# Patient Record
Sex: FEMALE | Race: WHITE | Employment: UNEMPLOYED
[De-identification: names, ages, dates, MRNs, and addresses within clinical notes are randomized per-mention and may not be internally consistent; named-entity substitution may affect disease eponyms.]

---

## 2017-02-17 ENCOUNTER — OFFICE VISIT (OUTPATIENT)
Dept: PRIMARY CARE CLINIC | Facility: CLINIC | Age: 52
End: 2017-02-17

## 2017-02-17 VITALS
TEMPERATURE: 97.7 F | BODY MASS INDEX: 35.27 KG/M2 | HEIGHT: 64 IN | SYSTOLIC BLOOD PRESSURE: 113 MMHG | DIASTOLIC BLOOD PRESSURE: 75 MMHG | WEIGHT: 206.6 LBS | RESPIRATION RATE: 20 BRPM | HEART RATE: 110 BPM

## 2017-02-17 DIAGNOSIS — M25.512 ACUTE PAIN OF LEFT SHOULDER: ICD-10-CM

## 2017-02-17 DIAGNOSIS — F41.8 DEPRESSION WITH ANXIETY: ICD-10-CM

## 2017-02-17 PROCEDURE — 99214 OFFICE O/P EST MOD 30 MIN: CPT | Performed by: NURSE PRACTITIONER

## 2017-02-17 RX ORDER — CITALOPRAM 40 MG/1
40 TABLET ORAL DAILY
Qty: 90 TABLET | Refills: 3 | Status: SHIPPED | OUTPATIENT
Start: 2017-02-17 | End: 2017-12-29 | Stop reason: SDUPTHER

## 2017-02-17 RX ORDER — PREGABALIN 100 MG/1
100 CAPSULE ORAL 3 TIMES DAILY
Qty: 270 CAPSULE | Refills: 3 | Status: SHIPPED | OUTPATIENT
Start: 2017-02-17 | End: 2017-06-09 | Stop reason: SDUPTHER

## 2017-02-17 ASSESSMENT — ENCOUNTER SYMPTOMS
BACK PAIN: 1
SORE THROAT: 0
DIARRHEA: 1
WHEEZING: 0
COUGH: 0
NAUSEA: 0
VISUAL CHANGE: 0
RHINORRHEA: 0
ABDOMINAL PAIN: 0
CONSTIPATION: 0
VOMITING: 0
SHORTNESS OF BREATH: 0

## 2017-02-17 ASSESSMENT — PATIENT HEALTH QUESTIONNAIRE - PHQ9
SUM OF ALL RESPONSES TO PHQ9 QUESTIONS 1 & 2: 2
2. FEELING DOWN, DEPRESSED OR HOPELESS: 1
SUM OF ALL RESPONSES TO PHQ QUESTIONS 1-9: 2
1. LITTLE INTEREST OR PLEASURE IN DOING THINGS: 1

## 2017-02-20 ENCOUNTER — TELEPHONE (OUTPATIENT)
Dept: PRIMARY CARE CLINIC | Facility: CLINIC | Age: 52
End: 2017-02-20

## 2017-03-24 ENCOUNTER — OFFICE VISIT (OUTPATIENT)
Dept: PRIMARY CARE CLINIC | Age: 52
End: 2017-03-24
Payer: COMMERCIAL

## 2017-03-24 VITALS
TEMPERATURE: 97 F | RESPIRATION RATE: 18 BRPM | HEIGHT: 64 IN | DIASTOLIC BLOOD PRESSURE: 62 MMHG | BODY MASS INDEX: 36.23 KG/M2 | SYSTOLIC BLOOD PRESSURE: 119 MMHG | WEIGHT: 212.2 LBS | HEART RATE: 96 BPM

## 2017-03-24 DIAGNOSIS — R07.89 COSTOCHONDRAL PAIN: ICD-10-CM

## 2017-03-24 DIAGNOSIS — J20.9 ACUTE BRONCHITIS, UNSPECIFIED ORGANISM: Primary | ICD-10-CM

## 2017-03-24 PROCEDURE — 99214 OFFICE O/P EST MOD 30 MIN: CPT | Performed by: NURSE PRACTITIONER

## 2017-03-24 RX ORDER — BENZONATATE 200 MG/1
200 CAPSULE ORAL 3 TIMES DAILY PRN
Qty: 20 CAPSULE | Refills: 0 | Status: SHIPPED | OUTPATIENT
Start: 2017-03-24 | End: 2017-03-31

## 2017-03-24 RX ORDER — AZITHROMYCIN 250 MG/1
TABLET, FILM COATED ORAL
COMMUNITY
Start: 2017-03-22 | End: 2017-04-19 | Stop reason: ALTCHOICE

## 2017-03-24 ASSESSMENT — ENCOUNTER SYMPTOMS
HEMOPTYSIS: 0
WHEEZING: 0
NAUSEA: 0
SORE THROAT: 1
TROUBLE SWALLOWING: 0
COUGH: 1
RHINORRHEA: 0
ABDOMINAL PAIN: 0
SHORTNESS OF BREATH: 0
VOMITING: 0
DIARRHEA: 0

## 2017-04-19 ENCOUNTER — OFFICE VISIT (OUTPATIENT)
Dept: PRIMARY CARE CLINIC | Age: 52
End: 2017-04-19
Payer: COMMERCIAL

## 2017-04-19 VITALS
SYSTOLIC BLOOD PRESSURE: 129 MMHG | TEMPERATURE: 96.4 F | RESPIRATION RATE: 18 BRPM | DIASTOLIC BLOOD PRESSURE: 70 MMHG | HEIGHT: 64 IN | WEIGHT: 210.1 LBS | BODY MASS INDEX: 35.87 KG/M2 | HEART RATE: 101 BPM

## 2017-04-19 DIAGNOSIS — L20.84 INTRINSIC ECZEMA: ICD-10-CM

## 2017-04-19 DIAGNOSIS — M79.2 NEUROPATHIC PAIN: ICD-10-CM

## 2017-04-19 DIAGNOSIS — E55.9 VITAMIN D DEFICIENCY: ICD-10-CM

## 2017-04-19 LAB — HBA1C MFR BLD: 11 %

## 2017-04-19 PROCEDURE — 83036 HEMOGLOBIN GLYCOSYLATED A1C: CPT | Performed by: NURSE PRACTITIONER

## 2017-04-19 PROCEDURE — 99214 OFFICE O/P EST MOD 30 MIN: CPT | Performed by: NURSE PRACTITIONER

## 2017-04-19 RX ORDER — AMMONIUM LACTATE 12 G/100G
CREAM TOPICAL
Qty: 1 BOTTLE | Refills: 4 | Status: SHIPPED | OUTPATIENT
Start: 2017-04-19 | End: 2017-05-19

## 2017-04-19 ASSESSMENT — ENCOUNTER SYMPTOMS
CONSTIPATION: 0
BLURRED VISION: 0
NAUSEA: 0
VOMITING: 0
COUGH: 0
WHEEZING: 0
DIARRHEA: 0
SHORTNESS OF BREATH: 0
ABDOMINAL PAIN: 0
SORE THROAT: 0
RHINORRHEA: 0

## 2017-04-21 ENCOUNTER — TELEPHONE (OUTPATIENT)
Dept: PRIMARY CARE CLINIC | Age: 52
End: 2017-04-21

## 2017-06-09 RX ORDER — TRAMADOL HYDROCHLORIDE 50 MG/1
TABLET ORAL
COMMUNITY
Start: 2017-05-08 | End: 2017-08-22 | Stop reason: ALTCHOICE

## 2017-06-09 RX ORDER — PREGABALIN 100 MG/1
100 CAPSULE ORAL 3 TIMES DAILY
Qty: 90 CAPSULE | Refills: 2 | Status: SHIPPED | OUTPATIENT
Start: 2017-06-09 | End: 2017-08-22 | Stop reason: ALTCHOICE

## 2017-07-11 ENCOUNTER — OFFICE VISIT (OUTPATIENT)
Dept: PRIMARY CARE CLINIC | Age: 52
End: 2017-07-11
Payer: COMMERCIAL

## 2017-07-11 VITALS
RESPIRATION RATE: 18 BRPM | BODY MASS INDEX: 36.45 KG/M2 | SYSTOLIC BLOOD PRESSURE: 122 MMHG | HEART RATE: 96 BPM | HEIGHT: 64 IN | DIASTOLIC BLOOD PRESSURE: 82 MMHG | TEMPERATURE: 97.4 F | WEIGHT: 213.5 LBS

## 2017-07-11 DIAGNOSIS — M79.2 NEUROPATHIC PAIN: ICD-10-CM

## 2017-07-11 DIAGNOSIS — M75.42 SHOULDER IMPINGEMENT, LEFT: Primary | ICD-10-CM

## 2017-07-11 PROCEDURE — 99213 OFFICE O/P EST LOW 20 MIN: CPT | Performed by: NURSE PRACTITIONER

## 2017-07-11 ASSESSMENT — ENCOUNTER SYMPTOMS
COUGH: 0
SHORTNESS OF BREATH: 0

## 2017-07-27 ENCOUNTER — TELEPHONE (OUTPATIENT)
Dept: PRIMARY CARE CLINIC | Age: 52
End: 2017-07-27

## 2017-08-03 DIAGNOSIS — M75.42 SHOULDER IMPINGEMENT, LEFT: ICD-10-CM

## 2017-08-03 DIAGNOSIS — M79.2 NEUROPATHIC PAIN: ICD-10-CM

## 2017-08-08 ENCOUNTER — TELEPHONE (OUTPATIENT)
Dept: PRIMARY CARE CLINIC | Age: 52
End: 2017-08-08

## 2017-08-22 ENCOUNTER — HOSPITAL ENCOUNTER (OUTPATIENT)
Age: 52
Discharge: HOME | End: 2017-08-22
Payer: COMMERCIAL

## 2017-08-22 ENCOUNTER — HOSPITAL ENCOUNTER (OUTPATIENT)
Dept: PREADMISSION TESTING | Age: 52
Discharge: HOME | End: 2017-08-22
Payer: COMMERCIAL

## 2017-08-22 VITALS
TEMPERATURE: 96.1 F | OXYGEN SATURATION: 100 % | DIASTOLIC BLOOD PRESSURE: 70 MMHG | SYSTOLIC BLOOD PRESSURE: 126 MMHG | HEIGHT: 64 IN | WEIGHT: 216.9 LBS | HEART RATE: 99 BPM | RESPIRATION RATE: 20 BRPM | BODY MASS INDEX: 37.03 KG/M2

## 2017-08-22 DIAGNOSIS — E55.9 VITAMIN D DEFICIENCY: ICD-10-CM

## 2017-08-22 LAB
ABSOLUTE EOS #: 0.1 K/UL (ref 0–0.4)
ABSOLUTE LYMPH #: 3.2 K/UL (ref 1–4.8)
ABSOLUTE MONO #: 0.5 K/UL (ref 0–1)
ALBUMIN SERPL-MCNC: 4.2 G/DL (ref 3.5–5.2)
ALBUMIN/GLOBULIN RATIO: 1.4 (ref 1–2.5)
ALP BLD-CCNC: 141 U/L (ref 35–104)
ALT SERPL-CCNC: 20 U/L (ref 5–33)
ANION GAP SERPL CALCULATED.3IONS-SCNC: 15 MMOL/L (ref 9–17)
AST SERPL-CCNC: 14 U/L
BASOPHILS # BLD: 0 %
BASOPHILS ABSOLUTE: 0 K/UL (ref 0–0.2)
BILIRUB SERPL-MCNC: 0.31 MG/DL (ref 0.3–1.2)
BUN BLDV-MCNC: 14 MG/DL (ref 6–20)
BUN/CREAT BLD: 32 (ref 9–20)
CALCIUM SERPL-MCNC: 9.7 MG/DL (ref 8.6–10.4)
CHLORIDE BLD-SCNC: 97 MMOL/L (ref 98–107)
CHOLESTEROL/HDL RATIO: 11.4
CHOLESTEROL: 263 MG/DL
CO2: 24 MMOL/L (ref 20–31)
CREAT SERPL-MCNC: 0.44 MG/DL (ref 0.5–0.9)
CREATININE URINE: 55.3 MG/DL (ref 28–217)
DIFFERENTIAL TYPE: ABNORMAL
EOSINOPHILS RELATIVE PERCENT: 1 %
ESTIMATED AVERAGE GLUCOSE: 243 MG/DL
GFR AFRICAN AMERICAN: >60 ML/MIN
GFR NON-AFRICAN AMERICAN: >60 ML/MIN
GFR SERPL CREATININE-BSD FRML MDRD: ABNORMAL ML/MIN/{1.73_M2}
GFR SERPL CREATININE-BSD FRML MDRD: ABNORMAL ML/MIN/{1.73_M2}
GLUCOSE BLD-MCNC: 330 MG/DL (ref 70–99)
HBA1C MFR BLD: 10.1 % (ref 4.8–5.9)
HCT VFR BLD CALC: 46.6 % (ref 36–46)
HDLC SERPL-MCNC: 23 MG/DL
HEMOGLOBIN: 15.7 G/DL (ref 12–16)
LDL CHOLESTEROL DIRECT: 159 MG/DL
LDL CHOLESTEROL: ABNORMAL MG/DL (ref 0–130)
LYMPHOCYTES # BLD: 30 %
MCH RBC QN AUTO: 29 PG (ref 26–34)
MCHC RBC AUTO-ENTMCNC: 33.8 G/DL (ref 31–37)
MCV RBC AUTO: 85.9 FL (ref 80–100)
MICROALBUMIN/CREAT 24H UR: 21 MG/L
MICROALBUMIN/CREAT UR-RTO: 38 MCG/MG CREAT
MONOCYTES # BLD: 5 %
PDW BLD-RTO: 13.5 % (ref 12.1–15.2)
PLATELET # BLD: 242 K/UL (ref 140–450)
PLATELET ESTIMATE: ABNORMAL
PMV BLD AUTO: 9.3 FL (ref 6–12)
POTASSIUM SERPL-SCNC: 4.8 MMOL/L (ref 3.7–5.3)
RBC # BLD: 5.42 M/UL (ref 4–5.2)
RBC # BLD: ABNORMAL 10*6/UL
SEG NEUTROPHILS: 64 %
SEGMENTED NEUTROPHILS ABSOLUTE COUNT: 6.9 K/UL (ref 1.8–7.7)
SODIUM BLD-SCNC: 136 MMOL/L (ref 135–144)
TOTAL PROTEIN: 7.3 G/DL (ref 6.4–8.3)
TRIGL SERPL-MCNC: 754 MG/DL
VITAMIN D 25-HYDROXY: 8.7 NG/ML (ref 30–100)
VLDLC SERPL CALC-MCNC: ABNORMAL MG/DL (ref 1–30)
WBC # BLD: 10.8 K/UL (ref 3.5–11)
WBC # BLD: ABNORMAL 10*3/UL

## 2017-08-22 PROCEDURE — 93005 ELECTROCARDIOGRAM TRACING: CPT

## 2017-08-22 PROCEDURE — 36415 COLL VENOUS BLD VENIPUNCTURE: CPT

## 2017-08-22 PROCEDURE — 82043 UR ALBUMIN QUANTITATIVE: CPT

## 2017-08-22 PROCEDURE — 80061 LIPID PANEL: CPT

## 2017-08-22 PROCEDURE — 83036 HEMOGLOBIN GLYCOSYLATED A1C: CPT

## 2017-08-22 PROCEDURE — 83721 ASSAY OF BLOOD LIPOPROTEIN: CPT

## 2017-08-22 PROCEDURE — 85025 COMPLETE CBC W/AUTO DIFF WBC: CPT

## 2017-08-22 PROCEDURE — 82306 VITAMIN D 25 HYDROXY: CPT

## 2017-08-22 PROCEDURE — 80053 COMPREHEN METABOLIC PANEL: CPT

## 2017-08-22 PROCEDURE — 82570 ASSAY OF URINE CREATININE: CPT

## 2017-08-22 RX ORDER — LOPERAMIDE HYDROCHLORIDE 2 MG/1
2 CAPSULE ORAL NIGHTLY
COMMUNITY
End: 2019-02-06 | Stop reason: ALTCHOICE

## 2017-08-22 RX ORDER — GABAPENTIN 600 MG/1
1200 TABLET ORAL NIGHTLY
Status: ON HOLD | COMMUNITY
End: 2017-11-23 | Stop reason: HOSPADM

## 2017-08-22 RX ORDER — GABAPENTIN 600 MG/1
600 TABLET ORAL 2 TIMES DAILY
COMMUNITY
End: 2018-01-24 | Stop reason: SDUPTHER

## 2017-08-22 ASSESSMENT — PAIN DESCRIPTION - LOCATION: LOCATION: ELBOW

## 2017-08-22 ASSESSMENT — PAIN DESCRIPTION - ORIENTATION: ORIENTATION: LEFT

## 2017-08-22 ASSESSMENT — PAIN SCALES - GENERAL: PAINLEVEL_OUTOF10: 5

## 2017-08-22 ASSESSMENT — PAIN DESCRIPTION - PAIN TYPE: TYPE: CHRONIC PAIN

## 2017-08-23 ENCOUNTER — TELEPHONE (OUTPATIENT)
Dept: PRIMARY CARE CLINIC | Age: 52
End: 2017-08-23

## 2017-08-23 LAB
EKG ATRIAL RATE: 91 BPM
EKG P AXIS: 55 DEGREES
EKG P-R INTERVAL: 136 MS
EKG Q-T INTERVAL: 364 MS
EKG QRS DURATION: 86 MS
EKG QTC CALCULATION (BAZETT): 447 MS
EKG R AXIS: 4 DEGREES
EKG T AXIS: 30 DEGREES
EKG VENTRICULAR RATE: 91 BPM

## 2017-08-24 ENCOUNTER — HOSPITAL ENCOUNTER (EMERGENCY)
Age: 52
Discharge: HOME | End: 2017-08-24
Attending: EMERGENCY MEDICINE
Payer: COMMERCIAL

## 2017-08-24 ENCOUNTER — APPOINTMENT (OUTPATIENT)
Dept: CT IMAGING | Age: 52
End: 2017-08-24
Payer: COMMERCIAL

## 2017-08-24 VITALS
DIASTOLIC BLOOD PRESSURE: 68 MMHG | BODY MASS INDEX: 36.88 KG/M2 | SYSTOLIC BLOOD PRESSURE: 141 MMHG | OXYGEN SATURATION: 99 % | HEART RATE: 85 BPM | RESPIRATION RATE: 18 BRPM | HEIGHT: 64 IN | TEMPERATURE: 97.4 F | WEIGHT: 216 LBS

## 2017-08-24 DIAGNOSIS — R42 VERTIGO: Primary | ICD-10-CM

## 2017-08-24 LAB
ABSOLUTE EOS #: 0.1 K/UL (ref 0–0.4)
ABSOLUTE LYMPH #: 3.7 K/UL (ref 1–4.8)
ABSOLUTE MONO #: 0.5 K/UL (ref 0–1)
ANION GAP SERPL CALCULATED.3IONS-SCNC: 19 MMOL/L (ref 9–17)
BASOPHILS # BLD: 0 %
BASOPHILS ABSOLUTE: 0 K/UL (ref 0–0.2)
BUN BLDV-MCNC: 11 MG/DL (ref 6–20)
BUN/CREAT BLD: 24 (ref 9–20)
CALCIUM SERPL-MCNC: 9.5 MG/DL (ref 8.6–10.4)
CHLORIDE BLD-SCNC: 95 MMOL/L (ref 98–107)
CO2: 24 MMOL/L (ref 20–31)
CREAT SERPL-MCNC: 0.45 MG/DL (ref 0.5–0.9)
DIFFERENTIAL TYPE: ABNORMAL
EOSINOPHILS RELATIVE PERCENT: 2 %
GFR AFRICAN AMERICAN: >60 ML/MIN
GFR NON-AFRICAN AMERICAN: >60 ML/MIN
GFR SERPL CREATININE-BSD FRML MDRD: ABNORMAL ML/MIN/{1.73_M2}
GFR SERPL CREATININE-BSD FRML MDRD: ABNORMAL ML/MIN/{1.73_M2}
GLUCOSE BLD-MCNC: 311 MG/DL (ref 70–99)
HCT VFR BLD CALC: 48.5 % (ref 36–46)
HEMOGLOBIN: 16.5 G/DL (ref 12–16)
LYMPHOCYTES # BLD: 38 %
MCH RBC QN AUTO: 29.2 PG (ref 26–34)
MCHC RBC AUTO-ENTMCNC: 33.9 G/DL (ref 31–37)
MCV RBC AUTO: 85.9 FL (ref 80–100)
MONOCYTES # BLD: 5 %
PDW BLD-RTO: 12.9 % (ref 12.1–15.2)
PLATELET # BLD: 246 K/UL (ref 140–450)
PLATELET ESTIMATE: ABNORMAL
PMV BLD AUTO: 9 FL (ref 6–12)
POTASSIUM SERPL-SCNC: 4.6 MMOL/L (ref 3.7–5.3)
RBC # BLD: 5.65 M/UL (ref 4–5.2)
RBC # BLD: ABNORMAL 10*6/UL
SEG NEUTROPHILS: 55 %
SEGMENTED NEUTROPHILS ABSOLUTE COUNT: 5.3 K/UL (ref 1.8–7.7)
SODIUM BLD-SCNC: 138 MMOL/L (ref 135–144)
WBC # BLD: 9.7 K/UL (ref 3.5–11)
WBC # BLD: ABNORMAL 10*3/UL

## 2017-08-24 PROCEDURE — 96375 TX/PRO/DX INJ NEW DRUG ADDON: CPT

## 2017-08-24 PROCEDURE — 6360000002 HC RX W HCPCS: Performed by: EMERGENCY MEDICINE

## 2017-08-24 PROCEDURE — 96376 TX/PRO/DX INJ SAME DRUG ADON: CPT

## 2017-08-24 PROCEDURE — 99284 EMERGENCY DEPT VISIT MOD MDM: CPT

## 2017-08-24 PROCEDURE — 2580000003 HC RX 258: Performed by: EMERGENCY MEDICINE

## 2017-08-24 PROCEDURE — 6370000000 HC RX 637 (ALT 250 FOR IP): Performed by: EMERGENCY MEDICINE

## 2017-08-24 PROCEDURE — 85025 COMPLETE CBC W/AUTO DIFF WBC: CPT

## 2017-08-24 PROCEDURE — 80048 BASIC METABOLIC PNL TOTAL CA: CPT

## 2017-08-24 PROCEDURE — 93005 ELECTROCARDIOGRAM TRACING: CPT

## 2017-08-24 PROCEDURE — 70450 CT HEAD/BRAIN W/O DYE: CPT

## 2017-08-24 PROCEDURE — 96374 THER/PROPH/DIAG INJ IV PUSH: CPT

## 2017-08-24 PROCEDURE — 36415 COLL VENOUS BLD VENIPUNCTURE: CPT

## 2017-08-24 RX ORDER — MECLIZINE HCL 12.5 MG/1
12.5 TABLET ORAL 3 TIMES DAILY PRN
Qty: 15 TABLET | Refills: 0 | Status: SHIPPED | OUTPATIENT
Start: 2017-08-24 | End: 2018-01-24 | Stop reason: ALTCHOICE

## 2017-08-24 RX ORDER — ONDANSETRON 2 MG/ML
4 INJECTION INTRAMUSCULAR; INTRAVENOUS ONCE
Status: COMPLETED | OUTPATIENT
Start: 2017-08-24 | End: 2017-08-24

## 2017-08-24 RX ORDER — ONDANSETRON 4 MG/1
4 TABLET, ORALLY DISINTEGRATING ORAL EVERY 8 HOURS PRN
Qty: 12 TABLET | Refills: 0 | Status: SHIPPED | OUTPATIENT
Start: 2017-08-24 | End: 2017-08-30 | Stop reason: ALTCHOICE

## 2017-08-24 RX ORDER — MECLIZINE HCL 12.5 MG/1
25 TABLET ORAL ONCE
Status: COMPLETED | OUTPATIENT
Start: 2017-08-24 | End: 2017-08-24

## 2017-08-24 RX ORDER — 0.9 % SODIUM CHLORIDE 0.9 %
500 INTRAVENOUS SOLUTION INTRAVENOUS ONCE
Status: COMPLETED | OUTPATIENT
Start: 2017-08-24 | End: 2017-08-24

## 2017-08-24 RX ORDER — PSEUDOEPHEDRINE HCL 120 MG/1
120 TABLET, FILM COATED, EXTENDED RELEASE ORAL 2 TIMES DAILY
Status: DISCONTINUED | OUTPATIENT
Start: 2017-08-24 | End: 2017-08-24 | Stop reason: HOSPADM

## 2017-08-24 RX ORDER — DIAZEPAM 5 MG/ML
5 INJECTION, SOLUTION INTRAMUSCULAR; INTRAVENOUS ONCE
Status: COMPLETED | OUTPATIENT
Start: 2017-08-24 | End: 2017-08-24

## 2017-08-24 RX ORDER — DIAZEPAM 5 MG/ML
2.5 INJECTION, SOLUTION INTRAMUSCULAR; INTRAVENOUS ONCE
Status: COMPLETED | OUTPATIENT
Start: 2017-08-24 | End: 2017-08-24

## 2017-08-24 RX ORDER — PSEUDOEPHEDRINE HCL 120 MG/1
120 TABLET, FILM COATED, EXTENDED RELEASE ORAL 2 TIMES DAILY
Status: DISCONTINUED | OUTPATIENT
Start: 2017-08-24 | End: 2017-08-24

## 2017-08-24 RX ADMIN — PSEUDOEPHEDRINE HYDROCHLORIDE 120 MG: 120 TABLET, FILM COATED, EXTENDED RELEASE ORAL at 19:29

## 2017-08-24 RX ADMIN — ONDANSETRON 4 MG: 2 INJECTION INTRAMUSCULAR; INTRAVENOUS at 16:38

## 2017-08-24 RX ADMIN — SODIUM CHLORIDE 500 ML: 9 INJECTION, SOLUTION INTRAVENOUS at 14:15

## 2017-08-24 RX ADMIN — ONDANSETRON 4 MG: 2 INJECTION INTRAMUSCULAR; INTRAVENOUS at 15:05

## 2017-08-24 RX ADMIN — DIAZEPAM 2.5 MG: 5 INJECTION, SOLUTION INTRAMUSCULAR; INTRAVENOUS at 14:18

## 2017-08-24 RX ADMIN — MECLIZINE 25 MG: 12.5 TABLET ORAL at 15:07

## 2017-08-24 RX ADMIN — DIAZEPAM 5 MG: 5 INJECTION, SOLUTION INTRAMUSCULAR; INTRAVENOUS at 16:59

## 2017-08-24 RX ADMIN — ONDANSETRON 4 MG: 2 INJECTION INTRAMUSCULAR; INTRAVENOUS at 14:16

## 2017-08-25 ENCOUNTER — OFFICE VISIT (OUTPATIENT)
Dept: PRIMARY CARE CLINIC | Age: 52
End: 2017-08-25
Payer: COMMERCIAL

## 2017-08-25 VITALS
BODY MASS INDEX: 36.39 KG/M2 | RESPIRATION RATE: 18 BRPM | HEART RATE: 86 BPM | SYSTOLIC BLOOD PRESSURE: 122 MMHG | TEMPERATURE: 97.7 F | DIASTOLIC BLOOD PRESSURE: 79 MMHG | WEIGHT: 212 LBS

## 2017-08-25 DIAGNOSIS — M54.2 NECK PAIN: ICD-10-CM

## 2017-08-25 DIAGNOSIS — R42 VERTIGO: Primary | ICD-10-CM

## 2017-08-25 DIAGNOSIS — E86.0 DEHYDRATION: ICD-10-CM

## 2017-08-25 PROCEDURE — 99213 OFFICE O/P EST LOW 20 MIN: CPT | Performed by: NURSE PRACTITIONER

## 2017-08-25 ASSESSMENT — ENCOUNTER SYMPTOMS
ABDOMINAL DISTENTION: 0
VISUAL CHANGE: 0
WHEEZING: 0
NAUSEA: 1
VOMITING: 0
SORE THROAT: 0
COUGH: 0
SHORTNESS OF BREATH: 0
PHOTOPHOBIA: 1
BACK PAIN: 1
ABDOMINAL PAIN: 0
RESPIRATORY NEGATIVE: 1
CHEST TIGHTNESS: 0

## 2017-08-28 LAB
EKG ATRIAL RATE: 82 BPM
EKG P AXIS: 60 DEGREES
EKG P-R INTERVAL: 138 MS
EKG Q-T INTERVAL: 384 MS
EKG QRS DURATION: 84 MS
EKG QTC CALCULATION (BAZETT): 448 MS
EKG R AXIS: 20 DEGREES
EKG T AXIS: 27 DEGREES
EKG VENTRICULAR RATE: 82 BPM

## 2017-08-30 ENCOUNTER — OFFICE VISIT (OUTPATIENT)
Dept: PRIMARY CARE CLINIC | Age: 52
End: 2017-08-30
Payer: COMMERCIAL

## 2017-08-30 VITALS
HEART RATE: 104 BPM | DIASTOLIC BLOOD PRESSURE: 80 MMHG | BODY MASS INDEX: 36.6 KG/M2 | WEIGHT: 214.4 LBS | HEIGHT: 64 IN | RESPIRATION RATE: 16 BRPM | TEMPERATURE: 98.1 F | SYSTOLIC BLOOD PRESSURE: 116 MMHG

## 2017-08-30 DIAGNOSIS — Z12.11 SCREENING FOR COLORECTAL CANCER: Primary | ICD-10-CM

## 2017-08-30 DIAGNOSIS — E55.9 VITAMIN D DEFICIENCY: ICD-10-CM

## 2017-08-30 DIAGNOSIS — Z12.12 SCREENING FOR COLORECTAL CANCER: Primary | ICD-10-CM

## 2017-08-30 DIAGNOSIS — E78.5 DYSLIPIDEMIA: ICD-10-CM

## 2017-08-30 DIAGNOSIS — Z12.31 ENCOUNTER FOR SCREENING MAMMOGRAM FOR BREAST CANCER: ICD-10-CM

## 2017-08-30 PROCEDURE — 99214 OFFICE O/P EST MOD 30 MIN: CPT | Performed by: NURSE PRACTITIONER

## 2017-08-30 RX ORDER — ATORVASTATIN CALCIUM 40 MG/1
40 TABLET, FILM COATED ORAL DAILY
Qty: 90 TABLET | Refills: 3 | Status: SHIPPED | OUTPATIENT
Start: 2017-08-30 | End: 2017-12-29 | Stop reason: SDUPTHER

## 2017-08-30 RX ORDER — CHOLECALCIFEROL (VITAMIN D3) 1250 MCG
1 CAPSULE ORAL WEEKLY
Qty: 8 CAPSULE | Refills: 0 | Status: SHIPPED | OUTPATIENT
Start: 2017-08-30 | End: 2018-03-27 | Stop reason: ALTCHOICE

## 2017-08-30 RX ORDER — GLIPIZIDE 5 MG/1
5 TABLET, FILM COATED, EXTENDED RELEASE ORAL DAILY
Qty: 90 TABLET | Refills: 3 | Status: SHIPPED | OUTPATIENT
Start: 2017-08-30 | End: 2017-12-29 | Stop reason: SDUPTHER

## 2017-08-30 ASSESSMENT — ENCOUNTER SYMPTOMS
VOMITING: 0
ABDOMINAL PAIN: 0
SORE THROAT: 0
CONSTIPATION: 0
DIARRHEA: 0
NAUSEA: 0
COUGH: 0
RHINORRHEA: 0
SHORTNESS OF BREATH: 0
WHEEZING: 0

## 2017-09-13 ENCOUNTER — TELEPHONE (OUTPATIENT)
Dept: PRIMARY CARE CLINIC | Age: 52
End: 2017-09-13

## 2017-09-13 DIAGNOSIS — Z12.12 SCREENING FOR COLORECTAL CANCER: ICD-10-CM

## 2017-09-13 DIAGNOSIS — Z12.11 SCREENING FOR COLORECTAL CANCER: ICD-10-CM

## 2017-09-13 LAB
CONTROL: PRESENT
HEMOCCULT STL QL: NORMAL

## 2017-09-13 PROCEDURE — 82274 ASSAY TEST FOR BLOOD FECAL: CPT | Performed by: NURSE PRACTITIONER

## 2017-10-03 ENCOUNTER — OFFICE VISIT (OUTPATIENT)
Dept: PRIMARY CARE CLINIC | Age: 52
End: 2017-10-03
Payer: MEDICAID

## 2017-10-03 VITALS
WEIGHT: 210.9 LBS | HEART RATE: 102 BPM | RESPIRATION RATE: 18 BRPM | SYSTOLIC BLOOD PRESSURE: 109 MMHG | BODY MASS INDEX: 36.2 KG/M2 | TEMPERATURE: 97.7 F | DIASTOLIC BLOOD PRESSURE: 77 MMHG

## 2017-10-03 DIAGNOSIS — J06.9 UPPER RESPIRATORY TRACT INFECTION, UNSPECIFIED TYPE: Primary | ICD-10-CM

## 2017-10-03 PROCEDURE — 99213 OFFICE O/P EST LOW 20 MIN: CPT | Performed by: NURSE PRACTITIONER

## 2017-10-03 ASSESSMENT — ENCOUNTER SYMPTOMS
WHEEZING: 0
VOICE CHANGE: 0
DIARRHEA: 0
EYES NEGATIVE: 1
STRIDOR: 0
COUGH: 1
SINUS PAIN: 0
RHINORRHEA: 1
VOMITING: 0
NAUSEA: 1
SORE THROAT: 0
SINUS PRESSURE: 0
TROUBLE SWALLOWING: 0
ABDOMINAL PAIN: 0
SWOLLEN GLANDS: 0
SHORTNESS OF BREATH: 0

## 2017-10-03 NOTE — MR AVS SNAPSHOT
type 2 diabetes, stroke, gallstones, arthritis, sleep apnea, and certain cancers. BMI is not perfect. It may overestimate body fat in athletes and people who are more muscular. Even a small weight loss (between 5 and 10 percent of your current weight) by decreasing your calorie intake and becoming more physically active will help lower your risk of developing or worsening diseases associated with obesity. Learn more at: Xfluential.uk          Instructions         Viral Respiratory Infection: Care Instructions  Your Care Instructions  Viruses are very small organisms. They grow in number after they enter your body. There are many types that cause different illnesses, such as colds and the mumps. The symptoms of a viral respiratory infection often start quickly. They include a fever, sore throat, and runny nose. You may also just not feel well. Or you may not want to eat much. Most viral respiratory infections are not serious. They usually get better with time and self-care. Antibiotics are not used to treat a viral infection. That's because antibiotics will not help cure a viral illness. In some cases, antiviral medicine can help your body fight a serious viral infection. Follow-up care is a key part of your treatment and safety. Be sure to make and go to all appointments, and call your doctor if you are having problems. It's also a good idea to know your test results and keep a list of the medicines you take. How can you care for yourself at home? · Rest as much as possible until you feel better. · Be safe with medicines. Take your medicine exactly as prescribed. Call your doctor if you think you are having a problem with your medicine. You will get more details on the specific medicine your doctor prescribes.   · Take an over-the-counter pain medicine, such as acetaminophen (Tylenol), ibuprofen (Advil, Motrin), or naproxen (Aleve), as needed for pain and fever. Read and follow all instructions on the label. Do not give aspirin to anyone younger than 20. It has been linked to Reye syndrome, a serious illness. · Drink plenty of fluids, enough so that your urine is light yellow or clear like water. Hot fluids, such as tea or soup, may help relieve congestion in your nose and throat. If you have kidney, heart, or liver disease and have to limit fluids, talk with your doctor before you increase the amount of fluids you drink. · Try to clear mucus from your lungs by breathing deeply and coughing. · Gargle with warm salt water once an hour. This can help reduce swelling and throat pain. Use 1 teaspoon of salt mixed in 1 cup of warm water. · Do not smoke or allow others to smoke around you. If you need help quitting, talk to your doctor about stop-smoking programs and medicines. These can increase your chances of quitting for good. To avoid spreading the virus  · Cough or sneeze into a tissue. Then throw the tissue away. · If you don't have a tissue, use your hand to cover your cough or sneeze. Then clean your hand. You can also cough into your sleeve. · Wash your hands often. Use soap and warm water. Wash for 15 to 20 seconds each time. · If you don't have soap and water near you, you can clean your hands with alcohol wipes or gel. When should you call for help? Call your doctor now or seek immediate medical care if:  · You have a new or higher fever. · Your fever lasts more than 48 hours. · You have trouble breathing. · You have a fever with a stiff neck or a severe headache. · You are sensitive to light. · You feel very sleepy or confused. Watch closely for changes in your health, and be sure to contact your doctor if:  · You do not get better as expected. Where can you learn more? Go to [email or web address].cisimple. org and sign in to your [employer] account.  Enter A414 in the Graymatics box to learn more Vitamin D deficiency    Uncontrolled type 2 diabetes mellitus with diabetic polyneuropathy, with long-term current use of insulin ([address]    Nuclear sclerotic cataract of left eye    Controlled type 2 diabetes mellitus with diabetic polyneuropathy, without long-term current use of insulin ([address]      Immunizations as of 10/3/2017     Name Date    Influenza Vaccine, unspecified formulation 9/2/2016    Tdap (Boostrix, Adacel) 4/28/2013      Preventive Care        Date Due    Mammograms are recommended every 2 years for low/average risk patients aged 48 - 69, and every year for high risk patients per updated national guidelines. However these guidelines can be individualized by your provider. 8/8/2017    Eye Exam By An Eye Doctor 8/11/2017    Diabetic Foot Exam 9/1/2017    Yearly Flu Vaccine (1) 11/25/2017 (Originally 9/1/2017)    Pneumococcal Vaccine - Pneumovax for adults aged 19-64 years with: chronic heart disease, chronic lung disease, diabetes mellitus, alcoholism, chronic liver disease, or cigarette smoking. (1 of 1 - PPSV23) 2/17/2018 (Originally 8/8/1986)    Pap Smear 2/25/2018 (Originally 8/8/1988)    Hemoglobin A1C (Test For Long-Term Glucose Control) 11/22/2017    Urine Check For Kidney Problems 8/22/2018    Cholesterol Screening 8/22/2018    Colon Cancer Stool Test 9/13/2018    Tetanus Combination Vaccine (2 - Td) 4/28/2023            [employer] Signup           Our records indicate that you have an active LeadPages account. You can view your After Visit Summary by going to [email or web address] and logging in with your LeadPages username and password. If you don't have a LeadPages username and password but a parent or guardian has access to your record, the parent or guardian should login with their own LeadPages username and password and access your record to view the After Visit Summary.      Additional Information

## 2017-10-03 NOTE — PATIENT INSTRUCTIONS
Viral Respiratory Infection: Care Instructions  Your Care Instructions  Viruses are very small organisms. They grow in number after they enter your body. There are many types that cause different illnesses, such as colds and the mumps. The symptoms of a viral respiratory infection often start quickly. They include a fever, sore throat, and runny nose. You may also just not feel well. Or you may not want to eat much. Most viral respiratory infections are not serious. They usually get better with time and self-care. Antibiotics are not used to treat a viral infection. That's because antibiotics will not help cure a viral illness. In some cases, antiviral medicine can help your body fight a serious viral infection. Follow-up care is a key part of your treatment and safety. Be sure to make and go to all appointments, and call your doctor if you are having problems. It's also a good idea to know your test results and keep a list of the medicines you take. How can you care for yourself at home? · Rest as much as possible until you feel better. · Be safe with medicines. Take your medicine exactly as prescribed. Call your doctor if you think you are having a problem with your medicine. You will get more details on the specific medicine your doctor prescribes. · Take an over-the-counter pain medicine, such as acetaminophen (Tylenol), ibuprofen (Advil, Motrin), or naproxen (Aleve), as needed for pain and fever. Read and follow all instructions on the label. Do not give aspirin to anyone younger than 20. It has been linked to Reye syndrome, a serious illness. · Drink plenty of fluids, enough so that your urine is light yellow or clear like water. Hot fluids, such as tea or soup, may help relieve congestion in your nose and throat. If you have kidney, heart, or liver disease and have to limit fluids, talk with your doctor before you increase the amount of fluids you drink.   · Try to clear mucus from your lungs by

## 2017-10-03 NOTE — PROGRESS NOTES
present. No rhinorrhea or sinus tenderness. Right sinus exhibits no maxillary sinus tenderness and no frontal sinus tenderness. Left sinus exhibits no maxillary sinus tenderness and no frontal sinus tenderness. Mouth/Throat: Uvula is midline, oropharynx is clear and moist and mucous membranes are normal. No trismus in the jaw. No oropharyngeal exudate, posterior oropharyngeal edema, posterior oropharyngeal erythema or tonsillar abscesses. Eyes: Conjunctivae and EOM are normal. Pupils are equal, round, and reactive to light. Right eye exhibits no discharge. Left eye exhibits no discharge. Neck: Normal range of motion. Neck supple. No tracheal deviation present. No thyromegaly present. Cardiovascular: Normal rate, regular rhythm, normal heart sounds and intact distal pulses. Exam reveals no gallop and no friction rub. No murmur heard. Pulses:       Radial pulses are 2+ on the left side. Pulmonary/Chest: Effort normal and breath sounds normal. No tachypnea. No respiratory distress. She has no decreased breath sounds. She has no wheezes. She has no rhonchi. She has no rales. Occasional dry cough in office  Breath sounds are clear to auscultation over posterior bilateral fields. Chest expansion is symmetrical with non-restricted air movement. Abdominal: Soft. Bowel sounds are normal.   Musculoskeletal: Normal range of motion. She exhibits no edema or tenderness. Lymphadenopathy:     She has no cervical adenopathy. Neurological: She is alert and oriented to person, place, and time. No cranial nerve deficit. She exhibits normal muscle tone. Coordination and gait normal.   Skin: Skin is warm and dry. No rash noted. She is not diaphoretic. No erythema. No pallor. Psychiatric: She has a normal mood and affect. Her speech is normal and behavior is normal. Judgment and thought content normal.   Nursing note and vitals reviewed. There were no vitals taken for this visit.     Assessment:     No diagnosis found. 1. Upper respiratory tract infection, unspecified type         Plan:   Lacie appears to have a viral URI. Treatment for this condition is largely supportive. Symtoms are usually present with this type of illness for approximately 5-7 days. There is no sign of bacterial infection and therefore there is no indication for antibiotics. Lacie is to increase clear fluids and rest. Saline and suctioning the nose and a vaporizer can be used if indicated. Tylenol or Motrin can be used for fever or pain if indicated and appropriate. Caregiver/patient informed today if any severe or worsening of symptoms, spikes in fever, difficulty swallowing, respiratory distress to go to ED. Caregiver/patient verbalizes understanding. Discussed exam, POCT findings, plan of care (including prescriptive and supportive as listed below) and follow-up at length with patient and or Patient. Reviewed all prescribed and recommended medications, administration and side effects. Specifically: acetaminophen, ibuprofen,  No antibiotics indicated today. Encouraged to return to [address] [place] [address] for no improvement and or worsening of symptoms. To ER or call 911 if any difficulty breathing, shortness of breath, inability to swallow, hives or temp greater than 103 degrees. Questions answered. They verbalized understanding and agreement. No Follow-up on file. No orders of the defined types were placed in this encounter. She is asked to follow-up if symptoms persist or worsen. All patient questions answered. Pt voiced understanding.       Electronically signed by Fabiana Toscano on 10/3/2017 at 2:37 PM

## 2017-10-10 ENCOUNTER — OFFICE VISIT (OUTPATIENT)
Dept: PRIMARY CARE CLINIC | Age: 52
End: 2017-10-10
Payer: MEDICAID

## 2017-10-10 VITALS
HEART RATE: 83 BPM | TEMPERATURE: 97.7 F | BODY MASS INDEX: 36.56 KG/M2 | SYSTOLIC BLOOD PRESSURE: 109 MMHG | DIASTOLIC BLOOD PRESSURE: 62 MMHG | WEIGHT: 213 LBS | RESPIRATION RATE: 22 BRPM

## 2017-10-10 DIAGNOSIS — J20.9 ACUTE BRONCHITIS, UNSPECIFIED ORGANISM: ICD-10-CM

## 2017-10-10 DIAGNOSIS — J01.40 ACUTE NON-RECURRENT PANSINUSITIS: Primary | ICD-10-CM

## 2017-10-10 PROCEDURE — 99213 OFFICE O/P EST LOW 20 MIN: CPT | Performed by: NURSE PRACTITIONER

## 2017-10-10 RX ORDER — BENZONATATE 200 MG/1
200 CAPSULE ORAL 3 TIMES DAILY PRN
Qty: 20 CAPSULE | Refills: 1 | Status: SHIPPED | OUTPATIENT
Start: 2017-10-10 | End: 2017-10-17

## 2017-10-10 RX ORDER — AMOXICILLIN AND CLAVULANATE POTASSIUM 875; 125 MG/1; MG/1
1 TABLET, FILM COATED ORAL 2 TIMES DAILY
Qty: 20 TABLET | Refills: 0 | Status: SHIPPED | OUTPATIENT
Start: 2017-10-10 | End: 2017-10-20

## 2017-10-10 ASSESSMENT — ENCOUNTER SYMPTOMS
SINUS PRESSURE: 1
DIARRHEA: 0
COUGH: 1
RHINORRHEA: 1
SHORTNESS OF BREATH: 0
WHEEZING: 0
VOMITING: 0
SORE THROAT: 0
SINUS PAIN: 1
TROUBLE SWALLOWING: 0

## 2017-10-10 NOTE — LETTER
[address] [address]  Phone: [phone]  Fax: [address] [facility], [facility]        October 10, 2017     Patient: Zenaida Morris   YOB: 1965   Date of Visit: 10/10/2017       To Whom it May Concern: It is my medical opinion that Clara Bonilla should remain out of work until 01/10/2018. If you have any questions or concerns, please don't hesitate to call.     Sincerely,         Linda Martin Might, CNP

## 2017-10-10 NOTE — LETTER
[address] [address]  Phone: [phone]  Fax: [phone] [facility], [facility]        October 10, 2017     Patient: Danette Jara   YOB: 1965   Date of Visit: 10/10/2017       To Whom It May Concern: It is my medical opinion that [facility] should remain out of work until 01/10/2017. If you have any questions or concerns, please don't hesitate to call.     Sincerely,        Nellie Ely Might, CNP

## 2017-10-10 NOTE — PROGRESS NOTES
[facility] PHYSICIANS  [facility] PRIMARY CARE [address]  Dept: [phone]  Dept Fax: [phone]    Ainsley Castellanos is a 50 y.o. female who presents to the [facility] in Care today for her medical conditions/complaints as noted below. Ainsley Castellanos is c/o of Cough (& chest congestion)      HPI:     Amanda Sampson is here today for a walk in care visit. Cough   This is a new problem. The current episode started in the past 7 days. The problem has been gradually worsening. The cough is productive of sputum. Associated symptoms include chills, nasal congestion, postnasal drip and rhinorrhea. Pertinent negatives include no chest pain, fever, rash, sore throat, shortness of breath or wheezing. Associated symptoms comments: C/o wheezing @ night, chest congestion. The symptoms are aggravated by lying down. She has tried OTC cough suppressant for the symptoms. The treatment provided mild relief. Her past medical history is significant for bronchitis, environmental allergies and pneumonia. Past Medical History:   Diagnosis Date    Arthritis     Depression     Diabetes mellitus [facility]     Diabetic neuropathy ([facility]     Esophageal reflux     Sleep apnea     DOES NOT USE A MACHINE.  DIAGNOSED > 15 YEARS AGO WITH SLEEP APNEA        Current Outpatient Prescriptions   Medication Sig Dispense Refill    amoxicillin-clavulanate (AUGMENTIN) 875-125 MG per tablet Take 1 tablet by mouth 2 times daily for 10 days 20 tablet 0    benzonatate (TESSALON) 200 MG capsule Take 1 capsule by mouth 3 times daily as needed for Cough 20 capsule 1    Cholecalciferol (VITAMIN D3) 86801 units CAPS Take 1 capsule by mouth once a week 8 capsule 0    atorvastatin (LIPITOR) 40 MG tablet Take 1 tablet by mouth daily 90 tablet 3    Insulin Degludec (TRESIBA FLEXTOUCH) 200 UNIT/ML SOPN Inject 40 Units into the skin daily 5 Pen 5    glipiZIDE (GLUCOTROL XL) 5 MG extended release tablet Take 1 tablet by mouth daily 90 tablet 3    gabapentin (NEURONTIN) 600 MG tablet Take 600 mg by mouth 2 times daily      gabapentin (NEURONTIN) 600 MG tablet Take 1,200 mg by mouth nightly       loperamide (IMODIUM) 2 MG capsule Take 2 mg by mouth nightly      metFORMIN (GLUCOPHAGE) 1000 MG tablet Take 1 tablet by mouth 2 times daily (with meals) 180 tablet 3    citalopram (CELEXA) 40 MG tablet Take 1 tablet by mouth daily (Patient taking differently: Take 40 mg by mouth nightly ) 90 tablet 3    lisinopril (ZESTRIL) 2.5 MG tablet Take 1 tablet by mouth daily (Patient taking differently: Take 2.5 mg by mouth nightly ) 90 tablet 3    esomeprazole Magnesium (NEXIUM) 20 MG PACK Take 20 mg by mouth nightly OTC       meclizine (ANTIVERT) 12.5 MG tablet Take 1 tablet by mouth 3 times daily as needed for Dizziness 15 tablet 0    AFLURIA PRESERVATIVE FREE 0.5 ML JAKE injection        No current facility-administered medications for this visit. Allergies   Allergen Reactions    Codeine Other (See Comments)     Migraine    Other Itching and Dermatitis     METAL       Subjective:      Review of Systems   Constitutional: Positive for chills. Negative for fever. HENT: Positive for congestion, postnasal drip, rhinorrhea, sinus pain and sinus pressure. Negative for sore throat and trouble swallowing. Respiratory: Positive for cough. Negative for shortness of breath and wheezing. Cardiovascular: Negative for chest pain. Gastrointestinal: Negative for diarrhea and vomiting. Skin: Negative for rash. Allergic/Immunologic: Positive for environmental allergies. Objective:     Physical Exam   Constitutional: She appears well-developed and well-nourished. She appears ill. No distress. HENT:   Right Ear: Tympanic membrane and ear canal normal. No middle ear effusion. Left Ear: Tympanic membrane and ear canal normal.  No middle ear effusion. Nose: Mucosal edema and rhinorrhea present.  Right sinus exhibits maxillary sinus tenderness and frontal sinus tenderness. Left sinus exhibits maxillary sinus tenderness and frontal sinus tenderness. Mouth/Throat: Mucous membranes are normal. Mucous membranes are not dry. Posterior oropharyngeal erythema present. Eyes: Conjunctivae are normal.   Neck: Normal range of motion. Neck supple. Cardiovascular: Normal rate, regular rhythm and normal heart sounds. Pulmonary/Chest: Effort normal and breath sounds normal. She has no wheezes. Lymphadenopathy:     She has no cervical adenopathy. Neurological: She is alert. Skin: Skin is warm and dry. No rash noted. Nursing note and vitals reviewed. /62 (Site: Left Arm, Position: Sitting, Cuff Size: Medium Adult)   Pulse 83   Temp 97.7 °F (36.5 °C) (Temporal)   Resp 22   Wt 213 lb (96.6 kg)   BMI 36.56 kg/m²     Assessment:     1. Acute non-recurrent pansinusitis  amoxicillin-clavulanate (AUGMENTIN) 875-125 MG per tablet   2. Acute bronchitis, unspecified organism  benzonatate (TESSALON) 200 MG capsule       Plan:             Discussed exam, POCT findings, plan of care (including prescriptive and supportive as listed below) and follow-up at length with patient and or Patient. Reviewed all prescribed and recommended medications, administration and side effects. Encouraged to return to [address] [place] [address] for no improvement and or worsening of symptoms. To ER or call 911 if any difficulty breathing, shortness of breath, inability to swallow, hives or temp greater than 103 degrees. Questions answered. They verbalized understanding and agreement. Return if symptoms worsen or fail to improve.     Orders Placed This Encounter   Medications    amoxicillin-clavulanate (AUGMENTIN) 875-125 MG per tablet     Sig: Take 1 tablet by mouth 2 times daily for 10 days     Dispense:  20 tablet     Refill:  0    benzonatate (TESSALON) 200 MG capsule     Sig: Take 1 capsule by mouth 3 times daily as needed for Cough

## 2017-10-25 ENCOUNTER — TELEPHONE (OUTPATIENT)
Dept: PRIMARY CARE CLINIC | Age: 52
End: 2017-10-25

## 2017-10-25 NOTE — TELEPHONE ENCOUNTER
Attempted to call patient regarding missed appt for mammogram. Informed that order was re faxed to scheduling and they will be calling her to reschedule. Instructed to call this office with any questions.

## 2017-11-05 DIAGNOSIS — E11.42 CONTROLLED TYPE 2 DIABETES MELLITUS WITH DIABETIC POLYNEUROPATHY, WITHOUT LONG-TERM CURRENT USE OF INSULIN (HCC): ICD-10-CM

## 2017-11-06 RX ORDER — LISINOPRIL 2.5 MG/1
TABLET ORAL
Qty: 90 TABLET | Refills: 3 | Status: SHIPPED | OUTPATIENT
Start: 2017-11-06 | End: 2017-12-29 | Stop reason: SDUPTHER

## 2017-11-20 ENCOUNTER — APPOINTMENT (OUTPATIENT)
Dept: GENERAL RADIOLOGY | Age: 52
DRG: 871 | End: 2017-11-20
Payer: COMMERCIAL

## 2017-11-20 ENCOUNTER — HOSPITAL ENCOUNTER (INPATIENT)
Age: 52
LOS: 3 days | Discharge: HOME | DRG: 871 | End: 2017-11-23
Attending: INTERNAL MEDICINE | Admitting: INTERNAL MEDICINE
Payer: COMMERCIAL

## 2017-11-20 ENCOUNTER — APPOINTMENT (OUTPATIENT)
Dept: CT IMAGING | Age: 52
DRG: 871 | End: 2017-11-20
Payer: COMMERCIAL

## 2017-11-20 DIAGNOSIS — J18.9 PNEUMONIA OF RIGHT UPPER LOBE DUE TO INFECTIOUS ORGANISM: Primary | ICD-10-CM

## 2017-11-20 LAB
ABSOLUTE BANDS #: 1.06 K/UL (ref 0–1)
ABSOLUTE EOS #: 0 K/UL (ref 0–0.4)
ABSOLUTE IMMATURE GRANULOCYTE: ABNORMAL K/UL (ref 0–0.3)
ABSOLUTE LYMPH #: 2.92 K/UL (ref 1–4.8)
ABSOLUTE MONO #: 1.33 K/UL (ref 0–1)
ANION GAP SERPL CALCULATED.3IONS-SCNC: 17 MMOL/L (ref 9–17)
BANDS: 4 %
BASOPHILS # BLD: 0 %
BASOPHILS ABSOLUTE: 0 K/UL (ref 0–0.2)
BNP INTERPRETATION: ABNORMAL
BUN BLDV-MCNC: 13 MG/DL (ref 6–20)
BUN/CREAT BLD: 27 (ref 9–20)
CALCIUM SERPL-MCNC: 9.2 MG/DL (ref 8.6–10.4)
CHLORIDE BLD-SCNC: 91 MMOL/L (ref 98–107)
CO2: 24 MMOL/L (ref 20–31)
CREAT SERPL-MCNC: 0.48 MG/DL (ref 0.5–0.9)
DIFFERENTIAL TYPE: ABNORMAL
EOSINOPHILS RELATIVE PERCENT: 0 %
GFR AFRICAN AMERICAN: >60 ML/MIN
GFR NON-AFRICAN AMERICAN: >60 ML/MIN
GFR SERPL CREATININE-BSD FRML MDRD: ABNORMAL ML/MIN/{1.73_M2}
GFR SERPL CREATININE-BSD FRML MDRD: ABNORMAL ML/MIN/{1.73_M2}
GLUCOSE BLD-MCNC: 251 MG/DL (ref 70–99)
HCT VFR BLD CALC: 45 % (ref 36–46)
HEMOGLOBIN: 14.6 G/DL (ref 12–16)
IMMATURE GRANULOCYTES: ABNORMAL %
INR BLD: 1.2 (ref 0.9–1.2)
LACTIC ACID, WHOLE BLOOD: NORMAL MMOL/L (ref 0.7–2.1)
LACTIC ACID: 1.8 MMOL/L (ref 0.5–2.2)
LYMPHOCYTES # BLD: 11 %
MCH RBC QN AUTO: 27.9 PG (ref 26–34)
MCHC RBC AUTO-ENTMCNC: 32.4 G/DL (ref 31–37)
MCV RBC AUTO: 85.9 FL (ref 80–100)
MONOCYTES # BLD: 5 %
MORPHOLOGY: NORMAL
PARTIAL THROMBOPLASTIN TIME: 31.8 SEC (ref 23.2–34.4)
PDW BLD-RTO: 13.7 % (ref 12.1–15.2)
PLATELET # BLD: 193 K/UL (ref 140–450)
PLATELET ESTIMATE: ABNORMAL
PMV BLD AUTO: 9.3 FL (ref 6–12)
POTASSIUM SERPL-SCNC: 4 MMOL/L (ref 3.7–5.3)
PRO-BNP: 391 PG/ML
PROTHROMBIN TIME: 12.4 SEC (ref 9.7–12.2)
RBC # BLD: 5.24 M/UL (ref 4–5.2)
RBC # BLD: ABNORMAL 10*6/UL
SEG NEUTROPHILS: 80 %
SEGMENTED NEUTROPHILS ABSOLUTE COUNT: 21.19 K/UL (ref 1.8–7.7)
SODIUM BLD-SCNC: 132 MMOL/L (ref 135–144)
TROPONIN INTERP: NORMAL
TROPONIN T: <0.03 NG/ML
WBC # BLD: 26.5 K/UL (ref 3.5–11)
WBC # BLD: ABNORMAL 10*3/UL

## 2017-11-20 PROCEDURE — 6360000002 HC RX W HCPCS: Performed by: INTERNAL MEDICINE

## 2017-11-20 PROCEDURE — 93005 ELECTROCARDIOGRAM TRACING: CPT

## 2017-11-20 PROCEDURE — 71010 XR CHEST PORTABLE: CPT | Performed by: INTERNAL MEDICINE

## 2017-11-20 PROCEDURE — 96375 TX/PRO/DX INJ NEW DRUG ADDON: CPT

## 2017-11-20 PROCEDURE — 84484 ASSAY OF TROPONIN QUANT: CPT

## 2017-11-20 PROCEDURE — 71275 CT ANGIOGRAPHY CHEST: CPT

## 2017-11-20 PROCEDURE — 6370000000 HC RX 637 (ALT 250 FOR IP): Performed by: INTERNAL MEDICINE

## 2017-11-20 PROCEDURE — 2580000003 HC RX 258: Performed by: PHYSICIAN ASSISTANT

## 2017-11-20 PROCEDURE — 36415 COLL VENOUS BLD VENIPUNCTURE: CPT

## 2017-11-20 PROCEDURE — 80048 BASIC METABOLIC PNL TOTAL CA: CPT

## 2017-11-20 PROCEDURE — 1200000000 HC SEMI PRIVATE

## 2017-11-20 PROCEDURE — 99285 EMERGENCY DEPT VISIT HI MDM: CPT

## 2017-11-20 PROCEDURE — 6360000004 HC RX CONTRAST MEDICATION: Performed by: PHYSICIAN ASSISTANT

## 2017-11-20 PROCEDURE — 96365 THER/PROPH/DIAG IV INF INIT: CPT

## 2017-11-20 PROCEDURE — 83605 ASSAY OF LACTIC ACID: CPT

## 2017-11-20 PROCEDURE — 85025 COMPLETE CBC W/AUTO DIFF WBC: CPT

## 2017-11-20 PROCEDURE — 6370000000 HC RX 637 (ALT 250 FOR IP): Performed by: PHYSICIAN ASSISTANT

## 2017-11-20 PROCEDURE — 83880 ASSAY OF NATRIURETIC PEPTIDE: CPT

## 2017-11-20 PROCEDURE — 2580000003 HC RX 258: Performed by: INTERNAL MEDICINE

## 2017-11-20 PROCEDURE — 71010 XR CHEST PORTABLE: CPT

## 2017-11-20 PROCEDURE — 87040 BLOOD CULTURE FOR BACTERIA: CPT

## 2017-11-20 PROCEDURE — 85730 THROMBOPLASTIN TIME PARTIAL: CPT

## 2017-11-20 PROCEDURE — 96376 TX/PRO/DX INJ SAME DRUG ADON: CPT

## 2017-11-20 PROCEDURE — 6360000002 HC RX W HCPCS: Performed by: PHYSICIAN ASSISTANT

## 2017-11-20 PROCEDURE — 94640 AIRWAY INHALATION TREATMENT: CPT

## 2017-11-20 PROCEDURE — 85610 PROTHROMBIN TIME: CPT

## 2017-11-20 RX ORDER — MORPHINE SULFATE 2 MG/ML
2 INJECTION, SOLUTION INTRAMUSCULAR; INTRAVENOUS ONCE
Status: COMPLETED | OUTPATIENT
Start: 2017-11-20 | End: 2017-11-20

## 2017-11-20 RX ORDER — CITALOPRAM 20 MG/1
40 TABLET ORAL NIGHTLY
Status: DISCONTINUED | OUTPATIENT
Start: 2017-11-20 | End: 2017-11-23 | Stop reason: HOSPADM

## 2017-11-20 RX ORDER — SODIUM CHLORIDE 0.9 % (FLUSH) 0.9 %
10 SYRINGE (ML) INJECTION EVERY 12 HOURS SCHEDULED
Status: DISCONTINUED | OUTPATIENT
Start: 2017-11-20 | End: 2017-11-23 | Stop reason: HOSPADM

## 2017-11-20 RX ORDER — ESOMEPRAZOLE MAGNESIUM 20 MG/1
20 FOR SUSPENSION ORAL NIGHTLY
Status: DISCONTINUED | OUTPATIENT
Start: 2017-11-20 | End: 2017-11-20

## 2017-11-20 RX ORDER — LISINOPRIL 2.5 MG/1
2.5 TABLET ORAL DAILY
Status: DISCONTINUED | OUTPATIENT
Start: 2017-11-21 | End: 2017-11-23 | Stop reason: HOSPADM

## 2017-11-20 RX ORDER — IPRATROPIUM BROMIDE AND ALBUTEROL SULFATE 2.5; .5 MG/3ML; MG/3ML
1 SOLUTION RESPIRATORY (INHALATION) ONCE
Status: COMPLETED | OUTPATIENT
Start: 2017-11-20 | End: 2017-11-20

## 2017-11-20 RX ORDER — IPRATROPIUM BROMIDE AND ALBUTEROL SULFATE 2.5; .5 MG/3ML; MG/3ML
1 SOLUTION RESPIRATORY (INHALATION)
Status: DISCONTINUED | OUTPATIENT
Start: 2017-11-21 | End: 2017-11-20

## 2017-11-20 RX ORDER — KETOROLAC TROMETHAMINE 15 MG/ML
15 INJECTION, SOLUTION INTRAMUSCULAR; INTRAVENOUS ONCE
Status: COMPLETED | OUTPATIENT
Start: 2017-11-20 | End: 2017-11-20

## 2017-11-20 RX ORDER — ACETAMINOPHEN 325 MG/1
650 TABLET ORAL EVERY 4 HOURS PRN
Status: DISCONTINUED | OUTPATIENT
Start: 2017-11-20 | End: 2017-11-23 | Stop reason: HOSPADM

## 2017-11-20 RX ORDER — ONDANSETRON 2 MG/ML
4 INJECTION INTRAMUSCULAR; INTRAVENOUS EVERY 6 HOURS PRN
Status: DISCONTINUED | OUTPATIENT
Start: 2017-11-20 | End: 2017-11-23 | Stop reason: HOSPADM

## 2017-11-20 RX ORDER — ONDANSETRON 2 MG/ML
4 INJECTION INTRAMUSCULAR; INTRAVENOUS ONCE
Status: COMPLETED | OUTPATIENT
Start: 2017-11-20 | End: 2017-11-20

## 2017-11-20 RX ORDER — 0.9 % SODIUM CHLORIDE 0.9 %
1000 INTRAVENOUS SOLUTION INTRAVENOUS ONCE
Status: COMPLETED | OUTPATIENT
Start: 2017-11-20 | End: 2017-11-20

## 2017-11-20 RX ORDER — SODIUM CHLORIDE 0.9 % (FLUSH) 0.9 %
10 SYRINGE (ML) INJECTION PRN
Status: DISCONTINUED | OUTPATIENT
Start: 2017-11-20 | End: 2017-11-23 | Stop reason: HOSPADM

## 2017-11-20 RX ORDER — ALBUTEROL SULFATE 2.5 MG/3ML
2.5 SOLUTION RESPIRATORY (INHALATION) EVERY 4 HOURS PRN
Status: DISCONTINUED | OUTPATIENT
Start: 2017-11-20 | End: 2017-11-23 | Stop reason: HOSPADM

## 2017-11-20 RX ORDER — ACETAMINOPHEN 500 MG
1000 TABLET ORAL ONCE
Status: COMPLETED | OUTPATIENT
Start: 2017-11-20 | End: 2017-11-20

## 2017-11-20 RX ORDER — GABAPENTIN 300 MG/1
600 CAPSULE ORAL 2 TIMES DAILY
Status: DISCONTINUED | OUTPATIENT
Start: 2017-11-21 | End: 2017-11-23 | Stop reason: HOSPADM

## 2017-11-20 RX ORDER — PANTOPRAZOLE SODIUM 40 MG/1
40 TABLET, DELAYED RELEASE ORAL NIGHTLY
Status: DISCONTINUED | OUTPATIENT
Start: 2017-11-20 | End: 2017-11-23 | Stop reason: HOSPADM

## 2017-11-20 RX ORDER — IPRATROPIUM BROMIDE AND ALBUTEROL SULFATE 2.5; .5 MG/3ML; MG/3ML
1 SOLUTION RESPIRATORY (INHALATION) 4 TIMES DAILY
Status: DISCONTINUED | OUTPATIENT
Start: 2017-11-21 | End: 2017-11-22

## 2017-11-20 RX ORDER — ATORVASTATIN CALCIUM 40 MG/1
40 TABLET, FILM COATED ORAL DAILY
Status: DISCONTINUED | OUTPATIENT
Start: 2017-11-21 | End: 2017-11-23 | Stop reason: HOSPADM

## 2017-11-20 RX ORDER — GLIPIZIDE 5 MG/1
5 TABLET, FILM COATED, EXTENDED RELEASE ORAL DAILY
Status: DISCONTINUED | OUTPATIENT
Start: 2017-11-21 | End: 2017-11-21 | Stop reason: CLARIF

## 2017-11-20 RX ORDER — SODIUM CHLORIDE 9 MG/ML
INJECTION, SOLUTION INTRAVENOUS CONTINUOUS
Status: DISCONTINUED | OUTPATIENT
Start: 2017-11-20 | End: 2017-11-23 | Stop reason: HOSPADM

## 2017-11-20 RX ORDER — CEFTRIAXONE 1 G/1
1 INJECTION, POWDER, FOR SOLUTION INTRAMUSCULAR; INTRAVENOUS ONCE
Status: DISCONTINUED | OUTPATIENT
Start: 2017-11-20 | End: 2017-11-20

## 2017-11-20 RX ORDER — OXYCODONE HYDROCHLORIDE AND ACETAMINOPHEN 5; 325 MG/1; MG/1
1 TABLET ORAL EVERY 4 HOURS PRN
Status: DISCONTINUED | OUTPATIENT
Start: 2017-11-20 | End: 2017-11-23 | Stop reason: HOSPADM

## 2017-11-20 RX ORDER — GABAPENTIN 300 MG/1
1200 CAPSULE ORAL NIGHTLY
Status: DISCONTINUED | OUTPATIENT
Start: 2017-11-20 | End: 2017-11-23 | Stop reason: HOSPADM

## 2017-11-20 RX ADMIN — CITALOPRAM HYDROBROMIDE 40 MG: 20 TABLET ORAL at 22:39

## 2017-11-20 RX ADMIN — ACETAMINOPHEN 1000 MG: 500 TABLET ORAL at 18:34

## 2017-11-20 RX ADMIN — ONDANSETRON 4 MG: 2 INJECTION INTRAMUSCULAR; INTRAVENOUS at 18:21

## 2017-11-20 RX ADMIN — SODIUM CHLORIDE 1000 ML: 9 INJECTION, SOLUTION INTRAVENOUS at 18:43

## 2017-11-20 RX ADMIN — MORPHINE SULFATE 2 MG: 2 INJECTION, SOLUTION INTRAMUSCULAR; INTRAVENOUS at 21:48

## 2017-11-20 RX ADMIN — SODIUM CHLORIDE 1000 ML: 9 INJECTION, SOLUTION INTRAVENOUS at 21:10

## 2017-11-20 RX ADMIN — IPRATROPIUM BROMIDE AND ALBUTEROL SULFATE 1 AMPULE: .5; 3 SOLUTION RESPIRATORY (INHALATION) at 20:28

## 2017-11-20 RX ADMIN — MORPHINE SULFATE 2 MG: 2 INJECTION, SOLUTION INTRAMUSCULAR; INTRAVENOUS at 18:33

## 2017-11-20 RX ADMIN — SODIUM CHLORIDE: 9 INJECTION, SOLUTION INTRAVENOUS at 22:33

## 2017-11-20 RX ADMIN — CEFTRIAXONE 1 G: 1 INJECTION, POWDER, FOR SOLUTION INTRAMUSCULAR; INTRAVENOUS at 19:32

## 2017-11-20 RX ADMIN — PANTOPRAZOLE SODIUM 40 MG: 40 TABLET, DELAYED RELEASE ORAL at 22:39

## 2017-11-20 RX ADMIN — AZITHROMYCIN MONOHYDRATE 500 MG: 500 INJECTION, POWDER, LYOPHILIZED, FOR SOLUTION INTRAVENOUS at 19:33

## 2017-11-20 RX ADMIN — KETOROLAC TROMETHAMINE 15 MG: 15 INJECTION, SOLUTION INTRAMUSCULAR; INTRAVENOUS at 22:57

## 2017-11-20 RX ADMIN — GABAPENTIN 1200 MG: 300 CAPSULE ORAL at 22:39

## 2017-11-20 RX ADMIN — IOPAMIDOL 100 ML: 612 INJECTION, SOLUTION INTRAVENOUS at 19:02

## 2017-11-20 ASSESSMENT — PAIN SCALES - GENERAL
PAINLEVEL_OUTOF10: 5
PAINLEVEL_OUTOF10: 2
PAINLEVEL_OUTOF10: 9
PAINLEVEL_OUTOF10: 4
PAINLEVEL_OUTOF10: 7

## 2017-11-20 ASSESSMENT — PAIN DESCRIPTION - ORIENTATION: ORIENTATION: RIGHT;UPPER

## 2017-11-20 ASSESSMENT — PAIN DESCRIPTION - PAIN TYPE: TYPE: ACUTE PAIN

## 2017-11-20 ASSESSMENT — ENCOUNTER SYMPTOMS
NAUSEA: 0
CHEST TIGHTNESS: 0
DIARRHEA: 0
ABDOMINAL PAIN: 0
COUGH: 1
BLOOD IN STOOL: 0
RHINORRHEA: 0
SHORTNESS OF BREATH: 0
CONSTIPATION: 0
WHEEZING: 0
EYE REDNESS: 0
SORE THROAT: 0
BACK PAIN: 0
EYE DISCHARGE: 0
VOMITING: 0

## 2017-11-20 ASSESSMENT — PAIN DESCRIPTION - LOCATION
LOCATION: BACK
LOCATION: CHEST

## 2017-11-20 ASSESSMENT — PAIN DESCRIPTION - DESCRIPTORS: DESCRIPTORS: CONSTANT

## 2017-11-21 PROBLEM — A41.9 SEPSIS DUE TO PNEUMONIA (HCC): Status: ACTIVE | Noted: 2017-11-21

## 2017-11-21 PROBLEM — J18.9 SEPSIS DUE TO PNEUMONIA (HCC): Status: ACTIVE | Noted: 2017-11-21

## 2017-11-21 PROBLEM — G47.33 OBSTRUCTIVE SLEEP APNEA: Chronic | Status: ACTIVE | Noted: 2017-11-21

## 2017-11-21 LAB
ABSOLUTE EOS #: 0 K/UL (ref 0–0.4)
ABSOLUTE IMMATURE GRANULOCYTE: ABNORMAL K/UL (ref 0–0.3)
ABSOLUTE LYMPH #: 4.2 K/UL (ref 1–4.8)
ABSOLUTE MONO #: 1.2 K/UL (ref 0–1)
ANION GAP SERPL CALCULATED.3IONS-SCNC: 11 MMOL/L (ref 9–17)
BASOPHILS # BLD: 0 %
BASOPHILS ABSOLUTE: 0 K/UL (ref 0–0.2)
BUN BLDV-MCNC: 16 MG/DL (ref 6–20)
BUN/CREAT BLD: 36 (ref 9–20)
CALCIUM SERPL-MCNC: 8.8 MG/DL (ref 8.6–10.4)
CHLORIDE BLD-SCNC: 97 MMOL/L (ref 98–107)
CO2: 26 MMOL/L (ref 20–31)
CREAT SERPL-MCNC: 0.45 MG/DL (ref 0.5–0.9)
DIFFERENTIAL TYPE: ABNORMAL
EKG ATRIAL RATE: 101 BPM
EKG ATRIAL RATE: 111 BPM
EKG P AXIS: 59 DEGREES
EKG P AXIS: 65 DEGREES
EKG P-R INTERVAL: 134 MS
EKG P-R INTERVAL: 136 MS
EKG Q-T INTERVAL: 338 MS
EKG Q-T INTERVAL: 348 MS
EKG QRS DURATION: 88 MS
EKG QRS DURATION: 94 MS
EKG QTC CALCULATION (BAZETT): 451 MS
EKG QTC CALCULATION (BAZETT): 459 MS
EKG R AXIS: 18 DEGREES
EKG R AXIS: 33 DEGREES
EKG T AXIS: 39 DEGREES
EKG T AXIS: 39 DEGREES
EKG VENTRICULAR RATE: 101 BPM
EKG VENTRICULAR RATE: 111 BPM
EOSINOPHILS RELATIVE PERCENT: 0 %
ESTIMATED AVERAGE GLUCOSE: 220 MG/DL
GFR AFRICAN AMERICAN: >60 ML/MIN
GFR NON-AFRICAN AMERICAN: >60 ML/MIN
GFR SERPL CREATININE-BSD FRML MDRD: ABNORMAL ML/MIN/{1.73_M2}
GFR SERPL CREATININE-BSD FRML MDRD: ABNORMAL ML/MIN/{1.73_M2}
GLUCOSE BLD-MCNC: 173 MG/DL (ref 70–99)
GLUCOSE BLD-MCNC: 219 MG/DL (ref 74–100)
GLUCOSE BLD-MCNC: 239 MG/DL (ref 74–100)
GLUCOSE BLD-MCNC: 301 MG/DL (ref 74–100)
HBA1C MFR BLD: 9.3 % (ref 4.8–5.9)
HCT VFR BLD CALC: 36.3 % (ref 36–46)
HEMOGLOBIN: 12 G/DL (ref 12–16)
IMMATURE GRANULOCYTES: ABNORMAL %
LYMPHOCYTES # BLD: 21 %
MCH RBC QN AUTO: 28.5 PG (ref 26–34)
MCHC RBC AUTO-ENTMCNC: 33.1 G/DL (ref 31–37)
MCV RBC AUTO: 86.1 FL (ref 80–100)
MONOCYTES # BLD: 6 %
MORPHOLOGY: ABNORMAL
PDW BLD-RTO: 14.1 % (ref 12.1–15.2)
PLATELET # BLD: 174 K/UL (ref 140–450)
PLATELET ESTIMATE: ABNORMAL
PMV BLD AUTO: 9.6 FL (ref 6–12)
POTASSIUM SERPL-SCNC: 4.2 MMOL/L (ref 3.7–5.3)
RBC # BLD: 4.22 M/UL (ref 4–5.2)
RBC # BLD: ABNORMAL 10*6/UL
SEG NEUTROPHILS: 73 %
SEGMENTED NEUTROPHILS ABSOLUTE COUNT: 14.6 K/UL (ref 1.8–7.7)
SODIUM BLD-SCNC: 134 MMOL/L (ref 135–144)
WBC # BLD: 20 K/UL (ref 3.5–11)
WBC # BLD: ABNORMAL 10*3/UL

## 2017-11-21 PROCEDURE — 94760 N-INVAS EAR/PLS OXIMETRY 1: CPT

## 2017-11-21 PROCEDURE — 6370000000 HC RX 637 (ALT 250 FOR IP): Performed by: INTERNAL MEDICINE

## 2017-11-21 PROCEDURE — 83036 HEMOGLOBIN GLYCOSYLATED A1C: CPT

## 2017-11-21 PROCEDURE — 80048 BASIC METABOLIC PNL TOTAL CA: CPT

## 2017-11-21 PROCEDURE — 94668 MNPJ CHEST WALL SBSQ: CPT

## 2017-11-21 PROCEDURE — 94761 N-INVAS EAR/PLS OXIMETRY MLT: CPT

## 2017-11-21 PROCEDURE — 94640 AIRWAY INHALATION TREATMENT: CPT

## 2017-11-21 PROCEDURE — 93005 ELECTROCARDIOGRAM TRACING: CPT

## 2017-11-21 PROCEDURE — 1200000000 HC SEMI PRIVATE

## 2017-11-21 PROCEDURE — 82947 ASSAY GLUCOSE BLOOD QUANT: CPT

## 2017-11-21 PROCEDURE — 94667 MNPJ CHEST WALL 1ST: CPT

## 2017-11-21 PROCEDURE — 6360000002 HC RX W HCPCS: Performed by: INTERNAL MEDICINE

## 2017-11-21 PROCEDURE — 94664 DEMO&/EVAL PT USE INHALER: CPT

## 2017-11-21 PROCEDURE — 36415 COLL VENOUS BLD VENIPUNCTURE: CPT

## 2017-11-21 PROCEDURE — 85025 COMPLETE CBC W/AUTO DIFF WBC: CPT

## 2017-11-21 PROCEDURE — 2580000003 HC RX 258: Performed by: INTERNAL MEDICINE

## 2017-11-21 RX ORDER — DEXTROSE MONOHYDRATE 25 G/50ML
12.5 INJECTION, SOLUTION INTRAVENOUS PRN
Status: DISCONTINUED | OUTPATIENT
Start: 2017-11-21 | End: 2017-11-23 | Stop reason: HOSPADM

## 2017-11-21 RX ORDER — NICOTINE POLACRILEX 4 MG
15 LOZENGE BUCCAL PRN
Status: DISCONTINUED | OUTPATIENT
Start: 2017-11-21 | End: 2017-11-23 | Stop reason: HOSPADM

## 2017-11-21 RX ORDER — DEXTROSE MONOHYDRATE 50 MG/ML
100 INJECTION, SOLUTION INTRAVENOUS PRN
Status: DISCONTINUED | OUTPATIENT
Start: 2017-11-21 | End: 2017-11-23 | Stop reason: HOSPADM

## 2017-11-21 RX ORDER — GLIPIZIDE 5 MG/1
5 TABLET ORAL
Status: DISCONTINUED | OUTPATIENT
Start: 2017-11-21 | End: 2017-11-23 | Stop reason: HOSPADM

## 2017-11-21 RX ADMIN — CEFTRIAXONE 1 G: 1 INJECTION, POWDER, FOR SOLUTION INTRAMUSCULAR; INTRAVENOUS at 19:24

## 2017-11-21 RX ADMIN — ATORVASTATIN CALCIUM 40 MG: 40 TABLET, FILM COATED ORAL at 09:25

## 2017-11-21 RX ADMIN — OXYCODONE HYDROCHLORIDE AND ACETAMINOPHEN 1 TABLET: 5; 325 TABLET ORAL at 08:25

## 2017-11-21 RX ADMIN — IPRATROPIUM BROMIDE AND ALBUTEROL SULFATE 1 AMPULE: .5; 3 SOLUTION RESPIRATORY (INHALATION) at 20:48

## 2017-11-21 RX ADMIN — INSULIN LISPRO 2 UNITS: 100 INJECTION, SOLUTION INTRAVENOUS; SUBCUTANEOUS at 09:26

## 2017-11-21 RX ADMIN — INSULIN LISPRO 4 UNITS: 100 INJECTION, SOLUTION INTRAVENOUS; SUBCUTANEOUS at 21:17

## 2017-11-21 RX ADMIN — INSULIN LISPRO 4 UNITS: 100 INJECTION, SOLUTION INTRAVENOUS; SUBCUTANEOUS at 16:24

## 2017-11-21 RX ADMIN — OXYCODONE HYDROCHLORIDE AND ACETAMINOPHEN 1 TABLET: 5; 325 TABLET ORAL at 15:20

## 2017-11-21 RX ADMIN — GABAPENTIN 600 MG: 300 CAPSULE ORAL at 16:20

## 2017-11-21 RX ADMIN — GABAPENTIN 600 MG: 300 CAPSULE ORAL at 08:32

## 2017-11-21 RX ADMIN — GLIPIZIDE 5 MG: 5 TABLET ORAL at 09:26

## 2017-11-21 RX ADMIN — IPRATROPIUM BROMIDE AND ALBUTEROL SULFATE 1 AMPULE: .5; 3 SOLUTION RESPIRATORY (INHALATION) at 10:12

## 2017-11-21 RX ADMIN — GABAPENTIN 1200 MG: 300 CAPSULE ORAL at 20:36

## 2017-11-21 RX ADMIN — PANTOPRAZOLE SODIUM 40 MG: 40 TABLET, DELAYED RELEASE ORAL at 20:37

## 2017-11-21 RX ADMIN — ACETAMINOPHEN 650 MG: 325 TABLET, FILM COATED ORAL at 05:04

## 2017-11-21 RX ADMIN — IPRATROPIUM BROMIDE AND ALBUTEROL SULFATE 1 AMPULE: .5; 3 SOLUTION RESPIRATORY (INHALATION) at 06:11

## 2017-11-21 RX ADMIN — ENOXAPARIN SODIUM 40 MG: 40 INJECTION SUBCUTANEOUS at 09:26

## 2017-11-21 RX ADMIN — AZITHROMYCIN MONOHYDRATE 500 MG: 500 INJECTION, POWDER, LYOPHILIZED, FOR SOLUTION INTRAVENOUS at 19:24

## 2017-11-21 RX ADMIN — LISINOPRIL 2.5 MG: 2.5 TABLET ORAL at 09:25

## 2017-11-21 RX ADMIN — IPRATROPIUM BROMIDE AND ALBUTEROL SULFATE 1 AMPULE: .5; 3 SOLUTION RESPIRATORY (INHALATION) at 16:02

## 2017-11-21 RX ADMIN — INSULIN LISPRO 4 UNITS: 100 INJECTION, SOLUTION INTRAVENOUS; SUBCUTANEOUS at 13:32

## 2017-11-21 RX ADMIN — CITALOPRAM HYDROBROMIDE 40 MG: 20 TABLET ORAL at 20:37

## 2017-11-21 ASSESSMENT — PAIN DESCRIPTION - DESCRIPTORS
DESCRIPTORS: ACHING;CONSTANT
DESCRIPTORS: ACHING

## 2017-11-21 ASSESSMENT — PAIN DESCRIPTION - PAIN TYPE
TYPE: ACUTE PAIN

## 2017-11-21 ASSESSMENT — PAIN DESCRIPTION - ORIENTATION
ORIENTATION: LEFT;UPPER
ORIENTATION: RIGHT

## 2017-11-21 ASSESSMENT — PAIN SCALES - GENERAL
PAINLEVEL_OUTOF10: 7
PAINLEVEL_OUTOF10: 6
PAINLEVEL_OUTOF10: 3
PAINLEVEL_OUTOF10: 9
PAINLEVEL_OUTOF10: 8

## 2017-11-21 ASSESSMENT — PAIN DESCRIPTION - LOCATION
LOCATION: SHOULDER
LOCATION: BACK
LOCATION: NECK
LOCATION: NECK

## 2017-11-21 ASSESSMENT — PAIN DESCRIPTION - FREQUENCY
FREQUENCY: CONTINUOUS
FREQUENCY: CONTINUOUS

## 2017-11-21 NOTE — H&P
Will Vigil M.D. Internal Medicine History and Physical 17    Patient:  Sobia Hunter  MRN: [id number]    Chief Complaint:  Chest pain, shortness of breath    History Obtained From:  patient, electronic medical record  PCP: Nicki Byrd CNP    History of Present Illness: The patient is a 50 y.o. female who presented to the ER yesterday complaining of right chest pain and back pain associated with cough and hemoptysis  with fever and chills. Temp up to 102 °F.  She was febrile and tachycardic in ER. Cardiac enzymes and EKG negative. CTA chest showed no PE but did reveal dense RUL infiltrate. She is admitted for pneumonia workup and treatment. She is a former smoker but quit in . She has poorly controlled diabetes with neuropathy. Past Medical History:        Diagnosis Date    Arthritis     Depression     Diabetes mellitus ([address]     Diabetic neuropathy [facility]     Esophageal reflux     Sleep apnea     DOES NOT USE A MACHINE. DIAGNOSED > 15 YEARS AGO WITH SLEEP APNEA       Past Surgical History:        Procedure Laterality Date    ABSCESS DRAINAGE Left     BUTTOCK    APPENDECTOMY      CATARACT REMOVAL WITH IMPLANT Bilateral 10/24/2016     SECTION      CHOLECYSTECTOMY      HERNIA REPAIR Right     I[facility]    HERNIA REPAIR  2006    HERNIA REPAIR WITH HYSTERECTOMY SURGERY    HYSTERECTOMY      LAPAROSCOPY         Medications Prior to Admission:    Prior to Admission medications    Medication Sig Start Date End Date Taking?  Authorizing Provider   lisinopril (PRINIVIL;ZESTRIL) 2.5 MG tablet TAKE 1 TABLET DAILY 17  Yes Nicki Byrd CNP   Cholecalciferol (VITAMIN D3) 36119 units CAPS Take 1 capsule by mouth once a week 17  Yes Nicki Byrd CNP   atorvastatin (LIPITOR) 40 MG tablet Take 1 tablet by mouth daily 17  Yes Nicki Byrd CNP   Insulin Degludec (TRESIBA FLEXTOUCH) 200 UNIT/ML SOPN Inject 40 Units into the skin daily 17  Yes Nicki Byrd CNP FINDINGS:     There is no evidence of pulmonary embolism.        The aorta and pulmonary arteries are normal in size. The cardiac chambers are not dilated. There are no signs of aortic aneurysm or dissection.       There is extensive consolidating infiltrates seen in the right upper lobe. This is new when compared to the prior study. There is no architectural distortion of the vasculature or bronchi in this area and therefore this does not have the appearance of a    neoplastic mass. It more likely represents dense pulmonary consolidation from pneumonia. There are no other pulmonary infiltrates or masses present. .       There is no pleural thickening or pleural effusion seen.        There is no mediastinal adenopathy other mediastinal masses.        The visualized upper abdominal contents are unremarkable.        Impression:     Dense consolidating infiltrate in the right upper lobe most typical of pneumonia.  Pulmonary neoplasm is unlikely.           Electronically Signed By: Jackson Stoddard   on  11/20/2017  20:05         Assessment:     Principal Problem:    Sepsis due to pneumonia with fever 101.9, tachycardia up to 120, leukocytosis up to 25k    Active Problems:    Community acquired pneumonia of right upper lobe of lung     Uncontrolled type 2 diabetes mellitus with diabetic polyneuropathy, with long-term current use of insulin - last A1c 10.1% in August    Obstructive sleep apnea    Obesity    Plan:     · This patient requires inpatient admission because of pneumonia  · Factors affecting the medical complexity of this patient include poorly controlled diabetes with diabetic neuropathy, EUNICE intollerant to CPAP  · Estimated length of stay is 3-4 days  · Discussed patient's symptoms and data results including labs and imaging studies with the ER MD at time of admission  · High risk drug monitoring: none  · IV rocephin / Azithromycin  · bronchodilators    CORE MEASURES  · DVT prophylaxis: Lovenox  · Decubitus

## 2017-11-21 NOTE — PROGRESS NOTES
Discussed discharge plans with the patient. Patient is a 50year old female here with pneumonia. She is alert and oriented. Patient is  and lives at home with her . She uses no medical equipment. Both her and her  do the cooking and the cleaning. She manages her own medications. Her PCP is Teresa Byrd CNP. She has medical insurance that helps with medication costs. The discharge plan is home with no services.     ADELITA Garcia

## 2017-11-21 NOTE — PROGRESS NOTES
RESPIRATORY ASSESSMENT PROTOCOL                                                                                              Patient Name: Charissa Ivory Room#: [id number] : 1965     Admitting diagnosis: Community acquired pneumonia of right upper lobe of lung ([facility].) [J18.1]       Medical History:   Past Medical History:   Diagnosis Date    Arthritis     Depression     Diabetes mellitus ([facility].)     Diabetic neuropathy ([place] [address]     Esophageal reflux     Sleep apnea     DOES NOT USE A MACHINE. DIAGNOSED > 15 YEARS AGO WITH SLEEP APNEA       PATIENT ASSESSMENT    LABORATORY DATA  Hematology:   Lab Results   Component Value Date    WBC 20.0 2017    RBC 4.22 2017    RBC 5.34 2012    HGB 12.0 2017    HCT 36.3 2017     2017     2012     Chemistry:  No results found for: PHART, WZR7LRO, PO2ART, [id number], EWM9OJF, PBEA    Blood Culture:   Sputum Culture:     VITALS  Pulse: 110   Resp: 18  BP: (!) 93/56  SpO2: 92 % O2 Device: None (Room air)  Temp: 98.1 °F (36.7 °C)  Comment:   SKIN COLOR  [x] Normal  [] Pale  [] Dusky  [] Cyanotic      RESPIRATORY PATTERN  [x] Normal  [] Dyspnea  [] Cheyne-Christianson  [] Kussmaul  [] Biots  AMBULATORY  [x] Yes  [] No  [] With Assistance    PEAK FLOW  Predicted:     Personal Best:        VITAL CAPACITY  Predicted value:  ml  Actual Value:  ml  30% of Predicted:  ml  Patient Acuity 0 1 2 3 4 Score   Level of Concious (LOC) [x]  Alert & Oriented or Pt normal LOC []  Confused;follows directions []  Confused & uncooper-ative []  Obtunded []  Comatose 0   Respiratory Rate  (RR) [x]  Reg. rate & pattern. 12 - 20 bpm  []  Increased RR.  Greater than 20 bpm   []  SOB w/ exertion or RR greater than 24 bpm []  Access- ory muscle use at rest. Abn.  resp. []  SOB at rest.   0   Bilateral Breath Sounds (BBS) []  Clear [x]  Diminish-ed bases  []  Diminish-ed t/o, or rales   []  Sporadic, scattered wheezes or rhonchi []  Persistentwheezes and, or AEROSOL THERAPY with  [physician-ordered bronchodilator(s)]  QID and Albuterol PRN q4 hrs. Breath-Actuated Neb if BBS Acuity = 4, and pt. can use MP. Notify physician if condition deteriorates. MDI THERAPY with  2 actuations of [physician-ordered bronchodilator(s)] via spacer TID Albuterol and PRNq4 hrs. If unable to utilize MDI: HHN [physician-ordered bronchodilator(s)] TID and Albuterol PRN q4 hrs. Notify physician if condition deteriorates. MDI THERAPY with  [physician-ordered bronchodilator(s)] via spacer TID PRN. If unable to utilize MDI: HHN [physician-ordered bronchodilator(s)] TID PRN. Notify physician if condition deteriorates. If Acuity Level is 2, 3, or 4 in any of the following:    [] COUGH     [] SURGICAL HISTORY (SURG HX)  [x] CHEST XRAY (CXR)    Goal: Improvement in sputum mobilization in patients with ineffective airway clearance. Reverse atelectasis. [x] Bronchopulmonary Hygiene Protocol    Total Acuity:   16-32  []  Secondary Assessment in 24 hrs Total Acuity:  9-15  [x]  Secondary Assessment in 24 hrs Total Acuity:  4-8  []  Secondary Assessment in 48 hrs Total Acuity:  0-3  []  Secondary Assessment in 72 hrs   METANEB QID with [physician-ordered bronchodilator(s)] if CXR Acuity = 4; otherwise:  PD&P, PEP, or Vest QID & PRN  NT Sxn PRN for ineffective cough  METANEB QID with [physician-ordered bronchodilator(s)] if CXR Acuity = 4; otherwise:  PD&P, PEP, or Vest TID & PRN  NT Sxn PRN for ineffective cough  Instruct patient to self-perform IS q1hr WA   Directed Cough self-performed q1hr WA     If Acuity Level is 2 or above in the following:    [] PULMONARY HISTORY (PULM HX)    Goal: Assist patient in quitting smoking to slow or stop the progression of lung disease.     [] Smoking Cessation Protocol    SMOKING CESSATION EDUCATION provided according to policy MO_578: (laisha with an X)  ____Yes    ____ No     ___x NA    Smoking Cessation Booklet given:  ____Yes  ____No ____Patient

## 2017-11-21 NOTE — ED NOTES
Supervisor called with bed assignment of 314 but stated room has to be cleaned first.  Will call when bed is available.      Caleb Hebertabhenok GU Reser  11/20/17 2030

## 2017-11-21 NOTE — PROGRESS NOTES
ALT 20 08/22/2017    LABGLOM >60 11/21/2017    GFRAA >60 11/21/2017     Lab Results   Component Value Date    LABA1C 9.3 (H) 11/21/2017     Lab Results   Component Value Date     11/21/2017     Lab Results   Component Value Date    VITD25 8.7 (L) 08/22/2017       Estimated Intake vs Estimated Needs: Intake Less Than Needs (only chose fruit this morning)    Nutrition Risk Level: Moderate    New A1C just in, and improved from October values, still well beyond goal.  Home diet hampered by irregular meal times, eating only 2 meals a day, and low activity. Denying educational needs and agreeable to have carb controlled diet while in hospital (currently on general diet). Low vit D, on repletive doses. Encouraged 3 meals and 3 carbs per meal ongoingly.      Nutrition Interventions:   Modify current diet (to carb controlled)  Continued Inpatient Monitoring, Coordination of Care, Education declined    Nutrition Evaluation:   · Evaluation: Goals set   · Goals: PO>75% with consistent carbs    · Monitoring: Meal Intake, Pertinent Labs, Ascites/Edema, Weight, Patient/Family Education    Electronically signed by Jocelyne Goss RD, LD on 11/21/17 at 9:34 AM    Contact Number: [phone]

## 2017-11-21 NOTE — PROGRESS NOTES
RESPIRATORY ASSESSMENT PROTOCOL                                                                                              Patient Name: Allyson Jo Room#: [id number] : 1965     Admitting diagnosis: Community acquired pneumonia of right upper lobe of lung ([facility] [J18.1]       Medical History:   Past Medical History:   Diagnosis Date    Arthritis     Depression     Diabetes mellitus ([facility]     Diabetic neuropathy ([place] [address].)     Esophageal reflux     Sleep apnea     DOES NOT USE A MACHINE. DIAGNOSED > 15 YEARS AGO WITH SLEEP APNEA       PATIENT ASSESSMENT    LABORATORY DATA  Hematology:   Lab Results   Component Value Date    WBC 26.5 2017    RBC 5.24 2017    RBC 5.34 2012    HGB 14.6 2017    HCT 45.0 2017     2017     2012     Chemistry:  No results found for: PHART, UEP8VIK, PO2ART, T1LJQOFB, FTP1DZA, PBEA    Blood Culture:  Sputum Culture:     VITALS  Pulse: 106   Resp: 20  BP: 117/64  SpO2: 92 % O2 Device: None (Room air)  Temp: 98 °F (36.7 °C)  Comment:   SKIN COLOR  [x] Normal  [] Pale  [] Dusky  [] Cyanotic      RESPIRATORY PATTERN  [x] Normal  [] Dyspnea  [] Cheyne-Christianson  [] Kussmaul  [] Biots  AMBULATORY  [x] Yes  [] No  [] With Assistance    PEAK FLOW  Predicted:     Personal Best:        VITAL CAPACITY  Predicted value:  ml  Actual Value:  ml  30% of Predicted:  ml  Patient Acuity 0 1 2 3 4 Score   Level of Concious (LOC) [x]  Alert & Oriented or Pt normal LOC []  Confused;follows directions []  Confused & uncooper-ative []  Obtunded []  Comatose 0   Respiratory Rate  (RR) []  Reg. rate & pattern. 12 - 20 bpm  [x]  Increased RR.  Greater than 20 bpm   []  SOB w/ exertion or RR greater than 24 bpm []  Access- ory muscle use at rest. Abn.  resp. []  SOB at rest.   1   Bilateral Breath Sounds (BBS) [x]  Clear []  Diminish-ed bases  []  Diminish-ed t/o, or rales   []  Sporadic, scattered wheezes or rhonchi []  Persistentwheezes and, or absent BBS 0   Cough []  Strong, effective, & non-prod. [x]  Effective & prod. Less than 25 ml (2 TBSP) over past 24 hrs []  Ineffective & non-prod to less than 25 ML over past 24 hrs []  Ineffective and, or greater than 25 ml sputum prod. past 24 hrs. []  Nonspon- taneous; Requires suctioning 1   Pulmonary History  (PULM HX) []  No smoking and no chronic pulmonaryhistory []  Former smoker. Quit over 12 mos. ago []  Current smoker or quit w/ in 12 mos []  Pulm. History and, or 20 pk/yr smoking hx [x]  Admitted w/ acute pulm. dx and, or has been admitted w/ pulm. dx 2 or more times over past 12 mos 4   Surgical History this Admit  (SURG HX) [x]  No surgery []  General surgery []  Lower abdominal []  Thoracic or upper abdominal   []  Thoracic w/ pulm. disease 0   Chest X-Ray (CXR)/CT Scan []  Clear or not applicable []  Not available []  Atelect- asis or pleural effusions [x]  Localized infiltrate or pulm. edema []  Con-solidated Infiltrates, bilateral, or in more than 1 lobe 3   Slow or Forced VC, FEV1 OR PEFR (PULM FXN)  [x]  80% or greater, or not indicated []  Pt. unable to perform []  FEV1 or PEFR or VC 51-79%. []  FEV1 or PEFR or VC  30-49%   []  FEV1 or PEFR or VC less than 30%   0   TOTAL ACUITY: 9       CARE PLAN    If Acuity Level is 2, 3, or 4 in any of the following:    [] BILATERAL BREATH SOUNDS (BBS)     [x] PULMONARY HISTORY (PULM HX)  [] PULMONARY FUNCTION (PULM FX)    Goal: Improve respiratory functions in patients with airway disease and decrease WOB    [x] AEROSOL PROTOCOL    Total Acuity:   16-32  []  Secondary Assessment in 24 hrs Total Acuity:  9-15  [x]  Secondary Assessment in 24 hrs Total Acuity:  4-8  []  Secondary Assessment in 48 hrs Total Acuity:  0-3  []  Secondary Assessment in 72 hrs   HHN AEROSOL THERAPY with  [physician-ordered bronchodilator(s)] q 4 & Albuterol PRN q2 hrs. Breath-Actuated Neb if BBS Acuity = 4, and pt. can use MP. Notify physician if condition deteriorates.   HHN AEROSOL

## 2017-11-21 NOTE — PLAN OF CARE
Problem: Falls - Risk of  Goal: Absence of falls  Outcome: Ongoing  Patient is alert and oriented and has demonstrated the use of using the call light for assistance before getting up. Education has been provided to defer the use of the bed alarm and/or restraint free alarm as the patient has shown competency in calling for assistance and verbalizing the risk. Problem: Pain:  Goal: Pain level will decrease  Pain level will decrease   Outcome: Ongoing  Pain being monitored . Patient encouraged to use pain scale when rating pain. Patient using pain medications and non medication techniques to relieve pain. Problem: Discharge Planning:  Goal: Discharged to appropriate level of care  Discharged to appropriate level of care   Outcome: Ongoing  Patient will be going home after discharge. Home needs have been discussed. Problem: Airway Clearance - Ineffective:  Goal: Clear lung sounds  Clear lung sounds   Outcome: Ongoing  Airway pérez. Patient able to clear airway at will. Patient coughs occasionally. Will continue to monitor. Problem: Fluid Volume - Deficit:  Goal: Achieves intake and output within specified parameters  Achieves intake and output within specified parameters   Outcome: Ongoing  Monitoring fluid intake and output. Patient vital signs are WNL at this time. Problem: Gas Exchange - Impaired:  Goal: Levels of oxygenation will improve  Levels of oxygenation will improve   Outcome: Ongoing  SPO2 in lower 90s. Shortness of breath noted with exertion. Will continue monitor. Problem: Hyperthermia:  Goal: Ability to maintain a body temperature in the normal range will improve  Ability to maintain a body temperature in the normal range will improve   Outcome: Ongoing  Vitals with in normal limits. Will continue to monitor.

## 2017-11-21 NOTE — ED PROVIDER NOTES
Inject 40 Units into the skin daily    LISINOPRIL (PRINIVIL;ZESTRIL) 2.5 MG TABLET    TAKE 1 TABLET DAILY    LOPERAMIDE (IMODIUM) 2 MG CAPSULE    Take 2 mg by mouth nightly    MECLIZINE (ANTIVERT) 12.5 MG TABLET    Take 1 tablet by mouth 3 times daily as needed for Dizziness    METFORMIN (GLUCOPHAGE) 1000 MG TABLET    Take 1 tablet by mouth 2 times daily (with meals)       ALLERGIES     Codeine and Other    FAMILY HISTORY       Family History   Problem Relation Age of Onset    Diabetes Mother     High Cholesterol Mother     Hypertension Mother     Heart Disease Mother     Diabetes Father     Heart Disease Father     High Cholesterol Father     Hypertension Father     Diabetes Sister           SOCIAL HISTORY       Social History     Social History    Marital status:      Spouse name: N/A    Number of children: N/A    Years of education: N/A     Social History Main Topics    Smoking status: Former Smoker     Quit date: 5/30/2011    Smokeless tobacco: None    Alcohol use No    Drug use: No    Sexual activity: Not Asked     Other Topics Concern    None     Social History Narrative    None       SCREENINGS   NIH Stroke Scale  NIH Stroke Scale Assessed: NoGlasgow Coma Scale  Eye Opening: Spontaneous  Best Verbal Response: Oriented  Best Motor Response: Obeys commands  Cate Coma Scale Score: 15        PHYSICAL EXAM    (up to 7 for level 4, 8 or more for level 5)     ED Triage Vitals   BP Temp Temp Source Pulse Resp SpO2 Height Weight   11/20/17 1736 11/20/17 1738 11/20/17 1738 11/20/17 1736 11/20/17 1736 11/20/17 1736 -- --   (!) 147/72 101.9 °F (38.8 °C) Tympanic 110 17 (!) 84 %         Physical Exam   Constitutional: She is oriented to person, place, and time. She appears well-developed and well-nourished. No distress. HENT:   Head: Normocephalic and atraumatic.    Right Ear: External ear normal.   Left Ear: External ear normal.   Mouth/Throat: Oropharynx is clear and moist. No oropharyngeal exudate. Eyes: Conjunctivae and EOM are normal. Pupils are equal, round, and reactive to light. Right eye exhibits no discharge. Left eye exhibits no discharge. No scleral icterus. Neck: Normal range of motion. Neck supple. No tracheal deviation present. Cardiovascular: Normal rate, regular rhythm and intact distal pulses. Exam reveals no gallop and no friction rub. No murmur heard. Pulmonary/Chest: Effort normal. No accessory muscle usage or stridor. Tachypnea noted. No respiratory distress. She has decreased breath sounds. She has wheezes. She has rhonchi in the right middle field. She exhibits no tenderness. Abdominal: Soft. Bowel sounds are normal. She exhibits no distension. There is no tenderness. There is no rebound and no guarding. Musculoskeletal: Normal range of motion. She exhibits no edema, tenderness or deformity. Neurological: She is alert and oriented to person, place, and time. No cranial nerve deficit. Skin: Skin is warm and dry. No rash noted. She is not diaphoretic. No erythema. Psychiatric: She has a normal mood and affect. Her behavior is normal.   Nursing note and vitals reviewed. DIAGNOSTIC RESULTS     EKG: All EKG's are interpreted by the Emergency Department Physician who either signs or Co-signs this chart in the absence of a cardiologist.      RADIOLOGY:   Non-plain film images such as CT, Ultrasound and MRI are read by the radiologist. Plain radiographic images are visualized and preliminarily interpreted by the emergency physician with the below findings:      Interpretation per the Radiologist below, if available at the time of this note:    XR Chest Portable   Final Result   Impression:     Large area of pulmonary consolidation or mass in right upper lobe. Differential diagnosis includes pulmonary neoplasm, consolidating pneumonia and/or atelectasis. Pulmonary infarction could also have this appearance. Chest CT is recommended for further    evaluation. ADDENDUM FINAL REPORT   Report is correct for lateralization. The infiltrate/mass in the right upper lobe. Electronically Signed By: Harriet Goodman   on  11/20/2017  20:06      CTA CHEST W CONTRAST   Final Result   Impression:     Dense consolidating infiltrate in the left upper lobe most typical of pneumonia. Pulmonary neoplasm is unlikely. ADDENDUM FINAL REPORT   Addended report for correct lateralization of findings. TECHNIQUE:  Spiral multi slice acquisition through the chest during arterial phase of contrast administration. Axial, coronal and sagittal images were reconstructed. Three-dimensional CT angiography images were generated. PRIORS:  None. FINDINGS:     There is no evidence of pulmonary embolism. The aorta and pulmonary arteries are normal in size. The cardiac chambers are not dilated. There are no signs of aortic aneurysm or dissection. There is extensive consolidating infiltrates seen in the right upper lobe. This is new when compared to the prior study. There is no architectural distortion of the vasculature or bronchi in this area and therefore this does not have the appearance of a    neoplastic mass. It more likely represents dense pulmonary consolidation from pneumonia. There are no other pulmonary infiltrates or masses present. .      There is no pleural thickening or pleural effusion seen. There is no mediastinal adenopathy other mediastinal masses. The visualized upper abdominal contents are unremarkable. Impression:     Dense consolidating infiltrate in the right upper lobe most typical of pneumonia. Pulmonary neoplasm is unlikely.          Electronically Signed By: Harriet Goodman   on  11/20/2017  20:05            ED BEDSIDE ULTRASOUND:   Performed by ED Physician - none    LABS:  Labs Reviewed   BASIC METABOLIC PANEL - Abnormal; Notable for the following:        Result Value    Glucose 251 (*)     CREATININE 0.48 (*)     Bun/Cre Ratio 27 (*)     Sodium 132 (*)     Chloride 91 (*)     All other components within normal limits   BRAIN NATRIURETIC PEPTIDE - Abnormal; Notable for the following:     Pro- (*)     All other components within normal limits   CBC WITH AUTO DIFFERENTIAL - Abnormal; Notable for the following:     WBC 26.5 (*)     RBC 5.24 (*)     Segs Absolute 21.19 (*)     Absolute Mono # 1.33 (*)     Absolute Bands # 1.06 (*)     All other components within normal limits   PROTIME-INR - Abnormal; Notable for the following:     Protime 12.4 (*)     All other components within normal limits   CULTURE BLOOD #1   CULTURE BLOOD #2   APTT   TROPONIN   LACTIC ACID, PLASMA       All other labs were within normal range or not returned as of this dictation. EMERGENCY DEPARTMENT COURSE and DIFFERENTIAL DIAGNOSIS/MDM:   Vitals:    Vitals:    11/20/17 1918 11/20/17 1931 11/20/17 1945 11/20/17 2031   BP: 99/65 112/68 112/68 (!) 102/50   Pulse: 100 97 99 90   Resp: 11 12 13    Temp:       TempSrc:       SpO2: 95% 95% 96% 96%         MDM  62-year-old female who presents with right back pain and right-sided chest pain is been ongoing for the past couple days. In addition she's had some hemoptysis with cough. She is febrile here she is tachycardic. At this point sound like she has rhonchi on the right some mild decreased lung sounds. I'm going to get a chest x-ray would likely also get a CT of the chest to rule out pneumonia and pneumothorax pleural effusion pulmonary embolism infection dehydration or O imbalance mass acute myocardial infarction    X-ray brought me the portable machine which did show the x-ray she has a large what appears to be pneumonia in the right lung I am going to get a CTA    CTA is back does show the large consolidation in the right upper lobe. Patient has had blood cultures and antibiotics has been initiated. Lactic acid is negative.   She will get another liter of fluid I am calling hospitalist for mis-transcribed.)      neelima Miller PA-C  11/20/17 [id number] [place], [place]  11/20/17 4294

## 2017-11-22 LAB
-: NORMAL
ABSOLUTE EOS #: 0.2 K/UL (ref 0–0.4)
ABSOLUTE IMMATURE GRANULOCYTE: ABNORMAL K/UL (ref 0–0.3)
ABSOLUTE LYMPH #: 3.2 K/UL (ref 1–4.8)
ABSOLUTE MONO #: 0.5 K/UL (ref 0–1)
ANION GAP SERPL CALCULATED.3IONS-SCNC: 13 MMOL/L (ref 9–17)
BASOPHILS # BLD: 0 % (ref 0–2)
BASOPHILS ABSOLUTE: 0 K/UL (ref 0–0.2)
BUN BLDV-MCNC: 9 MG/DL (ref 6–20)
BUN/CREAT BLD: 24 (ref 9–20)
CALCIUM SERPL-MCNC: 8.9 MG/DL (ref 8.6–10.4)
CHLORIDE BLD-SCNC: 96 MMOL/L (ref 98–107)
CO2: 25 MMOL/L (ref 20–31)
CREAT SERPL-MCNC: 0.38 MG/DL (ref 0.5–0.9)
CULTURE: NORMAL
DIFFERENTIAL TYPE: ABNORMAL
DIRECT EXAM: NORMAL
EOSINOPHILS RELATIVE PERCENT: 1 % (ref 0–8)
GFR AFRICAN AMERICAN: >60 ML/MIN
GFR NON-AFRICAN AMERICAN: >60 ML/MIN
GFR SERPL CREATININE-BSD FRML MDRD: ABNORMAL ML/MIN/{1.73_M2}
GFR SERPL CREATININE-BSD FRML MDRD: ABNORMAL ML/MIN/{1.73_M2}
GLUCOSE BLD-MCNC: 156 MG/DL (ref 74–100)
GLUCOSE BLD-MCNC: 166 MG/DL (ref 70–99)
GLUCOSE BLD-MCNC: 182 MG/DL (ref 74–100)
GLUCOSE BLD-MCNC: 237 MG/DL (ref 74–100)
GLUCOSE BLD-MCNC: 259 MG/DL (ref 74–100)
HCT VFR BLD CALC: 33.3 % (ref 36–46)
HEMOGLOBIN: 10.8 G/DL (ref 12–16)
IMMATURE GRANULOCYTES: ABNORMAL %
LYMPHOCYTES # BLD: 31 % (ref 24–44)
Lab: NORMAL
MCH RBC QN AUTO: 28.1 PG (ref 26–34)
MCHC RBC AUTO-ENTMCNC: 32.4 G/DL (ref 31–37)
MCV RBC AUTO: 86.7 FL (ref 80–100)
MONOCYTES # BLD: 5 % (ref 0–12)
PDW BLD-RTO: 13.9 % (ref 12.1–15.2)
PLATELET # BLD: 175 K/UL (ref 140–450)
PLATELET ESTIMATE: ABNORMAL
PMV BLD AUTO: 9.7 FL (ref 6–12)
POTASSIUM SERPL-SCNC: 3.9 MMOL/L (ref 3.7–5.3)
RBC # BLD: 3.85 M/UL (ref 4–5.2)
RBC # BLD: ABNORMAL 10*6/UL
REASON FOR REJECTION: NORMAL
SEG NEUTROPHILS: 63 % (ref 36–66)
SEGMENTED NEUTROPHILS ABSOLUTE COUNT: 6.6 K/UL (ref 1.8–7.7)
SODIUM BLD-SCNC: 134 MMOL/L (ref 135–144)
SPECIMEN DESCRIPTION: NORMAL
STATUS: NORMAL
WBC # BLD: 10.6 K/UL (ref 3.5–11)
WBC # BLD: ABNORMAL 10*3/UL
ZZ NTE CLEAN UP: ORDERED TEST: NORMAL
ZZ NTE WITH NAME CLEAN UP: SPECIMEN SOURCE: NORMAL

## 2017-11-22 PROCEDURE — 6370000000 HC RX 637 (ALT 250 FOR IP): Performed by: INTERNAL MEDICINE

## 2017-11-22 PROCEDURE — 94761 N-INVAS EAR/PLS OXIMETRY MLT: CPT

## 2017-11-22 PROCEDURE — 94664 DEMO&/EVAL PT USE INHALER: CPT

## 2017-11-22 PROCEDURE — 36415 COLL VENOUS BLD VENIPUNCTURE: CPT

## 2017-11-22 PROCEDURE — 94668 MNPJ CHEST WALL SBSQ: CPT

## 2017-11-22 PROCEDURE — 94640 AIRWAY INHALATION TREATMENT: CPT

## 2017-11-22 PROCEDURE — 87070 CULTURE OTHR SPECIMN AEROBIC: CPT

## 2017-11-22 PROCEDURE — 6360000002 HC RX W HCPCS: Performed by: INTERNAL MEDICINE

## 2017-11-22 PROCEDURE — 85025 COMPLETE CBC W/AUTO DIFF WBC: CPT

## 2017-11-22 PROCEDURE — 1200000000 HC SEMI PRIVATE

## 2017-11-22 PROCEDURE — 82947 ASSAY GLUCOSE BLOOD QUANT: CPT

## 2017-11-22 PROCEDURE — 87205 SMEAR GRAM STAIN: CPT

## 2017-11-22 PROCEDURE — 2580000003 HC RX 258: Performed by: INTERNAL MEDICINE

## 2017-11-22 PROCEDURE — 80048 BASIC METABOLIC PNL TOTAL CA: CPT

## 2017-11-22 RX ORDER — ALPRAZOLAM 0.5 MG/1
0.5 TABLET ORAL
Status: COMPLETED | OUTPATIENT
Start: 2017-11-22 | End: 2017-11-22

## 2017-11-22 RX ORDER — IPRATROPIUM BROMIDE AND ALBUTEROL SULFATE 2.5; .5 MG/3ML; MG/3ML
1 SOLUTION RESPIRATORY (INHALATION) 3 TIMES DAILY
Status: DISCONTINUED | OUTPATIENT
Start: 2017-11-22 | End: 2017-11-23 | Stop reason: HOSPADM

## 2017-11-22 RX ADMIN — GABAPENTIN 600 MG: 300 CAPSULE ORAL at 08:42

## 2017-11-22 RX ADMIN — AZITHROMYCIN MONOHYDRATE 500 MG: 500 INJECTION, POWDER, LYOPHILIZED, FOR SOLUTION INTRAVENOUS at 21:20

## 2017-11-22 RX ADMIN — ACETAMINOPHEN 650 MG: 325 TABLET, FILM COATED ORAL at 01:02

## 2017-11-22 RX ADMIN — ALPRAZOLAM 0.5 MG: 0.5 TABLET ORAL at 21:19

## 2017-11-22 RX ADMIN — INSULIN LISPRO 2 UNITS: 100 INJECTION, SOLUTION INTRAVENOUS; SUBCUTANEOUS at 08:47

## 2017-11-22 RX ADMIN — IPRATROPIUM BROMIDE AND ALBUTEROL SULFATE 1 AMPULE: .5; 3 SOLUTION RESPIRATORY (INHALATION) at 15:29

## 2017-11-22 RX ADMIN — GABAPENTIN 600 MG: 300 CAPSULE ORAL at 17:35

## 2017-11-22 RX ADMIN — SODIUM CHLORIDE: 9 INJECTION, SOLUTION INTRAVENOUS at 01:10

## 2017-11-22 RX ADMIN — INSULIN LISPRO 3 UNITS: 100 INJECTION, SOLUTION INTRAVENOUS; SUBCUTANEOUS at 21:34

## 2017-11-22 RX ADMIN — GLIPIZIDE 5 MG: 5 TABLET ORAL at 07:08

## 2017-11-22 RX ADMIN — ATORVASTATIN CALCIUM 40 MG: 40 TABLET, FILM COATED ORAL at 08:42

## 2017-11-22 RX ADMIN — ENOXAPARIN SODIUM 40 MG: 40 INJECTION SUBCUTANEOUS at 08:44

## 2017-11-22 RX ADMIN — INSULIN LISPRO 2 UNITS: 100 INJECTION, SOLUTION INTRAVENOUS; SUBCUTANEOUS at 12:45

## 2017-11-22 RX ADMIN — CITALOPRAM HYDROBROMIDE 40 MG: 20 TABLET ORAL at 21:19

## 2017-11-22 RX ADMIN — ACETAMINOPHEN 650 MG: 325 TABLET, FILM COATED ORAL at 08:42

## 2017-11-22 RX ADMIN — LISINOPRIL 2.5 MG: 2.5 TABLET ORAL at 08:42

## 2017-11-22 RX ADMIN — GABAPENTIN 1200 MG: 300 CAPSULE ORAL at 21:19

## 2017-11-22 RX ADMIN — SODIUM CHLORIDE: 9 INJECTION, SOLUTION INTRAVENOUS at 14:45

## 2017-11-22 RX ADMIN — IPRATROPIUM BROMIDE AND ALBUTEROL SULFATE 1 AMPULE: .5; 3 SOLUTION RESPIRATORY (INHALATION) at 10:08

## 2017-11-22 RX ADMIN — PANTOPRAZOLE SODIUM 40 MG: 40 TABLET, DELAYED RELEASE ORAL at 21:19

## 2017-11-22 RX ADMIN — ONDANSETRON 4 MG: 2 INJECTION INTRAMUSCULAR; INTRAVENOUS at 01:30

## 2017-11-22 RX ADMIN — ONDANSETRON 4 MG: 2 INJECTION INTRAMUSCULAR; INTRAVENOUS at 08:42

## 2017-11-22 RX ADMIN — INSULIN LISPRO 4 UNITS: 100 INJECTION, SOLUTION INTRAVENOUS; SUBCUTANEOUS at 17:14

## 2017-11-22 RX ADMIN — CEFTRIAXONE 1 G: 1 INJECTION, POWDER, FOR SOLUTION INTRAMUSCULAR; INTRAVENOUS at 21:19

## 2017-11-22 ASSESSMENT — PAIN SCALES - GENERAL
PAINLEVEL_OUTOF10: 0
PAINLEVEL_OUTOF10: 0
PAINLEVEL_OUTOF10: 5
PAINLEVEL_OUTOF10: 0
PAINLEVEL_OUTOF10: 3
PAINLEVEL_OUTOF10: 1
PAINLEVEL_OUTOF10: 3
PAINLEVEL_OUTOF10: 3
PAINLEVEL_OUTOF10: 2

## 2017-11-22 ASSESSMENT — PAIN DESCRIPTION - PAIN TYPE
TYPE: ACUTE PAIN

## 2017-11-22 ASSESSMENT — PAIN DESCRIPTION - DESCRIPTORS
DESCRIPTORS: HEADACHE

## 2017-11-22 NOTE — PLAN OF CARE
Problem: Falls - Risk of  Goal: Absence of falls  Outcome: Ongoing  Patient alert and oriented, has demonstrated proper use of call light for assistance. Bed is in lowest position with wheels locked for safety. Will continue to monitor. Problem: Pain:  Goal: Pain level will decrease  Pain level will decrease   Outcome: Ongoing  Patient's pain level has been assessed using the 1-10 scale and medication has been administered as ordered depending on stated pain level. Pain rating and characteristics are being charted to track progress and reassessment of pain is being assessed. Will continue to monitor. Problem: Daily Care:  Goal: Daily care needs are met  Daily care needs are met  Outcome: Ongoing  Patient's daily cares and needs are being assessed each shift. Consideration is given according to patient's ability to assist with ADL's, and hourly rounding consists of asking patient if any help is needed. Will continue to monitor. Problem: Skin Integrity:  Goal: Skin integrity will stabilize  Skin integrity will stabilize  Outcome: Ongoing  Patient's skin condition is being assessed each shift and changes are being charted. Pillows are being used to relieve bony prominences and patient is being reminded to turn frequently to help maintain integrity of skin. Heels are also being elevated when patient is in bed. Will continue to monitor.

## 2017-11-22 NOTE — PROGRESS NOTES
Randa Walton M.D. Internal Medicine Progress Note 11/22/17    SUBJECTIVE:    Patient seen for f/u of Sepsis due to pneumonia [facility]). She is still coughing quite a bit, but overall feels as if she's improving. No fevers or chills. ROS:   Constitutional: negative  for fevers, and negative for chills. Respiratory: positive for shortness of breath, positive for cough, and negative for wheezing  Cardiovascular: negative for chest pain, and negative for palpitations  Gastrointestinal: negative for abdominal pain, negative for nausea,negative for vomiting, negative for diarrhea, and negative for constipation     All other systems were reviewed with the patient and are negative unless otherwise stated in HPI    OBJECTIVE:  Vitals:   Temp: 97.9 °F (36.6 °C)  BP: 123/67  Resp: 18  Pulse: 91  SpO2: 96 %    24HR INTAKE/OUTPUT:    Intake/Output Summary (Last 24 hours) at 11/22/17 0752  Last data filed at 11/22/17 0544   Gross per 24 hour   Intake             3331 ml   Output                0 ml   Net             3331 ml     -----------------------------------------------------------------  Exam:  GEN:    Awake, alert and oriented x 3. no acute distress  EYES:  EOMI, pupils equal   NECK: Supple. No lymphadenopathy. No carotid bruit  CVS:    RRR, no murmur, rub or gallop  PULM:  Still with some rhonchi RUL  ABD:    Bowels sounds normal.  Abdomen is soft. No distention. No tenderness. EXT:   no edema bilaterally . No calf tenderness. NEURO: Motor and sensory are intact  SKIN:  No rashes.   No skin lesions.    -----------------------------------------------------------------  Diagnostic Data:    · All available data reviewed  Lab Results   Component Value Date    WBC 10.6 11/22/2017    HGB 10.8 (L) 11/22/2017    MCV 86.7 11/22/2017     11/22/2017    Lab Results   Component Value Date    GLUCOSE 166 (H) 11/22/2017    BUN 9 11/22/2017    CREATININE 0.38 (L) 11/22/2017     (L) 11/22/2017    K 3.9 11/22/2017    CALCIUM 8.9 11/22/2017    CL 96 (L) 11/22/2017    CO2 25 11/22/2017       ASSESSMENT:    Principal Problem:    Sepsis due to pneumonia     Active Problems:    Community acquired pneumonia of right upper lobe of lung     Uncontrolled type 2 diabetes mellitus with diabetic polyneuropathy, with long-term current use of insulin     Obstructive sleep apnea    Obstructive      PLAN:  · Continue current therapy  · Continue IV Abx  · Hopefully DC in next 24-48 hours if continues to improve    Allegra Luque M.D.  11/22/2017  7:54 AM

## 2017-11-22 NOTE — PROGRESS NOTES
absent BBS 0   Cough []  Strong, effective, & non-prod. [x]  Effective & prod. Less than 25 ml (2 TBSP) over past 24 hrs []  Ineffective & non-prod to less than 25 ML over past 24 hrs []  Ineffective and, or greater than 25 ml sputum prod. past 24 hrs. []  Nonspon- taneous; Requires suctioning 1   Pulmonary History  (PULM HX) []  No smoking and no chronic pulmonaryhistory []  Former smoker. Quit over 12 mos. ago []  Current smoker or quit w/ in 12 mos []  Pulm. History and, or 20 pk/yr smoking hx [x]  Admitted w/ acute pulm. dx and, or has been admitted w/ pulm. dx 2 or more times over past 12 mos 4   Surgical History this Admit  (SURG HX) [x]  No surgery []  General surgery []  Lower abdominal []  Thoracic or upper abdominal   []  Thoracic w/ pulm. disease 0   Chest X-Ray (CXR)/CT Scan []  Clear or not applicable []  Not available []  Atelect- asis or pleural effusions [x]  Localized infiltrate or pulm. edema []  Con-solidated Infiltrates, bilateral, or in more than 1 lobe 3   Slow or Forced VC, FEV1 OR PEFR (PULM FXN)  [x]  80% or greater, or not indicated []  Pt. unable to perform []  FEV1 or PEFR or VC 51-79%. []  FEV1 or PEFR or VC  30-49%   []  FEV1 or PEFR or VC less than 30%   0   TOTAL ACUITY: 8       CARE PLAN    If Acuity Level is 2, 3, or 4 in any of the following:    [] BILATERAL BREATH SOUNDS (BBS)     [x] PULMONARY HISTORY (PULM HX)  [] PULMONARY FUNCTION (PULM FX)    Goal: Improve respiratory functions in patients with airway disease and decrease WOB    [x] AEROSOL PROTOCOL    Total Acuity:   16-32  []  Secondary Assessment in 24 hrs Total Acuity:  9-15  []  Secondary Assessment in 24 hrs Total Acuity:  4-8  [x]  Secondary Assessment in 48 hrs Total Acuity:  0-3  []  Secondary Assessment in 72 hrs   HHN AEROSOL THERAPY with  [physician-ordered bronchodilator(s)] q 4 & Albuterol PRN q2 hrs. Breath-Actuated Neb if BBS Acuity = 4, and pt. can use MP. Notify physician if condition deteriorates.   HHN Refused

## 2017-11-23 VITALS
HEART RATE: 85 BPM | BODY MASS INDEX: 37.39 KG/M2 | SYSTOLIC BLOOD PRESSURE: 157 MMHG | DIASTOLIC BLOOD PRESSURE: 87 MMHG | HEIGHT: 64 IN | WEIGHT: 219 LBS | OXYGEN SATURATION: 91 % | RESPIRATION RATE: 18 BRPM | TEMPERATURE: 98.5 F

## 2017-11-23 LAB
ANION GAP SERPL CALCULATED.3IONS-SCNC: 12 MMOL/L (ref 9–17)
BUN BLDV-MCNC: 6 MG/DL (ref 6–20)
BUN/CREAT BLD: 17 (ref 9–20)
CALCIUM SERPL-MCNC: 8.9 MG/DL (ref 8.6–10.4)
CHLORIDE BLD-SCNC: 98 MMOL/L (ref 98–107)
CO2: 23 MMOL/L (ref 20–31)
CREAT SERPL-MCNC: 0.36 MG/DL (ref 0.5–0.9)
GFR AFRICAN AMERICAN: >60 ML/MIN
GFR NON-AFRICAN AMERICAN: >60 ML/MIN
GFR SERPL CREATININE-BSD FRML MDRD: ABNORMAL ML/MIN/{1.73_M2}
GFR SERPL CREATININE-BSD FRML MDRD: ABNORMAL ML/MIN/{1.73_M2}
GLUCOSE BLD-MCNC: 200 MG/DL (ref 74–100)
GLUCOSE BLD-MCNC: 224 MG/DL (ref 70–99)
POTASSIUM SERPL-SCNC: 4.1 MMOL/L (ref 3.7–5.3)
SODIUM BLD-SCNC: 133 MMOL/L (ref 135–144)

## 2017-11-23 PROCEDURE — 6370000000 HC RX 637 (ALT 250 FOR IP): Performed by: INTERNAL MEDICINE

## 2017-11-23 PROCEDURE — 80048 BASIC METABOLIC PNL TOTAL CA: CPT

## 2017-11-23 PROCEDURE — 82947 ASSAY GLUCOSE BLOOD QUANT: CPT

## 2017-11-23 PROCEDURE — 6360000002 HC RX W HCPCS: Performed by: INTERNAL MEDICINE

## 2017-11-23 PROCEDURE — 36415 COLL VENOUS BLD VENIPUNCTURE: CPT

## 2017-11-23 RX ORDER — AMOXICILLIN AND CLAVULANATE POTASSIUM 875; 125 MG/1; MG/1
1 TABLET, FILM COATED ORAL 2 TIMES DAILY
Qty: 14 TABLET | Refills: 0 | Status: SHIPPED | OUTPATIENT
Start: 2017-11-23 | End: 2017-11-30

## 2017-11-23 RX ADMIN — LISINOPRIL 2.5 MG: 2.5 TABLET ORAL at 08:35

## 2017-11-23 RX ADMIN — ATORVASTATIN CALCIUM 40 MG: 40 TABLET, FILM COATED ORAL at 08:35

## 2017-11-23 RX ADMIN — ENOXAPARIN SODIUM 40 MG: 40 INJECTION SUBCUTANEOUS at 08:35

## 2017-11-23 RX ADMIN — GABAPENTIN 600 MG: 300 CAPSULE ORAL at 07:07

## 2017-11-23 RX ADMIN — GLIPIZIDE 5 MG: 5 TABLET ORAL at 07:07

## 2017-11-23 RX ADMIN — INSULIN LISPRO 4 UNITS: 100 INJECTION, SOLUTION INTRAVENOUS; SUBCUTANEOUS at 07:20

## 2017-11-23 ASSESSMENT — PAIN DESCRIPTION - ORIENTATION: ORIENTATION: RIGHT

## 2017-11-23 ASSESSMENT — PAIN SCALES - GENERAL
PAINLEVEL_OUTOF10: 2
PAINLEVEL_OUTOF10: 0

## 2017-11-23 ASSESSMENT — PAIN DESCRIPTION - FREQUENCY: FREQUENCY: INTERMITTENT

## 2017-11-23 ASSESSMENT — PAIN DESCRIPTION - PAIN TYPE: TYPE: ACUTE PAIN

## 2017-11-23 ASSESSMENT — PAIN DESCRIPTION - DESCRIPTORS: DESCRIPTORS: ACHING

## 2017-11-23 ASSESSMENT — PAIN DESCRIPTION - LOCATION: LOCATION: CHEST

## 2017-11-23 NOTE — PLAN OF CARE
Problem: Falls - Risk of  Goal: Absence of falls  Outcome: Ongoing  Patient is independent. Continuing to assess and monitor. Fall risk assessed daily. Problem: Fluid Volume - Deficit:  Goal: Achieves intake and output within specified parameters  Achieves intake and output within specified parameters   Outcome: Ongoing  Continuing to assess and monitor. Problem: Safety:  Goal: Free from accidental physical injury  Free from accidental physical injury   Outcome: Ongoing  Call light in reach. Fall risk assessed daily. Pt able to call for assistance. Non slip socks in place. Bed in lowest position with wheels locked. ID band is on and visible. Will continue to assess and monitor.

## 2017-11-23 NOTE — DISCHARGE SUMMARY
Isabel High M.D. Internal Medicine Discharge Summary    Patient ID:  James Valdivia  [id number]  1965    Admission date: 11/20/2017    Discharge date: 11/23/2017     Admitting Physician: Reyna Antonio MD     Primary Care Physician: Amy Byrd CNP     Primary Discharge Diagnoses:   Patient Active Problem List    Diagnosis Date Noted    Community acquired pneumonia of right upper lobe of lung ([address] 11/20/2017     Priority: High    Obstructive sleep apnea 11/21/2017    Sepsis due to pneumonia ([address] 11/21/2017    Vitamin D deficiency 04/19/2017    Uncontrolled type 2 diabetes mellitus with diabetic polyneuropathy, with long-term current use of insulin ([address] 02/17/2017    Nuclear sclerotic cataract of left eye 10/20/2016       Additional Diagnoses:       Diagnosis Date    Arthritis     Depression     Diabetes mellitus ([address]     Diabetic neuropathy ([address]     Esophageal reflux     Sleep apnea     DOES NOT USE A MACHINE. DIAGNOSED > 15 YEARS 2000 [facility] Course: The patient was admitted for sepsis due to RUL pneumonia. On admission she had fever up to 102, leukocytosis up to 25k and tachycardia up to 120 bpm.  CXR showed the RUL infiltrate. She was treated with IV Rocephin and Azithromycin and improved over the course of her hospitalization. Discharge Exam:  GEN:    Awake, alert and oriented x 3. no acute distress  CVS:    RRR, no murmur, rub or gallop  PULM: CTA, no wheezes, rales or rhonchi  ABD:    Bowels sounds normal.  Abdomen is soft. No distention. No tenderness. EXT:   no edema bilaterally . No calf tenderness.      Consultants:    · none    Procedures:    · none    Complications:   · none    Significant Diagnostic Studies:   · Discharge Labs:  Lab Results   Component Value Date    WBC 10.6 11/22/2017    HGB 10.8 (L) 11/22/2017    MCV 86.7 11/22/2017     11/22/2017      Lab Results   Component Value Date    GLUCOSE 224 (H) 11/23/2017    BUN 6 11/23/2017    CREATININE 0.36 (L) 11/23/2017     (L) 11/23/2017    K 4.1 11/23/2017    CALCIUM 8.9 11/23/2017    CL 98 11/23/2017    CO2 23 11/23/2017       Discharge Condition:   · stable    Disposition:   · Discharge to Home    Discharge Medications:   Lindsey Gandhi   Home Medication Instructions [id number]    Printed on:11/23/17 7970   Medication Information                      AFLURIA PRESERVATIVE FREE 0.5 ML JAKE injection               amoxicillin-clavulanate (AUGMENTIN) 875-125 MG per tablet  Take 1 tablet by mouth 2 times daily for 7 days             atorvastatin (LIPITOR) 40 MG tablet  Take 1 tablet by mouth daily             Cholecalciferol (VITAMIN D3) 45743 units CAPS  Take 1 capsule by mouth once a week             citalopram (CELEXA) 40 MG tablet  Take 1 tablet by mouth daily             esomeprazole Magnesium (NEXIUM) 20 MG PACK  Take 20 mg by mouth nightly OTC              gabapentin (NEURONTIN) 600 MG tablet  Take 600 mg by mouth 2 times daily             glipiZIDE (GLUCOTROL XL) 5 MG extended release tablet  Take 1 tablet by mouth daily             Insulin Degludec (TRESIBA FLEXTOUCH) 200 UNIT/ML SOPN  Inject 40 Units into the skin daily             lisinopril (PRINIVIL;ZESTRIL) 2.5 MG tablet  TAKE 1 TABLET DAILY             loperamide (IMODIUM) 2 MG capsule  Take 2 mg by mouth nightly             meclizine (ANTIVERT) 12.5 MG tablet  Take 1 tablet by mouth 3 times daily as needed for Dizziness             metFORMIN (GLUCOPHAGE) 1000 MG tablet  Take 1 tablet by mouth 2 times daily (with meals)                 Patient Instructions:   · Activity: activity as tolerated  · Diet: diabetic diet  · Wound Care: none needed  · Follow up with Bita Byrd CNP in 1-2 weeks     CORE MEASURES on Discharge (if applicable)  ACE/ARB in CHF: N/A  ASA in MI: N/A  Statin in MI: N/A  Statin in CVA: N/A  Antiplatelet in CVA: N/A    Total time spent on discharge services: 25 minutes  Including the following activities:  · Evaluation and Management of patient  · Discussion with patient and/or surrogate about current care plan  · Coordination with Case Management and/or   · Coordination of care with Consultants (if applicable)   · Coordination of care with Receiving Facility Physician (if applicable)  · Completion of DME forms (if applicable)  · Preparation of Discharge Summary  · Preparation of Medication Reconciliation  · Preparation of Discharge Prescriptions      Signed:  Kwame Banerjee M.D.  11/23/2017  11:17 AM

## 2017-11-23 NOTE — PROGRESS NOTES
Marcus Talley M.D. Internal Medicine Progress Note 11/23/17    SUBJECTIVE:    Patient seen for f/u of Sepsis due to pneumonia [facility]). She is feeling much better. Still coughing but much improved. Denies any fevers or chills. Denies any chest pain. ROS:   Constitutional: negative  for fevers, and negative for chills. Respiratory: negative for shortness of breath, positive for cough, and negative for wheezing  Cardiovascular: negative for chest pain, and negative for palpitations  Gastrointestinal: negative for abdominal pain, negative for nausea,negative for vomiting, negative for diarrhea, and negative for constipation     All other systems were reviewed with the patient and are negative unless otherwise stated in HPI    OBJECTIVE:  Vitals:   Temp: 98.5 °F (36.9 °C)  BP: (!) 157/87  Resp: 18  Pulse: 85  SpO2: 91 %    24HR INTAKE/OUTPUT:    Intake/Output Summary (Last 24 hours) at 11/23/17 1110  Last data filed at 11/23/17 0436   Gross per 24 hour   Intake             3631 ml   Output                0 ml   Net             3631 ml     -----------------------------------------------------------------  Exam:  GEN:    Awake, alert and oriented x 3. no acute distress  EYES:  EOMI, pupils equal   NECK: Supple. No lymphadenopathy. No carotid bruit  CVS:    RRR, no murmur, rub or gallop  PULM:  CTA, no wheezes, rales or rhonchi  ABD:    Bowels sounds normal.  Abdomen is soft. No distention. No tenderness. EXT:   no edema bilaterally . No calf tenderness. NEURO: Motor and sensory are intact  SKIN:  No rashes.   No skin lesions.    -----------------------------------------------------------------  Diagnostic Data:    · All available data reviewed  Lab Results   Component Value Date    WBC 10.6 11/22/2017    HGB 10.8 (L) 11/22/2017    MCV 86.7 11/22/2017     11/22/2017    Lab Results   Component Value Date    GLUCOSE 224 (H) 11/23/2017    BUN 6 11/23/2017    CREATININE 0.36 (L) 11/23/2017

## 2017-11-25 LAB
CULTURE: ABNORMAL
CULTURE: ABNORMAL
CULTURE: NORMAL
DIRECT EXAM: ABNORMAL
Lab: ABNORMAL
Lab: NORMAL
Lab: NORMAL
SPECIMEN DESCRIPTION: ABNORMAL
SPECIMEN DESCRIPTION: ABNORMAL
SPECIMEN DESCRIPTION: NORMAL
SPECIMEN DESCRIPTION: NORMAL
STATUS: ABNORMAL
STATUS: NORMAL
STATUS: NORMAL

## 2017-12-29 DIAGNOSIS — E11.42 CONTROLLED TYPE 2 DIABETES MELLITUS WITH DIABETIC POLYNEUROPATHY, WITHOUT LONG-TERM CURRENT USE OF INSULIN (HCC): ICD-10-CM

## 2017-12-29 DIAGNOSIS — E78.5 DYSLIPIDEMIA: ICD-10-CM

## 2017-12-29 DIAGNOSIS — F41.8 DEPRESSION WITH ANXIETY: ICD-10-CM

## 2017-12-29 RX ORDER — GLIPIZIDE 5 MG/1
5 TABLET, FILM COATED, EXTENDED RELEASE ORAL DAILY
Qty: 90 TABLET | Refills: 0 | Status: SHIPPED | OUTPATIENT
Start: 2017-12-29 | End: 2018-01-24 | Stop reason: SDUPTHER

## 2017-12-29 RX ORDER — GABAPENTIN 600 MG/1
600 TABLET ORAL 2 TIMES DAILY
Qty: 90 TABLET | OUTPATIENT
Start: 2017-12-29

## 2017-12-29 RX ORDER — CITALOPRAM 40 MG/1
40 TABLET ORAL DAILY
Qty: 90 TABLET | Refills: 0 | Status: SHIPPED | OUTPATIENT
Start: 2017-12-29 | End: 2018-03-27 | Stop reason: SDUPTHER

## 2017-12-29 RX ORDER — ATORVASTATIN CALCIUM 40 MG/1
40 TABLET, FILM COATED ORAL DAILY
Qty: 90 TABLET | Refills: 0 | Status: SHIPPED | OUTPATIENT
Start: 2017-12-29 | End: 2018-03-27 | Stop reason: SDUPTHER

## 2017-12-29 RX ORDER — LISINOPRIL 2.5 MG/1
TABLET ORAL
Qty: 90 TABLET | Refills: 0 | Status: SHIPPED | OUTPATIENT
Start: 2017-12-29 | End: 2018-03-27 | Stop reason: SDUPTHER

## 2018-01-12 ENCOUNTER — CARE COORDINATION (OUTPATIENT)
Dept: CARE COORDINATION | Age: 53
End: 2018-01-12

## 2018-01-24 ENCOUNTER — OFFICE VISIT (OUTPATIENT)
Dept: PRIMARY CARE CLINIC | Age: 53
End: 2018-01-24
Payer: COMMERCIAL

## 2018-01-24 VITALS
HEIGHT: 64 IN | HEART RATE: 89 BPM | SYSTOLIC BLOOD PRESSURE: 118 MMHG | TEMPERATURE: 97.6 F | BODY MASS INDEX: 35.78 KG/M2 | WEIGHT: 209.6 LBS | DIASTOLIC BLOOD PRESSURE: 76 MMHG | RESPIRATION RATE: 20 BRPM

## 2018-01-24 DIAGNOSIS — J01.00 ACUTE NON-RECURRENT MAXILLARY SINUSITIS: ICD-10-CM

## 2018-01-24 LAB
HBA1C MFR BLD: 12.4 %
S PYO AG THROAT QL: NORMAL

## 2018-01-24 PROCEDURE — 1036F TOBACCO NON-USER: CPT | Performed by: NURSE PRACTITIONER

## 2018-01-24 PROCEDURE — 87880 STREP A ASSAY W/OPTIC: CPT | Performed by: NURSE PRACTITIONER

## 2018-01-24 PROCEDURE — 3017F COLORECTAL CA SCREEN DOC REV: CPT | Performed by: NURSE PRACTITIONER

## 2018-01-24 PROCEDURE — 83036 HEMOGLOBIN GLYCOSYLATED A1C: CPT | Performed by: NURSE PRACTITIONER

## 2018-01-24 PROCEDURE — 99214 OFFICE O/P EST MOD 30 MIN: CPT | Performed by: NURSE PRACTITIONER

## 2018-01-24 PROCEDURE — 3046F HEMOGLOBIN A1C LEVEL >9.0%: CPT | Performed by: NURSE PRACTITIONER

## 2018-01-24 PROCEDURE — G8427 DOCREV CUR MEDS BY ELIG CLIN: HCPCS | Performed by: NURSE PRACTITIONER

## 2018-01-24 PROCEDURE — G8484 FLU IMMUNIZE NO ADMIN: HCPCS | Performed by: NURSE PRACTITIONER

## 2018-01-24 PROCEDURE — G8417 CALC BMI ABV UP PARAM F/U: HCPCS | Performed by: NURSE PRACTITIONER

## 2018-01-24 RX ORDER — GLIPIZIDE 10 MG/1
10 TABLET, FILM COATED, EXTENDED RELEASE ORAL DAILY
Qty: 90 TABLET | Refills: 3 | Status: SHIPPED | OUTPATIENT
Start: 2018-01-24 | End: 2019-01-31 | Stop reason: SDUPTHER

## 2018-01-24 RX ORDER — GABAPENTIN 600 MG/1
600 TABLET ORAL 3 TIMES DAILY
Qty: 270 TABLET | Refills: 0 | Status: SHIPPED | OUTPATIENT
Start: 2018-01-24 | End: 2018-04-25 | Stop reason: SDUPTHER

## 2018-01-24 RX ORDER — AMOXICILLIN AND CLAVULANATE POTASSIUM 875; 125 MG/1; MG/1
1 TABLET, FILM COATED ORAL 2 TIMES DAILY
Qty: 20 TABLET | Refills: 0 | Status: SHIPPED | OUTPATIENT
Start: 2018-01-24 | End: 2018-02-03

## 2018-01-24 ASSESSMENT — ENCOUNTER SYMPTOMS
RHINORRHEA: 1
BLURRED VISION: 0
ABDOMINAL PAIN: 0
SHORTNESS OF BREATH: 0
SORE THROAT: 1
SINUS PAIN: 1
WHEEZING: 0
VOMITING: 0
DIARRHEA: 1
COUGH: 1
TROUBLE SWALLOWING: 0
SINUS PRESSURE: 1

## 2018-01-24 NOTE — PROGRESS NOTES
exam is current. Otalgia    There is pain in the left ear. This is a new problem. The current episode started yesterday. Episode frequency: INTERMITTENTLY. The problem has been unchanged. Maximum temperature: SUBJECTIVE. The fever has been present for 1 to 2 days. The pain is at a severity of 2/10. The pain is mild. Associated symptoms include coughing, diarrhea (CHRONIC INTERMITTENT), headaches, rhinorrhea and a sore throat. Pertinent negatives include no abdominal pain, ear discharge, hearing loss, neck pain, rash or vomiting. She has tried nothing for the symptoms. The treatment provided no relief. There is no history of a chronic ear infection. Past Medical History:     Past Medical History:   Diagnosis Date    Arthritis     Depression     [facility]     Diabetic neuropathy [facility]     Esophageal reflux     Sleep apnea     DOES NOT USE A MACHINE. DIAGNOSED > 15 YEARS AGO WITH SLEEP APNEA      Reviewed all health maintenance requirements and ordered appropriate tests  Health Maintenance Due   Topic Date Due    Breast cancer screen  2017    Diabetic retinal exam  2017    Diabetic foot exam  2017       Past Surgical History:     Past Surgical History:   Procedure Laterality Date    ABSCESS DRAINAGE Left 2014    BUTTOCK    APPENDECTOMY      CATARACT REMOVAL WITH IMPLANT Bilateral 10/24/2016     SECTION      CHOLECYSTECTOMY      HERNIA REPAIR Right     IHR    HERNIA REPAIR  2006    HERNIA REPAIR WITH HYSTERECTOMY SURGERY    HYSTERECTOMY      LAPAROSCOPY          Medications:       Prior to Admission medications    Medication Sig Start Date End Date Taking? Authorizing Provider   gabapentin (NEURONTIN) 600 MG tablet Take 1 tablet by mouth 3 times daily for 90 days.  18 Yes Risa Byrd CNP   sitaGLIPtan-metformin (JANUMET)  MG per tablet Take 1 tablet by mouth 2 times daily (with meals) 18  Yes Risa Byrd CNP   glipiZIDE questions answered. Pt voiced understanding. 5.  Reviewed prior labs and health maintenance  6. Continue current medications, diet and exercise. Completed Refills   Requested Prescriptions     Signed Prescriptions Disp Refills    gabapentin (NEURONTIN) 600 MG tablet 270 tablet 0     Sig: Take 1 tablet by mouth 3 times daily for 90 days.  sitaGLIPtan-metformin (JANUMET)  MG per tablet 180 tablet 3     Sig: Take 1 tablet by mouth 2 times daily (with meals)    glipiZIDE (GLUCOTROL XL) 10 MG extended release tablet 90 tablet 3     Sig: Take 1 tablet by mouth daily    amoxicillin-clavulanate (AUGMENTIN) 875-125 MG per tablet 20 tablet 0     Sig: Take 1 tablet by mouth 2 times daily for 10 days         Return in about 2 months (around 3/24/2018) for check up.

## 2018-01-24 NOTE — PATIENT INSTRUCTIONS
Patient Education        Earache: Care Instructions  Your Care Instructions    Even though infection is a common cause of ear pain, not all ear pain means an infection. If you have ear pain and don't have an infection, it could be because of a jaw problem, such as temporomandibular joint (TMJ) pain. Or it could be because of a neck problem. When ear discomfort or pain is mild or comes and goes without other symptoms, home treatment may be all you need. Follow-up care is a key part of your treatment and safety. Be sure to make and go to all appointments, and call your doctor if you are having problems. It's also a good idea to know your test results and keep a list of the medicines you take. How can you care for yourself at home? · Apply heat on the ear to ease pain. To apply heat, put a warm water bottle, a heating pad set on low, or a warm cloth on your ear. Do not go to sleep with a heating pad on your skin. · Take an over-the-counter pain medicine, such as acetaminophen (Tylenol), ibuprofen (Advil, Motrin), or naproxen (Aleve). Be safe with medicines. Read and follow all instructions on the label. · Do not take two or more pain medicines at the same time unless the doctor told you to. Many pain medicines have acetaminophen, which is Tylenol. Too much acetaminophen (Tylenol) can be harmful. · Never insert anything, such as a cotton swab or a leopoldo pin, into the ear. When should you call for help? Call your doctor now or seek immediate medical care if:  ? · You have new or worse symptoms of infection, such as:  ¨ Increased pain, swelling, warmth, or redness. ¨ Red streaks leading from the area. ¨ Pus draining from the area. ¨ A fever. ? Watch closely for changes in your health, and be sure to contact your doctor if:  ? · You have new or worse discharge coming from the ear. ? · You do not get better as expected. Where can you learn more? Go to [email or web address] and sign in

## 2018-01-26 ENCOUNTER — TELEPHONE (OUTPATIENT)
Dept: PRIMARY CARE CLINIC | Age: 53
End: 2018-01-26

## 2018-01-26 NOTE — TELEPHONE ENCOUNTER
Patient informed that insurance would not cover Krupa Frank was sent to the pharmacy. Patient verbalized understanding. [address] also informed.

## 2018-01-30 ENCOUNTER — CARE COORDINATION (OUTPATIENT)
Dept: CARE COORDINATION | Age: 53
End: 2018-01-30

## 2018-01-30 NOTE — CARE COORDINATION
Attempted to contact patient today. Left voice message requesting patient call this CC Nurse at [phone].

## 2018-02-07 ENCOUNTER — CARE COORDINATION (OUTPATIENT)
Dept: CARE COORDINATION | Age: 53
End: 2018-02-07

## 2018-02-07 NOTE — CARE COORDINATION
Attempted to contact patient today. Left voice message requesting patient call this CC Nurse at [phone].

## 2018-02-15 ENCOUNTER — TELEPHONE (OUTPATIENT)
Dept: PRIMARY CARE CLINIC | Age: 53
End: 2018-02-15

## 2018-02-15 ENCOUNTER — CARE COORDINATION (OUTPATIENT)
Dept: CARE COORDINATION | Age: 53
End: 2018-02-15

## 2018-02-15 ENCOUNTER — HOSPITAL ENCOUNTER (OUTPATIENT)
Dept: WOMENS IMAGING | Age: 53
Discharge: HOME | End: 2018-02-17
Payer: COMMERCIAL

## 2018-02-15 DIAGNOSIS — Z12.31 ENCOUNTER FOR SCREENING MAMMOGRAM FOR BREAST CANCER: ICD-10-CM

## 2018-02-15 PROCEDURE — 77067 SCR MAMMO BI INCL CAD: CPT

## 2018-02-15 NOTE — CARE COORDINATION
Ambulatory Care Coordination Note  2/19/2018  CM Risk Score: 5  Asmita Mortality Risk Score:      ACC: Pa Yu RN    Summary Note: Phone call to patient to enroll in care coordination. Patient reports left foot is reddish/purple at times. Patient reports she has discussed this with PCP. This nurse discussed importance of diabetic foot care and regular follow up. Patient is agreeable to referral to Dr. Eh Luz or any podiatrist at [facility]. Patient reports she does have injections in her eyes and reports she has annual diabetic eye exam by [facility]. Fasting blood sugars range 109-200 per report by the patient. This nurse informed patient that the A1C of 12.4 is high. Discussed reasons patient thinks A1C is high. Patient is agreeable to diabetic education. Patient reports she is taking 40u qd as well as Janumet and Glipizide. Patient is not taking Vitamin D and reports this medication was ended Danielle a doctor. \"  This nurse instructed. patient that she should be taking Vit D daily. Patient agrees to buy new bottle and start taking. Patient reports she had 2 falls in past year when knee gave in house and one time outside. Reviewed fall prevention methods. Patient verbalizes understanding. Patient reports she needs Left carpal tunnel and ulner release surgery, but she is not able to have surgery due to unstable blood sugars. Reviewed this case with Erika Byrd at 66 [phone] 21 2/15/2018. Pended orders for Diabetic education and podiatry. CC plan: Will follow up with patient in 1-2 weeks to check status of referrals, Vit D medication and blood sugars. Diabetes Assessment    Medic Alert ID:  No  Meal Planning:  Carb counting, Avoidance of concentrated sweets   How often do you test your blood sugar?:  Daily   Do you have barriers with adherence to non-pharmacologic self-management interventions?  (Nutrition/Exercise/Self-Monitoring):  No   Have you ever had

## 2018-02-15 NOTE — TELEPHONE ENCOUNTER
----- Message from [address] [place] [address], CNP sent at 2/15/2018  4:57 PM EST -----  Results are normal, please call patient and make them aware.

## 2018-02-27 ENCOUNTER — CARE COORDINATION (OUTPATIENT)
Dept: CARE COORDINATION | Age: 53
End: 2018-02-27

## 2018-02-27 NOTE — CARE COORDINATION
Attempted to contact patient today. Left voice message requesting patient call this CC Nurse at [phone].

## 2018-03-08 ENCOUNTER — CARE COORDINATION (OUTPATIENT)
Dept: CARE COORDINATION | Age: 53
End: 2018-03-08

## 2018-03-22 ENCOUNTER — CARE COORDINATION (OUTPATIENT)
Dept: CARE COORDINATION | Age: 53
End: 2018-03-22

## 2018-03-26 ENCOUNTER — HOSPITAL ENCOUNTER (OUTPATIENT)
Age: 53
Discharge: HOME | End: 2018-03-26
Payer: COMMERCIAL

## 2018-03-26 LAB
ABSOLUTE EOS #: 0.13 K/UL (ref 0–0.44)
ABSOLUTE IMMATURE GRANULOCYTE: 0.05 K/UL (ref 0–0.3)
ABSOLUTE LYMPH #: 3.01 K/UL (ref 1.1–3.7)
ABSOLUTE MONO #: 0.65 K/UL (ref 0.1–1.2)
ALBUMIN SERPL-MCNC: 4.2 G/DL (ref 3.5–5.2)
ALBUMIN/GLOBULIN RATIO: 1.4 (ref 1–2.5)
ALP BLD-CCNC: 135 U/L (ref 35–104)
ALT SERPL-CCNC: 16 U/L (ref 5–33)
ANION GAP SERPL CALCULATED.3IONS-SCNC: 13 MMOL/L (ref 9–17)
AST SERPL-CCNC: 14 U/L
BASOPHILS # BLD: 0 % (ref 0–2)
BASOPHILS ABSOLUTE: 0.04 K/UL (ref 0–0.2)
BILIRUB SERPL-MCNC: 0.47 MG/DL (ref 0.3–1.2)
BUN BLDV-MCNC: 11 MG/DL (ref 6–20)
BUN/CREAT BLD: 28 (ref 9–20)
CALCIUM SERPL-MCNC: 9.6 MG/DL (ref 8.6–10.4)
CHLORIDE BLD-SCNC: 101 MMOL/L (ref 98–107)
CHOLESTEROL/HDL RATIO: 6
CHOLESTEROL: 125 MG/DL
CO2: 27 MMOL/L (ref 20–31)
CREAT SERPL-MCNC: 0.39 MG/DL (ref 0.5–0.9)
CREATININE URINE: 97.6 MG/DL (ref 28–217)
DIFFERENTIAL TYPE: ABNORMAL
EOSINOPHILS RELATIVE PERCENT: 1 % (ref 1–4)
ESTIMATED AVERAGE GLUCOSE: 171 MG/DL
GFR AFRICAN AMERICAN: >60 ML/MIN
GFR NON-AFRICAN AMERICAN: >60 ML/MIN
GFR SERPL CREATININE-BSD FRML MDRD: ABNORMAL ML/MIN/{1.73_M2}
GFR SERPL CREATININE-BSD FRML MDRD: ABNORMAL ML/MIN/{1.73_M2}
GLUCOSE BLD-MCNC: 153 MG/DL (ref 70–99)
HBA1C MFR BLD: 7.6 % (ref 4.8–5.9)
HCT VFR BLD CALC: 43.9 % (ref 36.3–47.1)
HDLC SERPL-MCNC: 21 MG/DL
HEMOGLOBIN: 13.9 G/DL (ref 11.9–15.1)
IMMATURE GRANULOCYTES: 0 %
LDL CHOLESTEROL: 68 MG/DL (ref 0–130)
LYMPHOCYTES # BLD: 26 % (ref 24–43)
MCH RBC QN AUTO: 28.1 PG (ref 25.2–33.5)
MCHC RBC AUTO-ENTMCNC: 31.7 G/DL (ref 28.4–34.8)
MCV RBC AUTO: 88.7 FL (ref 82.6–102.9)
MICROALBUMIN/CREAT 24H UR: 20 MG/L
MICROALBUMIN/CREAT UR-RTO: 20 MCG/MG CREAT
MONOCYTES # BLD: 6 % (ref 3–12)
NRBC AUTOMATED: 0 PER 100 WBC
PDW BLD-RTO: 14.2 % (ref 11.8–14.4)
PLATELET # BLD: 242 K/UL (ref 138–453)
PLATELET ESTIMATE: ABNORMAL
PMV BLD AUTO: 10.1 FL (ref 8.1–13.5)
POTASSIUM SERPL-SCNC: 4.4 MMOL/L (ref 3.7–5.3)
RBC # BLD: 4.95 M/UL (ref 3.95–5.11)
RBC # BLD: ABNORMAL 10*6/UL
SEG NEUTROPHILS: 67 % (ref 36–65)
SEGMENTED NEUTROPHILS ABSOLUTE COUNT: 7.9 K/UL (ref 1.5–8.1)
SODIUM BLD-SCNC: 141 MMOL/L (ref 135–144)
TOTAL PROTEIN: 7.3 G/DL (ref 6.4–8.3)
TRIGL SERPL-MCNC: 178 MG/DL
VLDLC SERPL CALC-MCNC: ABNORMAL MG/DL (ref 1–30)
WBC # BLD: 11.8 K/UL (ref 3.5–11.3)
WBC # BLD: ABNORMAL 10*3/UL

## 2018-03-26 PROCEDURE — 82570 ASSAY OF URINE CREATININE: CPT

## 2018-03-26 PROCEDURE — 85025 COMPLETE CBC W/AUTO DIFF WBC: CPT

## 2018-03-26 PROCEDURE — 36415 COLL VENOUS BLD VENIPUNCTURE: CPT

## 2018-03-26 PROCEDURE — 82043 UR ALBUMIN QUANTITATIVE: CPT

## 2018-03-26 PROCEDURE — 80053 COMPREHEN METABOLIC PANEL: CPT

## 2018-03-26 PROCEDURE — 83036 HEMOGLOBIN GLYCOSYLATED A1C: CPT

## 2018-03-26 PROCEDURE — 80061 LIPID PANEL: CPT

## 2018-03-27 ENCOUNTER — CARE COORDINATION (OUTPATIENT)
Dept: CARE COORDINATION | Age: 53
End: 2018-03-27

## 2018-03-27 ENCOUNTER — OFFICE VISIT (OUTPATIENT)
Dept: PRIMARY CARE CLINIC | Age: 53
End: 2018-03-27
Payer: COMMERCIAL

## 2018-03-27 VITALS
DIASTOLIC BLOOD PRESSURE: 61 MMHG | WEIGHT: 214.4 LBS | RESPIRATION RATE: 18 BRPM | HEART RATE: 85 BPM | SYSTOLIC BLOOD PRESSURE: 109 MMHG | BODY MASS INDEX: 36.8 KG/M2 | TEMPERATURE: 98 F

## 2018-03-27 DIAGNOSIS — F41.8 DEPRESSION WITH ANXIETY: ICD-10-CM

## 2018-03-27 DIAGNOSIS — E78.5 DYSLIPIDEMIA: ICD-10-CM

## 2018-03-27 DIAGNOSIS — E11.42 CONTROLLED TYPE 2 DIABETES MELLITUS WITH DIABETIC POLYNEUROPATHY, WITHOUT LONG-TERM CURRENT USE OF INSULIN (HCC): Primary | ICD-10-CM

## 2018-03-27 DIAGNOSIS — K31.84 GASTROPARESIS: ICD-10-CM

## 2018-03-27 DIAGNOSIS — Z23 NEED FOR PNEUMOCOCCAL VACCINATION: ICD-10-CM

## 2018-03-27 PROCEDURE — G8417 CALC BMI ABV UP PARAM F/U: HCPCS | Performed by: NURSE PRACTITIONER

## 2018-03-27 PROCEDURE — 3045F PR MOST RECENT HEMOGLOBIN A1C LEVEL 7.0-9.0%: CPT | Performed by: NURSE PRACTITIONER

## 2018-03-27 PROCEDURE — 90732 PPSV23 VACC 2 YRS+ SUBQ/IM: CPT | Performed by: NURSE PRACTITIONER

## 2018-03-27 PROCEDURE — G8427 DOCREV CUR MEDS BY ELIG CLIN: HCPCS | Performed by: NURSE PRACTITIONER

## 2018-03-27 PROCEDURE — G8484 FLU IMMUNIZE NO ADMIN: HCPCS | Performed by: NURSE PRACTITIONER

## 2018-03-27 PROCEDURE — 3017F COLORECTAL CA SCREEN DOC REV: CPT | Performed by: NURSE PRACTITIONER

## 2018-03-27 PROCEDURE — 90471 IMMUNIZATION ADMIN: CPT | Performed by: NURSE PRACTITIONER

## 2018-03-27 PROCEDURE — 99214 OFFICE O/P EST MOD 30 MIN: CPT | Performed by: NURSE PRACTITIONER

## 2018-03-27 PROCEDURE — 1036F TOBACCO NON-USER: CPT | Performed by: NURSE PRACTITIONER

## 2018-03-27 RX ORDER — ATORVASTATIN CALCIUM 40 MG/1
40 TABLET, FILM COATED ORAL DAILY
Qty: 90 TABLET | Refills: 3 | Status: SHIPPED | OUTPATIENT
Start: 2018-03-27 | End: 2019-02-06 | Stop reason: SDUPTHER

## 2018-03-27 RX ORDER — LISINOPRIL 2.5 MG/1
TABLET ORAL
Qty: 90 TABLET | Refills: 3 | Status: SHIPPED | OUTPATIENT
Start: 2018-03-27 | End: 2019-02-06 | Stop reason: SDUPTHER

## 2018-03-27 RX ORDER — CITALOPRAM 40 MG/1
40 TABLET ORAL DAILY
Qty: 90 TABLET | Refills: 3 | Status: SHIPPED | OUTPATIENT
Start: 2018-03-27 | End: 2019-03-14 | Stop reason: ALTCHOICE

## 2018-03-27 RX ORDER — OMEPRAZOLE 20 MG/1
20 CAPSULE, DELAYED RELEASE ORAL DAILY
Qty: 30 CAPSULE | Refills: 3 | Status: SHIPPED | OUTPATIENT
Start: 2018-03-27 | End: 2018-07-27 | Stop reason: SDUPTHER

## 2018-03-27 RX ORDER — METOCLOPRAMIDE 5 MG/1
5 TABLET ORAL 3 TIMES DAILY
Qty: 540 TABLET | Refills: 0 | Status: SHIPPED | OUTPATIENT
Start: 2018-03-27 | End: 2019-02-06 | Stop reason: ALTCHOICE

## 2018-03-27 ASSESSMENT — ENCOUNTER SYMPTOMS
SORE THROAT: 0
SHORTNESS OF BREATH: 0
RHINORRHEA: 0
COUGH: 0
WHEEZING: 0

## 2018-03-27 ASSESSMENT — PATIENT HEALTH QUESTIONNAIRE - PHQ9
1. LITTLE INTEREST OR PLEASURE IN DOING THINGS: 0
SUM OF ALL RESPONSES TO PHQ9 QUESTIONS 1 & 2: 1
2. FEELING DOWN, DEPRESSED OR HOPELESS: 1
SUM OF ALL RESPONSES TO PHQ QUESTIONS 1-9: 1

## 2018-03-27 NOTE — PROGRESS NOTES
participates in exercise. Her home blood glucose trend is decreasing steadily. Her breakfast blood glucose range is generally 140-180 mg/dl. An ACE inhibitor/angiotensin II receptor blocker is being taken. She sees a podiatrist.Eye exam is current. Past Medical History:     Past Medical History:   Diagnosis Date    Arthritis     Depression     Diabetes mellitus ([address]     Diabetic neuropathy [facility]     Esophageal reflux     Sleep apnea     DOES NOT USE A MACHINE. DIAGNOSED > 15 YEARS AGO WITH SLEEP APNEA      Reviewed all health maintenance requirements and ordered appropriate tests  Health Maintenance Due   Topic Date Due    Shingles Vaccine (1 of 2 - 2 Dose Series) 2017    Diabetic retinal exam  2017    Diabetic foot exam  2017       Past Surgical History:     Past Surgical History:   Procedure Laterality Date    ABSCESS DRAINAGE Left     BUTTOCK    APPENDECTOMY      CATARACT REMOVAL WITH IMPLANT Bilateral 10/24/2016     SECTION      CHOLECYSTECTOMY      HERNIA REPAIR Right     IHR    HERNIA REPAIR  2006    HERNIA REPAIR WITH HYSTERECTOMY SURGERY    HYSTERECTOMY      LAPAROSCOPY          Medications:       Prior to Admission medications    Medication Sig Start Date End Date Taking?  Authorizing Provider   atorvastatin (LIPITOR) 40 MG tablet Take 1 tablet by mouth daily 3/27/18  Yes Ramsey Apple Might, MARI   citalopram (CELEXA) 40 MG tablet Take 1 tablet by mouth daily 3/27/18  Yes Ramsey Apple Might, MARI   lisinopril (PRINIVIL;ZESTRIL) 2.5 MG tablet TAKE 1 TABLET DAILY 3/27/18  Yes Ramsey Apple Might, MARI   metoclopramide (REGLAN) 5 MG tablet Take 1 tablet by mouth 3 times daily 3/27/18 9/23/18 Yes Ramsey Apple Might, MARI   omeprazole (PRILOSEC) 20 MG delayed release capsule Take 1 capsule by mouth daily 3/27/18  Yes Ramsey Apple Might, MARI   insulin glargine (BASAGLAR KWIKPEN) 100 UNIT/ML injection pen Inject 40 Units into the skin nightly 18  Yes Ramsey Apple Might, MARI   gabapentin lb 6.4 oz (97.3 kg)   BMI 36.80 kg/m²     Physical Exam   Constitutional: She is oriented to person, place, and time. Vital signs are normal. She appears well-developed and well-nourished. She is active and cooperative. No distress. HENT:   Head: Normocephalic. Right Ear: Hearing, tympanic membrane and ear canal normal.   Left Ear: Hearing, tympanic membrane and ear canal normal.   Nose: Nose normal.   Mouth/Throat: Mucous membranes are normal.   Eyes: Conjunctivae are normal.   Neck: Normal range of motion and full passive range of motion without pain. Neck supple. Cardiovascular: Normal rate, regular rhythm, normal heart sounds and normal pulses. No murmur heard. Pulses:       Radial pulses are 2+ on the right side, and 2+ on the left side. Pulmonary/Chest: Effort normal and breath sounds normal. She has no decreased breath sounds. She has no wheezes. She has no rhonchi. She has no rales. Abdominal: Soft. Normal appearance and bowel sounds are normal. There is no tenderness. There is no rebound. No hernia. Musculoskeletal: She exhibits edema (trace pedal edema bilaterally ). Right shoulder: She exhibits swelling (pedal trace edema bilaterally). Lymphadenopathy:     She has no cervical adenopathy. Neurological: She is alert and oriented to person, place, and time. She has normal strength. No sensory deficit. Skin: Skin is warm, dry and intact. No rash noted. Psychiatric: She has a normal mood and affect. Her speech is normal and behavior is normal. Thought content normal.   Nursing note and vitals reviewed.       Data:     Lab Results   Component Value Date     03/26/2018    K 4.4 03/26/2018     03/26/2018    CO2 27 03/26/2018    BUN 11 03/26/2018    CREATININE 0.39 03/26/2018    GLUCOSE 153 03/26/2018    GLUCOSE 351 03/11/2012    PROT 7.3 03/26/2018    LABALBU 4.2 03/26/2018    BILITOT 0.47 03/26/2018    ALKPHOS 135 03/26/2018    AST 14 03/26/2018    ALT 16 03/26/2018 tablet by mouth daily    lisinopril (PRINIVIL;ZESTRIL) 2.5 MG tablet 90 tablet 3     Sig: TAKE 1 TABLET DAILY    metoclopramide (REGLAN) 5 MG tablet 540 tablet 0     Sig: Take 1 tablet by mouth 3 times daily    omeprazole (PRILOSEC) 20 MG delayed release capsule 30 capsule 3     Sig: Take 1 capsule by mouth daily         Return in about 6 months (around 9/27/2018) for check up.

## 2018-03-27 NOTE — PATIENT INSTRUCTIONS
Patient Education        Diabetes Foot Health: Care Instructions  Your Care Instructions    When you have diabetes, your feet need extra care and attention. Diabetes can damage the nerve endings and blood vessels in your feet, making you less likely to notice when your feet are injured. Diabetes also limits your body's ability to fight infection and get blood to areas that need it. If you get a minor foot injury, it could become an ulcer or a serious infection. With good foot care, you can prevent most of these problems. Caring for your feet can be quick and easy. Most of the care can be done when you are bathing or getting ready for bed. Follow-up care is a key part of your treatment and safety. Be sure to make and go to all appointments, and call your doctor if you are having problems. It's also a good idea to know your test results and keep a list of the medicines you take. How can you care for yourself at home? · Keep your blood sugar close to normal by watching what and how much you eat, monitoring blood sugar, taking medicines if prescribed, and getting regular exercise. · Do not smoke. Smoking affects blood flow and can make foot problems worse. If you need help quitting, talk to your doctor about stop-smoking programs and medicines. These can increase your chances of quitting for good. · Eat a diet that is low in fats. High fat intake can cause fat to build up in your blood vessels and decrease blood flow. · Inspect your feet daily for blisters, cuts, cracks, or sores. If you cannot see well, use a mirror or have someone help you. · Take care of your feet:  [facility] your feet every day. Use warm (not hot) water. Check the water temperature with your wrists or other part of your body, not your feet. ¨ Dry your feet well. Pat them dry. Do not rub the skin on your feet too hard. Dry well between your toes.  If the skin on your feet stays moist, bacteria or a fungus can grow, which can lead to for early. When should you call for help? Call your doctor now or seek immediate medical care if:  ? · You have a foot sore, an ulcer or break in the skin that is not healing after 4 days, bleeding corns or calluses, or an ingrown toenail. ? · You have blue or black areas, which can mean bruising or blood flow problems. ? · You have peeling skin or tiny blisters between your toes or cracking or oozing of the skin. ? · You have a fever for more than 24 hours and a foot sore. ? · You have new numbness or tingling in your feet that does not go away after you move your feet or change positions. ? · You have unexplained or unusual swelling of the foot or ankle. ? Watch closely for changes in your health, and be sure to contact your doctor if:  ? · You cannot do proper foot care. Where can you learn more? Go to https://Drive [email or web address].[email or web address]. org and sign in to your [employer] account. Enter A739 in the 3seventy box to learn more about \"Diabetes Foot Health: Care Instructions. \"     If you do not have an account, please click on the \"Sign Up Now\" link. Current as of: March 13, 2017  Content Version: 11.5  © 8351-4767 [employer]. Care instructions adapted under license by [facility] ([facility]). If you have questions about a medical condition or this instruction, always ask your healthcare professional. [place] [address] any warranty or liability for your use of this information. Patient Education        Gastroparesis: Care Instructions  Your Care Instructions    When you have gastroparesis, your stomach takes a lot longer to empty. This delay can cause belly pain, bloating, and belching. It also can cause hiccups, heartburn, nausea or vomiting. You may not feel like eating. These symptoms may come and go. They most often occur during and after meals. You may feel full after only a few bites of food.   This condition occurs when the nerves to the stomach don't

## 2018-03-28 ENCOUNTER — TELEPHONE (OUTPATIENT)
Dept: PRIMARY CARE CLINIC | Age: 53
End: 2018-03-28

## 2018-03-28 DIAGNOSIS — Z01.811 PRE-OP CHEST EXAM: Primary | ICD-10-CM

## 2018-03-29 ENCOUNTER — HOSPITAL ENCOUNTER (OUTPATIENT)
Age: 53
Discharge: HOME | End: 2018-03-31
Payer: COMMERCIAL

## 2018-03-29 ENCOUNTER — TELEPHONE (OUTPATIENT)
Dept: PRIMARY CARE CLINIC | Age: 53
End: 2018-03-29

## 2018-03-29 ENCOUNTER — HOSPITAL ENCOUNTER (OUTPATIENT)
Dept: GENERAL RADIOLOGY | Age: 53
Discharge: HOME | End: 2018-03-31
Payer: COMMERCIAL

## 2018-03-29 DIAGNOSIS — Z01.811 PRE-OP CHEST EXAM: ICD-10-CM

## 2018-03-29 PROCEDURE — 71046 X-RAY EXAM CHEST 2 VIEWS: CPT

## 2018-04-03 ENCOUNTER — HOSPITAL ENCOUNTER (OUTPATIENT)
Age: 53
Discharge: HOME | End: 2018-04-03
Payer: COMMERCIAL

## 2018-04-03 LAB
ANION GAP SERPL CALCULATED.3IONS-SCNC: 11 MMOL/L (ref 9–17)
CHLORIDE BLD-SCNC: 99 MMOL/L (ref 98–107)
CO2: 27 MMOL/L (ref 20–31)
EKG ATRIAL RATE: 92 BPM
EKG P AXIS: 69 DEGREES
EKG P-R INTERVAL: 140 MS
EKG Q-T INTERVAL: 372 MS
EKG QRS DURATION: 92 MS
EKG QTC CALCULATION (BAZETT): 460 MS
EKG R AXIS: 14 DEGREES
EKG T AXIS: 25 DEGREES
EKG VENTRICULAR RATE: 92 BPM
POTASSIUM SERPL-SCNC: 4.7 MMOL/L (ref 3.7–5.3)
SODIUM BLD-SCNC: 137 MMOL/L (ref 135–144)

## 2018-04-03 PROCEDURE — 80051 ELECTROLYTE PANEL: CPT

## 2018-04-03 PROCEDURE — 36415 COLL VENOUS BLD VENIPUNCTURE: CPT

## 2018-04-03 PROCEDURE — 93005 ELECTROCARDIOGRAM TRACING: CPT

## 2018-04-04 ENCOUNTER — CARE COORDINATION (OUTPATIENT)
Dept: CARE COORDINATION | Age: 53
End: 2018-04-04

## 2018-04-16 ENCOUNTER — CARE COORDINATION (OUTPATIENT)
Dept: CARE COORDINATION | Age: 53
End: 2018-04-16

## 2018-04-18 ENCOUNTER — CARE COORDINATION (OUTPATIENT)
Dept: CARE COORDINATION | Age: 53
End: 2018-04-18

## 2018-04-23 ENCOUNTER — CARE COORDINATION (OUTPATIENT)
Dept: CARE COORDINATION | Age: 53
End: 2018-04-23

## 2018-04-25 RX ORDER — GABAPENTIN 600 MG/1
TABLET ORAL
Qty: 270 TABLET | Refills: 0 | Status: SHIPPED | OUTPATIENT
Start: 2018-04-25 | End: 2019-02-01

## 2018-05-04 ENCOUNTER — CARE COORDINATION (OUTPATIENT)
Dept: CARE COORDINATION | Age: 53
End: 2018-05-04

## 2018-06-05 ENCOUNTER — CARE COORDINATION (OUTPATIENT)
Dept: CARE COORDINATION | Age: 53
End: 2018-06-05

## 2018-06-05 RX ORDER — GLIPIZIDE 10 MG/1
10 TABLET, FILM COATED, EXTENDED RELEASE ORAL DAILY
COMMUNITY
End: 2018-06-05 | Stop reason: SDUPTHER

## 2018-06-05 ASSESSMENT — PATIENT HEALTH QUESTIONNAIRE - PHQ9: SUM OF ALL RESPONSES TO PHQ QUESTIONS 1-9: 0

## 2018-07-11 ENCOUNTER — OFFICE VISIT (OUTPATIENT)
Dept: PRIMARY CARE CLINIC | Age: 53
End: 2018-07-11
Payer: COMMERCIAL

## 2018-07-11 VITALS
HEIGHT: 64 IN | RESPIRATION RATE: 18 BRPM | BODY MASS INDEX: 38.33 KG/M2 | DIASTOLIC BLOOD PRESSURE: 58 MMHG | WEIGHT: 224.5 LBS | TEMPERATURE: 98 F | HEART RATE: 77 BPM | SYSTOLIC BLOOD PRESSURE: 113 MMHG

## 2018-07-11 DIAGNOSIS — R14.0 ABDOMINAL BLOATING: ICD-10-CM

## 2018-07-11 DIAGNOSIS — G89.29 CHRONIC BILATERAL LOW BACK PAIN WITHOUT SCIATICA: ICD-10-CM

## 2018-07-11 DIAGNOSIS — M54.50 CHRONIC BILATERAL LOW BACK PAIN WITHOUT SCIATICA: ICD-10-CM

## 2018-07-11 DIAGNOSIS — R29.898 LEG WEAKNESS, BILATERAL: ICD-10-CM

## 2018-07-11 DIAGNOSIS — R19.7 DIARRHEA, UNSPECIFIED TYPE: Primary | ICD-10-CM

## 2018-07-11 PROCEDURE — 99214 OFFICE O/P EST MOD 30 MIN: CPT | Performed by: NURSE PRACTITIONER

## 2018-07-11 PROCEDURE — 1036F TOBACCO NON-USER: CPT | Performed by: NURSE PRACTITIONER

## 2018-07-11 PROCEDURE — G8427 DOCREV CUR MEDS BY ELIG CLIN: HCPCS | Performed by: NURSE PRACTITIONER

## 2018-07-11 PROCEDURE — G8417 CALC BMI ABV UP PARAM F/U: HCPCS | Performed by: NURSE PRACTITIONER

## 2018-07-11 PROCEDURE — 3017F COLORECTAL CA SCREEN DOC REV: CPT | Performed by: NURSE PRACTITIONER

## 2018-07-11 RX ORDER — DICYCLOMINE HCL 20 MG
20 TABLET ORAL EVERY 6 HOURS
Qty: 90 TABLET | Refills: 3 | Status: SHIPPED | OUTPATIENT
Start: 2018-07-11 | End: 2019-02-06

## 2018-07-11 ASSESSMENT — ENCOUNTER SYMPTOMS
COUGH: 0
SORE THROAT: 0
DIARRHEA: 1
VOMITING: 0
NAUSEA: 0
WHEEZING: 0
BOWEL INCONTINENCE: 0
BACK PAIN: 1
RHINORRHEA: 0
ABDOMINAL PAIN: 1
CONSTIPATION: 0
SHORTNESS OF BREATH: 0

## 2018-07-11 NOTE — PROGRESS NOTES
described as aching. The pain does not radiate. The pain is at a severity of 5/10. The pain is moderate. The pain is worse during the day. The symptoms are aggravated by bending, standing and position. Associated symptoms include abdominal pain, headaches and weakness. Pertinent negatives include no bladder incontinence, bowel incontinence, chest pain, dysuria, fever, leg pain, numbness, tingling or weight loss. Risk factors include sedentary lifestyle and obesity. She has tried nothing for the symptoms. The treatment provided no relief. Past Medical History:     Past Medical History:   Diagnosis Date    Arthritis     Depression     [facility]     Diabetic neuropathy ([facility]     Esophageal reflux     Sleep apnea     DOES NOT USE A MACHINE. DIAGNOSED > 15 YEARS AGO WITH SLEEP APNEA      Reviewed all health maintenance requirements and ordered appropriate tests  There are no preventive care reminders to display for this patient. Past Surgical History:     Past Surgical History:   Procedure Laterality Date    ABSCESS DRAINAGE Left     BUTTOCK    APPENDECTOMY      CARPAL TUNNEL RELEASE Left 2018    CATARACT REMOVAL WITH IMPLANT Bilateral 10/24/2016     SECTION      CHOLECYSTECTOMY      HERNIA REPAIR Right     IHR    HERNIA REPAIR  2006    HERNIA REPAIR WITH HYSTERECTOMY SURGERY    HYSTERECTOMY      LAPAROSCOPY          Medications:       Prior to Admission medications    Medication Sig Start Date End Date Taking?  Authorizing Provider   dicyclomine (BENTYL) 20 MG tablet Take 1 tablet by mouth every 6 hours 18  Yes DI Thompson CNP   gabapentin (NEURONTIN) 600 MG tablet TAKE ONE TABLET BY MOUTH THREE TIMES DAILY 18 Yes DI Thompson CNP   atorvastatin (LIPITOR) 40 MG tablet Take 1 tablet by mouth daily 3/27/18  Yes DI Thompson CNP   citalopram (CELEXA) 40 MG tablet Take 1 tablet by mouth daily 3/27/18  Yes DI Thompson

## 2018-07-23 ENCOUNTER — CARE COORDINATION (OUTPATIENT)
Dept: CARE COORDINATION | Age: 53
End: 2018-07-23

## 2018-07-27 DIAGNOSIS — K31.84 GASTROPARESIS: ICD-10-CM

## 2018-07-27 RX ORDER — OMEPRAZOLE 20 MG/1
CAPSULE, DELAYED RELEASE ORAL
Qty: 30 CAPSULE | Refills: 2 | Status: SHIPPED | OUTPATIENT
Start: 2018-07-27 | End: 2018-10-25 | Stop reason: SDUPTHER

## 2018-07-27 NOTE — TELEPHONE ENCOUNTER
APRN - CNP [facility]            Patient Active Problem List:     Nuclear sclerotic cataract of left eye     Uncontrolled type 2 diabetes mellitus with diabetic polyneuropathy, with long-term current use of insulin (HCC)     Vitamin D deficiency     Community acquired pneumonia of right upper lobe of lung ([facility]     Obstructive sleep apnea     Sepsis due to pneumonia ([facility]

## 2018-08-03 ENCOUNTER — HOSPITAL ENCOUNTER (OUTPATIENT)
Age: 53
Discharge: HOME | End: 2018-08-05
Payer: COMMERCIAL

## 2018-08-03 ENCOUNTER — HOSPITAL ENCOUNTER (OUTPATIENT)
Dept: GENERAL RADIOLOGY | Age: 53
Discharge: HOME | End: 2018-08-05
Payer: COMMERCIAL

## 2018-08-03 DIAGNOSIS — R29.898 LEG WEAKNESS, BILATERAL: ICD-10-CM

## 2018-08-03 PROCEDURE — 72100 X-RAY EXAM L-S SPINE 2/3 VWS: CPT

## 2018-08-06 ENCOUNTER — TELEPHONE (OUTPATIENT)
Dept: PRIMARY CARE CLINIC | Age: 53
End: 2018-08-06

## 2018-08-19 ENCOUNTER — HOSPITAL ENCOUNTER (OUTPATIENT)
Age: 53
Discharge: HOME | End: 2018-08-19
Payer: COMMERCIAL

## 2018-08-19 PROCEDURE — 87328 CRYPTOSPORIDIUM AG IA: CPT

## 2018-08-19 PROCEDURE — 87493 C DIFF AMPLIFIED PROBE: CPT

## 2018-08-19 PROCEDURE — 87427 SHIGA-LIKE TOXIN AG IA: CPT

## 2018-08-19 PROCEDURE — 87329 GIARDIA AG IA: CPT

## 2018-08-19 PROCEDURE — 87046 STOOL CULTR AEROBIC BACT EA: CPT

## 2018-08-19 PROCEDURE — 87045 FECES CULTURE AEROBIC BACT: CPT

## 2018-08-23 DIAGNOSIS — R19.7 DIARRHEA, UNSPECIFIED TYPE: ICD-10-CM

## 2018-08-23 LAB
CULTURE: NORMAL
CULTURE: NORMAL
Lab: NORMAL
SPECIMEN DESCRIPTION: NORMAL
STATUS: NORMAL

## 2018-08-23 PROCEDURE — 87505 NFCT AGENT DETECTION GI: CPT

## 2018-08-24 ENCOUNTER — TELEPHONE (OUTPATIENT)
Dept: PRIMARY CARE CLINIC | Age: 53
End: 2018-08-24

## 2018-08-24 LAB
C DIFFICILE TOXINS, PCR: NORMAL
CAMPYLOBACTER PCR: NORMAL
DIRECT EXAM: NORMAL
Lab: NORMAL
SALMONELLA PCR: NORMAL
SHIGATOXIN GENE PCR: NORMAL
SHIGELLA SP PCR: NORMAL
SPECIMEN DESCRIPTION: NORMAL
SPECIMEN DESCRIPTION: NORMAL
SPECIMEN: NORMAL
STATUS: NORMAL

## 2018-09-10 ENCOUNTER — CARE COORDINATION (OUTPATIENT)
Dept: CARE COORDINATION | Age: 53
End: 2018-09-10

## 2018-09-10 NOTE — CARE COORDINATION
Attempt to contact patient today regarding care coordination, unable to reach. LVM    Recent encounters and notes reviewed.     Has outstanding labs with an expected completion date of 9/23/18, next appt 10/2/18 @ 3pm - Reminded pt of this on VM to include fasting instructions    Lab Results   Component Value Date    LABA1C 7.6 (H) 03/26/2018    LABA1C 12.4 01/24/2018    LABA1C 9.3 (H) 11/21/2017     Future Appointments  Date Time Provider Osiel Owen   10/2/2018 3:00 PM Abbi Byrd, APRN - CNP [facility]     [place]  RN Care Coordinator  [phone]

## 2018-09-28 ENCOUNTER — OFFICE VISIT (OUTPATIENT)
Dept: PRIMARY CARE CLINIC | Age: 53
End: 2018-09-28
Payer: COMMERCIAL

## 2018-09-28 VITALS
DIASTOLIC BLOOD PRESSURE: 79 MMHG | RESPIRATION RATE: 18 BRPM | WEIGHT: 228.8 LBS | HEART RATE: 88 BPM | BODY MASS INDEX: 39.06 KG/M2 | HEIGHT: 64 IN | TEMPERATURE: 97.4 F | SYSTOLIC BLOOD PRESSURE: 118 MMHG

## 2018-09-28 DIAGNOSIS — H66.003 ACUTE SUPPURATIVE OTITIS MEDIA OF BOTH EARS WITHOUT SPONTANEOUS RUPTURE OF TYMPANIC MEMBRANES, RECURRENCE NOT SPECIFIED: Primary | ICD-10-CM

## 2018-09-28 PROCEDURE — 1036F TOBACCO NON-USER: CPT | Performed by: NURSE PRACTITIONER

## 2018-09-28 PROCEDURE — 3017F COLORECTAL CA SCREEN DOC REV: CPT | Performed by: NURSE PRACTITIONER

## 2018-09-28 PROCEDURE — G8417 CALC BMI ABV UP PARAM F/U: HCPCS | Performed by: NURSE PRACTITIONER

## 2018-09-28 PROCEDURE — G8427 DOCREV CUR MEDS BY ELIG CLIN: HCPCS | Performed by: NURSE PRACTITIONER

## 2018-09-28 PROCEDURE — 99213 OFFICE O/P EST LOW 20 MIN: CPT | Performed by: NURSE PRACTITIONER

## 2018-09-28 RX ORDER — AMOXICILLIN 500 MG/1
500 CAPSULE ORAL 3 TIMES DAILY
Qty: 30 CAPSULE | Refills: 0 | Status: SHIPPED | OUTPATIENT
Start: 2018-09-28 | End: 2018-10-08

## 2018-09-28 ASSESSMENT — ENCOUNTER SYMPTOMS
WHEEZING: 0
EYES NEGATIVE: 1
RESPIRATORY NEGATIVE: 1
ABDOMINAL PAIN: 0
VOMITING: 0
TROUBLE SWALLOWING: 0
SHORTNESS OF BREATH: 0
SORE THROAT: 0
COUGH: 0
GASTROINTESTINAL NEGATIVE: 1
RHINORRHEA: 0

## 2018-09-28 NOTE — PROGRESS NOTES
sitaGLIPtan-metformin (JANUMET)  MG per tablet Take 1 tablet by mouth 2 times daily (with meals) 180 tablet 3    glipiZIDE (GLUCOTROL XL) 10 MG extended release tablet Take 1 tablet by mouth daily 90 tablet 3    loperamide (IMODIUM) 2 MG capsule Take 2 mg by mouth nightly      gabapentin (NEURONTIN) 600 MG tablet TAKE ONE TABLET BY MOUTH THREE TIMES DAILY 270 tablet 0    metoclopramide (REGLAN) 5 MG tablet Take 1 tablet by mouth 3 times daily 540 tablet 0     No current facility-administered medications for this visit. Allergies   Allergen Reactions    Codeine Other (See Comments)     Migraine    Meperidine     Other Itching and Dermatitis     METAL       Subjective:      Review of Systems   Constitutional: Negative. Negative for activity change, appetite change and fever. HENT: Positive for congestion and ear pain. Negative for ear discharge, rhinorrhea, sore throat and trouble swallowing. Eyes: Negative. Negative for visual disturbance. Respiratory: Negative. Negative for cough, shortness of breath and wheezing. Cardiovascular: Negative. Gastrointestinal: Negative. Negative for abdominal pain and vomiting. Genitourinary: Negative. Skin: Negative. Negative for rash. Neurological: Positive for dizziness and headaches. Objective:     Physical Exam   Constitutional: She is oriented to person, place, and time. Vital signs are normal. She appears well-developed and well-nourished. She is cooperative. Non-toxic appearance. She appears ill. No distress. Appears well hydrated and non toxic. Sitting upright on exam table without distress. Respirations are regular, non labored and quiet. HENT:   Head: Normocephalic and atraumatic. Right Ear: External ear and ear canal normal. No drainage or swelling. No mastoid tenderness. Tympanic membrane is erythematous. Tympanic membrane is not retracted and not bulging. A middle ear effusion is present.    Left Ear: External exam, POCT findings, plan of care (including prescriptive and supportive as listed below) and follow-up at length with patient and or Patient. Reviewed all prescribed and recommended medications, administration and side effects. Encouraged to return to [facility] for no improvement and or worsening of symptoms. To ER or call 911 if any difficulty breathing, shortness of breath, inability to swallow, hives or temp greater than 103 degrees. Questions answered. They verbalized understanding and agreement. Return if symptoms worsen or fail to improve. Orders Placed This Encounter   Medications    amoxicillin (AMOXIL) 500 MG capsule     Sig: Take 1 capsule by mouth 3 times daily for 10 days     Dispense:  30 capsule     Refill:  0          All patient questions answered. Pt voiced understanding.       Electronically signed by DI Jones CNP on 9/28/2018 at 5:30 PM

## 2018-10-12 ENCOUNTER — CARE COORDINATION (OUTPATIENT)
Dept: CARE COORDINATION | Age: 53
End: 2018-10-12

## 2018-10-25 DIAGNOSIS — K31.84 GASTROPARESIS: ICD-10-CM

## 2018-10-25 RX ORDER — OMEPRAZOLE 20 MG/1
CAPSULE, DELAYED RELEASE ORAL
Qty: 90 CAPSULE | Refills: 1 | Status: SHIPPED | OUTPATIENT
Start: 2018-10-25 | End: 2019-05-08 | Stop reason: SDUPTHER

## 2018-10-25 NOTE — TELEPHONE ENCOUNTER
lung ([facility]     Obstructive sleep apnea     Sepsis due to pneumonia ([facility]

## 2018-10-29 ENCOUNTER — TELEPHONE (OUTPATIENT)
Dept: PRIMARY CARE CLINIC | Age: 53
End: 2018-10-29

## 2018-11-05 ENCOUNTER — CARE COORDINATION (OUTPATIENT)
Dept: CARE COORDINATION | Age: 53
End: 2018-11-05

## 2018-12-03 RX ORDER — INSULIN GLARGINE 100 [IU]/ML
INJECTION, SOLUTION SUBCUTANEOUS
Qty: 5 PEN | Refills: 5 | Status: SHIPPED | OUTPATIENT
Start: 2018-12-03 | End: 2019-03-14 | Stop reason: SDUPTHER

## 2018-12-05 ENCOUNTER — TELEPHONE (OUTPATIENT)
Dept: PRIMARY CARE CLINIC | Age: 53
End: 2018-12-05

## 2018-12-11 ENCOUNTER — CARE COORDINATION (OUTPATIENT)
Dept: CARE COORDINATION | Age: 53
End: 2018-12-11

## 2019-02-01 RX ORDER — GABAPENTIN 600 MG/1
900 TABLET ORAL 3 TIMES DAILY
COMMUNITY
Start: 2019-01-17

## 2019-02-01 RX ORDER — GLIPIZIDE 10 MG/1
TABLET, FILM COATED, EXTENDED RELEASE ORAL
Qty: 30 TABLET | Refills: 0 | Status: SHIPPED | OUTPATIENT
Start: 2019-02-01 | End: 2019-02-06 | Stop reason: SDUPTHER

## 2019-02-01 RX ORDER — LIDOCAINE AND PRILOCAINE 25; 25 MG/G; MG/G
CREAM TOPICAL PRN
COMMUNITY
Start: 2018-11-08 | End: 2022-03-01

## 2019-02-01 RX ORDER — HYDROCODONE BITARTRATE AND ACETAMINOPHEN 5; 325 MG/1; MG/1
1 TABLET ORAL EVERY 6 HOURS PRN
Status: ON HOLD | COMMUNITY
Start: 2019-01-18 | End: 2019-09-17 | Stop reason: SDUPTHER

## 2019-02-01 RX ORDER — MELOXICAM 15 MG/1
TABLET ORAL
COMMUNITY
Start: 2018-12-13 | End: 2019-02-06 | Stop reason: ALTCHOICE

## 2019-02-05 ENCOUNTER — HOSPITAL ENCOUNTER (OUTPATIENT)
Age: 54
Discharge: HOME | End: 2019-02-05
Payer: COMMERCIAL

## 2019-02-05 DIAGNOSIS — E11.42 CONTROLLED TYPE 2 DIABETES MELLITUS WITH DIABETIC POLYNEUROPATHY, WITHOUT LONG-TERM CURRENT USE OF INSULIN (HCC): ICD-10-CM

## 2019-02-05 LAB
ABSOLUTE EOS #: 0.14 K/UL (ref 0–0.44)
ABSOLUTE IMMATURE GRANULOCYTE: 0.03 K/UL (ref 0–0.3)
ABSOLUTE LYMPH #: 3.43 K/UL (ref 1.1–3.7)
ABSOLUTE MONO #: 0.63 K/UL (ref 0.1–1.2)
ALBUMIN SERPL-MCNC: 4.4 G/DL (ref 3.5–5.2)
ALBUMIN/GLOBULIN RATIO: 1.4 (ref 1–2.5)
ALP BLD-CCNC: 168 U/L (ref 35–104)
ALT SERPL-CCNC: 25 U/L (ref 5–33)
ANION GAP SERPL CALCULATED.3IONS-SCNC: 11 MMOL/L (ref 9–17)
AST SERPL-CCNC: 18 U/L
BASOPHILS # BLD: 1 % (ref 0–2)
BASOPHILS ABSOLUTE: 0.05 K/UL (ref 0–0.2)
BILIRUB SERPL-MCNC: 0.54 MG/DL (ref 0.3–1.2)
BUN BLDV-MCNC: 6 MG/DL (ref 6–20)
BUN/CREAT BLD: 13 (ref 9–20)
CALCIUM SERPL-MCNC: 9.7 MG/DL (ref 8.6–10.4)
CHLORIDE BLD-SCNC: 100 MMOL/L (ref 98–107)
CHOLESTEROL/HDL RATIO: 5.9
CHOLESTEROL: 153 MG/DL
CO2: 27 MMOL/L (ref 20–31)
CREAT SERPL-MCNC: 0.48 MG/DL (ref 0.5–0.9)
CREATININE URINE: 373.6 MG/DL (ref 28–217)
DIFFERENTIAL TYPE: ABNORMAL
EOSINOPHILS RELATIVE PERCENT: 1 % (ref 1–4)
ESTIMATED AVERAGE GLUCOSE: 255 MG/DL
GFR AFRICAN AMERICAN: >60 ML/MIN
GFR NON-AFRICAN AMERICAN: >60 ML/MIN
GFR SERPL CREATININE-BSD FRML MDRD: ABNORMAL ML/MIN/{1.73_M2}
GFR SERPL CREATININE-BSD FRML MDRD: ABNORMAL ML/MIN/{1.73_M2}
GLUCOSE BLD-MCNC: 212 MG/DL (ref 70–99)
HBA1C MFR BLD: 10.5 % (ref 4.8–5.9)
HCT VFR BLD CALC: 48.5 % (ref 36.3–47.1)
HDLC SERPL-MCNC: 26 MG/DL
HEMOGLOBIN: 15.7 G/DL (ref 11.9–15.1)
IMMATURE GRANULOCYTES: 0 %
LDL CHOLESTEROL: 77 MG/DL (ref 0–130)
LYMPHOCYTES # BLD: 35 % (ref 24–43)
MCH RBC QN AUTO: 28.6 PG (ref 25.2–33.5)
MCHC RBC AUTO-ENTMCNC: 32.4 G/DL (ref 28.4–34.8)
MCV RBC AUTO: 88.5 FL (ref 82.6–102.9)
MICROALBUMIN/CREAT 24H UR: 1932 MG/L
MICROALBUMIN/CREAT UR-RTO: 517 MCG/MG CREAT
MONOCYTES # BLD: 6 % (ref 3–12)
NRBC AUTOMATED: 0 PER 100 WBC
PDW BLD-RTO: 12.5 % (ref 11.8–14.4)
PLATELET # BLD: 268 K/UL (ref 138–453)
PLATELET ESTIMATE: ABNORMAL
PMV BLD AUTO: 10.8 FL (ref 8.1–13.5)
POTASSIUM SERPL-SCNC: 3.8 MMOL/L (ref 3.7–5.3)
RBC # BLD: 5.48 M/UL (ref 3.95–5.11)
RBC # BLD: ABNORMAL 10*6/UL
SEG NEUTROPHILS: 57 % (ref 36–65)
SEGMENTED NEUTROPHILS ABSOLUTE COUNT: 5.66 K/UL (ref 1.5–8.1)
SODIUM BLD-SCNC: 138 MMOL/L (ref 135–144)
TOTAL PROTEIN: 7.6 G/DL (ref 6.4–8.3)
TRIGL SERPL-MCNC: 250 MG/DL
VLDLC SERPL CALC-MCNC: ABNORMAL MG/DL (ref 1–30)
WBC # BLD: 9.9 K/UL (ref 3.5–11.3)
WBC # BLD: ABNORMAL 10*3/UL

## 2019-02-05 PROCEDURE — 82570 ASSAY OF URINE CREATININE: CPT

## 2019-02-05 PROCEDURE — 85025 COMPLETE CBC W/AUTO DIFF WBC: CPT

## 2019-02-05 PROCEDURE — 80053 COMPREHEN METABOLIC PANEL: CPT

## 2019-02-05 PROCEDURE — 83036 HEMOGLOBIN GLYCOSYLATED A1C: CPT

## 2019-02-05 PROCEDURE — 80061 LIPID PANEL: CPT

## 2019-02-05 PROCEDURE — 36415 COLL VENOUS BLD VENIPUNCTURE: CPT

## 2019-02-05 PROCEDURE — 82043 UR ALBUMIN QUANTITATIVE: CPT

## 2019-02-06 ENCOUNTER — OFFICE VISIT (OUTPATIENT)
Dept: PRIMARY CARE CLINIC | Age: 54
End: 2019-02-06
Payer: COMMERCIAL

## 2019-02-06 ENCOUNTER — TELEPHONE (OUTPATIENT)
Dept: PRIMARY CARE CLINIC | Age: 54
End: 2019-02-06

## 2019-02-06 VITALS
RESPIRATION RATE: 16 BRPM | WEIGHT: 219.8 LBS | TEMPERATURE: 97.3 F | BODY MASS INDEX: 37.52 KG/M2 | SYSTOLIC BLOOD PRESSURE: 103 MMHG | HEART RATE: 90 BPM | HEIGHT: 64 IN | DIASTOLIC BLOOD PRESSURE: 68 MMHG

## 2019-02-06 DIAGNOSIS — F41.8 DEPRESSION WITH ANXIETY: ICD-10-CM

## 2019-02-06 DIAGNOSIS — J00 ACUTE NASOPHARYNGITIS: ICD-10-CM

## 2019-02-06 DIAGNOSIS — E78.5 DYSLIPIDEMIA: ICD-10-CM

## 2019-02-06 PROCEDURE — G8427 DOCREV CUR MEDS BY ELIG CLIN: HCPCS | Performed by: NURSE PRACTITIONER

## 2019-02-06 PROCEDURE — 99214 OFFICE O/P EST MOD 30 MIN: CPT | Performed by: NURSE PRACTITIONER

## 2019-02-06 PROCEDURE — 2022F DILAT RTA XM EVC RTNOPTHY: CPT | Performed by: NURSE PRACTITIONER

## 2019-02-06 PROCEDURE — 1036F TOBACCO NON-USER: CPT | Performed by: NURSE PRACTITIONER

## 2019-02-06 PROCEDURE — G8484 FLU IMMUNIZE NO ADMIN: HCPCS | Performed by: NURSE PRACTITIONER

## 2019-02-06 PROCEDURE — G8417 CALC BMI ABV UP PARAM F/U: HCPCS | Performed by: NURSE PRACTITIONER

## 2019-02-06 PROCEDURE — 3046F HEMOGLOBIN A1C LEVEL >9.0%: CPT | Performed by: NURSE PRACTITIONER

## 2019-02-06 PROCEDURE — 3017F COLORECTAL CA SCREEN DOC REV: CPT | Performed by: NURSE PRACTITIONER

## 2019-02-06 RX ORDER — LISINOPRIL 2.5 MG/1
TABLET ORAL
Qty: 90 TABLET | Refills: 3 | Status: SHIPPED | OUTPATIENT
Start: 2019-02-06 | End: 2020-03-12 | Stop reason: SDUPTHER

## 2019-02-06 RX ORDER — CITALOPRAM 40 MG/1
40 TABLET ORAL DAILY
Qty: 90 TABLET | Refills: 3 | Status: CANCELLED | OUTPATIENT
Start: 2019-02-06

## 2019-02-06 RX ORDER — ATORVASTATIN CALCIUM 40 MG/1
40 TABLET, FILM COATED ORAL DAILY
Qty: 90 TABLET | Refills: 3 | Status: SHIPPED | OUTPATIENT
Start: 2019-02-06 | End: 2020-03-12 | Stop reason: SDUPTHER

## 2019-02-06 RX ORDER — VENLAFAXINE HYDROCHLORIDE 75 MG/1
75 CAPSULE, EXTENDED RELEASE ORAL DAILY
Qty: 90 CAPSULE | Refills: 0 | Status: SHIPPED | OUTPATIENT
Start: 2019-02-06 | End: 2019-05-13 | Stop reason: SDUPTHER

## 2019-02-06 RX ORDER — GLIPIZIDE 10 MG/1
10 TABLET, FILM COATED, EXTENDED RELEASE ORAL DAILY
Qty: 90 TABLET | Refills: 3 | Status: SHIPPED | OUTPATIENT
Start: 2019-02-06 | End: 2020-03-12 | Stop reason: SDUPTHER

## 2019-02-06 ASSESSMENT — ENCOUNTER SYMPTOMS
VOMITING: 0
DIARRHEA: 0
NAUSEA: 0
WHEEZING: 0
ABDOMINAL PAIN: 0
COUGH: 1
SORE THROAT: 1
BLURRED VISION: 0
RHINORRHEA: 1
VISUAL CHANGE: 0
SINUS PAIN: 1
SHORTNESS OF BREATH: 0
CONSTIPATION: 0
SWOLLEN GLANDS: 0

## 2019-02-06 ASSESSMENT — PATIENT HEALTH QUESTIONNAIRE - PHQ9
SUM OF ALL RESPONSES TO PHQ9 QUESTIONS 1 & 2: 2
1. LITTLE INTEREST OR PLEASURE IN DOING THINGS: 1
SUM OF ALL RESPONSES TO PHQ QUESTIONS 1-9: 2
SUM OF ALL RESPONSES TO PHQ QUESTIONS 1-9: 2
2. FEELING DOWN, DEPRESSED OR HOPELESS: 1

## 2019-02-07 ENCOUNTER — TELEPHONE (OUTPATIENT)
Dept: PRIMARY CARE CLINIC | Age: 54
End: 2019-02-07

## 2019-02-22 ENCOUNTER — TELEPHONE (OUTPATIENT)
Dept: PRIMARY CARE CLINIC | Age: 54
End: 2019-02-22

## 2019-02-22 DIAGNOSIS — Z12.11 SCREENING FOR COLORECTAL CANCER: ICD-10-CM

## 2019-02-22 DIAGNOSIS — Z12.12 SCREENING FOR COLORECTAL CANCER: ICD-10-CM

## 2019-02-22 DIAGNOSIS — Z12.12 SCREENING FOR COLORECTAL CANCER: Primary | ICD-10-CM

## 2019-02-22 DIAGNOSIS — Z12.11 SCREENING FOR COLORECTAL CANCER: Primary | ICD-10-CM

## 2019-02-22 DIAGNOSIS — R19.5 POSITIVE FIT (FECAL IMMUNOCHEMICAL TEST): Primary | ICD-10-CM

## 2019-02-22 LAB
CONTROL: PRESENT
HEMOCCULT STL QL: POSITIVE

## 2019-02-22 PROCEDURE — 82274 ASSAY TEST FOR BLOOD FECAL: CPT | Performed by: NURSE PRACTITIONER

## 2019-03-01 PROBLEM — R19.5 POSITIVE FIT (FECAL IMMUNOCHEMICAL TEST): Status: ACTIVE | Noted: 2019-03-01

## 2019-03-01 RX ORDER — SODIUM, POTASSIUM,MAG SULFATES 17.5-3.13G
SOLUTION, RECONSTITUTED, ORAL ORAL
Qty: 2 BOTTLE | Refills: 0 | Status: ON HOLD | OUTPATIENT
Start: 2019-03-01 | End: 2019-03-25 | Stop reason: HOSPADM

## 2019-03-12 RX ORDER — LIRAGLUTIDE 6 MG/ML
INJECTION SUBCUTANEOUS
Qty: 3 PEN | Refills: 0 | Status: SHIPPED | OUTPATIENT
Start: 2019-03-12 | End: 2019-03-14 | Stop reason: SDUPTHER

## 2019-03-14 ENCOUNTER — OFFICE VISIT (OUTPATIENT)
Dept: PRIMARY CARE CLINIC | Age: 54
End: 2019-03-14
Payer: COMMERCIAL

## 2019-03-14 VITALS
DIASTOLIC BLOOD PRESSURE: 72 MMHG | BODY MASS INDEX: 37.73 KG/M2 | HEIGHT: 64 IN | SYSTOLIC BLOOD PRESSURE: 125 MMHG | RESPIRATION RATE: 16 BRPM | TEMPERATURE: 96.8 F | HEART RATE: 101 BPM | WEIGHT: 221 LBS

## 2019-03-14 DIAGNOSIS — F34.1 DYSTHYMIA: ICD-10-CM

## 2019-03-14 DIAGNOSIS — R10.13 DYSPEPSIA: ICD-10-CM

## 2019-03-14 PROCEDURE — 99214 OFFICE O/P EST MOD 30 MIN: CPT | Performed by: NURSE PRACTITIONER

## 2019-03-14 PROCEDURE — G8417 CALC BMI ABV UP PARAM F/U: HCPCS | Performed by: NURSE PRACTITIONER

## 2019-03-14 PROCEDURE — 1036F TOBACCO NON-USER: CPT | Performed by: NURSE PRACTITIONER

## 2019-03-14 PROCEDURE — 3017F COLORECTAL CA SCREEN DOC REV: CPT | Performed by: NURSE PRACTITIONER

## 2019-03-14 PROCEDURE — G8484 FLU IMMUNIZE NO ADMIN: HCPCS | Performed by: NURSE PRACTITIONER

## 2019-03-14 PROCEDURE — 3046F HEMOGLOBIN A1C LEVEL >9.0%: CPT | Performed by: NURSE PRACTITIONER

## 2019-03-14 PROCEDURE — G8427 DOCREV CUR MEDS BY ELIG CLIN: HCPCS | Performed by: NURSE PRACTITIONER

## 2019-03-14 PROCEDURE — 2022F DILAT RTA XM EVC RTNOPTHY: CPT | Performed by: NURSE PRACTITIONER

## 2019-03-14 RX ORDER — RANITIDINE 150 MG/1
150 TABLET ORAL 2 TIMES DAILY
Qty: 180 TABLET | Refills: 1 | Status: SHIPPED | OUTPATIENT
Start: 2019-03-14 | End: 2020-03-12 | Stop reason: SDUPTHER

## 2019-03-14 ASSESSMENT — ENCOUNTER SYMPTOMS
RHINORRHEA: 0
COUGH: 0
NAUSEA: 1
GLOBUS SENSATION: 0
SHORTNESS OF BREATH: 0
STRIDOR: 0
WATER BRASH: 0
CONSTIPATION: 0
VOMITING: 0
SORE THROAT: 0
HEARTBURN: 1
BELCHING: 1
WHEEZING: 0
BLURRED VISION: 0
VISUAL CHANGE: 0
ABDOMINAL PAIN: 0
DIARRHEA: 0

## 2019-03-25 ENCOUNTER — HOSPITAL ENCOUNTER (OUTPATIENT)
Age: 54
Setting detail: OUTPATIENT SURGERY
Discharge: HOME | End: 2019-03-25
Attending: INTERNAL MEDICINE | Admitting: INTERNAL MEDICINE
Payer: COMMERCIAL

## 2019-03-25 ENCOUNTER — ANESTHESIA (OUTPATIENT)
Dept: OPERATING ROOM | Age: 54
End: 2019-03-25
Payer: COMMERCIAL

## 2019-03-25 ENCOUNTER — ANESTHESIA EVENT (OUTPATIENT)
Dept: OPERATING ROOM | Age: 54
End: 2019-03-25
Payer: COMMERCIAL

## 2019-03-25 VITALS
TEMPERATURE: 97.2 F | HEART RATE: 82 BPM | WEIGHT: 217 LBS | SYSTOLIC BLOOD PRESSURE: 125 MMHG | OXYGEN SATURATION: 98 % | DIASTOLIC BLOOD PRESSURE: 69 MMHG | RESPIRATION RATE: 16 BRPM | HEIGHT: 64 IN | BODY MASS INDEX: 37.05 KG/M2

## 2019-03-25 VITALS
DIASTOLIC BLOOD PRESSURE: 44 MMHG | RESPIRATION RATE: 16 BRPM | OXYGEN SATURATION: 98 % | SYSTOLIC BLOOD PRESSURE: 83 MMHG

## 2019-03-25 PROCEDURE — 3700000001 HC ADD 15 MINUTES (ANESTHESIA): Performed by: INTERNAL MEDICINE

## 2019-03-25 PROCEDURE — 3609027000 HC COLONOSCOPY: Performed by: INTERNAL MEDICINE

## 2019-03-25 PROCEDURE — 7100000011 HC PHASE II RECOVERY - ADDTL 15 MIN: Performed by: INTERNAL MEDICINE

## 2019-03-25 PROCEDURE — 6360000002 HC RX W HCPCS: Performed by: NURSE ANESTHETIST, CERTIFIED REGISTERED

## 2019-03-25 PROCEDURE — 45378 DIAGNOSTIC COLONOSCOPY: CPT | Performed by: INTERNAL MEDICINE

## 2019-03-25 PROCEDURE — 7100000010 HC PHASE II RECOVERY - FIRST 15 MIN: Performed by: INTERNAL MEDICINE

## 2019-03-25 PROCEDURE — 3700000000 HC ANESTHESIA ATTENDED CARE: Performed by: INTERNAL MEDICINE

## 2019-03-25 PROCEDURE — 2709999900 HC NON-CHARGEABLE SUPPLY: Performed by: INTERNAL MEDICINE

## 2019-03-25 PROCEDURE — 2580000003 HC RX 258: Performed by: INTERNAL MEDICINE

## 2019-03-25 RX ORDER — PROPOFOL 10 MG/ML
INJECTION, EMULSION INTRAVENOUS CONTINUOUS PRN
Status: DISCONTINUED | OUTPATIENT
Start: 2019-03-25 | End: 2019-03-25 | Stop reason: SDUPTHER

## 2019-03-25 RX ORDER — SODIUM CHLORIDE, SODIUM LACTATE, POTASSIUM CHLORIDE, CALCIUM CHLORIDE 600; 310; 30; 20 MG/100ML; MG/100ML; MG/100ML; MG/100ML
INJECTION, SOLUTION INTRAVENOUS CONTINUOUS
Status: DISCONTINUED | OUTPATIENT
Start: 2019-03-25 | End: 2019-03-25 | Stop reason: HOSPADM

## 2019-03-25 RX ORDER — PROPOFOL 10 MG/ML
INJECTION, EMULSION INTRAVENOUS PRN
Status: DISCONTINUED | OUTPATIENT
Start: 2019-03-25 | End: 2019-03-25 | Stop reason: SDUPTHER

## 2019-03-25 RX ADMIN — LIDOCAINE HYDROCHLORIDE 100 MG: 20 INJECTION, SOLUTION INTRAVENOUS at 13:12

## 2019-03-25 RX ADMIN — PROPOFOL 200 MCG/KG/MIN: 10 INJECTION, EMULSION INTRAVENOUS at 13:12

## 2019-03-25 RX ADMIN — PROPOFOL 60 MG: 10 INJECTION, EMULSION INTRAVENOUS at 13:12

## 2019-03-25 RX ADMIN — SODIUM CHLORIDE, POTASSIUM CHLORIDE, SODIUM LACTATE AND CALCIUM CHLORIDE: 600; 310; 30; 20 INJECTION, SOLUTION INTRAVENOUS at 13:38

## 2019-03-25 RX ADMIN — SODIUM CHLORIDE, POTASSIUM CHLORIDE, SODIUM LACTATE AND CALCIUM CHLORIDE: 600; 310; 30; 20 INJECTION, SOLUTION INTRAVENOUS at 13:08

## 2019-03-25 ASSESSMENT — PAIN SCALES - GENERAL
PAINLEVEL_OUTOF10: 0

## 2019-03-26 ENCOUNTER — TELEPHONE (OUTPATIENT)
Dept: GASTROENTEROLOGY | Age: 54
End: 2019-03-26

## 2019-03-27 RX ORDER — SODIUM, POTASSIUM,MAG SULFATES 17.5-3.13G
SOLUTION, RECONSTITUTED, ORAL ORAL
Status: ON HOLD | COMMUNITY
End: 2019-04-10 | Stop reason: HOSPADM

## 2019-04-10 ENCOUNTER — ANESTHESIA EVENT (OUTPATIENT)
Dept: OPERATING ROOM | Age: 54
End: 2019-04-10
Payer: COMMERCIAL

## 2019-04-10 ENCOUNTER — HOSPITAL ENCOUNTER (OUTPATIENT)
Age: 54
Setting detail: OUTPATIENT SURGERY
Discharge: HOME | End: 2019-04-10
Attending: INTERNAL MEDICINE | Admitting: INTERNAL MEDICINE
Payer: COMMERCIAL

## 2019-04-10 ENCOUNTER — ANESTHESIA (OUTPATIENT)
Dept: OPERATING ROOM | Age: 54
End: 2019-04-10
Payer: COMMERCIAL

## 2019-04-10 VITALS
OXYGEN SATURATION: 78 % | SYSTOLIC BLOOD PRESSURE: 84 MMHG | RESPIRATION RATE: 12 BRPM | DIASTOLIC BLOOD PRESSURE: 39 MMHG

## 2019-04-10 VITALS
TEMPERATURE: 97.2 F | SYSTOLIC BLOOD PRESSURE: 103 MMHG | BODY MASS INDEX: 37.05 KG/M2 | DIASTOLIC BLOOD PRESSURE: 65 MMHG | HEIGHT: 64 IN | RESPIRATION RATE: 16 BRPM | WEIGHT: 217 LBS | OXYGEN SATURATION: 97 % | HEART RATE: 89 BPM

## 2019-04-10 LAB — GLUCOSE BLD-MCNC: 248 MG/DL (ref 74–100)

## 2019-04-10 PROCEDURE — 3700000000 HC ANESTHESIA ATTENDED CARE: Performed by: INTERNAL MEDICINE

## 2019-04-10 PROCEDURE — 2500000003 HC RX 250 WO HCPCS: Performed by: NURSE ANESTHETIST, CERTIFIED REGISTERED

## 2019-04-10 PROCEDURE — 45378 DIAGNOSTIC COLONOSCOPY: CPT | Performed by: INTERNAL MEDICINE

## 2019-04-10 PROCEDURE — 2580000003 HC RX 258: Performed by: INTERNAL MEDICINE

## 2019-04-10 PROCEDURE — 2709999900 HC NON-CHARGEABLE SUPPLY: Performed by: INTERNAL MEDICINE

## 2019-04-10 PROCEDURE — 3609009500 HC COLONOSCOPY DIAGNOSTIC OR SCREENING: Performed by: INTERNAL MEDICINE

## 2019-04-10 PROCEDURE — 3700000001 HC ADD 15 MINUTES (ANESTHESIA): Performed by: INTERNAL MEDICINE

## 2019-04-10 PROCEDURE — 7100000011 HC PHASE II RECOVERY - ADDTL 15 MIN: Performed by: INTERNAL MEDICINE

## 2019-04-10 PROCEDURE — 7100000010 HC PHASE II RECOVERY - FIRST 15 MIN: Performed by: INTERNAL MEDICINE

## 2019-04-10 PROCEDURE — 6360000002 HC RX W HCPCS: Performed by: NURSE ANESTHETIST, CERTIFIED REGISTERED

## 2019-04-10 PROCEDURE — 82947 ASSAY GLUCOSE BLOOD QUANT: CPT

## 2019-04-10 RX ORDER — LIDOCAINE HYDROCHLORIDE 20 MG/ML
INJECTION, SOLUTION INFILTRATION; PERINEURAL PRN
Status: DISCONTINUED | OUTPATIENT
Start: 2019-04-10 | End: 2019-04-10 | Stop reason: SDUPTHER

## 2019-04-10 RX ORDER — SODIUM CHLORIDE, SODIUM LACTATE, POTASSIUM CHLORIDE, CALCIUM CHLORIDE 600; 310; 30; 20 MG/100ML; MG/100ML; MG/100ML; MG/100ML
INJECTION, SOLUTION INTRAVENOUS CONTINUOUS
Status: DISCONTINUED | OUTPATIENT
Start: 2019-04-10 | End: 2019-04-10 | Stop reason: HOSPADM

## 2019-04-10 RX ORDER — PROPOFOL 10 MG/ML
INJECTION, EMULSION INTRAVENOUS CONTINUOUS PRN
Status: DISCONTINUED | OUTPATIENT
Start: 2019-04-10 | End: 2019-04-10 | Stop reason: SDUPTHER

## 2019-04-10 RX ADMIN — PROPOFOL 250 MCG/KG/MIN: 10 INJECTION, EMULSION INTRAVENOUS at 09:43

## 2019-04-10 RX ADMIN — SODIUM CHLORIDE, POTASSIUM CHLORIDE, SODIUM LACTATE AND CALCIUM CHLORIDE: 600; 310; 30; 20 INJECTION, SOLUTION INTRAVENOUS at 10:10

## 2019-04-10 RX ADMIN — SODIUM CHLORIDE, POTASSIUM CHLORIDE, SODIUM LACTATE AND CALCIUM CHLORIDE: 600; 310; 30; 20 INJECTION, SOLUTION INTRAVENOUS at 08:34

## 2019-04-10 RX ADMIN — LIDOCAINE HYDROCHLORIDE 5 ML: 20 INJECTION, SOLUTION INFILTRATION; PERINEURAL at 09:43

## 2019-04-10 ASSESSMENT — PAIN SCALES - GENERAL
PAINLEVEL_OUTOF10: 0

## 2019-04-10 ASSESSMENT — PAIN DESCRIPTION - DESCRIPTORS: DESCRIPTORS: CRAMPING;CONSTANT

## 2019-04-10 ASSESSMENT — PAIN - FUNCTIONAL ASSESSMENT: PAIN_FUNCTIONAL_ASSESSMENT: 0-10

## 2019-04-10 NOTE — ANESTHESIA PRE PROCEDURE
Department of Anesthesiology  Preprocedure Note       Name:  Rosa Weber   Age:  48 y.o.  :  1965                                          MRN:  [id number]         Date:  4/10/2019      Surgeon: Onur Carson):  Marlon Sullivan MD    Procedure: COLONOSCOPY DIAGNOSTIC (N/A )    Medications prior to admission:   Prior to Admission medications    Medication Sig Start Date End Date Taking? Authorizing Provider   insulin glargine [facility]) 100 UNIT/ML injection pen Inject 50 Units into the skin nightly 3/14/19  Yes DI Dumont CNP   Liraglutide (VICTOZA) 18 MG/3ML SOPN SC injection Inject 1.2 mg into the skin daily 3/14/19  Yes DI Dumont CNP   ranitidine (ZANTAC) 150 MG tablet Take 1 tablet by mouth 2 times daily 3/14/19  Yes DI Dumont CNP   glipiZIDE (GLUCOTROL XL) 10 MG extended release tablet Take 1 tablet by mouth daily 19  Yes DI Dumont CNP   atorvastatin (LIPITOR) 40 MG tablet Take 1 tablet by mouth daily 19  Yes DI Dumont CNP   lisinopril (PRINIVIL;ZESTRIL) 2.5 MG tablet TAKE 1 TABLET DAILY 19  Yes DI Dumont CNP   venlafaxine (EFFEXOR XR) 75 MG extended release capsule Take 1 capsule by mouth daily 19  Yes DI Dumont CNP   gabapentin (NEURONTIN) 600 MG tablet Takes 1 1/2 tabs TID. . 19  Yes Historical Provider, MD   HYDROcodone-acetaminophen ([facility]) 5-325 MG per tablet  19  Yes Historical Provider, MD   lidocaine-prilocaine (EMLA) 2.5-2.5 % cream  18  Yes Historical Provider, MD   omeprazole (PRILOSEC) 20 MG delayed release capsule TAKE ONE CAPSULE BY MOUTH DAILY 10/25/18  Yes DI Dumont CNP   Na Sulfate-K Sulfate-Mg Sulf 17.5-3.13-1.6 GM/177ML SOLN Take by mouth    Historical Provider, MD       Current medications:    Current Facility-Administered Medications   Medication Dose Route Frequency Provider Last Rate Last Dose    lactated ringers infusion   Intravenous Continuous Vernal Grade Seema Lanza  mL/hr at 04/10/19 7172         Allergies: Allergies   Allergen Reactions    Codeine Other (See Comments)     Migraine    Meperidine     Other Itching and Dermatitis     METAL       Problem List:    Patient Active Problem List   Diagnosis Code    Nuclear sclerotic cataract of left eye H25.12    Uncontrolled type 2 diabetes mellitus with diabetic polyneuropathy, with long-term current use of insulin ([facility]) E11.42, Z79.4, E11.65    Vitamin D deficiency E55.9    Community acquired pneumonia of right upper lobe of lung ([address] J18.1    Obstructive sleep apnea G47.33    Sepsis due to pneumonia ([address] J18.9, A41.9    Positive FIT (fecal immunochemical test) R19.5    Dysthymia F34.1    Dyspepsia R10.13       Past Medical History:        Diagnosis Date    Arthritis     Depression     Diabetes mellitus ([address]     Diabetic [facility]     Esophageal reflux     Sleep apnea     DOES NOT USE A MACHINE. DIAGNOSED > 15 YEARS AGO WITH SLEEP APNEA       Past Surgical History:        Procedure Laterality Date    ABSCESS DRAINAGE Left     BUTTOCK    APPENDECTOMY      CARPAL TUNNEL RELEASE Left 2018    CATARACT REMOVAL WITH IMPLANT Bilateral 10/24/2016     SECTION      CHOLECYSTECTOMY      COLONOSCOPY  2019    Dr. Compa Fritz -normal poor prep    COLONOSCOPY N/A 3/25/2019    COLORECTAL CANCER SCREENING, NOT HIGH RISK performed by Antione Scott MD at [place] [address] Right     IHR   [address]  2006    HERNIA REPAIR WITH HYSTERECTOMY SURGERY    HYSTERECTOMY      LAPAROSCOPY         Social History:    Social History     Tobacco Use    Smoking status: Former Smoker     Last attempt to quit: 2011     Years since quittin.8    Smokeless tobacco: Never Used   Substance Use Topics    Alcohol use:  No                                Counseling given: Not Answered      Vital Signs (Current):   Vitals:    04/10/19 0827   BP: (!) 164/91   Pulse: 97   Resp: 18 Pulmonary:normal exam    (+) sleep apnea: on noncompliant,                             Cardiovascular:  Exercise tolerance: good (>4 METS),   (+) hypertension:, hyperlipidemia      ECG reviewed                        Neuro/Psych:   (+) depression/anxiety             GI/Hepatic/Renal:   (+) GERD (denies s/s today): well controlled, bowel prep, morbid obesity          Endo/Other:    (+) Diabetes (bs today 248)Type I DM, using insulin, . Abdominal:   (+) obese,         Vascular: negative vascular ROS. Anesthesia Plan      general and TIVA     ASA 3       Induction: intravenous. Anesthetic plan and risks discussed with patient and spouse.                       Josselin Nicole, APRN - CRNA   4/10/2019

## 2019-04-10 NOTE — H&P
file     Inability: Not on file    Transportation needs:     Medical: Not on file     Non-medical: Not on file   Tobacco Use    Smoking status: Former Smoker     Last attempt to quit: 2011     Years since quittin.8    Smokeless tobacco: Never Used   Substance and Sexual Activity    Alcohol use: No    Drug use: No    Sexual activity: Not on file   Lifestyle    Physical activity:     Days per week: Not on file     Minutes per session: Not on file    Stress: Not on file   Relationships    Social connections:     Talks on phone: Not on file     Gets together: Not on file     Attends Cheondo[employer] service: Not on file     Active member of club or organization: Not on file     Attends meetings of clubs or organizations: Not on file     Relationship status: Not on file    Intimate partner violence:     Fear of current or ex partner: Not on file     Emotionally abused: Not on file     Physically abused: Not on file     Forced sexual activity: Not on file   Other Topics Concern    Not on file   Social History Narrative    Not on file     ROS: Non-contributory    Physical Exam:  Vitals:    04/10/19 0827   BP: (!) 164/91   Pulse: 97   Resp: 18   Temp: 96.9 °F (36.1 °C)   SpO2: 98%       Chest: Breath sounds were clear and equal with no rales, wheezes, or rhonchi. Respiratory effort was normal with no retractions or use of accessory muscles. Cardiovascular: Heart sounds were normal with a regular rate and rhythm. There were no murmurs, gallops or rubs. Abdomen: Bowel sounds were normal.  The abdomen was soft and non distended. There was no tenderness, guarding, rebound, or rigidity. There were no masses, hepatosplenomegaly, or hernias.     Plan: Colonoscopy      Electronically by Pao Salmon MD  on 4/10/2019 at 9:35 AM

## 2019-04-10 NOTE — PROGRESS NOTES
Discharge Criteria    Inpatients must meet Criteria 1 through 7. All other patients are either YES or N/A. If a NO is chosen then Anesthesia or Surgeon must be notified. 1.  Minimum 30 minutes after last dose of sedative medication, minimum 120 minutes after last dose of reversal agent. Yes      2. Systolic BP stable within 20 mmHg for 30 minutes & systolic BP between 90 & 654 or within 10 mmHg of baseline. Yes      3. Pulse between 60 and 100 or within 10 bpm of baseline. Yes      4. Spontaneous respiratory rate >/= 10 per minute. Yes      5. SaO2 >/= 95 or  >/= baseline. Yes      6. Able to cough and swallow or return to baseline function. Yes      7. Alert and oriented or return to baseline mental status. Yes      8. Demonstrates controlled, coordinated movements, ambulates with steady gait, or return to baseline activity function. Yes      9. Minimal or no pain or nausea, or at a level tolerable and acceptable to patient. Yes      10. Takes and retains oral fluids as allowed. Yes      11. Procedural / perioperative site stable. Minimal or no bleeding. Yes          12. If GI endoscopy procedure, minimal or no abdominal distention or passing flatus. Yes      13. Written discharge instructions and emergency telephone number provided. Yes      14. Accompanied by a responsible adult. Yes      Adult patient discharged from facility without responsible person meets above criteria plus the following:   a) remains awake without stimulus for 30 minutes     b) oriented appropriate for age      c) all vital signs stable   d) no significant risk of losing protective reflexes      e) able to maintain pre-procedure mobility without assistance   f) no nausea or dizziness      g) transportation arrangements that do not require patient to operate motor Vehicle.      N/A

## 2019-04-10 NOTE — OP NOTE
PROCEDURE NOTE    DATE OF PROCEDURE: 4/10/2019    ENDOSCOPIST: Fredy Anthony. Stanton Kocher, MD, [facility]    ASSISTANT: None    PREOPERATIVE DIAGNOSIS: positive FIT    POSTOPERATIVE DIAGNOSIS: diverticulosis,  Mild in degree, involving the descending colon and the sigmoid  hemorrhoids internal, Small in size  lipoma, 25 mm in size, located in Ileocecal valve    OPERATION: Colonoscopy --diagnostic    ANESTHESIA: MAC     ESTIMATED BLOOD LOSS: No    COMPLICATIONS: None. SPECIMENS: were not obtained    HISTORY: The patient is a 48y.o. year old female with history of above preop diagnosis. Colonoscopy with possible biopsy or polypectomy has been recommended and I explained the risk, benefits, expected outcome, and alternatives to the procedure. Risks include but are not limited to bleeding, infection, respiratory distress, hypotension, and perforation of the colon. The patient understands and is in agreement. PROCEDURE: The patient was given monitored anesthesia care. The patient was given oxygen by nasal cannula. The colonoscope was inserted per rectum and advanced under direct vision to the cecum without difficulty. Findings:  Cecum/Ascending colon: abnormal: Ileocecal valve lipoma    Transverse colon: normal    Descending/Sigmoid colon: abnormal: diverticulosis    Rectum/Anus: examined in normal and retroflexed positions and was abnormal: hemorrhoids    The colon was decompressed and the scope was removed. The patient tolerated the procedure well.        Electronically signed by Rachel Finnegan MD  on 4/10/2019 at 10:28 AM

## 2019-04-10 NOTE — ANESTHESIA POSTPROCEDURE EVALUATION
Department of Anesthesiology  Postprocedure Note    Patient: Solange Black  MRN: [id number]  YOB: 1965  Date of evaluation: 4/10/2019  Time:  1:09 PM     Procedure Summary     Date:  04/10/19 [facility] [place] [address] [facility]    Anesthesia Start:  5868 Anesthesia Stop:  8737    Procedure:  COLONOSCOPY DIAGNOSTIC (N/A ) Diagnosis:  (DIAGNOSTIC, POSITIVE FIT TEST)    Surgeon:  Alea Frederick MD Responsible Provider:  DI Arrington CRNA    Anesthesia Type:  general, TIVA ASA Status:  3          Anesthesia Type: general, TIVA    Lisa Phase I:      Lisa Phase II:      Last vitals: Reviewed and per EMR flowsheets.        Anesthesia Post Evaluation    Patient location during evaluation: PACU  Patient participation: complete - patient participated  Level of consciousness: awake and alert  Airway patency: patent  Nausea & Vomiting: no nausea and no vomiting  Complications: no  Cardiovascular status: blood pressure returned to baseline and hemodynamically stable  Respiratory status: acceptable and room air  Hydration status: euvolemic

## 2019-05-13 ENCOUNTER — TELEPHONE (OUTPATIENT)
Dept: PRIMARY CARE CLINIC | Age: 54
End: 2019-05-13

## 2019-05-13 DIAGNOSIS — F41.8 DEPRESSION WITH ANXIETY: ICD-10-CM

## 2019-05-13 RX ORDER — VENLAFAXINE HYDROCHLORIDE 150 MG/1
150 CAPSULE, EXTENDED RELEASE ORAL DAILY
Qty: 90 CAPSULE | Refills: 1 | Status: SHIPPED | OUTPATIENT
Start: 2019-05-13 | End: 2019-05-14 | Stop reason: SDUPTHER

## 2019-05-13 NOTE — TELEPHONE ENCOUNTER
Pt called stated Effexor XR 75 MG is not working at all. Pt was seen 3/14/2019. Does she need to seen? Thanks.

## 2019-05-13 NOTE — TELEPHONE ENCOUNTER
Health Maintenance   Topic Date Due    Cervical cancer screen  08/08/1988    A1C test (Diabetic or Prediabetic)  05/05/2019    Diabetic retinal exam  02/06/2020 (Originally 8/11/2017)    Shingles Vaccine (1 of 2) 02/06/2020 (Originally 8/8/2017)    Hepatitis B Vaccine (1 of 3 - Risk 3-dose series) 03/14/2020 (Originally 8/8/1986)    Flu vaccine (Season Ended) 09/01/2019    Diabetic microalbuminuria test  02/05/2020    Lipid screen  02/05/2020    Potassium monitoring  02/05/2020    Creatinine monitoring  02/05/2020    Diabetic foot exam  02/06/2020    Breast cancer screen  02/15/2020    DTaP/Tdap/Td vaccine (2 - Td) 04/28/2023    Colon cancer screen colonoscopy  04/10/2029    Pneumococcal 0-64 years Vaccine  Completed    HIV screen  Completed             (applicable per patient's age: Cancer Screenings, Depression Screening, Fall Risk Screening, Immunizations)    Hemoglobin A1C (%)   Date Value   02/05/2019 10.5 (H)   03/26/2018 7.6 (H)   01/24/2018 12.4     Microalb/Crt. Ratio (mcg/mg creat)   Date Value   02/05/2019 517 (H)     LDL Cholesterol (mg/dL)   Date Value   02/05/2019 77     AST (U/L)   Date Value   02/05/2019 18     ALT (U/L)   Date Value   02/05/2019 25     BUN (mg/dL)   Date Value   02/05/2019 6      (goal A1C is < 7)   (goal LDL is <100) need 30-50% reduction from baseline     BP Readings from Last 3 Encounters:   04/10/19 103/65   04/10/19 (!) 84/39   03/25/19 125/69    (goal /80)      All Future Testing planned in [facility]:  Lab Frequency Next Occurrence   COLONOSCOPY W/ OR W/O BIOPSY Once 03/01/2019       Next Visit Date:  No future appointments.          Patient Active Problem List:     Nuclear sclerotic cataract of left eye     Uncontrolled type 2 diabetes mellitus with diabetic polyneuropathy, with long-term current use of insulin ([address]     Vitamin D deficiency     Community acquired pneumonia of right upper lobe of lung ([address]     Obstructive sleep apnea     Sepsis due to pneumonia ([facility]     Positive FIT (fecal immunochemical test)     Dysthymia     Dyspepsia

## 2019-05-14 DIAGNOSIS — F41.8 DEPRESSION WITH ANXIETY: ICD-10-CM

## 2019-05-14 RX ORDER — VENLAFAXINE HYDROCHLORIDE 150 MG/1
150 CAPSULE, EXTENDED RELEASE ORAL DAILY
Qty: 90 CAPSULE | Refills: 1 | Status: SHIPPED | OUTPATIENT
Start: 2019-05-14 | End: 2019-09-04

## 2019-05-22 ENCOUNTER — CARE COORDINATION (OUTPATIENT)
Dept: CARE COORDINATION | Age: 54
End: 2019-05-22

## 2019-05-22 NOTE — CARE COORDINATION
Attempt to contact patient for enrollment in [facility], 12/11/18 pt was d/c'd from [facility] due to unable to contact. Will also send letter.     Ingrid Anton RN

## 2019-05-22 NOTE — LETTER
5/22/2019    Susanne Briceño  [address] [address] [address]      Dear [address] Briceño,    My name is Yee Calderon and I am a registered nurse who partners with DI Hardy CNP to improve patients' health. DI Hardy CNP believes you would benefit from working with me. As a member of your health care team, I would work with other providers involved in your care, offer education for your specific health conditions, and connect you with additional resources as needed. I will collaborate with DI Hardy CNP to support you in following your treatment plan. The additional support I provide is no additional cost to you. My primary focus is to help you achieve specific goals and improve your health. We are committed to walk with you on this journey and look forward to working with you. Please call me to further discuss your healthcare needs. I am available by phone or for appointments at the office. You can reach me at [phone].     In good health,     Yee Calderon RN

## 2019-05-30 ENCOUNTER — CARE COORDINATION (OUTPATIENT)
Dept: CARE COORDINATION | Age: 54
End: 2019-05-30

## 2019-05-30 NOTE — CARE COORDINATION
Attempt to contact patient for enrollment in CC    2nd attempt no answer LVM    12/11/18 pt was d/c'd from CC due to unable to contact.       Brooklyn Manley RN

## 2019-06-17 ENCOUNTER — CARE COORDINATION (OUTPATIENT)
Dept: CARE COORDINATION | Age: 54
End: 2019-06-17

## 2019-06-26 ENCOUNTER — CARE COORDINATION (OUTPATIENT)
Dept: CARE COORDINATION | Age: 54
End: 2019-06-26

## 2019-06-26 NOTE — LETTER
6/26/2019    Susanne Briceño  [address] [facility] [address] [address]      Dear Susanne Alexander Briceño,    My name is Sofia Yepez and I am a registered nurse who partners with DI Walsh CNP to improve patients' health. DI Walsh CNP believes you would benefit from working with me. As a member of your health care team, I would work with other providers involved in your care, offer education for your specific health conditions, and connect you with additional resources as needed. I will collaborate with DI Walsh CNP to support you in following your treatment plan. The additional support I provide is no additional cost to you. My primary focus is to help you achieve specific goals and improve your health. We are committed to walk with you on this journey and look forward to working with you. Please call me to further discuss your healthcare needs. I am available by phone or for appointments at the office. You can reach me at [phone].     In good health,           Sofia Yepez RN

## 2019-06-26 NOTE — CARE COORDINATION
Reason For Call Today:  -Attempted to reach patient today to assess for Care Coordination  -Unable to reach patient today  -Left a voicemail requesting a return phone call back to this [facility] when patient was able  -Will mail CC introduction letter today    Patient Active Problem List   Diagnosis    Nuclear sclerotic cataract of left eye    Uncontrolled type 2 diabetes mellitus with diabetic polyneuropathy, with long-term current use of insulin ([address]    Vitamin D deficiency    Community acquired pneumonia of right upper lobe of lung ([address]    Obstructive sleep apnea    Sepsis due to pneumonia ([address]    Positive FIT (fecal immunochemical test)    Dysthymia    Dyspepsia       Lab Results   Component Value Date    LABA1C 10.5 (H) 02/05/2019     Lab Results   Component Value Date     02/05/2019       No future appointments. Care Coordination Plan of Care: This nurse Care Coordinator will await call back from patient, and if no return call; will attempt to reach patient back again next week per protocol. Will mail CC Introduction letter today in the mail.

## 2019-07-01 ENCOUNTER — OFFICE VISIT (OUTPATIENT)
Dept: PRIMARY CARE CLINIC | Age: 54
End: 2019-07-01
Payer: COMMERCIAL

## 2019-07-01 VITALS
HEART RATE: 102 BPM | DIASTOLIC BLOOD PRESSURE: 78 MMHG | TEMPERATURE: 97.4 F | SYSTOLIC BLOOD PRESSURE: 122 MMHG | BODY MASS INDEX: 37.44 KG/M2 | RESPIRATION RATE: 14 BRPM | HEIGHT: 64 IN | WEIGHT: 219.3 LBS

## 2019-07-01 DIAGNOSIS — E66.01 CLASS 2 SEVERE OBESITY DUE TO EXCESS CALORIES WITH SERIOUS COMORBIDITY AND BODY MASS INDEX (BMI) OF 37.0 TO 37.9 IN ADULT (HCC): ICD-10-CM

## 2019-07-01 DIAGNOSIS — F34.1 DYSTHYMIA: ICD-10-CM

## 2019-07-01 LAB — HBA1C MFR BLD: 9.8 %

## 2019-07-01 PROCEDURE — G8417 CALC BMI ABV UP PARAM F/U: HCPCS | Performed by: NURSE PRACTITIONER

## 2019-07-01 PROCEDURE — 3017F COLORECTAL CA SCREEN DOC REV: CPT | Performed by: NURSE PRACTITIONER

## 2019-07-01 PROCEDURE — G8427 DOCREV CUR MEDS BY ELIG CLIN: HCPCS | Performed by: NURSE PRACTITIONER

## 2019-07-01 PROCEDURE — 3046F HEMOGLOBIN A1C LEVEL >9.0%: CPT | Performed by: NURSE PRACTITIONER

## 2019-07-01 PROCEDURE — 2022F DILAT RTA XM EVC RTNOPTHY: CPT | Performed by: NURSE PRACTITIONER

## 2019-07-01 PROCEDURE — 99214 OFFICE O/P EST MOD 30 MIN: CPT | Performed by: NURSE PRACTITIONER

## 2019-07-01 PROCEDURE — 1036F TOBACCO NON-USER: CPT | Performed by: NURSE PRACTITIONER

## 2019-07-01 PROCEDURE — 83036 HEMOGLOBIN GLYCOSYLATED A1C: CPT | Performed by: NURSE PRACTITIONER

## 2019-07-01 RX ORDER — BUPROPION HYDROCHLORIDE 150 MG/1
150 TABLET ORAL EVERY MORNING
Qty: 30 TABLET | Refills: 1 | Status: SHIPPED | OUTPATIENT
Start: 2019-07-01 | End: 2019-09-04

## 2019-07-01 ASSESSMENT — ENCOUNTER SYMPTOMS
COUGH: 0
CONSTIPATION: 0
SHORTNESS OF BREATH: 0
HEARTBURN: 0
VISUAL CHANGE: 0
BLURRED VISION: 0
RHINORRHEA: 0
ABDOMINAL PAIN: 0
WHEEZING: 0
VOMITING: 0
SORE THROAT: 0
NAUSEA: 0
BACK PAIN: 0
DIARRHEA: 0

## 2019-07-01 NOTE — PATIENT INSTRUCTIONS
slowly than it would otherwise. Eat from all food groups  · Eat at least three meals a day. · Plan meals to include food from all the food groups. The food groups include grains, fruits, dairy, proteins, and vegetables. · Talk to your dietitian or diabetes educator about ways to add limited amounts of sweets into your meal plan. · If you drink alcohol, talk to your doctor. It may not be recommended when you are taking certain diabetes medicines. Where can you learn more? Go to https://[employer].MyDROBE. org and sign in to your [employer] account. Enter H544 in the "[employer]" box to learn more about \"Counting Carbohydrates: Care Instructions. \"     If you do not have an account, please click on the \"Sign Up Now\" link. Current as of: July 25, 2018  Content Version: 12.0  © 2758-3888 Healthwise, Incorporated. Care instructions adapted under license by [facility] ([facility]). If you have questions about a medical condition or this instruction, always ask your healthcare professional. [place] [address] any warranty or liability for your use of this information.

## 2019-07-01 NOTE — PROGRESS NOTES
Name: Luis Oshea  : 1965         Chief Complaint:     Chief Complaint   Patient presents with    Diabetes     [place] office visit. States \"I have not checked my blood sugar in awhile. \"    [address] Problem     \"effexor is not working. \"        History of Present Illness:      Luis Oshea is a 48 y.o.  female who presents with Diabetes ([place] office visit. States \"I have not checked my blood sugar in awhile. \" ) and Mental Health Problem (\"effexor is not working. \" )      Viviana Hurst is here today for a routine office visit. Patient states that she feels Effexor is no longer effective. Patient states she has lack of motivation and fatigue on a daily basis. Patient states there are days she does not want to leave her bedroom or the house. Patient denies any suicidal or homicidal thoughts. Obesity-patient is requesting referral to bariatric surgery for evaluation. Patient states she has been unable to maintain any weight loss with success. Patient does have several comorbid conditions that would likely improve with weight management. Diabetes   She presents for her follow-up diabetic visit. She has type 2 diabetes mellitus. Her disease course has been stable. Hypoglycemia symptoms include nervousness/anxiousness. Pertinent negatives for hypoglycemia include no confusion, dizziness, headaches, sleepiness, speech difficulty or sweats. Associated symptoms include fatigue and foot paresthesias. Pertinent negatives for diabetes include no blurred vision, no chest pain, no polydipsia, no polyphagia, no polyuria and no visual change. There are no hypoglycemic complications. Symptoms are stable. Diabetic complications include peripheral neuropathy. Pertinent negatives for diabetic complications include no CVA, heart disease or nephropathy.  Risk factors for coronary artery disease include diabetes mellitus and post-menopausal. Current diabetic treatment includes insulin injections and oral agent tablet by mouth daily 2/6/19  Yes DI Trejo CNP   atorvastatin (LIPITOR) 40 MG tablet Take 1 tablet by mouth daily 2/6/19  Yes DI Trejo - CNP   lisinopril (PRINIVIL;ZESTRIL) 2.5 MG tablet TAKE 1 TABLET DAILY 2/6/19  Yes DI Trejo CNP   gabapentin (NEURONTIN) 600 MG tablet Takes 1 1/2 tabs TID. . 1/17/19  Yes Historical Provider, MD   HYDROcodone-acetaminophen (Mckenna Mario Alberto) 5-325 MG per tablet  1/18/19  Yes Historical Provider, MD   lidocaine-prilocaine (EMLA) 2.5-2.5 % cream  11/8/18  Yes Historical Provider, MD        Allergies:       Codeine; Meperidine; and Other    Social History:     Tobacco:    reports that she quit smoking about 8 years ago. She has never used smokeless tobacco.  Alcohol:      reports that she does not drink alcohol. Drug Use:  reports that she does not use drugs. Family History:     Family History   Problem Relation Age of Onset    Diabetes Mother     High Cholesterol Mother     Hypertension Mother     Heart Disease Mother     Diabetes Father     Heart Disease Father     High Cholesterol Father     Hypertension Father     Diabetes Sister        Review of Systems:     Positive and Negative as described in HPI    Review of Systems   Constitutional: Positive for fatigue and unexpected weight change. Negative for appetite change, chills and fever. HENT: Negative for congestion, rhinorrhea and sore throat. Eyes: Negative for blurred vision and visual disturbance. Respiratory: Negative for cough, shortness of breath and wheezing. Cardiovascular: Negative for chest pain and palpitations. Gastrointestinal: Negative for abdominal pain, constipation, diarrhea, heartburn, nausea and vomiting. Endocrine: Negative for polydipsia, polyphagia and polyuria. Genitourinary: Negative for difficulty urinating and dysuria. Musculoskeletal: Negative for back pain, gait problem and myalgias. Skin: Negative for rash.    Neurological: Negative for

## 2019-07-03 ENCOUNTER — TELEPHONE (OUTPATIENT)
Dept: BARIATRICS/WEIGHT MGMT | Age: 54
End: 2019-07-03

## 2019-07-03 NOTE — TELEPHONE ENCOUNTER
Online Info Session Completed:  on 7/1/19 okay to schedule with either surgeon. Verified Insurance Benefit   with [facility]    Appointment Note :   New Patient , [facility],   6   month visits,  PG Fee $350,  Mailed Packet or advised to arrive  early      Remind Patient of $350 Program fee with $ 100 required at Second visit with office on initial dietician visit. Remind Patient they must be nicotine free. They will be tested at the beginning of the program and prior to surgery. Advise Patient Responsible for out of pocket, copay at medical visits, Deductible and coinsurance applied to medical visits and procedure. You will be responsible for any of the following:  · Copays   · Deductibles   · Co insurances     The items mentioned above are indicated or required by your insurance plan. Your deductible and coinsurance are applied to medical visits and procedures. Verified with patient if he or she has had any previous bariatric surgery? no  ( If yes ,advise patient of transfer of care process and program fee)       .

## 2019-07-08 ENCOUNTER — CARE COORDINATION (OUTPATIENT)
Dept: CARE COORDINATION | Age: 54
End: 2019-07-08

## 2019-07-10 ENCOUNTER — CARE COORDINATION (OUTPATIENT)
Dept: CARE COORDINATION | Age: 54
End: 2019-07-10

## 2019-07-10 NOTE — CARE COORDINATION
orders/instructions/reccomendations. Keep blood sugar log on blood tracker sheet provided by [facility]. Read over material handed out by [facility]: Diabetes zone sheet, diabetic friendly snacks, sick day guidelines, what is my A1C, Dollar store shopping sheet. Confidence: 5/10  Anticipated Goal Completion Date: 09/01/2019            Education Reviewed with patient today: See Education Module. Interventions completed today:Offered to schedule with PCP for headache-patient declined. · Patient to reach out to care coordinator at [phone] or MD office with questions. · Reminded Lacie of walk in care clinic availability/hours; and when to utilize the facility if MD office not available. Care Coordinator Plan of Care: This nurse CC will plan to await call back from patient with blood sugar log. Will update PCP about Wellbutrin being effective. Will plan to reach out to patient next week to follow up after Dr. Dylan Vegas appointment. Care Coordination Interventions    Program Enrollment:  Rising Risk  Referral from Primary Care Provider:  No  Suggested Interventions and Community Resources  Diabetes Education:  Declined (Comment: handouts provided: What is my A1C, Diabetic Friendly snacks, Sick Day Guidelines, Diabetes Zone sheet all given on 7/1/2019)  Fall Risk Prevention:  Completed  Disease Specific Clinic:  Declined (Comment: Was \"no show\" on 3/13/2018. Declines wanting to currently go )  Meals on Wheels:  Declined  Medication Assistance Program:  Declined (Comment: denies any cost issues with affording medications at this time)  Medi Set or Pill Pack:  Declined  Registered Dietician:  Declined (Comment: declined 7/1/2019)  Transportation Support:  Completed  Zone Management Tools:  Completed (Comment: Diabetes on 7/1/2019)       Prior to Admission medications    Medication Sig Start Date End Date Taking?  Authorizing Provider   buPROPion (WELLBUTRIN XL) 150 MG extended release tablet Take 1 tablet by

## 2019-07-15 ENCOUNTER — TELEPHONE (OUTPATIENT)
Dept: PRIMARY CARE CLINIC | Age: 54
End: 2019-07-15

## 2019-07-15 NOTE — TELEPHONE ENCOUNTER
Dr. Alee Morrison office would like to give the pt a cortisone injection but with her being diabetic they would like to know if she is ok to receive one. Note tp be faxed to Dr. Alee Morrison office at [phone]. Health Maintenance   Topic Date Due    Diabetic retinal exam  02/06/2020 (Originally 8/11/2017)    Shingles Vaccine (1 of 2) 02/06/2020 (Originally 8/8/2017)    Hepatitis B Vaccine (1 of 3 - Risk 3-dose series) 03/14/2020 (Originally 8/8/1986)    Cervical cancer screen  07/01/2020 (Originally 8/8/1988)    Flu vaccine (1) 09/01/2019    A1C test (Diabetic or Prediabetic)  10/01/2019    Diabetic microalbuminuria test  02/05/2020    Lipid screen  02/05/2020    Potassium monitoring  02/05/2020    Creatinine monitoring  02/05/2020    Diabetic foot exam  02/06/2020    Breast cancer screen  02/15/2020    DTaP/Tdap/Td vaccine (2 - Td) 04/28/2023    Colon cancer screen colonoscopy  04/10/2029    Pneumococcal 0-64 years Vaccine  Completed    HIV screen  Completed             (applicable per patient's age: Cancer Screenings, Depression Screening, Fall Risk Screening, Immunizations)    Hemoglobin A1C (%)   Date Value   07/01/2019 9.8   02/05/2019 10.5 (H)   03/26/2018 7.6 (H)     Microalb/Crt.  Ratio (mcg/mg creat)   Date Value   02/05/2019 517 (H)     LDL Cholesterol (mg/dL)   Date Value   02/05/2019 77     AST (U/L)   Date Value   02/05/2019 18     ALT (U/L)   Date Value   02/05/2019 25     BUN (mg/dL)   Date Value   02/05/2019 6      (goal A1C is < 7)   (goal LDL is <100) need 30-50% reduction from baseline     BP Readings from Last 3 Encounters:   07/01/19 122/78   04/10/19 103/65   04/10/19 (!) 84/39    (goal /80)      All Future Testing planned in [facility]:  Lab Frequency Next Occurrence   COLONOSCOPY W/ OR W/O BIOPSY Once 03/01/2019   CBC Auto Differential Once 09/29/2019   ALT Once 10/01/2019   AST Once 60/51/0994   Basic Metabolic Panel Once 91/96/8714   Hemoglobin A1C Once

## 2019-07-17 ENCOUNTER — OFFICE VISIT (OUTPATIENT)
Dept: BARIATRICS/WEIGHT MGMT | Age: 54
End: 2019-07-17
Payer: COMMERCIAL

## 2019-07-17 VITALS
WEIGHT: 214 LBS | HEART RATE: 76 BPM | DIASTOLIC BLOOD PRESSURE: 74 MMHG | HEIGHT: 64 IN | SYSTOLIC BLOOD PRESSURE: 118 MMHG | RESPIRATION RATE: 20 BRPM | BODY MASS INDEX: 36.54 KG/M2

## 2019-07-17 DIAGNOSIS — G47.33 OBSTRUCTIVE SLEEP APNEA: Chronic | ICD-10-CM

## 2019-07-17 PROCEDURE — 3017F COLORECTAL CA SCREEN DOC REV: CPT | Performed by: SURGERY

## 2019-07-17 PROCEDURE — 99204 OFFICE O/P NEW MOD 45 MIN: CPT | Performed by: SURGERY

## 2019-07-17 PROCEDURE — G8417 CALC BMI ABV UP PARAM F/U: HCPCS | Performed by: SURGERY

## 2019-07-17 PROCEDURE — 3046F HEMOGLOBIN A1C LEVEL >9.0%: CPT | Performed by: SURGERY

## 2019-07-17 PROCEDURE — 1036F TOBACCO NON-USER: CPT | Performed by: SURGERY

## 2019-07-17 PROCEDURE — G8427 DOCREV CUR MEDS BY ELIG CLIN: HCPCS | Performed by: SURGERY

## 2019-07-17 PROCEDURE — 2022F DILAT RTA XM EVC RTNOPTHY: CPT | Performed by: SURGERY

## 2019-07-17 NOTE — PROGRESS NOTES
cream        No current facility-administered medications for this visit. SOCIAL:      This patient is with her daughter for the evaluation today. [] HIV Risk Factors (i.e.) intravenous drug abuser; at risk sexual behavior; received blood products    [] TB Risk Factors (i.e.) Medically underserved, institutional care, foreign born, endemic area; exposure to active case    [] Hepatitis B&C Risk Factors (i.e.) Received blood transfusion prior to 1992; recreational drug use; high risk sexual behaviors; tattoos or body piercings; contact with blood or needle sticks in the workplace    Comprehension    Ability to grasp concepts and respond to questions:   [x] High   [] Medium   [] Low    Motivation    [x] Asks Questions; eager to learn   [] Needs education   [] Extreme anxiety    [] uncooperative   [] Denies need for education    English Speaking Ability    [x] Speaks English well   [] Reads Jose Waite well   [] Understands spoken Erica Serna    [] Understands written English   [] No need for interpretive support      [] Might benefit from interpretive support   []  required for all services     REVIEW OF SYSTEMS:     Do you feel sleepy during the day? [x] Yes     [] No  Do you get short of breath when walking up two flights of stairs? [x] Yes     [] No  Do you get chest pains when walking up two flights of stairs? [] Yes     [x] No  Do you suffer from back pain? [x] Yes     [] No  Do you suffer from knee pain? [x] Yes     [] No    Do you or have you had any of the following?   Cardiovascular YES NO Respiratory YES NO   High Blood Pressure   []   [x] COPD   []   [x]   Heart Attack   []   [x] TB/Positive skin Test   []   [x]   Congestive Heart Failure   []   [x] Obstructive Sleep Apnea   []   [x]   Coronary Artery Disease   []   [x] Asthma   []   [x]   Circulation Problems   []   [x]      Activity Intolerance   []   [x] Gastrointestinal YES NO   Peripheral Vascular Disease   [] [x] Gastric Problems   [x]   []        Colorectal problems   []   [x]   Hematological YES NO Ulcer disease   []   [x]   Bleeding Tendencies   []   [x] Liver disease   []   [x]   Blood Transfusion last 30d   []   [x] Gallstones   []   [x]   Anemia   []   [x] Refulx or Heartburn   [x]   []   Blood Clots   []   [x]      High Cholesterol   [x]   [] Muscoloskeletal YES NO   High Triglycerides   []   [x] Joint Limitations   [x]   []      Muscle Weakness   [x]   []   Eyes, Ears, Nose, Throat YES NO Multiple Sclerosis   []   [x]   Cataracts   []   [x] Arthritis   [x]   []   Glasses   []   [x]      Blurred Vision   []   [x] Cancer   []   [x]   Hearing Aids   []   [x] Type:     Ringing in Ears   []   [x]      Difficulty Swallowing   []   [x] Encodrine YES NO      Diabetes   [x]   []   Neurological YES NO Thyroid   []   [x]   Stroke   []   [x]      Seizure   []   [x] Psychiatric Disorder YES NO   Dizziness/Blackouts/Fainting   []   [x] Depression   [x]   []   Memory Impairement   []   [x] Bipolar   []   [x]   Parkinson's   []   [x] Anxiety disorder   [x]   []           Genitourinary/Gyn YES NO Skin Intact   [x]   []   Urinary Infection   []   [x]      Stones   []   [x] Sleep   YES NO   Kidney Disease   []   [x] Excessive daytime sleepiness   [x]   []   Incontinent   []   [x] Snoring   [x]   []   Irregular menstrual cycles   []   [] Unrefreshed sleep   [x]   []   Possibly Pregnant? []     [] Other:      Date of LMP:   Preferred location:       PREVIOUS ANESTHESIA  Has any family member had a problem with anesthesia in the past?  [x] No   [] Yes  If yes, describe:  Have you had a problem with anesthesia in the past?                 [x] No   [] Yes  If yes, describe:  Do you have any difficulty moving your head side-to-side? [x] No   [] Yes  Do you have difficulty opening or closing your jaw?       [x] No   [] Yes       PRESENT ILLNESS:     Weight Parameters  Weight 214 lb (97.1 kg)   Height 5' 4.02\" (1.626 m)   BMI Body

## 2019-07-18 ENCOUNTER — CARE COORDINATION (OUTPATIENT)
Dept: CARE COORDINATION | Age: 54
End: 2019-07-18

## 2019-07-18 NOTE — CARE COORDINATION
Reason For Call Today:  -Attempted to reach patient today to follow up on Dr. Mira Toribio appointment. Follow up on any new orders/instructions. Follow up on blood sugar readings.   -Unable to reach patient today  -Left a voicemail message requesting a return phone call when patient was able      Future Appointments   Date Time Provider Osiel Owen   7/23/2019  2:00 PM SCHEDULE, [facility] BARIATRIC DIETICIAN bariatric [facility]   10/1/2019  3:00 PM Duyen Byrd, APRN - [address] [place] Coordination Plan of Care: This nurse Care Coordinator will await call back from patient, and if no return call; will attempt to reach patient back again next week. Mona Pyle

## 2019-07-24 ENCOUNTER — CARE COORDINATION (OUTPATIENT)
Dept: CARE COORDINATION | Age: 54
End: 2019-07-24

## 2019-07-24 NOTE — CARE COORDINATION
Reason For Call Today:  -Attempted to reach patient today to follow up on Dr. Shy Redd appointment. Noted patient rescheduled to see Dietician for 8/1/19. Follow up on blood sugar log readings.   -Unable to reach patient today  -Left a voicemail message requesting a return phone call to this AC when patient was able    Future Appointments   Date Time Provider Osiel Owen   8/1/2019  3:30 PM SCHEDULE, [facility] BARIATRIC DIETICIAN bariatric [facility]   10/1/2019  3:00 PM Ru Byrd, APRN - [address] [facility] Coordination Plan of Care: This nurse Care Coordinator will await call back from patient, and if no return call; will attempt to reach patient back again next week.

## 2019-07-31 ENCOUNTER — CARE COORDINATION (OUTPATIENT)
Dept: CARE COORDINATION | Age: 54
End: 2019-07-31

## 2019-08-07 ENCOUNTER — CARE COORDINATION (OUTPATIENT)
Dept: CARE COORDINATION | Age: 54
End: 2019-08-07

## 2019-08-13 ENCOUNTER — CARE COORDINATION (OUTPATIENT)
Dept: CARE COORDINATION | Age: 54
End: 2019-08-13

## 2019-08-16 ENCOUNTER — TELEPHONE (OUTPATIENT)
Dept: PRIMARY CARE CLINIC | Age: 54
End: 2019-08-16

## 2019-08-16 DIAGNOSIS — E66.01 CLASS 2 SEVERE OBESITY DUE TO EXCESS CALORIES WITH SERIOUS COMORBIDITY AND BODY MASS INDEX (BMI) OF 37.0 TO 37.9 IN ADULT (HCC): Primary | ICD-10-CM

## 2019-08-19 ENCOUNTER — TELEPHONE (OUTPATIENT)
Dept: BARIATRICS/WEIGHT MGMT | Age: 54
End: 2019-08-19

## 2019-09-04 ENCOUNTER — HOSPITAL ENCOUNTER (OUTPATIENT)
Age: 54
Setting detail: SPECIMEN
Discharge: HOME | End: 2019-09-04
Payer: COMMERCIAL

## 2019-09-04 ENCOUNTER — OFFICE VISIT (OUTPATIENT)
Dept: PRIMARY CARE CLINIC | Age: 54
End: 2019-09-04
Payer: COMMERCIAL

## 2019-09-04 VITALS
RESPIRATION RATE: 16 BRPM | WEIGHT: 222.1 LBS | TEMPERATURE: 97.4 F | DIASTOLIC BLOOD PRESSURE: 64 MMHG | BODY MASS INDEX: 37.92 KG/M2 | HEIGHT: 64 IN | HEART RATE: 83 BPM | SYSTOLIC BLOOD PRESSURE: 110 MMHG

## 2019-09-04 DIAGNOSIS — G89.29 CHRONIC RUQ PAIN: ICD-10-CM

## 2019-09-04 DIAGNOSIS — R10.11 CHRONIC RUQ PAIN: ICD-10-CM

## 2019-09-04 DIAGNOSIS — R30.0 DYSURIA: ICD-10-CM

## 2019-09-04 DIAGNOSIS — F34.1 DYSTHYMIA: Primary | ICD-10-CM

## 2019-09-04 DIAGNOSIS — M62.830 SPASM OF THORACOLUMBAR MUSCLE: ICD-10-CM

## 2019-09-04 DIAGNOSIS — I72.8 SPLENIC ARTERY ANEURYSM (HCC): ICD-10-CM

## 2019-09-04 PROBLEM — H52.223 REGULAR ASTIGMATISM, BILATERAL: Status: ACTIVE | Noted: 2018-07-16

## 2019-09-04 PROBLEM — M54.12 CERVICAL RADICULITIS: Status: ACTIVE | Noted: 2018-06-25

## 2019-09-04 PROBLEM — M79.604 BILATERAL LEG AND FOOT PAIN: Status: ACTIVE | Noted: 2018-09-27

## 2019-09-04 PROBLEM — M79.672 BILATERAL LEG AND FOOT PAIN: Status: ACTIVE | Noted: 2018-09-27

## 2019-09-04 PROBLEM — M79.671 BILATERAL LEG AND FOOT PAIN: Status: ACTIVE | Noted: 2018-09-27

## 2019-09-04 PROBLEM — E10.43 DIABETIC AUTONOMIC NEUROPATHY ASSOCIATED WITH TYPE 1 DIABETES MELLITUS (HCC): Status: ACTIVE | Noted: 2018-06-25

## 2019-09-04 PROBLEM — G90.522 COMPLEX REGIONAL PAIN SYNDROME TYPE 1 OF LEFT LOWER EXTREMITY: Status: ACTIVE | Noted: 2018-06-25

## 2019-09-04 PROBLEM — H40.052 OCULAR HYPERTENSION OF LEFT EYE: Status: ACTIVE | Noted: 2018-07-16

## 2019-09-04 PROBLEM — M79.605 BILATERAL LEG AND FOOT PAIN: Status: ACTIVE | Noted: 2018-09-27

## 2019-09-04 PROBLEM — M25.512 CHRONIC LEFT SHOULDER PAIN: Status: ACTIVE | Noted: 2018-06-25

## 2019-09-04 PROBLEM — Z79.891 ENCOUNTER FOR LONG-TERM OPIATE ANALGESIC USE: Status: ACTIVE | Noted: 2018-08-22

## 2019-09-04 PROBLEM — M54.2 CERVICALGIA: Status: ACTIVE | Noted: 2018-06-25

## 2019-09-04 PROBLEM — H43.393 VITREOUS FLOATERS OF BOTH EYES: Status: ACTIVE | Noted: 2018-07-16

## 2019-09-04 LAB
BILIRUBIN, POC: NEGATIVE
BLOOD URINE, POC: NEGATIVE
CLARITY, POC: ABNORMAL
COLOR, POC: YELLOW
GLUCOSE URINE, POC: 250
KETONES, POC: NEGATIVE
LEUKOCYTE EST, POC: ABNORMAL
NITRITE, POC: NEGATIVE
PH, POC: 6.5
PROTEIN, POC: NEGATIVE
SPECIFIC GRAVITY, POC: 1.02
UROBILINOGEN, POC: 1

## 2019-09-04 PROCEDURE — 81002 URINALYSIS NONAUTO W/O SCOPE: CPT | Performed by: NURSE PRACTITIONER

## 2019-09-04 PROCEDURE — G8417 CALC BMI ABV UP PARAM F/U: HCPCS | Performed by: NURSE PRACTITIONER

## 2019-09-04 PROCEDURE — 99214 OFFICE O/P EST MOD 30 MIN: CPT | Performed by: NURSE PRACTITIONER

## 2019-09-04 PROCEDURE — 1036F TOBACCO NON-USER: CPT | Performed by: NURSE PRACTITIONER

## 2019-09-04 PROCEDURE — 3017F COLORECTAL CA SCREEN DOC REV: CPT | Performed by: NURSE PRACTITIONER

## 2019-09-04 PROCEDURE — 87086 URINE CULTURE/COLONY COUNT: CPT

## 2019-09-04 PROCEDURE — G8427 DOCREV CUR MEDS BY ELIG CLIN: HCPCS | Performed by: NURSE PRACTITIONER

## 2019-09-04 RX ORDER — BACLOFEN 10 MG/1
10 TABLET ORAL 3 TIMES DAILY
Qty: 30 TABLET | Refills: 0 | Status: ON HOLD | OUTPATIENT
Start: 2019-09-04 | End: 2019-09-17 | Stop reason: HOSPADM

## 2019-09-04 RX ORDER — ESCITALOPRAM OXALATE 10 MG/1
10 TABLET ORAL DAILY
Qty: 90 TABLET | Refills: 0 | Status: SHIPPED | OUTPATIENT
Start: 2019-09-04 | End: 2019-12-02 | Stop reason: SDUPTHER

## 2019-09-04 ASSESSMENT — ENCOUNTER SYMPTOMS
FLATUS: 0
ABDOMINAL PAIN: 1
BOWEL INCONTINENCE: 0
RHINORRHEA: 0
BACK PAIN: 1
SHORTNESS OF BREATH: 0
SORE THROAT: 0
VISUAL CHANGE: 0
BELCHING: 0
COUGH: 0
WHEEZING: 0
CONSTIPATION: 0
HEMATOCHEZIA: 0
VOMITING: 0
DIARRHEA: 0
NAUSEA: 1

## 2019-09-04 NOTE — PROGRESS NOTES
hematochezia, hematuria, melena, myalgias, vomiting or weight loss. Nothing aggravates the pain. The pain is relieved by nothing. She has tried nothing for the symptoms. The treatment provided no relief. Her past medical history is significant for GERD. Mental Health Problem   The primary symptoms include dysphoric mood. The primary symptoms do not include delusions, hallucinations, bizarre behavior, disorganized speech, negative symptoms or somatic symptoms. The current episode started more than 1 month ago. This is a chronic problem. The onset of the illness is precipitated by emotional stress. The degree of incapacity that she is experiencing as a consequence of her illness is moderate. Sequelae of the illness include harmed interpersonal relations. Additional symptoms of the illness include anhedonia, insomnia, fatigue, attention impairment and abdominal pain. Additional symptoms of the illness do not include hypersomnia, appetite change, unexpected weight change, agitation, psychomotor retardation, feelings of worthlessness, euphoric mood, increased goal-directed activity, flight of ideas, inflated self-esteem, decreased need for sleep, distractible, poor judgment, visual change or headaches. She does not admit to suicidal ideas. She does not have a plan to commit suicide. She does not contemplate harming herself. She has not already injured self. She does not contemplate injuring another person. She has not already  injured another person. Risk factors that are present for mental illness include a history of mental illness. Back Pain   This is a recurrent problem. The current episode started more than 1 month ago. The problem occurs intermittently. The problem is unchanged. The pain is present in the thoracic spine. The quality of the pain is described as cramping. The pain does not radiate. The pain is at a severity of 4/10. The pain is mild. The pain is worse during the day.  The symptoms are aggravated by bending, twisting and position. Stiffness is present all day. Associated symptoms include abdominal pain and dysuria. Pertinent negatives include no bladder incontinence, bowel incontinence, chest pain, fever, headaches, leg pain, numbness, paresis, paresthesias, pelvic pain, perianal numbness, tingling, weakness or weight loss. Risk factors include lack of exercise, obesity, menopause and sedentary lifestyle. She has tried nothing for the symptoms. The treatment provided no relief. Past Medical History:     Past Medical History:   Diagnosis Date    Anxiety     Arthritis     Depression     Diabetes mellitus ([address]     Diabetic neuropathy (HCC)     Esophageal reflux     Heart burn     Limitation of joint motion     Muscle weakness     Sleep apnea     DOES NOT USE A MACHINE. DIAGNOSED > 15 YEARS AGO WITH SLEEP APNEA    Sleepiness       Reviewed all health maintenance requirements and ordered appropriate tests  There are no preventive care reminders to display for this patient. Past Surgical History:     Past Surgical History:   Procedure Laterality Date    ABSCESS DRAINAGE Left     BUTTOCK    APPENDECTOMY      CARPAL TUNNEL RELEASE Left 2018    CATARACT REMOVAL WITH IMPLANT Bilateral 10/24/2016     SECTION      CHOLECYSTECTOMY      COLONOSCOPY  2019    Dr. Jimbo Moreno -normal poor prep    COLONOSCOPY N/A 3/25/2019    COLORECTAL CANCER SCREENING, NOT HIGH RISK performed by Mono Michael MD at [facility] COLONOSCOPY N/A 4/10/2019    -diverticulosis,hemorrhoids,lipoma at ileocecal valve    HERNIA REPAIR Right     I[facility]    HERNIA REPAIR  2006    HERNIA REPAIR WITH HYSTERECTOMY SURGERY    HYSTERECTOMY      LAPAROSCOPY          Medications:       Prior to Admission medications    Medication Sig Start Date End Date Taking?  Authorizing Provider   escitalopram (LEXAPRO) 10 MG tablet Take 1 tablet by mouth daily 19  Yes Sherley Awad Might, APRN - CNP   baclofen (LIORESAL) 10 MG tablet Take 1 tablet by mouth 3 times daily for 10 days 9/4/19 9/14/19 Yes Teresita Gains Might, APRN - CNP   insulin glargine (BASAGLAR KWIKPEN) 100 UNIT/ML injection pen Inject 50 Units into the skin nightly 5/13/19  Yes Teresita Gains Might, APRN - CNP   Liraglutide (VICTOZA) 18 MG/3ML SOPN SC injection Inject 1.2 mg into the skin daily 5/13/19  Yes Teresita Gains Might, APRN - CNP   ranitidine (ZANTAC) 150 MG tablet Take 1 tablet by mouth 2 times daily 3/14/19  Yes Teresita Gains Might, APRN - CNP   glipiZIDE (GLUCOTROL XL) 10 MG extended release tablet Take 1 tablet by mouth daily 2/6/19  Yes Teresita Gains Might, APRN - CNP   atorvastatin (LIPITOR) 40 MG tablet Take 1 tablet by mouth daily 2/6/19  Yes Teresita Gains Might, APRN - CNP   lisinopril (PRINIVIL;ZESTRIL) 2.5 MG tablet TAKE 1 TABLET DAILY 2/6/19  Yes Teresita Gains Might, APRN - CNP   gabapentin (NEURONTIN) 600 MG tablet Takes 1 1/2 tabs TID. . 1/17/19  Yes Historical Provider, MD   HYDROcodone-acetaminophen ([address]) 5-325 MG per tablet  1/18/19  Yes Historical Provider, MD   lidocaine-prilocaine (EMLA) 2.5-2.5 % cream  11/8/18  Yes Historical Provider, MD   omeprazole (PRILOSEC) 20 MG delayed release capsule TAKE ONE CAPSULE BY MOUTH DAILY 5/8/19   Teresita Gains Might, APRN - CNP        Allergies:       Codeine; Meperidine; and Other    Social History:     Tobacco:    reports that she quit smoking about 8 years ago. She has never used smokeless tobacco.  Alcohol:      reports that she does not drink alcohol. Drug Use:  reports that she does not use drugs. Family History:     Family History   Problem Relation Age of Onset    Diabetes Mother     High Cholesterol Mother     Hypertension Mother     Heart Disease Mother     Diabetes Father     Heart Disease Father     High Cholesterol Father     Hypertension Father     Diabetes Sister        Review of Systems:     Positive and Negative as described in HPI    Review of Systems   Constitutional: Positive for fatigue.  Negative for appetite change, chills, fever, unexpected weight change and weight loss. HENT: Negative for congestion, rhinorrhea and sore throat. Eyes: Negative for visual disturbance. Respiratory: Negative for cough, shortness of breath and wheezing. Cardiovascular: Negative for chest pain and palpitations. Gastrointestinal: Positive for abdominal pain and nausea. Negative for anorexia, bowel incontinence, constipation, diarrhea, flatus, hematochezia, melena and vomiting. Genitourinary: Positive for dysuria. Negative for bladder incontinence, difficulty urinating, frequency, hematuria, pelvic pain and vaginal discharge. Musculoskeletal: Positive for back pain. Negative for arthralgias, gait problem and myalgias. Skin: Negative for rash. Neurological: Negative for dizziness, tingling, syncope, weakness, numbness, headaches and paresthesias. Psychiatric/Behavioral: Positive for decreased concentration, dysphoric mood and sleep disturbance. Negative for agitation, behavioral problems, confusion, hallucinations, self-injury and suicidal ideas. The patient is nervous/anxious and has insomnia. The patient is not hyperactive. Physical Exam:   Vitals:  /64 (Site: Right Upper Arm, Position: Sitting, Cuff Size: Large Adult)   Pulse 83   Temp 97.4 °F (36.3 °C) (Temporal)   Resp 16   Ht 5' 4\" (1.626 m)   Wt 222 lb 1.6 oz (100.7 kg)   BMI 38.12 kg/m²     Physical Exam   Constitutional: She is oriented to person, place, and time. She appears well-developed and well-nourished. No distress. HENT:   Mouth/Throat: Oropharynx is clear and moist.   Eyes: No scleral icterus. Neck: Normal range of motion. Neck supple. Cardiovascular: Normal rate and regular rhythm. Pulmonary/Chest: Effort normal and breath sounds normal. She has no wheezes. Abdominal: Soft. Bowel sounds are normal. She exhibits no distension and no mass. There is tenderness in the right upper quadrant and epigastric area.  There is no rebound and no guarding. Obese abdomen   Musculoskeletal: She exhibits no edema. Thoracic back: She exhibits tenderness, pain and spasm. Back:    Lymphadenopathy:     She has no cervical adenopathy. Neurological: She is alert and oriented to person, place, and time. Skin: Skin is warm and dry. No rash noted. Psychiatric: Her speech is normal and behavior is normal. Judgment and thought content normal. Her mood appears anxious. Cognition and memory are normal. She does not exhibit a depressed mood. She expresses no homicidal and no suicidal ideation. She expresses no suicidal plans and no homicidal plans. She is attentive. Nursing note and vitals reviewed. Data:     Lab Results   Component Value Date     02/05/2019    K 3.8 02/05/2019     02/05/2019    CO2 27 02/05/2019    BUN 6 02/05/2019    CREATININE 0.48 02/05/2019    GLUCOSE 212 02/05/2019    GLUCOSE 351 03/11/2012    PROT 7.6 02/05/2019    LABALBU 4.4 02/05/2019    BILITOT 0.54 02/05/2019    ALKPHOS 168 02/05/2019    AST 18 02/05/2019    ALT 25 02/05/2019     Lab Results   Component Value Date    WBC 9.9 02/05/2019    RBC 5.48 02/05/2019    RBC 5.34 03/11/2012    HGB 15.7 02/05/2019    HCT 48.5 02/05/2019    MCV 88.5 02/05/2019    MCH 28.6 02/05/2019    MCHC 32.4 02/05/2019    RDW 12.5 02/05/2019     02/05/2019     03/11/2012    MPV 10.8 02/05/2019     Lab Results   Component Value Date    TSH 0.88 02/06/2013     Lab Results   Component Value Date    CHOL 153 02/05/2019    HDL 26 02/05/2019    LABA1C 9.8 07/01/2019       Assessment/Plan:      Diagnosis Orders   1. Dysthymia  escitalopram (LEXAPRO) 10 MG tablet   2. Dysuria  POCT Urinalysis no Micro    Urine Culture   3. Chronic RUQ pain  CT ABDOMEN PELVIS W IV CONTRAST Additional Contrast? Radiologist Recommendation    BUN    Creatinine, Serum   4. Spasm of thoracolumbar muscle  baclofen (LIORESAL) 10 MG tablet   5.  Splenic artery aneurysm ([address]  CT

## 2019-09-04 NOTE — PATIENT INSTRUCTIONS
or liver disease and have to limit fluids, talk with your doctor before you increase your fluid intake.)  · Urinate when you need to. · Urinate right after you have sex. · Change sanitary pads often. · Avoid douches, bubble baths, feminine hygiene sprays, and other feminine hygiene products that have deodorants. · After going to the bathroom, wipe from front to back. When should you call for help? Call your doctor now or seek immediate medical care if:    · Symptoms such as fever, chills, nausea, or vomiting get worse or appear for the first time.     · You have new pain in your back just below your rib cage. This is called flank pain.     · There is new blood or pus in your urine.     · You have any problems with your antibiotic medicine.    Watch closely for changes in your health, and be sure to contact your doctor if:    · You are not getting better after taking an antibiotic for 2 days.     · Your symptoms go away but then come back. Where can you learn more? Go to [email or web address] and sign in to your [employer] account. Enter P811 in the [employer] box to learn more about \"Urinary Tract Infection in Women: Care Instructions. \"     If you do not have an account, please click on the \"Sign Up Now\" link. Current as of: December 19, 2018  Content Version: 12.1  © 1826-6108 Healthwise, Incorporated. Care instructions adapted under license by [facility] ([facility]). If you have questions about a medical condition or this instruction, always ask your healthcare professional. [place] [address] any warranty or liability for your use of this information.

## 2019-09-05 ENCOUNTER — TELEPHONE (OUTPATIENT)
Dept: PRIMARY CARE CLINIC | Age: 54
End: 2019-09-05

## 2019-09-05 LAB
CULTURE: NORMAL
Lab: NORMAL
SPECIMEN DESCRIPTION: NORMAL

## 2019-09-13 ENCOUNTER — HOSPITAL ENCOUNTER (INPATIENT)
Age: 54
LOS: 4 days | Discharge: HOME | DRG: 720 | End: 2019-09-17
Attending: EMERGENCY MEDICINE | Admitting: INTERNAL MEDICINE
Payer: COMMERCIAL

## 2019-09-13 ENCOUNTER — APPOINTMENT (OUTPATIENT)
Dept: CT IMAGING | Age: 54
DRG: 720 | End: 2019-09-13
Payer: COMMERCIAL

## 2019-09-13 DIAGNOSIS — M62.830 SPASM OF THORACOLUMBAR MUSCLE: ICD-10-CM

## 2019-09-13 DIAGNOSIS — N10 ACUTE PYELONEPHRITIS: ICD-10-CM

## 2019-09-13 DIAGNOSIS — L03.311 CELLULITIS, ABDOMINAL WALL: Primary | ICD-10-CM

## 2019-09-13 PROBLEM — N12 PYELONEPHRITIS: Status: ACTIVE | Noted: 2019-09-13

## 2019-09-13 LAB
-: ABNORMAL
ABSOLUTE EOS #: <0.03 K/UL (ref 0–0.44)
ABSOLUTE IMMATURE GRANULOCYTE: 0.07 K/UL (ref 0–0.3)
ABSOLUTE LYMPH #: 1.19 K/UL (ref 1.1–3.7)
ABSOLUTE MONO #: 0.72 K/UL (ref 0.1–1.2)
ALBUMIN SERPL-MCNC: 4.2 G/DL (ref 3.5–5.2)
ALBUMIN/GLOBULIN RATIO: 1.1 (ref 1–2.5)
ALP BLD-CCNC: 159 U/L (ref 35–104)
ALT SERPL-CCNC: 42 U/L (ref 5–33)
AMORPHOUS: ABNORMAL
ANION GAP SERPL CALCULATED.3IONS-SCNC: 16 MMOL/L (ref 9–17)
AST SERPL-CCNC: 40 U/L
BACTERIA: ABNORMAL
BASOPHILS # BLD: 0 % (ref 0–2)
BASOPHILS ABSOLUTE: 0.03 K/UL (ref 0–0.2)
BILIRUB SERPL-MCNC: 0.89 MG/DL (ref 0.3–1.2)
BILIRUBIN URINE: NEGATIVE
BUN BLDV-MCNC: 19 MG/DL (ref 6–20)
BUN/CREAT BLD: 19 (ref 9–20)
CALCIUM SERPL-MCNC: 9.2 MG/DL (ref 8.6–10.4)
CASTS UA: ABNORMAL /LPF
CHLORIDE BLD-SCNC: 93 MMOL/L (ref 98–107)
CO2: 22 MMOL/L (ref 20–31)
COLOR: YELLOW
COMMENT UA: ABNORMAL
CREAT SERPL-MCNC: 1.01 MG/DL (ref 0.5–0.9)
CRYSTALS, UA: ABNORMAL /HPF
DIFFERENTIAL TYPE: ABNORMAL
EOSINOPHILS RELATIVE PERCENT: 0 % (ref 1–4)
EPITHELIAL CELLS UA: ABNORMAL /HPF (ref 0–25)
GFR AFRICAN AMERICAN: >60 ML/MIN
GFR NON-AFRICAN AMERICAN: 58 ML/MIN
GFR SERPL CREATININE-BSD FRML MDRD: ABNORMAL ML/MIN/{1.73_M2}
GFR SERPL CREATININE-BSD FRML MDRD: ABNORMAL ML/MIN/{1.73_M2}
GLUCOSE BLD-MCNC: 162 MG/DL (ref 74–100)
GLUCOSE BLD-MCNC: 208 MG/DL (ref 70–99)
GLUCOSE URINE: ABNORMAL
HCT VFR BLD CALC: 42.4 % (ref 36.3–47.1)
HEMOGLOBIN: 13.7 G/DL (ref 11.9–15.1)
IMMATURE GRANULOCYTES: 1 %
KETONES, URINE: NEGATIVE
LACTIC ACID, WHOLE BLOOD: NORMAL MMOL/L (ref 0.7–2.1)
LACTIC ACID: 1.8 MMOL/L (ref 0.5–2.2)
LEUKOCYTE ESTERASE, URINE: ABNORMAL
LIPASE: 11 U/L (ref 13–60)
LYMPHOCYTES # BLD: 9 % (ref 24–43)
MCH RBC QN AUTO: 29.5 PG (ref 25.2–33.5)
MCHC RBC AUTO-ENTMCNC: 32.3 G/DL (ref 28.4–34.8)
MCV RBC AUTO: 91.2 FL (ref 82.6–102.9)
MONOCYTES # BLD: 5 % (ref 3–12)
MUCUS: ABNORMAL
NITRITE, URINE: NEGATIVE
NRBC AUTOMATED: 0 PER 100 WBC
OTHER OBSERVATIONS UA: ABNORMAL
PDW BLD-RTO: 13.2 % (ref 11.8–14.4)
PH UA: 5.5 (ref 5–9)
PLATELET # BLD: 209 K/UL (ref 138–453)
PLATELET ESTIMATE: ABNORMAL
PMV BLD AUTO: 10.1 FL (ref 8.1–13.5)
POTASSIUM SERPL-SCNC: 4.7 MMOL/L (ref 3.7–5.3)
PROTEIN UA: ABNORMAL
RBC # BLD: 4.65 M/UL (ref 3.95–5.11)
RBC # BLD: ABNORMAL 10*6/UL
RBC UA: ABNORMAL /HPF (ref 0–2)
RENAL EPITHELIAL, UA: ABNORMAL /HPF
SEG NEUTROPHILS: 85 % (ref 36–65)
SEGMENTED NEUTROPHILS ABSOLUTE COUNT: 11.92 K/UL (ref 1.5–8.1)
SODIUM BLD-SCNC: 131 MMOL/L (ref 135–144)
SPECIFIC GRAVITY UA: 1.02 (ref 1.01–1.02)
TOTAL PROTEIN: 8.1 G/DL (ref 6.4–8.3)
TRICHOMONAS: ABNORMAL
TURBIDITY: CLEAR
URINE HGB: NEGATIVE
UROBILINOGEN, URINE: NORMAL
WBC # BLD: 13.9 K/UL (ref 3.5–11.3)
WBC # BLD: ABNORMAL 10*3/UL
WBC UA: ABNORMAL /HPF (ref 0–5)
YEAST: ABNORMAL

## 2019-09-13 PROCEDURE — 74178 CT ABD&PLV WO CNTR FLWD CNTR: CPT

## 2019-09-13 PROCEDURE — 83605 ASSAY OF LACTIC ACID: CPT

## 2019-09-13 PROCEDURE — 96376 TX/PRO/DX INJ SAME DRUG ADON: CPT

## 2019-09-13 PROCEDURE — 6360000002 HC RX W HCPCS: Performed by: EMERGENCY MEDICINE

## 2019-09-13 PROCEDURE — 83690 ASSAY OF LIPASE: CPT

## 2019-09-13 PROCEDURE — 6370000000 HC RX 637 (ALT 250 FOR IP): Performed by: INTERNAL MEDICINE

## 2019-09-13 PROCEDURE — 36415 COLL VENOUS BLD VENIPUNCTURE: CPT

## 2019-09-13 PROCEDURE — 80053 COMPREHEN METABOLIC PANEL: CPT

## 2019-09-13 PROCEDURE — 87086 URINE CULTURE/COLONY COUNT: CPT

## 2019-09-13 PROCEDURE — 99285 EMERGENCY DEPT VISIT HI MDM: CPT

## 2019-09-13 PROCEDURE — 85025 COMPLETE CBC W/AUTO DIFF WBC: CPT

## 2019-09-13 PROCEDURE — 96375 TX/PRO/DX INJ NEW DRUG ADDON: CPT

## 2019-09-13 PROCEDURE — 2580000003 HC RX 258: Performed by: INTERNAL MEDICINE

## 2019-09-13 PROCEDURE — 2580000003 HC RX 258: Performed by: EMERGENCY MEDICINE

## 2019-09-13 PROCEDURE — 87040 BLOOD CULTURE FOR BACTERIA: CPT

## 2019-09-13 PROCEDURE — 82947 ASSAY GLUCOSE BLOOD QUANT: CPT

## 2019-09-13 PROCEDURE — 96374 THER/PROPH/DIAG INJ IV PUSH: CPT

## 2019-09-13 PROCEDURE — 6360000004 HC RX CONTRAST MEDICATION: Performed by: EMERGENCY MEDICINE

## 2019-09-13 PROCEDURE — 6360000002 HC RX W HCPCS: Performed by: INTERNAL MEDICINE

## 2019-09-13 PROCEDURE — 81001 URINALYSIS AUTO W/SCOPE: CPT

## 2019-09-13 PROCEDURE — 1200000000 HC SEMI PRIVATE

## 2019-09-13 RX ORDER — HYDROCODONE BITARTRATE AND ACETAMINOPHEN 5; 325 MG/1; MG/1
1 TABLET ORAL EVERY 6 HOURS PRN
Status: DISCONTINUED | OUTPATIENT
Start: 2019-09-13 | End: 2019-09-14

## 2019-09-13 RX ORDER — SODIUM CHLORIDE 0.9 % (FLUSH) 0.9 %
10 SYRINGE (ML) INJECTION PRN
Status: DISCONTINUED | OUTPATIENT
Start: 2019-09-13 | End: 2019-09-17 | Stop reason: HOSPADM

## 2019-09-13 RX ORDER — PROMETHAZINE HYDROCHLORIDE 25 MG/ML
12.5 INJECTION, SOLUTION INTRAMUSCULAR; INTRAVENOUS ONCE
Status: COMPLETED | OUTPATIENT
Start: 2019-09-13 | End: 2019-09-13

## 2019-09-13 RX ORDER — GABAPENTIN 300 MG/1
900 CAPSULE ORAL 3 TIMES DAILY
Status: DISCONTINUED | OUTPATIENT
Start: 2019-09-13 | End: 2019-09-17 | Stop reason: HOSPADM

## 2019-09-13 RX ORDER — ONDANSETRON 2 MG/ML
4 INJECTION INTRAMUSCULAR; INTRAVENOUS ONCE
Status: COMPLETED | OUTPATIENT
Start: 2019-09-13 | End: 2019-09-13

## 2019-09-13 RX ORDER — SODIUM CHLORIDE 9 MG/ML
INJECTION, SOLUTION INTRAVENOUS CONTINUOUS
Status: DISCONTINUED | OUTPATIENT
Start: 2019-09-13 | End: 2019-09-17 | Stop reason: HOSPADM

## 2019-09-13 RX ORDER — ONDANSETRON 2 MG/ML
4 INJECTION INTRAMUSCULAR; INTRAVENOUS EVERY 6 HOURS PRN
Status: DISCONTINUED | OUTPATIENT
Start: 2019-09-13 | End: 2019-09-17 | Stop reason: HOSPADM

## 2019-09-13 RX ORDER — FENTANYL CITRATE 50 UG/ML
50 INJECTION, SOLUTION INTRAMUSCULAR; INTRAVENOUS ONCE
Status: DISCONTINUED | OUTPATIENT
Start: 2019-09-13 | End: 2019-09-13

## 2019-09-13 RX ORDER — ESCITALOPRAM OXALATE 5 MG/1
10 TABLET ORAL DAILY
Status: DISCONTINUED | OUTPATIENT
Start: 2019-09-13 | End: 2019-09-17 | Stop reason: HOSPADM

## 2019-09-13 RX ORDER — SODIUM CHLORIDE 0.9 % (FLUSH) 0.9 %
10 SYRINGE (ML) INJECTION EVERY 12 HOURS SCHEDULED
Status: DISCONTINUED | OUTPATIENT
Start: 2019-09-13 | End: 2019-09-17 | Stop reason: HOSPADM

## 2019-09-13 RX ORDER — SODIUM CHLORIDE, SODIUM LACTATE, POTASSIUM CHLORIDE, CALCIUM CHLORIDE 600; 310; 30; 20 MG/100ML; MG/100ML; MG/100ML; MG/100ML
1000 INJECTION, SOLUTION INTRAVENOUS ONCE
Status: COMPLETED | OUTPATIENT
Start: 2019-09-13 | End: 2019-09-13

## 2019-09-13 RX ORDER — ACETAMINOPHEN 325 MG/1
650 TABLET ORAL EVERY 4 HOURS PRN
Status: DISCONTINUED | OUTPATIENT
Start: 2019-09-13 | End: 2019-09-16

## 2019-09-13 RX ORDER — INSULIN GLARGINE 100 [IU]/ML
50 INJECTION, SOLUTION SUBCUTANEOUS NIGHTLY
Status: DISCONTINUED | OUTPATIENT
Start: 2019-09-13 | End: 2019-09-17 | Stop reason: HOSPADM

## 2019-09-13 RX ORDER — MORPHINE SULFATE 10 MG/ML
6 INJECTION, SOLUTION INTRAMUSCULAR; INTRAVENOUS ONCE
Status: COMPLETED | OUTPATIENT
Start: 2019-09-13 | End: 2019-09-13

## 2019-09-13 RX ORDER — BACLOFEN 10 MG/1
10 TABLET ORAL 3 TIMES DAILY
Status: DISCONTINUED | OUTPATIENT
Start: 2019-09-13 | End: 2019-09-17 | Stop reason: HOSPADM

## 2019-09-13 RX ORDER — GABAPENTIN 600 MG/1
900 TABLET ORAL 3 TIMES DAILY
Status: DISCONTINUED | OUTPATIENT
Start: 2019-09-13 | End: 2019-09-13

## 2019-09-13 RX ADMIN — CEFTRIAXONE 1 G: 1 INJECTION, POWDER, FOR SOLUTION INTRAMUSCULAR; INTRAVENOUS at 19:25

## 2019-09-13 RX ADMIN — ACETAMINOPHEN 650 MG: 325 TABLET, FILM COATED ORAL at 21:39

## 2019-09-13 RX ADMIN — ENOXAPARIN SODIUM 40 MG: 40 INJECTION SUBCUTANEOUS at 21:40

## 2019-09-13 RX ADMIN — ONDANSETRON 4 MG: 2 INJECTION INTRAMUSCULAR; INTRAVENOUS at 15:29

## 2019-09-13 RX ADMIN — SODIUM CHLORIDE, POTASSIUM CHLORIDE, SODIUM LACTATE AND CALCIUM CHLORIDE 1000 ML: 600; 310; 30; 20 INJECTION, SOLUTION INTRAVENOUS at 15:29

## 2019-09-13 RX ADMIN — HYDROCODONE BITARTRATE AND ACETAMINOPHEN 1 TABLET: 5; 325 TABLET ORAL at 23:52

## 2019-09-13 RX ADMIN — SODIUM CHLORIDE: 9 INJECTION, SOLUTION INTRAVENOUS at 21:44

## 2019-09-13 RX ADMIN — GABAPENTIN 900 MG: 300 CAPSULE ORAL at 21:39

## 2019-09-13 RX ADMIN — ESCITALOPRAM 10 MG: 5 TABLET, FILM COATED ORAL at 21:39

## 2019-09-13 RX ADMIN — MORPHINE SULFATE 6 MG: 10 INJECTION INTRAVENOUS at 19:20

## 2019-09-13 RX ADMIN — IOPAMIDOL 75 ML: 755 INJECTION, SOLUTION INTRAVENOUS at 16:25

## 2019-09-13 RX ADMIN — ONDANSETRON 4 MG: 2 INJECTION INTRAMUSCULAR; INTRAVENOUS at 19:18

## 2019-09-13 RX ADMIN — PROMETHAZINE HYDROCHLORIDE 12.5 MG: 25 INJECTION, SOLUTION INTRAMUSCULAR; INTRAVENOUS at 19:32

## 2019-09-13 RX ADMIN — BACLOFEN 10 MG: 10 TABLET ORAL at 21:39

## 2019-09-13 RX ADMIN — MORPHINE SULFATE 6 MG: 10 INJECTION INTRAVENOUS at 15:29

## 2019-09-13 ASSESSMENT — PAIN SCALES - GENERAL
PAINLEVEL_OUTOF10: 9
PAINLEVEL_OUTOF10: 7
PAINLEVEL_OUTOF10: 4
PAINLEVEL_OUTOF10: 8
PAINLEVEL_OUTOF10: 0
PAINLEVEL_OUTOF10: 0
PAINLEVEL_OUTOF10: 9
PAINLEVEL_OUTOF10: 0

## 2019-09-13 ASSESSMENT — PAIN DESCRIPTION - PAIN TYPE
TYPE: ACUTE PAIN
TYPE: ACUTE PAIN

## 2019-09-13 ASSESSMENT — PAIN DESCRIPTION - ORIENTATION
ORIENTATION: RIGHT;MID
ORIENTATION: RIGHT

## 2019-09-13 ASSESSMENT — PAIN DESCRIPTION - LOCATION
LOCATION: FLANK;BACK
LOCATION: BACK;FLANK

## 2019-09-13 NOTE — ED PROVIDER NOTES
[facility] ED  [facility]   Chief Complaint   Patient presents with    Flank Pain     Right, onset yesterday with nausea    Emesis     ONset PTA, bright green per pt        HPI   Paul Hubbard is a 48 y.o. female who presents with back pain. The pain started yesterday. It is in her right lower back. Later on she said he was also in her left back but initially said it was only in her right back. She notes that yesterday she was having some vomiting and nausea today also. She notes that the pain is severe. She also casually mentioned that she has a new large rash for 2 days which is been going on for the same time as her other symptoms. The rash is about 15 inches x 10 inches and on her belly and red and hot and warm. No other current complaints. She has been taking OTC medications which have not been helping. She says she has felt this way when she has had kidney infections. She has not had this rash before ever. Daughter says that her temperature was 103 at home yesterday, though she did not have a fever here today. REVIEW OF SYSTEMS     : No dysuria or hematuria  GI: No abdominal pain or vomiting  General: fever, and chillls  Neurologic: No bowel or bladder incontinence  Skin: As per HPI rash  Review of systems otherwise negative. PAST MEDICAL & SURGICAL HISTORY   Past Medical History:   Diagnosis Date    Anxiety     Arthritis     Depression     Diabetes mellitus ([address]     Diabetic neuropathy (HCC)     Esophageal reflux     Heart burn     Limitation of joint motion     Muscle weakness     Sleep apnea     DOES NOT USE A MACHINE.  DIAGNOSED > 15 YEARS AGO WITH SLEEP APNEA    Sleepiness      Past Surgical History:   Procedure Laterality Date    ABSCESS DRAINAGE Left     BUTTOCK    APPENDECTOMY      CARPAL TUNNEL RELEASE Left 2018    CATARACT REMOVAL WITH IMPLANT Bilateral 10/24/2016     SECTION      CHOLECYSTECTOMY  COLONOSCOPY  03/25/2019    Dr. Flaca Farmer -normal poor prep    COLONOSCOPY N/A 3/25/2019    COLORECTAL CANCER SCREENING, NOT HIGH RISK performed by Terence Ryder MD at [facility] COLONOSCOPY N/A 4/10/2019    -diverticulosis,hemorrhoids,lipoma at ileocecal valve    HERNIA REPAIR Right     I[facility]    HERNIA REPAIR  2006    HERNIA REPAIR WITH HYSTERECTOMY SURGERY    HYSTERECTOMY      LAPAROSCOPY        CURRENT MEDICATIONS   Current Outpatient Rx   Medication Sig Dispense Refill    escitalopram (LEXAPRO) 10 MG tablet Take 1 tablet by mouth daily 90 tablet 0    baclofen (LIORESAL) 10 MG tablet Take 1 tablet by mouth 3 times daily for 10 days 30 tablet 0    insulin glargine (BASAGLAR KWIKPEN) 100 UNIT/ML injection pen Inject 50 Units into the skin nightly 5 pen 5    Liraglutide (VICTOZA) 18 MG/3ML SOPN SC injection Inject 1.2 mg into the skin daily 3 pen 5    omeprazole (PRILOSEC) 20 MG delayed release capsule TAKE ONE CAPSULE BY MOUTH DAILY (Patient taking differently: Take 20 mg by mouth Daily ) 90 capsule 3    ranitidine (ZANTAC) 150 MG tablet Take 1 tablet by mouth 2 times daily 180 tablet 1    glipiZIDE (GLUCOTROL XL) 10 MG extended release tablet Take 1 tablet by mouth daily 90 tablet 3    atorvastatin (LIPITOR) 40 MG tablet Take 1 tablet by mouth daily 90 tablet 3    lisinopril (PRINIVIL;ZESTRIL) 2.5 MG tablet TAKE 1 TABLET DAILY (Patient taking differently: Take 2.5 mg by mouth daily TAKE 1 TABLET DAILY) 90 tablet 3    gabapentin (NEURONTIN) 600 MG tablet Take 900 mg by mouth 3 times daily. Takes 1 1/2 tabs TID.      HYDROcodone-acetaminophen (NORCO) 5-325 MG per tablet Take 1 tablet by mouth every 6 hours as needed.        lidocaine-prilocaine (EMLA) 2.5-2.5 % cream Apply topically as needed         ALLERGIES   Allergies   Allergen Reactions    Codeine Other (See Comments)     Migraine    Meperidine     Other Itching and Dermatitis     METAL      SOCIAL HISTORY   Social History

## 2019-09-13 NOTE — ED NOTES
Called Dr Karen Willis via answering service, connected call to Dr Sky Bennett  09/13/19 Mame Galindo

## 2019-09-14 PROBLEM — L03.311 CELLULITIS, ABDOMINAL WALL: Status: ACTIVE | Noted: 2019-09-14

## 2019-09-14 LAB
ANION GAP SERPL CALCULATED.3IONS-SCNC: 10 MMOL/L (ref 9–17)
BUN BLDV-MCNC: 16 MG/DL (ref 6–20)
BUN/CREAT BLD: 24 (ref 9–20)
CALCIUM SERPL-MCNC: 8.8 MG/DL (ref 8.6–10.4)
CHLORIDE BLD-SCNC: 96 MMOL/L (ref 98–107)
CO2: 24 MMOL/L (ref 20–31)
CREAT SERPL-MCNC: 0.67 MG/DL (ref 0.5–0.9)
GFR AFRICAN AMERICAN: >60 ML/MIN
GFR NON-AFRICAN AMERICAN: >60 ML/MIN
GFR SERPL CREATININE-BSD FRML MDRD: ABNORMAL ML/MIN/{1.73_M2}
GFR SERPL CREATININE-BSD FRML MDRD: ABNORMAL ML/MIN/{1.73_M2}
GLUCOSE BLD-MCNC: 144 MG/DL (ref 74–100)
GLUCOSE BLD-MCNC: 166 MG/DL (ref 70–99)
GLUCOSE BLD-MCNC: 181 MG/DL (ref 74–100)
GLUCOSE BLD-MCNC: 185 MG/DL (ref 74–100)
HCT VFR BLD CALC: 36 % (ref 36.3–47.1)
HEMOGLOBIN: 11.4 G/DL (ref 11.9–15.1)
MCH RBC QN AUTO: 29.5 PG (ref 25.2–33.5)
MCHC RBC AUTO-ENTMCNC: 31.7 G/DL (ref 28.4–34.8)
MCV RBC AUTO: 93 FL (ref 82.6–102.9)
NRBC AUTOMATED: 0 PER 100 WBC
PDW BLD-RTO: 13.5 % (ref 11.8–14.4)
PLATELET # BLD: 176 K/UL (ref 138–453)
PMV BLD AUTO: 9.9 FL (ref 8.1–13.5)
POTASSIUM SERPL-SCNC: 4.8 MMOL/L (ref 3.7–5.3)
RBC # BLD: 3.87 M/UL (ref 3.95–5.11)
SODIUM BLD-SCNC: 130 MMOL/L (ref 135–144)
WBC # BLD: 14.4 K/UL (ref 3.5–11.3)

## 2019-09-14 PROCEDURE — 1200000000 HC SEMI PRIVATE

## 2019-09-14 PROCEDURE — 82947 ASSAY GLUCOSE BLOOD QUANT: CPT

## 2019-09-14 PROCEDURE — 85027 COMPLETE CBC AUTOMATED: CPT

## 2019-09-14 PROCEDURE — 2580000003 HC RX 258: Performed by: INTERNAL MEDICINE

## 2019-09-14 PROCEDURE — 36415 COLL VENOUS BLD VENIPUNCTURE: CPT

## 2019-09-14 PROCEDURE — 6370000000 HC RX 637 (ALT 250 FOR IP): Performed by: INTERNAL MEDICINE

## 2019-09-14 PROCEDURE — 6360000002 HC RX W HCPCS: Performed by: INTERNAL MEDICINE

## 2019-09-14 PROCEDURE — 80048 BASIC METABOLIC PNL TOTAL CA: CPT

## 2019-09-14 RX ORDER — HYDROCODONE BITARTRATE AND ACETAMINOPHEN 5; 325 MG/1; MG/1
1 TABLET ORAL EVERY 4 HOURS PRN
Status: DISCONTINUED | OUTPATIENT
Start: 2019-09-14 | End: 2019-09-15

## 2019-09-14 RX ADMIN — ACETAMINOPHEN 650 MG: 325 TABLET, FILM COATED ORAL at 04:40

## 2019-09-14 RX ADMIN — SODIUM CHLORIDE: 9 INJECTION, SOLUTION INTRAVENOUS at 03:52

## 2019-09-14 RX ADMIN — ESCITALOPRAM 10 MG: 5 TABLET, FILM COATED ORAL at 09:02

## 2019-09-14 RX ADMIN — GABAPENTIN 900 MG: 300 CAPSULE ORAL at 09:02

## 2019-09-14 RX ADMIN — HYDROCODONE BITARTRATE AND ACETAMINOPHEN 1 TABLET: 5; 325 TABLET ORAL at 09:02

## 2019-09-14 RX ADMIN — INSULIN GLARGINE 50 UNITS: 100 INJECTION, SOLUTION SUBCUTANEOUS at 20:57

## 2019-09-14 RX ADMIN — BACLOFEN 10 MG: 10 TABLET ORAL at 21:00

## 2019-09-14 RX ADMIN — MAGNESIUM HYDROXIDE 30 ML: 400 SUSPENSION ORAL at 09:02

## 2019-09-14 RX ADMIN — GABAPENTIN 900 MG: 300 CAPSULE ORAL at 20:58

## 2019-09-14 RX ADMIN — CEFTRIAXONE 1 G: 1 INJECTION, POWDER, FOR SOLUTION INTRAMUSCULAR; INTRAVENOUS at 08:59

## 2019-09-14 RX ADMIN — BACLOFEN 10 MG: 10 TABLET ORAL at 14:35

## 2019-09-14 RX ADMIN — HYDROCODONE BITARTRATE AND ACETAMINOPHEN 1 TABLET: 5; 325 TABLET ORAL at 14:35

## 2019-09-14 RX ADMIN — HYDROCODONE BITARTRATE AND ACETAMINOPHEN 1 TABLET: 5; 325 TABLET ORAL at 18:50

## 2019-09-14 RX ADMIN — SODIUM CHLORIDE: 9 INJECTION, SOLUTION INTRAVENOUS at 10:37

## 2019-09-14 RX ADMIN — SODIUM CHLORIDE: 9 INJECTION, SOLUTION INTRAVENOUS at 17:22

## 2019-09-14 RX ADMIN — ENOXAPARIN SODIUM 40 MG: 40 INJECTION SUBCUTANEOUS at 09:01

## 2019-09-14 RX ADMIN — GABAPENTIN 900 MG: 300 CAPSULE ORAL at 14:35

## 2019-09-14 RX ADMIN — BACLOFEN 10 MG: 10 TABLET ORAL at 09:02

## 2019-09-14 ASSESSMENT — PAIN DESCRIPTION - ORIENTATION
ORIENTATION: RIGHT
ORIENTATION: RIGHT

## 2019-09-14 ASSESSMENT — PAIN DESCRIPTION - PAIN TYPE
TYPE: ACUTE PAIN
TYPE: ACUTE PAIN

## 2019-09-14 ASSESSMENT — PAIN DESCRIPTION - LOCATION
LOCATION: ABDOMEN;FLANK;NECK
LOCATION: ABDOMEN;FLANK

## 2019-09-14 ASSESSMENT — PAIN DESCRIPTION - DESCRIPTORS: DESCRIPTORS: BURNING

## 2019-09-14 ASSESSMENT — PAIN SCALES - GENERAL
PAINLEVEL_OUTOF10: 7
PAINLEVEL_OUTOF10: 8
PAINLEVEL_OUTOF10: 7
PAINLEVEL_OUTOF10: 7

## 2019-09-14 NOTE — H&P
Cervicalgia 2018    Diabetic autonomic neuropathy associated with type 1 diabetes mellitus ([facility] 2018    Obstructive sleep apnea 2017    Sepsis due to pneumonia ([address] 2017    Vitamin D deficiency 2017    Uncontrolled type 2 diabetes mellitus with diabetic polyneuropathy, with long-term current use of insulin ([address] 2017    Nuclear sclerotic cataract of left eye 10/20/2016       Plan:     · This patient requires inpatient admission because of  Cellulitis of the abdominal wall. Possible pyelonephritis on top of this. Factors affecting the medical complexity of this patient include Principal Problem:    Cellulitis, abdominal wall  Active Problems:    Chronic left shoulder pain    Complex regional pain syndrome type 1 of left lower extremity    Diabetic autonomic neuropathy associated with type 1 diabetes mellitus ([address]    Pyelonephritis  Resolved Problems:    * No resolved hospital problems. *  ·   · Estimated length of stay is 3 days  ·   IV antibiotics, fever control. Fluids. · High risk medication monitorin    CORE MEASURES  Core measures including DVT prophylaxis, Code Status, Nutrition, Therapy Options, chart reviewed and advance directives reviewed at this visit.     Kaitlynn Cavazos MD  2019, 9:29 AM

## 2019-09-14 NOTE — PLAN OF CARE
Problem: Falls - Risk of:  Goal: Will remain free from falls  Description  Will remain free from falls  Outcome: Ongoing  Note:   Bed in low position. Wheels locked. 2/4 side rails are up. Fall band on. Call light within reach. Problem: Fluid Volume:  Goal: Maintenance of adequate hydration will improve  Description  Maintenance of adequate hydration will improve  Outcome: Ongoing  Note:   Monitoring I/O, IVF infusing as ordered, will continue to monitor. Problem: Urinary Elimination:  Goal: Will remain free from infection  Description  Will remain free from infection  Outcome: Ongoing  Note:   Monitoring temperatures and giving tylenol as needed.

## 2019-09-15 LAB
CULTURE: NORMAL
GLUCOSE BLD-MCNC: 171 MG/DL (ref 74–100)
GLUCOSE BLD-MCNC: 212 MG/DL (ref 74–100)
Lab: NORMAL
SPECIMEN DESCRIPTION: NORMAL

## 2019-09-15 PROCEDURE — 6360000002 HC RX W HCPCS: Performed by: INTERNAL MEDICINE

## 2019-09-15 PROCEDURE — 82947 ASSAY GLUCOSE BLOOD QUANT: CPT

## 2019-09-15 PROCEDURE — 2580000003 HC RX 258: Performed by: INTERNAL MEDICINE

## 2019-09-15 PROCEDURE — 1200000000 HC SEMI PRIVATE

## 2019-09-15 PROCEDURE — 6370000000 HC RX 637 (ALT 250 FOR IP): Performed by: INTERNAL MEDICINE

## 2019-09-15 RX ORDER — HYDROCODONE BITARTRATE AND ACETAMINOPHEN 5; 325 MG/1; MG/1
2 TABLET ORAL EVERY 6 HOURS PRN
Status: DISCONTINUED | OUTPATIENT
Start: 2019-09-15 | End: 2019-09-17 | Stop reason: HOSPADM

## 2019-09-15 RX ADMIN — BACLOFEN 10 MG: 10 TABLET ORAL at 13:15

## 2019-09-15 RX ADMIN — BACLOFEN 10 MG: 10 TABLET ORAL at 20:13

## 2019-09-15 RX ADMIN — CEFTRIAXONE 1 G: 1 INJECTION, POWDER, FOR SOLUTION INTRAMUSCULAR; INTRAVENOUS at 09:00

## 2019-09-15 RX ADMIN — Medication 10 ML: at 09:00

## 2019-09-15 RX ADMIN — ENOXAPARIN SODIUM 40 MG: 40 INJECTION SUBCUTANEOUS at 09:00

## 2019-09-15 RX ADMIN — GABAPENTIN 900 MG: 300 CAPSULE ORAL at 20:13

## 2019-09-15 RX ADMIN — SODIUM CHLORIDE: 9 INJECTION, SOLUTION INTRAVENOUS at 06:50

## 2019-09-15 RX ADMIN — HYDROCODONE BITARTRATE AND ACETAMINOPHEN 2 TABLET: 5; 325 TABLET ORAL at 20:13

## 2019-09-15 RX ADMIN — BACLOFEN 10 MG: 10 TABLET ORAL at 09:00

## 2019-09-15 RX ADMIN — INSULIN GLARGINE 50 UNITS: 100 INJECTION, SOLUTION SUBCUTANEOUS at 20:17

## 2019-09-15 RX ADMIN — GABAPENTIN 900 MG: 300 CAPSULE ORAL at 13:15

## 2019-09-15 RX ADMIN — GABAPENTIN 900 MG: 300 CAPSULE ORAL at 09:00

## 2019-09-15 RX ADMIN — ESCITALOPRAM 10 MG: 5 TABLET, FILM COATED ORAL at 09:00

## 2019-09-15 RX ADMIN — SODIUM CHLORIDE: 9 INJECTION, SOLUTION INTRAVENOUS at 00:21

## 2019-09-15 RX ADMIN — HYDROCODONE BITARTRATE AND ACETAMINOPHEN 1 TABLET: 5; 325 TABLET ORAL at 10:20

## 2019-09-15 ASSESSMENT — PAIN SCALES - GENERAL
PAINLEVEL_OUTOF10: 7
PAINLEVEL_OUTOF10: 4
PAINLEVEL_OUTOF10: 8
PAINLEVEL_OUTOF10: 5

## 2019-09-15 ASSESSMENT — PAIN DESCRIPTION - PAIN TYPE: TYPE: ACUTE PAIN

## 2019-09-15 ASSESSMENT — PAIN DESCRIPTION - LOCATION: LOCATION: NECK;BACK

## 2019-09-15 NOTE — PROGRESS NOTES
Progress Note    SUBJECTIVE:  FU related to  / pain in neck and abdominal area still. No chills. OBJECTIVE:    Vitals:   TEMPERATURE:  Current - Temp: 99.2 °F (37.3 °C);  Max - Temp  Av.3 °F (36.8 °C)  Min: 97.2 °F (36.2 °C)  Max: 99.2 °F (37.3 °C)  RESPIRATIONS RANGE: Resp  Av  Min: 16  Max: 16  PULSE RANGE: Pulse  Av.3  Min: 90  Max: 103  BLOOD PRESSURE RANGE:  Systolic (92NIZ), LKT:928 , Min:108 , LTD:759   ; Diastolic (70UKS), ELISSA:21, Min:63, Max:86    PULSE OXIMETRY RANGE: SpO2  Av.3 %  Min: 92 %  Max: 99 %  24HR INTAKE/OUTPUT:      Intake/Output Summary (Last 24 hours) at 9/15/2019 1024  Last data filed at 9/15/2019 0028  Gross per 24 hour   Intake 3157 ml   Output 700 ml   Net 2457 ml     -----------------------------------------------------------------  Exam:  General: A & O x3  HEENT: Supple neck & negative  Heart: Regular  Lungs: clear to auscultation bilaterally & no retractions  Abdomen: redness abdominal wall   Extremities:  No edema   Neuro: NonFocal     -----------------------------------------------------------------  Diagnostic Data:  Lab Results   Component Value Date    WBC 14.4 (H) 2019    HGB 11.4 (L) 2019     2019       Lab Results   Component Value Date    BUN 16 2019    CREATININE 0.67 2019     (L) 2019    K 4.8 2019    CALCIUM 8.8 2019    CL 96 (L) 2019    CO2 24 2019    LABGLOM >60 2019       Lab Results   Component Value Date    WBCUA 50  2019    RBCUA 0 TO 2 2019    EPITHUA 2 TO 5 2019    LEUKOCYTESUR MODERATE (A) 2019    SPECGRAV 1.025 (H) 2019    GLUCOSEU TRACE (A) 2019    KETUA NEGATIVE 2019    PROTEINU TRACE (A) 2019    HGBUR NEGATIVE 2019    CASTUA NOT REPORTED 2019    CRYSTUA NOT REPORTED 2019    BACTERIA 1+ (A) 2019    YEAST NOT REPORTED 2019       Lab Results   Component Value Date    MYOGLOBIN <21 (L) 01/10/2015    TROPONINT <0.03 11/20/2017    CKMB 1.2 01/10/2015    PROBNP 391 (H) 11/20/2017       Ct Abdomen Pelvis W Wo Contrast Additional Contrast? Radiologist Recommendation    Result Date: 9/13/2019  EXAMINATION: CT OF THE ABDOMEN AND PELVIS WITH CONTRAST 9/13/2019 2:17 pm TECHNIQUE: CT of the abdomen and pelvis was performed with the administration of intravenous contrast. Multiplanar reformatted images are provided for review. Dose modulation, iterative reconstruction, and/or weight based adjustment of the mA/kV was utilized to reduce the radiation dose to as low as reasonably achievable. COMPARISON: CT abdomen and pelvis March 31, 2016. HISTORY: ORDERING SYSTEM PROVIDED HISTORY: Chronic RUQ pain TECHNOLOGIST PROVIDED HISTORY: RUQ pain, history of splenic aneurysm FINDINGS: Lower Chest: No acute findings. No pericardial or pleural effusions. Organs: Diffuse hepatic steatosis. Gallbladder has been removed. Portal vein, spleen, pancreas and adrenal glands all appear grossly unremarkable. 1.3 cm partially calcified splenic artery aneurysm at the level the splenic hilum. 2 nonobstructing left renal calculi versus vascular calcifications are noted largest measuring 3 mm. No obstructive uropathy is seen. Abdominal aorta demonstrates moderate calcification without aneurysm. GI/Bowel: Small hiatal hernia. Distal stomach appears grossly unremarkable. Small bowel appears nondilated. Fat is seen within the wall of the cecum and terminal ileum suggestive of prior colitis/ileitis. No acute colonic abnormality is seen. Appendix is not visualized. Pelvis: Uterus has been removed. No adnexal mass. Urinary bladder is grossly unremarkable. Peritoneum/Retroperitoneum: No free air, free fluid or lymphadenopathy. Bones/Soft Tissues: Abdominal wall demonstrates mild skin thickening along the patient's pannus. No fluid collection is seen. Osseous structures demonstrate degenerative change.      1.  No acute

## 2019-09-16 LAB
ABSOLUTE EOS #: <0.03 K/UL (ref 0–0.44)
ABSOLUTE IMMATURE GRANULOCYTE: 0.05 K/UL (ref 0–0.3)
ABSOLUTE LYMPH #: 2.07 K/UL (ref 1.1–3.7)
ABSOLUTE MONO #: 0.81 K/UL (ref 0.1–1.2)
ALBUMIN SERPL-MCNC: 2.9 G/DL (ref 3.5–5.2)
ALBUMIN/GLOBULIN RATIO: 0.7 (ref 1–2.5)
ALP BLD-CCNC: 142 U/L (ref 35–104)
ALT SERPL-CCNC: 50 U/L (ref 5–33)
ANION GAP SERPL CALCULATED.3IONS-SCNC: 14 MMOL/L (ref 9–17)
AST SERPL-CCNC: 37 U/L
BASOPHILS # BLD: 0 % (ref 0–2)
BASOPHILS ABSOLUTE: 0.03 K/UL (ref 0–0.2)
BILIRUB SERPL-MCNC: 0.39 MG/DL (ref 0.3–1.2)
BUN BLDV-MCNC: 8 MG/DL (ref 6–20)
BUN/CREAT BLD: 20 (ref 9–20)
CALCIUM SERPL-MCNC: 8.9 MG/DL (ref 8.6–10.4)
CHLORIDE BLD-SCNC: 97 MMOL/L (ref 98–107)
CO2: 21 MMOL/L (ref 20–31)
CREAT SERPL-MCNC: 0.41 MG/DL (ref 0.5–0.9)
DIFFERENTIAL TYPE: ABNORMAL
EOSINOPHILS RELATIVE PERCENT: 0 % (ref 1–4)
GFR AFRICAN AMERICAN: >60 ML/MIN
GFR NON-AFRICAN AMERICAN: >60 ML/MIN
GFR SERPL CREATININE-BSD FRML MDRD: ABNORMAL ML/MIN/{1.73_M2}
GFR SERPL CREATININE-BSD FRML MDRD: ABNORMAL ML/MIN/{1.73_M2}
GLUCOSE BLD-MCNC: 151 MG/DL (ref 74–100)
GLUCOSE BLD-MCNC: 163 MG/DL (ref 74–100)
GLUCOSE BLD-MCNC: 170 MG/DL (ref 70–99)
GLUCOSE BLD-MCNC: 182 MG/DL (ref 74–100)
HCT VFR BLD CALC: 34.9 % (ref 36.3–47.1)
HEMOGLOBIN: 11 G/DL (ref 11.9–15.1)
IMMATURE GRANULOCYTES: 1 %
LYMPHOCYTES # BLD: 22 % (ref 24–43)
MCH RBC QN AUTO: 29.5 PG (ref 25.2–33.5)
MCHC RBC AUTO-ENTMCNC: 31.5 G/DL (ref 28.4–34.8)
MCV RBC AUTO: 93.6 FL (ref 82.6–102.9)
MONOCYTES # BLD: 9 % (ref 3–12)
NRBC AUTOMATED: 0 PER 100 WBC
PDW BLD-RTO: 13.3 % (ref 11.8–14.4)
PLATELET # BLD: 181 K/UL (ref 138–453)
PLATELET ESTIMATE: ABNORMAL
PMV BLD AUTO: 10.1 FL (ref 8.1–13.5)
POTASSIUM SERPL-SCNC: 4.2 MMOL/L (ref 3.7–5.3)
RBC # BLD: 3.73 M/UL (ref 3.95–5.11)
RBC # BLD: ABNORMAL 10*6/UL
SEG NEUTROPHILS: 68 % (ref 36–65)
SEGMENTED NEUTROPHILS ABSOLUTE COUNT: 6.32 K/UL (ref 1.5–8.1)
SODIUM BLD-SCNC: 132 MMOL/L (ref 135–144)
TOTAL PROTEIN: 6.9 G/DL (ref 6.4–8.3)
WBC # BLD: 9.3 K/UL (ref 3.5–11.3)
WBC # BLD: ABNORMAL 10*3/UL

## 2019-09-16 PROCEDURE — 82947 ASSAY GLUCOSE BLOOD QUANT: CPT

## 2019-09-16 PROCEDURE — 85025 COMPLETE CBC W/AUTO DIFF WBC: CPT

## 2019-09-16 PROCEDURE — 2580000003 HC RX 258: Performed by: INTERNAL MEDICINE

## 2019-09-16 PROCEDURE — 6370000000 HC RX 637 (ALT 250 FOR IP): Performed by: PHYSICIAN ASSISTANT

## 2019-09-16 PROCEDURE — 1200000000 HC SEMI PRIVATE

## 2019-09-16 PROCEDURE — 36415 COLL VENOUS BLD VENIPUNCTURE: CPT

## 2019-09-16 PROCEDURE — 80053 COMPREHEN METABOLIC PANEL: CPT

## 2019-09-16 PROCEDURE — 6370000000 HC RX 637 (ALT 250 FOR IP): Performed by: INTERNAL MEDICINE

## 2019-09-16 PROCEDURE — 6360000002 HC RX W HCPCS: Performed by: INTERNAL MEDICINE

## 2019-09-16 RX ORDER — DOCUSATE SODIUM 100 MG/1
100 CAPSULE, LIQUID FILLED ORAL 2 TIMES DAILY
Status: DISCONTINUED | OUTPATIENT
Start: 2019-09-16 | End: 2019-09-17 | Stop reason: HOSPADM

## 2019-09-16 RX ORDER — PANTOPRAZOLE SODIUM 40 MG/1
40 TABLET, DELAYED RELEASE ORAL
Status: DISCONTINUED | OUTPATIENT
Start: 2019-09-16 | End: 2019-09-17 | Stop reason: HOSPADM

## 2019-09-16 RX ORDER — ACETAMINOPHEN 325 MG/1
650 TABLET ORAL EVERY 4 HOURS PRN
Status: DISCONTINUED | OUTPATIENT
Start: 2019-09-16 | End: 2019-09-17 | Stop reason: HOSPADM

## 2019-09-16 RX ORDER — FAMOTIDINE 20 MG/1
20 TABLET, FILM COATED ORAL 2 TIMES DAILY
Status: DISCONTINUED | OUTPATIENT
Start: 2019-09-16 | End: 2019-09-17 | Stop reason: HOSPADM

## 2019-09-16 RX ORDER — NICOTINE POLACRILEX 4 MG
15 LOZENGE BUCCAL PRN
Status: DISCONTINUED | OUTPATIENT
Start: 2019-09-16 | End: 2019-09-17 | Stop reason: HOSPADM

## 2019-09-16 RX ORDER — POLYETHYLENE GLYCOL 3350 17 G/17G
17 POWDER, FOR SOLUTION ORAL 2 TIMES DAILY
Status: DISCONTINUED | OUTPATIENT
Start: 2019-09-16 | End: 2019-09-17 | Stop reason: HOSPADM

## 2019-09-16 RX ORDER — DEXTROSE MONOHYDRATE 50 MG/ML
100 INJECTION, SOLUTION INTRAVENOUS PRN
Status: DISCONTINUED | OUTPATIENT
Start: 2019-09-16 | End: 2019-09-17 | Stop reason: HOSPADM

## 2019-09-16 RX ORDER — DEXTROSE MONOHYDRATE 25 G/50ML
12.5 INJECTION, SOLUTION INTRAVENOUS PRN
Status: DISCONTINUED | OUTPATIENT
Start: 2019-09-16 | End: 2019-09-17 | Stop reason: HOSPADM

## 2019-09-16 RX ADMIN — POLYETHYLENE GLYCOL 3350 17 G: 17 POWDER, FOR SOLUTION ORAL at 20:07

## 2019-09-16 RX ADMIN — SODIUM CHLORIDE: 9 INJECTION, SOLUTION INTRAVENOUS at 02:38

## 2019-09-16 RX ADMIN — INSULIN LISPRO 2 UNITS: 100 INJECTION, SOLUTION INTRAVENOUS; SUBCUTANEOUS at 17:23

## 2019-09-16 RX ADMIN — INSULIN LISPRO 1 UNITS: 100 INJECTION, SOLUTION INTRAVENOUS; SUBCUTANEOUS at 20:08

## 2019-09-16 RX ADMIN — HYDROCODONE BITARTRATE AND ACETAMINOPHEN 2 TABLET: 5; 325 TABLET ORAL at 18:50

## 2019-09-16 RX ADMIN — ENOXAPARIN SODIUM 40 MG: 40 INJECTION SUBCUTANEOUS at 08:45

## 2019-09-16 RX ADMIN — ACETAMINOPHEN 650 MG: 325 TABLET, FILM COATED ORAL at 14:30

## 2019-09-16 RX ADMIN — FAMOTIDINE 20 MG: 20 TABLET ORAL at 20:08

## 2019-09-16 RX ADMIN — DOCUSATE SODIUM 100 MG: 100 CAPSULE, LIQUID FILLED ORAL at 08:44

## 2019-09-16 RX ADMIN — DOCUSATE SODIUM 100 MG: 100 CAPSULE, LIQUID FILLED ORAL at 20:08

## 2019-09-16 RX ADMIN — FAMOTIDINE 20 MG: 20 TABLET ORAL at 14:30

## 2019-09-16 RX ADMIN — PANTOPRAZOLE SODIUM 40 MG: 40 TABLET, DELAYED RELEASE ORAL at 14:29

## 2019-09-16 RX ADMIN — ESCITALOPRAM 10 MG: 5 TABLET, FILM COATED ORAL at 08:45

## 2019-09-16 RX ADMIN — HYDROCODONE BITARTRATE AND ACETAMINOPHEN 1 TABLET: 5; 325 TABLET ORAL at 08:44

## 2019-09-16 RX ADMIN — BACLOFEN 10 MG: 10 TABLET ORAL at 08:44

## 2019-09-16 RX ADMIN — CEFTRIAXONE 1 G: 1 INJECTION, POWDER, FOR SOLUTION INTRAMUSCULAR; INTRAVENOUS at 08:46

## 2019-09-16 RX ADMIN — SODIUM CHLORIDE: 9 INJECTION, SOLUTION INTRAVENOUS at 17:27

## 2019-09-16 RX ADMIN — HYDROCODONE BITARTRATE AND ACETAMINOPHEN 2 TABLET: 5; 325 TABLET ORAL at 02:37

## 2019-09-16 RX ADMIN — INSULIN GLARGINE 50 UNITS: 100 INJECTION, SOLUTION SUBCUTANEOUS at 20:08

## 2019-09-16 ASSESSMENT — PAIN DESCRIPTION - FREQUENCY
FREQUENCY: CONTINUOUS

## 2019-09-16 ASSESSMENT — PAIN DESCRIPTION - ORIENTATION
ORIENTATION: LOWER
ORIENTATION: OTHER (COMMENT)
ORIENTATION: POSTERIOR;UPPER
ORIENTATION: LOWER

## 2019-09-16 ASSESSMENT — PAIN DESCRIPTION - LOCATION
LOCATION: HEAD
LOCATION: BACK;HEAD

## 2019-09-16 ASSESSMENT — PAIN DESCRIPTION - DESCRIPTORS
DESCRIPTORS: ACHING
DESCRIPTORS: HEADACHE

## 2019-09-16 ASSESSMENT — PAIN SCALES - GENERAL
PAINLEVEL_OUTOF10: 6
PAINLEVEL_OUTOF10: 3
PAINLEVEL_OUTOF10: 4
PAINLEVEL_OUTOF10: 0
PAINLEVEL_OUTOF10: 8
PAINLEVEL_OUTOF10: 3
PAINLEVEL_OUTOF10: 8
PAINLEVEL_OUTOF10: 8

## 2019-09-16 ASSESSMENT — PAIN DESCRIPTION - PAIN TYPE
TYPE: ACUTE PAIN

## 2019-09-16 NOTE — PROGRESS NOTES
Hospitalist Progress Note    SUBJECTIVE/INTERVAL HISTORY:    Patient seen in follow up for abdominal wall cellulitis, chronic pain, DM, pyelonephritis. She states that she feels better, but is uncertain to what degree her cellulitis has improved. Afebrile. + BM. OBJECTIVE:    Vitals:   Temp: 97.4 °F (36.3 °C)  BP: (!) 162/68  Resp: 16  Pulse: 81  SpO2: 92 %  24HR INTAKE/OUTPUT:      Intake/Output Summary (Last 24 hours) at 9/16/2019 1520  Last data filed at 9/16/2019 1429  Gross per 24 hour   Intake 4486.04 ml   Output 1850 ml   Net 2636.04 ml       -----------------------------------------------------------------  Review of Systems:  Constitutional:negative  for fevers, and negative for chills. Eyes: negative for visual disturbance   ENT: negative for sore throat, negative nasal congestion, and negative for earache  Respiratory: negative for shortness of breath, negative for cough, and negative for wheezing  Cardiovascular: negative for chest pain, negative for palpitations, and negative for syncope  Gastrointestinal: negative for abdominal pain, negative for nausea,negative for vomiting, negative for diarrhea, negative for constipation, and negative for hematochezia or melena  Genitourinary: negative for dysuria, negative for urinary urgency, negative for urinary frequency, and negative for hematuria  Skin: positive for skin rash, and positive for skin lesions  Neurological: negative for unilateral weakness, numbness or tingling.     Exam:  GEN:  alert and oriented to person, place and time, well-developed and well-nourished, in no acute distress  EYES: PERRL  NECK: normal, supple  PULM: clear to auscultation bilaterally- no wheezes, rales or rhonchi, normal air movement, no respiratory distress  COR: regular rate & rhythm and no murmurs  ABD:  soft, mildly tender, non-distended, normal bowel sounds, periumbilical erythema/warmth/tenderness extending distally to waist line and bilateral flank  EXT:   edema:

## 2019-09-16 NOTE — PROGRESS NOTES
Nutrition Assessment    Type and Reason for Visit: Initial    Nutrition Recommendations:   1. Continue current diet. 2. Pt declined diet education needs. 3. Encourage consistent carbohydrates at meals. Nutrition Assessment: Altered nutrition related lab values A1c r/t endocrine dysfunction AEB A1c 9.8. Pt with , HDL 26. Pt \"tries\" to count carbohydrates. Pt states she is not doing any type of exercise. Pt encouraged to be consistent with counting carbohydrates and incorportate PA. Malnutrition Assessment:  · Malnutrition Status: At risk for malnutrition  · Context: Acute illness or injury  · Findings of the 6 clinical characteristics of malnutrition (Minimum of 2 out of 6 clinical characteristics is required to make the diagnosis of moderate or severe Protein Calorie Malnutrition based on AND/ASPEN Guidelines):  1. Energy Intake-(< 75%), (2 days)    2. Weight Loss-No significant weight loss,    3. Fat Loss-No significant subcutaneous fat loss,    4. Muscle Loss-No significant muscle mass loss,    5. Fluid Accumulation-No significant fluid accumulation,    6.   Strength-Not measured    Nutrition Risk Level: Low, Moderate    Nutrition Diagnosis:   · Problem: Altered nutrition-related lab values  · Etiology: related to Endocrine dysfunction     Signs and symptoms:  as evidenced by Lab values(A1c 9.8)    Objective Information:  · Nutrition-Focused Physical Findings: Pt appears overly nourished  · Wound Type: None  · Current Nutrition Therapies:  · Oral Diet Orders: Carb Control 4 Carbs/Meal   · Oral Diet intake: 1-25%  · Oral Nutrition Supplement (ONS) Orders: None  · Anthropometric Measures:  · Ht: 5' 4\" (162.6 cm)   · Current Body Wt: 229 lb 4.8 oz (104 kg)  · Admission Body Wt: 222 lb 14.4 oz (101.1 kg)  · Usual Body Wt: 219 lb (99.3 kg)(217-222#)  · % Weight Change:  ,  7.2% gain x 2 months  · Ideal Body Wt: 120 lb (54.4 kg), % Ideal Body 191%  · BMI Classification: BMI 35.0 - 39.9 Obese Class II  Lab Results   Component Value Date    LABA1C 9.8 07/01/2019     Recent Labs     09/13/19  2143 09/14/19  0758 09/14/19  1205 09/14/19  2045 09/15/19  0725 09/15/19  1956 09/16/19  0729   POCGLU 162* 144* 181* 185* 171* 212* 163*     Recent Labs     09/13/19  1515 09/14/19  0610 09/16/19  0830   * 130* 132*   K 4.7 4.8 4.2   CL 93* 96* 97*   CO2 22 24 21   BUN 19 16 8   CREATININE 1.01* 0.67 0.41*   GLUCOSE 208* 166* 170*   ALT 42*  --  50*   ALKPHOS 159*  --  142*   GFR                                       Lab Results   Component Value Date    LABALBU 2.9 09/16/2019      Lab Results   Component Value Date    TRIG 250 02/05/2019    HDL 26 02/05/2019    LDLDIRECT 159 08/22/2017     Nutrition Interventions:   Continue current diet  Continued Inpatient Monitoring, Education not appropriate at this time, Coordination of Care    Nutrition Evaluation:   · Evaluation: Goals set   · Goals: PO > 75% of meals    · Monitoring: Meal Intake, I&O, Weight, Pertinent Labs, Patient/Family Education      Electronically signed by Jonh Paul Mcdonald RD, LD on 9/16/19 at 12:12 PM    Contact Number: [id number]

## 2019-09-17 VITALS
TEMPERATURE: 97 F | DIASTOLIC BLOOD PRESSURE: 75 MMHG | HEIGHT: 64 IN | BODY MASS INDEX: 39.83 KG/M2 | OXYGEN SATURATION: 93 % | WEIGHT: 233.3 LBS | RESPIRATION RATE: 16 BRPM | HEART RATE: 82 BPM | SYSTOLIC BLOOD PRESSURE: 157 MMHG

## 2019-09-17 PROBLEM — A41.9 SEPSIS (HCC): Status: ACTIVE | Noted: 2019-09-17

## 2019-09-17 LAB
ABSOLUTE EOS #: <0.03 K/UL (ref 0–0.44)
ABSOLUTE IMMATURE GRANULOCYTE: 0.07 K/UL (ref 0–0.3)
ABSOLUTE LYMPH #: 2.18 K/UL (ref 1.1–3.7)
ABSOLUTE MONO #: 0.67 K/UL (ref 0.1–1.2)
ALBUMIN SERPL-MCNC: 2.8 G/DL (ref 3.5–5.2)
ALBUMIN/GLOBULIN RATIO: 0.8 (ref 1–2.5)
ALP BLD-CCNC: 137 U/L (ref 35–104)
ALT SERPL-CCNC: 50 U/L (ref 5–33)
ANION GAP SERPL CALCULATED.3IONS-SCNC: 11 MMOL/L (ref 9–17)
AST SERPL-CCNC: 26 U/L
BASOPHILS # BLD: 0 % (ref 0–2)
BASOPHILS ABSOLUTE: <0.03 K/UL (ref 0–0.2)
BILIRUB SERPL-MCNC: 0.36 MG/DL (ref 0.3–1.2)
BUN BLDV-MCNC: 6 MG/DL (ref 6–20)
BUN/CREAT BLD: 14 (ref 9–20)
CALCIUM SERPL-MCNC: 8.8 MG/DL (ref 8.6–10.4)
CHLORIDE BLD-SCNC: 103 MMOL/L (ref 98–107)
CO2: 25 MMOL/L (ref 20–31)
CREAT SERPL-MCNC: 0.43 MG/DL (ref 0.5–0.9)
DIFFERENTIAL TYPE: ABNORMAL
EOSINOPHILS RELATIVE PERCENT: 0 % (ref 1–4)
GFR AFRICAN AMERICAN: >60 ML/MIN
GFR NON-AFRICAN AMERICAN: >60 ML/MIN
GFR SERPL CREATININE-BSD FRML MDRD: ABNORMAL ML/MIN/{1.73_M2}
GFR SERPL CREATININE-BSD FRML MDRD: ABNORMAL ML/MIN/{1.73_M2}
GLUCOSE BLD-MCNC: 122 MG/DL (ref 74–100)
GLUCOSE BLD-MCNC: 134 MG/DL (ref 74–100)
GLUCOSE BLD-MCNC: 150 MG/DL (ref 70–99)
HCT VFR BLD CALC: 33 % (ref 36.3–47.1)
HEMOGLOBIN: 10.4 G/DL (ref 11.9–15.1)
IMMATURE GRANULOCYTES: 1 %
LYMPHOCYTES # BLD: 27 % (ref 24–43)
MCH RBC QN AUTO: 29.1 PG (ref 25.2–33.5)
MCHC RBC AUTO-ENTMCNC: 31.5 G/DL (ref 28.4–34.8)
MCV RBC AUTO: 92.2 FL (ref 82.6–102.9)
MONOCYTES # BLD: 8 % (ref 3–12)
NRBC AUTOMATED: 0 PER 100 WBC
PDW BLD-RTO: 13.5 % (ref 11.8–14.4)
PLATELET # BLD: 199 K/UL (ref 138–453)
PLATELET ESTIMATE: ABNORMAL
PMV BLD AUTO: 10.1 FL (ref 8.1–13.5)
POTASSIUM SERPL-SCNC: 3.7 MMOL/L (ref 3.7–5.3)
RBC # BLD: 3.58 M/UL (ref 3.95–5.11)
RBC # BLD: ABNORMAL 10*6/UL
SEG NEUTROPHILS: 64 % (ref 36–65)
SEGMENTED NEUTROPHILS ABSOLUTE COUNT: 5.09 K/UL (ref 1.5–8.1)
SODIUM BLD-SCNC: 139 MMOL/L (ref 135–144)
TOTAL PROTEIN: 6.3 G/DL (ref 6.4–8.3)
WBC # BLD: 8.1 K/UL (ref 3.5–11.3)
WBC # BLD: ABNORMAL 10*3/UL

## 2019-09-17 PROCEDURE — 6370000000 HC RX 637 (ALT 250 FOR IP): Performed by: INTERNAL MEDICINE

## 2019-09-17 PROCEDURE — 6370000000 HC RX 637 (ALT 250 FOR IP): Performed by: PHYSICIAN ASSISTANT

## 2019-09-17 PROCEDURE — 6360000002 HC RX W HCPCS: Performed by: INTERNAL MEDICINE

## 2019-09-17 PROCEDURE — 36415 COLL VENOUS BLD VENIPUNCTURE: CPT

## 2019-09-17 PROCEDURE — 85025 COMPLETE CBC W/AUTO DIFF WBC: CPT

## 2019-09-17 PROCEDURE — 2580000003 HC RX 258: Performed by: INTERNAL MEDICINE

## 2019-09-17 PROCEDURE — 82947 ASSAY GLUCOSE BLOOD QUANT: CPT

## 2019-09-17 PROCEDURE — 80053 COMPREHEN METABOLIC PANEL: CPT

## 2019-09-17 RX ORDER — HYDROCODONE BITARTRATE AND ACETAMINOPHEN 5; 325 MG/1; MG/1
1 TABLET ORAL EVERY 8 HOURS PRN
Qty: 12 TABLET | Refills: 0 | Status: SHIPPED | OUTPATIENT
Start: 2019-09-17 | End: 2019-09-17 | Stop reason: SDUPTHER

## 2019-09-17 RX ORDER — HYDROCODONE BITARTRATE AND ACETAMINOPHEN 5; 325 MG/1; MG/1
1 TABLET ORAL EVERY 8 HOURS PRN
Qty: 12 TABLET | Refills: 0 | Status: SHIPPED | OUTPATIENT
Start: 2019-09-17 | End: 2019-09-21

## 2019-09-17 RX ORDER — CEPHALEXIN 500 MG/1
500 CAPSULE ORAL 4 TIMES DAILY
Qty: 40 CAPSULE | Refills: 0 | Status: SHIPPED | OUTPATIENT
Start: 2019-09-17 | End: 2019-09-27

## 2019-09-17 RX ORDER — PSEUDOEPHEDRINE HCL 30 MG
100 TABLET ORAL 2 TIMES DAILY
Status: ON HOLD | COMMUNITY
Start: 2019-09-17 | End: 2020-07-19

## 2019-09-17 RX ORDER — POLYETHYLENE GLYCOL 3350 17 G/17G
17 POWDER, FOR SOLUTION ORAL DAILY
Qty: 527 G | Refills: 1 | COMMUNITY
Start: 2019-09-17 | End: 2019-09-24

## 2019-09-17 RX ORDER — GREEN TEA/HOODIA GORDONII 315-12.5MG
1 CAPSULE ORAL DAILY
Qty: 30 TABLET | Refills: 0 | COMMUNITY
Start: 2019-09-17 | End: 2019-10-17

## 2019-09-17 RX ADMIN — FAMOTIDINE 20 MG: 20 TABLET ORAL at 10:00

## 2019-09-17 RX ADMIN — ONDANSETRON 4 MG: 2 INJECTION INTRAMUSCULAR; INTRAVENOUS at 00:17

## 2019-09-17 RX ADMIN — POLYETHYLENE GLYCOL 3350 17 G: 17 POWDER, FOR SOLUTION ORAL at 10:00

## 2019-09-17 RX ADMIN — HYDROCODONE BITARTRATE AND ACETAMINOPHEN 2 TABLET: 5; 325 TABLET ORAL at 01:19

## 2019-09-17 RX ADMIN — PANTOPRAZOLE SODIUM 40 MG: 40 TABLET, DELAYED RELEASE ORAL at 07:25

## 2019-09-17 RX ADMIN — DOCUSATE SODIUM 100 MG: 100 CAPSULE, LIQUID FILLED ORAL at 10:00

## 2019-09-17 RX ADMIN — ESCITALOPRAM 10 MG: 5 TABLET, FILM COATED ORAL at 10:00

## 2019-09-17 RX ADMIN — CEFTRIAXONE 1 G: 1 INJECTION, POWDER, FOR SOLUTION INTRAMUSCULAR; INTRAVENOUS at 09:30

## 2019-09-17 RX ADMIN — HYDROCODONE BITARTRATE AND ACETAMINOPHEN 2 TABLET: 5; 325 TABLET ORAL at 09:04

## 2019-09-17 ASSESSMENT — PAIN DESCRIPTION - DESCRIPTORS
DESCRIPTORS: CRAMPING
DESCRIPTORS: CRAMPING
DESCRIPTORS: ACHING

## 2019-09-17 ASSESSMENT — PAIN DESCRIPTION - PAIN TYPE
TYPE: ACUTE PAIN
TYPE: CHRONIC PAIN
TYPE: ACUTE PAIN

## 2019-09-17 ASSESSMENT — PAIN SCALES - GENERAL
PAINLEVEL_OUTOF10: 9
PAINLEVEL_OUTOF10: 2
PAINLEVEL_OUTOF10: 7
PAINLEVEL_OUTOF10: 2
PAINLEVEL_OUTOF10: 7

## 2019-09-17 ASSESSMENT — PAIN DESCRIPTION - ORIENTATION
ORIENTATION: LOWER
ORIENTATION: LOWER

## 2019-09-17 ASSESSMENT — PAIN DESCRIPTION - LOCATION
LOCATION: ABDOMEN
LOCATION: ABDOMEN
LOCATION: NECK

## 2019-09-17 ASSESSMENT — PAIN DESCRIPTION - FREQUENCY
FREQUENCY: INTERMITTENT
FREQUENCY: INTERMITTENT

## 2019-09-17 NOTE — DISCHARGE SUMMARY
Discharge Summary    Serge Knowles  :  1965  MRN:  [id number]    Admit date:  2019      Discharge date: 2019     Admitting Physician:  Estiven Celeste MD    Consultants:  none    Procedures: none    Complications: none    Discharge Condition: stable    Hospital Course:   Serge Knowles is a 48 y.o. female admitted with bilateral back pain - especially right side. She has some associated N/V. She had 2-3 days of abdominal rash which was worsening as well. She had a temperature of 103F at home. She met sepsis criteria. Admitted for sepsis with abdominal wall cellulitis as source, cellulitis secondary to DM, possible pyelonephritis. She was started on IVF and IV cefepime. Her cellulitis improved with cefepime. Will give keflex X 10 more days. Short course prn norco for her local abdominal wall pain. Colace and miralax daily for constipation. Urine cx was negative thus no pyelonephritis. She is clinically stable, will discharge to home.      Exam:  GEN:  alert and oriented to person, place and time, well-developed and well-nourished, in no acute distress  EYES:  PERRL  NECK: normal, supple  PULM: clear to auscultation bilaterally- no wheezes, rales or rhonchi, normal air movement, no respiratory distress  COR:   regular rate & rhythm and no murmurs  ABD:    soft, mildly tender, non-distended, normal bowel sounds, Improving - periumbilical erythema receding from boundary lines drawn/mild warmth/tenderness extending distally to waist line and bilateral flank  EXT:    edema: 1+ affecting bilateral foot, bilateral ankle  NEURO: follows commands, COREAS, no deficits  SKIN:   See abd    Significant Diagnostic Studies:   Lab Results   Component Value Date    WBC 8.1 2019    HGB 10.4 (L) 2019     2019       Lab Results   Component Value Date    BUN 6 2019    CREATININE 0.43 (L) 2019     2019    K 3.7 2019    CALCIUM 8.8 2019     2019    CO2 25 09/17/2019    LABGLOM >60 09/17/2019       Lab Results   Component Value Date    WBCUA 50  09/13/2019    RBCUA 0 TO 2 09/13/2019    EPITHUA 2 TO 5 09/13/2019    LEUKOCYTESUR MODERATE (A) 09/13/2019    SPECGRAV 1.025 (H) 09/13/2019    GLUCOSEU TRACE (A) 09/13/2019    KETUA NEGATIVE 09/13/2019    PROTEINU TRACE (A) 09/13/2019    HGBUR NEGATIVE 09/13/2019    CASTUA NOT REPORTED 09/13/2019    CRYSTUA NOT REPORTED 09/13/2019    BACTERIA 1+ (A) 09/13/2019    YEAST NOT REPORTED 09/13/2019       Ct Abdomen Pelvis W Wo Contrast Additional Contrast? Radiologist Recommendation    Result Date: 9/13/2019  EXAMINATION: CT OF THE ABDOMEN AND PELVIS WITH CONTRAST 9/13/2019 2:17 pm TECHNIQUE: CT of the abdomen and pelvis was performed with the administration of intravenous contrast. Multiplanar reformatted images are provided for review. Dose modulation, iterative reconstruction, and/or weight based adjustment of the mA/kV was utilized to reduce the radiation dose to as low as reasonably achievable. COMPARISON: CT abdomen and pelvis March 31, 2016. HISTORY: ORDERING SYSTEM PROVIDED HISTORY: Chronic RUQ pain TECHNOLOGIST PROVIDED HISTORY: RUQ pain, history of splenic aneurysm FINDINGS: Lower Chest: No acute findings. No pericardial or pleural effusions. Organs: Diffuse hepatic steatosis. Gallbladder has been removed. Portal vein, spleen, pancreas and adrenal glands all appear grossly unremarkable. 1.3 cm partially calcified splenic artery aneurysm at the level the splenic hilum. 2 nonobstructing left renal calculi versus vascular calcifications are noted largest measuring 3 mm. No obstructive uropathy is seen. Abdominal aorta demonstrates moderate calcification without aneurysm. GI/Bowel: Small hiatal hernia. Distal stomach appears grossly unremarkable. Small bowel appears nondilated. Fat is seen within the wall of the cecum and terminal ileum suggestive of prior colitis/ileitis. No acute colonic abnormality is seen. Appendix is not visualized. Pelvis: Uterus has been removed. No adnexal mass. Urinary bladder is grossly unremarkable. Peritoneum/Retroperitoneum: No free air, free fluid or lymphadenopathy. Bones/Soft Tissues: Abdominal wall demonstrates mild skin thickening along the patient's pannus. No fluid collection is seen. Osseous structures demonstrate degenerative change. 1.  No acute intra-abdominal process. 2. 2 nonobstructing renal calculi versus vascular calcifications are noted involving the left kidney, largest measuring 3 mm. No CT evidence of obstructive uropathy. 3. 1.3 cm partially calcified splenic artery aneurysm. 4. Small hiatal hernia. 5. Fat is seen within the wall of the cecum/terminal ileum suggestive of prior colitis/ileitis. 6. Hepatic steatosis. Discharge Diagnoses:    Principal Problem:    Sepsis due to abdominal wall cellulitis secondary to DM  Active Problems:    Chronic left shoulder pain    Complex regional pain syndrome type 1 of left lower extremity    Diabetic autonomic neuropathy associated with type 1 diabetes mellitus (HCC)    Cellulitis, abdominal wall  Resolved Problems:    * No resolved hospital problems.  *      Active Hospital Problems    Diagnosis Date Noted    Sepsis due to abdominal wall cellulitis secondary to DM [A41.9] 09/17/2019    Cellulitis, abdominal wall [L03.311] 09/14/2019    Chronic left shoulder pain [M25.512, G89.29] 06/25/2018    Complex regional pain syndrome type 1 of left lower extremity [G90.522] 06/25/2018    Diabetic autonomic neuropathy associated with type 1 diabetes mellitus ([facility] [address] [E10.43] 06/25/2018       Discharge Medications:       Debera Males   Home Medication Instructions [facility]:[id number]    Printed on:09/17/19 0919   Medication Information                      atorvastatin (LIPITOR) 40 MG tablet  Take 1 tablet by mouth daily             cephALEXin (KEFLEX) 500 MG capsule  Take 1 capsule by mouth 4 times daily for 10 days docusate sodium (COLACE, DULCOLAX) 100 MG CAPS  Take 100 mg by mouth 2 times daily             escitalopram (LEXAPRO) 10 MG tablet  Take 1 tablet by mouth daily             gabapentin (NEURONTIN) 600 MG tablet  Take 900 mg by mouth 3 times daily. Takes 1 1/2 tabs TID. glipiZIDE (GLUCOTROL XL) 10 MG extended release tablet  Take 1 tablet by mouth daily             HYDROcodone-acetaminophen (NORCO) 5-325 MG per tablet  Take 1 tablet by mouth every 8 hours as needed for Pain for up to 4 days. insulin glargine (BASAGLAR KWIKPEN) 100 UNIT/ML injection pen  Inject 50 Units into the skin nightly             Lactobacillus (PROBIOTIC ACIDOPHILUS) TABS  Take 1 tablet by mouth daily             lidocaine-prilocaine (EMLA) 2.5-2.5 % cream  Apply topically as needed              Liraglutide (VICTOZA) 18 MG/3ML SOPN SC injection  Inject 1.2 mg into the skin daily             lisinopril (PRINIVIL;ZESTRIL) 2.5 MG tablet  TAKE 1 TABLET DAILY             omeprazole (PRILOSEC) 20 MG delayed release capsule  TAKE ONE CAPSULE BY MOUTH DAILY             polyethylene glycol (GLYCOLAX) packet  Take 17 g by mouth daily             ranitidine (ZANTAC) 150 MG tablet  Take 1 tablet by mouth 2 times daily                 Patient Instructions:    Activity: activity as tolerated  Diet: diabetic diet  Wound Care: keep wound clean and dry  Other: none     Disposition:   Discharge to Home    Follow up:  Patient will be followed by DI Candelaria CNP in 1 week    CORE MEASURES on Discharge (if applicable)  ACE/ARB in CHF: NA  Statin in MI: NA  ASA in MI: NA  Statin in CVA: NA  Antiplatelet in CVA: NA    Total time spent on discharge services: 30 minutes    Including the following activities:  Evaluation and Management of patient  Discussion with patient and/or surrogate about current care plan  Coordination with Case Management and/or   Coordination of care with Consultants (if applicable)   Coordination

## 2019-09-17 NOTE — PLAN OF CARE
Problem: Falls - Risk of:  Goal: Will remain free from falls  Description  Will remain free from falls  Outcome: Ongoing  Note:   Fall risk assessment done and patient is a high risk for falls. Alarms on as needed for patient safety. Patient being monitored on a regular basis. No falls noted at this time. Goal: Absence of physical injury  Description  Absence of physical injury  Outcome: Ongoing     Problem: Health Behavior:  Goal: Compliance with treatment plan for underlying cause of condition will improve  Description  Compliance with treatment plan for underlying cause of condition will improve  Outcome: Ongoing  Goal: Identification of resources available to assist in meeting health care needs will improve  Description  Identification of resources available to assist in meeting health care needs will improve  Outcome: Ongoing     Problem: Fluid Volume:  Goal: Maintenance of adequate hydration will improve  Description  Maintenance of adequate hydration will improve  Outcome: Ongoing  Note:   NS running at 75mL/hr, pt tolerating PO fluids. Will continue to monitor. Problem: Urinary Elimination:  Goal: Skin integrity will improve  Description  Skin integrity will improve  Outcome: Ongoing  Note:   Pt able to void appropriately. Will continue to monitor. Goal: Ability to recognize the need to void and respond appropriately will improve  Description  Ability to recognize the need to void and respond appropriately will improve  Outcome: Ongoing  Goal: Will remain free from infection  Description  Will remain free from infection  Outcome: Ongoing     Problem: Pain:  Goal: Pain level will decrease  Description  Pain level will decrease  Outcome: Ongoing  Note:   Pain assessed routinely and as needed with a 0-10 scale. PRN pain medication given as appropriate per orders.  Pain reassessed within an hour, will continue to monitor    Goal: Control of acute pain  Description  Control of acute pain  Outcome:

## 2019-09-18 ENCOUNTER — TELEPHONE (OUTPATIENT)
Dept: PRIMARY CARE CLINIC | Age: 54
End: 2019-09-18

## 2019-09-18 LAB
CULTURE: NORMAL
CULTURE: NORMAL
Lab: NORMAL
Lab: NORMAL
SPECIMEN DESCRIPTION: NORMAL
SPECIMEN DESCRIPTION: NORMAL

## 2019-09-24 ENCOUNTER — OFFICE VISIT (OUTPATIENT)
Dept: PRIMARY CARE CLINIC | Age: 54
End: 2019-09-24
Payer: COMMERCIAL

## 2019-09-24 VITALS
HEART RATE: 90 BPM | BODY MASS INDEX: 37.68 KG/M2 | TEMPERATURE: 97 F | SYSTOLIC BLOOD PRESSURE: 138 MMHG | DIASTOLIC BLOOD PRESSURE: 72 MMHG | WEIGHT: 219.5 LBS | OXYGEN SATURATION: 98 % | RESPIRATION RATE: 24 BRPM

## 2019-09-24 DIAGNOSIS — J20.9 ACUTE BRONCHITIS, UNSPECIFIED ORGANISM: Primary | ICD-10-CM

## 2019-09-24 LAB — HBA1C MFR BLD: 8.3 %

## 2019-09-24 PROCEDURE — 2022F DILAT RTA XM EVC RTNOPTHY: CPT | Performed by: NURSE PRACTITIONER

## 2019-09-24 PROCEDURE — 1036F TOBACCO NON-USER: CPT | Performed by: NURSE PRACTITIONER

## 2019-09-24 PROCEDURE — 3017F COLORECTAL CA SCREEN DOC REV: CPT | Performed by: NURSE PRACTITIONER

## 2019-09-24 PROCEDURE — G8417 CALC BMI ABV UP PARAM F/U: HCPCS | Performed by: NURSE PRACTITIONER

## 2019-09-24 PROCEDURE — 99214 OFFICE O/P EST MOD 30 MIN: CPT | Performed by: NURSE PRACTITIONER

## 2019-09-24 PROCEDURE — G8427 DOCREV CUR MEDS BY ELIG CLIN: HCPCS | Performed by: NURSE PRACTITIONER

## 2019-09-24 PROCEDURE — 3045F PR MOST RECENT HEMOGLOBIN A1C LEVEL 7.0-9.0%: CPT | Performed by: NURSE PRACTITIONER

## 2019-09-24 PROCEDURE — 83036 HEMOGLOBIN GLYCOSYLATED A1C: CPT | Performed by: NURSE PRACTITIONER

## 2019-09-24 PROCEDURE — 1111F DSCHRG MED/CURRENT MED MERGE: CPT | Performed by: NURSE PRACTITIONER

## 2019-09-24 RX ORDER — PREDNISONE 20 MG/1
40 TABLET ORAL DAILY
Qty: 6 TABLET | Refills: 0 | Status: SHIPPED | OUTPATIENT
Start: 2019-09-24 | End: 2019-09-27

## 2019-09-24 RX ORDER — AZITHROMYCIN 250 MG/1
250 TABLET, FILM COATED ORAL SEE ADMIN INSTRUCTIONS
Qty: 6 TABLET | Refills: 0 | Status: SHIPPED | OUTPATIENT
Start: 2019-09-24 | End: 2019-09-29

## 2019-09-24 RX ORDER — ALBUTEROL SULFATE 90 UG/1
2 AEROSOL, METERED RESPIRATORY (INHALATION) 4 TIMES DAILY PRN
Qty: 3 INHALER | Refills: 1 | Status: ON HOLD
Start: 2019-09-24 | End: 2020-07-19

## 2019-09-24 ASSESSMENT — ENCOUNTER SYMPTOMS
WHEEZING: 1
COUGH: 1
TROUBLE SWALLOWING: 0
VOMITING: 0
DIARRHEA: 0
NAUSEA: 0
SHORTNESS OF BREATH: 1
HEMOPTYSIS: 0
RHINORRHEA: 0
SORE THROAT: 0
HEARTBURN: 0
ABDOMINAL PAIN: 0

## 2019-09-24 NOTE — PROGRESS NOTES
lifestyle, stress and post-menopausal. Current diabetic treatment includes oral agent (monotherapy) and insulin injections (GLP-1). She is compliant with treatment all of the time. Her weight is stable. She is following a generally healthy diet. Meal planning includes avoidance of concentrated sweets. She has had a previous visit with a dietitian. She rarely participates in exercise. Her home blood glucose trend is decreasing steadily. Her breakfast blood glucose range is generally 140-180 mg/dl. An ACE inhibitor/angiotensin II receptor blocker is being taken. She does not see a podiatrist.Eye exam is current. Past Medical History:   Diagnosis Date    Anxiety     Arthritis     Depression     Diabetes mellitus (HCC)     Diabetic neuropathy (HCC)     Esophageal reflux     Heart burn     Limitation of joint motion     Muscle weakness     Sleep apnea     DOES NOT USE A MACHINE.  DIAGNOSED > 15 YEARS AGO WITH SLEEP APNEA    Sleepiness         Current Outpatient Medications   Medication Sig Dispense Refill    albuterol sulfate  (90 Base) MCG/ACT inhaler Inhale 2 puffs into the lungs 4 times daily as needed for Wheezing 3 Inhaler 1    azithromycin (ZITHROMAX) 250 MG tablet Take 1 tablet by mouth See Admin Instructions for 5 days 500mg on day 1 followed by 250mg on days 2 - 5 6 tablet 0    predniSONE (DELTASONE) 20 MG tablet Take 2 tablets by mouth daily for 3 days 6 tablet 0    docusate sodium (COLACE, DULCOLAX) 100 MG CAPS Take 100 mg by mouth 2 times daily      cephALEXin (KEFLEX) 500 MG capsule Take 1 capsule by mouth 4 times daily for 10 days 40 capsule 0    escitalopram (LEXAPRO) 10 MG tablet Take 1 tablet by mouth daily 90 tablet 0    insulin glargine (BASAGLAR KWIKPEN) 100 UNIT/ML injection pen Inject 50 Units into the skin nightly 5 pen 5    Liraglutide (VICTOZA) 18 MG/3ML SOPN SC injection Inject 1.2 mg into the skin daily 3 pen 5    omeprazole (PRILOSEC) 20 MG delayed release capsule TAKE ONE CAPSULE BY MOUTH DAILY (Patient taking differently: Take 20 mg by mouth Daily ) 90 capsule 3    ranitidine (ZANTAC) 150 MG tablet Take 1 tablet by mouth 2 times daily 180 tablet 1    glipiZIDE (GLUCOTROL XL) 10 MG extended release tablet Take 1 tablet by mouth daily 90 tablet 3    atorvastatin (LIPITOR) 40 MG tablet Take 1 tablet by mouth daily 90 tablet 3    lisinopril (PRINIVIL;ZESTRIL) 2.5 MG tablet TAKE 1 TABLET DAILY (Patient taking differently: Take 2.5 mg by mouth daily TAKE 1 TABLET DAILY) 90 tablet 3    gabapentin (NEURONTIN) 600 MG tablet Take 900 mg by mouth 3 times daily. Takes 1 1/2 tabs TID.  lidocaine-prilocaine (EMLA) 2.5-2.5 % cream Apply topically as needed       Lactobacillus (PROBIOTIC ACIDOPHILUS) TABS Take 1 tablet by mouth daily (Patient not taking: Reported on 9/24/2019) 30 tablet 0     No current facility-administered medications for this visit. Allergies   Allergen Reactions    Codeine Other (See Comments)     Migraine    Meperidine     Other Itching and Dermatitis     METAL       Subjective:      Review of Systems   Constitutional: Positive for chills and fever. Negative for fatigue and weight loss. HENT: Positive for congestion and postnasal drip. Negative for ear pain, rhinorrhea, sore throat and trouble swallowing. Respiratory: Positive for cough, shortness of breath and wheezing. Negative for hemoptysis. Cardiovascular: Negative for chest pain. Gastrointestinal: Negative for abdominal pain, diarrhea, heartburn, nausea and vomiting. Endocrine: Negative for polydipsia, polyphagia and polyuria. Genitourinary: Negative for difficulty urinating and dysuria. Musculoskeletal: Negative for myalgias. Skin: Negative for rash. Neurological: Negative for headaches. Objective:     Physical Exam   Constitutional: She is oriented to person, place, and time. She appears well-developed and well-nourished. No distress.    HENT:   Right Ear: agreement. Return if symptoms worsen or fail to improve. Orders Placed This Encounter   Medications    albuterol sulfate  (90 Base) MCG/ACT inhaler     Sig: Inhale 2 puffs into the lungs 4 times daily as needed for Wheezing     Dispense:  3 Inhaler     Refill:  1    azithromycin (ZITHROMAX) 250 MG tablet     Sig: Take 1 tablet by mouth See Admin Instructions for 5 days 500mg on day 1 followed by 250mg on days 2 - 5     Dispense:  6 tablet     Refill:  0    predniSONE (DELTASONE) 20 MG tablet     Sig: Take 2 tablets by mouth daily for 3 days     Dispense:  6 tablet     Refill:  0          All patient questions answered. Pt voiced understanding.       Electronically signed by DI Walter CNP on 9/24/2019 at 5:12 PM

## 2019-10-07 ENCOUNTER — TELEPHONE (OUTPATIENT)
Dept: PRIMARY CARE CLINIC | Age: 54
End: 2019-10-07

## 2019-10-22 ENCOUNTER — TELEPHONE (OUTPATIENT)
Dept: PRIMARY CARE CLINIC | Age: 54
End: 2019-10-22

## 2019-11-06 ENCOUNTER — TELEPHONE (OUTPATIENT)
Dept: PRIMARY CARE CLINIC | Age: 54
End: 2019-11-06

## 2019-11-21 ENCOUNTER — TELEPHONE (OUTPATIENT)
Dept: PRIMARY CARE CLINIC | Age: 54
End: 2019-11-21

## 2019-12-02 DIAGNOSIS — F34.1 DYSTHYMIA: ICD-10-CM

## 2019-12-03 RX ORDER — ESCITALOPRAM OXALATE 10 MG/1
TABLET ORAL
Qty: 90 TABLET | Refills: 0 | Status: SHIPPED | OUTPATIENT
Start: 2019-12-03 | End: 2020-03-12 | Stop reason: SDUPTHER

## 2020-01-09 ENCOUNTER — OFFICE VISIT (OUTPATIENT)
Dept: PRIMARY CARE CLINIC | Age: 55
End: 2020-01-09
Payer: COMMERCIAL

## 2020-01-09 VITALS
BODY MASS INDEX: 38.29 KG/M2 | HEIGHT: 64 IN | DIASTOLIC BLOOD PRESSURE: 77 MMHG | TEMPERATURE: 97 F | WEIGHT: 224.3 LBS | RESPIRATION RATE: 17 BRPM | HEART RATE: 90 BPM | SYSTOLIC BLOOD PRESSURE: 115 MMHG

## 2020-01-09 PROCEDURE — G8482 FLU IMMUNIZE ORDER/ADMIN: HCPCS | Performed by: NURSE PRACTITIONER

## 2020-01-09 PROCEDURE — G8417 CALC BMI ABV UP PARAM F/U: HCPCS | Performed by: NURSE PRACTITIONER

## 2020-01-09 PROCEDURE — G8427 DOCREV CUR MEDS BY ELIG CLIN: HCPCS | Performed by: NURSE PRACTITIONER

## 2020-01-09 PROCEDURE — 3017F COLORECTAL CA SCREEN DOC REV: CPT | Performed by: NURSE PRACTITIONER

## 2020-01-09 PROCEDURE — 1036F TOBACCO NON-USER: CPT | Performed by: NURSE PRACTITIONER

## 2020-01-09 PROCEDURE — 99213 OFFICE O/P EST LOW 20 MIN: CPT | Performed by: NURSE PRACTITIONER

## 2020-01-09 RX ORDER — GUAIFENESIN 600 MG/1
600 TABLET, EXTENDED RELEASE ORAL 2 TIMES DAILY
Qty: 30 TABLET | Refills: 0 | Status: SHIPPED | OUTPATIENT
Start: 2020-01-09 | End: 2020-01-24

## 2020-01-09 RX ORDER — AMOXICILLIN AND CLAVULANATE POTASSIUM 875; 125 MG/1; MG/1
1 TABLET, FILM COATED ORAL 2 TIMES DAILY
Qty: 14 TABLET | Refills: 0 | Status: SHIPPED | OUTPATIENT
Start: 2020-01-09 | End: 2020-01-16

## 2020-01-09 RX ORDER — FLUTICASONE PROPIONATE 50 MCG
1 SPRAY, SUSPENSION (ML) NASAL DAILY
Qty: 1 BOTTLE | Refills: 0 | Status: SHIPPED | OUTPATIENT
Start: 2020-01-09 | End: 2022-03-01

## 2020-01-09 ASSESSMENT — ENCOUNTER SYMPTOMS
ABDOMINAL PAIN: 0
WHEEZING: 0
TROUBLE SWALLOWING: 0
COUGH: 1
SINUS PAIN: 1
SORE THROAT: 1
EYE ITCHING: 0
RHINORRHEA: 1
EYE DISCHARGE: 0
SINUS PRESSURE: 1
EYE PAIN: 0

## 2020-01-09 NOTE — PATIENT INSTRUCTIONS
SURVEY:    You may be receiving a survey from [employer] regarding your visit today. Please complete the survey to enable us to provide the highest quality of care to you and your family. If you cannot score us a very good on any question, please call the office to discuss how we could have made your experience a very good one. Thank you. Patient Education        Sinusitis: Care Instructions  Your Care Instructions    Sinusitis is an infection of the lining of the sinus cavities in your head. Sinusitis often follows a cold. It causes pain and pressure in your head and face. In most cases, sinusitis gets better on its own in 1 to 2 weeks. But some mild symptoms may last for several weeks. Sometimes antibiotics are needed. Follow-up care is a key part of your treatment and safety. Be sure to make and go to all appointments, and call your doctor if you are having problems. It's also a good idea to know your test results and keep a list of the medicines you take. How can you care for yourself at home? · Take an over-the-counter pain medicine, such as acetaminophen (Tylenol), ibuprofen (Advil, Motrin), or naproxen (Aleve). Read and follow all instructions on the label. · If the doctor prescribed antibiotics, take them as directed. Do not stop taking them just because you feel better. You need to take the full course of antibiotics. · Be careful when taking over-the-counter cold or flu medicines and Tylenol at the same time. Many of these medicines have acetaminophen, which is Tylenol. Read the labels to make sure that you are not taking more than the recommended dose. Too much acetaminophen (Tylenol) can be harmful. · Breathe warm, moist air from a steamy shower, a hot bath, or a sink filled with hot water. Avoid cold, dry air. Using a humidifier in your home may help. Follow the directions for cleaning the machine.   · Use saline (saltwater) nasal washes to help keep your nasal passages open and wash

## 2020-01-09 NOTE — PROGRESS NOTES
(90 Base) MCG/ACT inhaler Inhale 2 puffs into the lungs 4 times daily as needed for Wheezing 3 Inhaler 1    docusate sodium (COLACE, DULCOLAX) 100 MG CAPS Take 100 mg by mouth 2 times daily      Liraglutide (VICTOZA) 18 MG/3ML SOPN SC injection Inject 1.2 mg into the skin daily 3 pen 5    omeprazole (PRILOSEC) 20 MG delayed release capsule TAKE ONE CAPSULE BY MOUTH DAILY (Patient taking differently: Take 20 mg by mouth Daily ) 90 capsule 3    ranitidine (ZANTAC) 150 MG tablet Take 1 tablet by mouth 2 times daily 180 tablet 1    glipiZIDE (GLUCOTROL XL) 10 MG extended release tablet Take 1 tablet by mouth daily 90 tablet 3    atorvastatin (LIPITOR) 40 MG tablet Take 1 tablet by mouth daily 90 tablet 3    lisinopril (PRINIVIL;ZESTRIL) 2.5 MG tablet TAKE 1 TABLET DAILY (Patient taking differently: Take 2.5 mg by mouth daily TAKE 1 TABLET DAILY) 90 tablet 3    gabapentin (NEURONTIN) 600 MG tablet Take 900 mg by mouth 3 times daily. Takes 1 1/2 tabs TID.  lidocaine-prilocaine (EMLA) 2.5-2.5 % cream Apply topically as needed        No current facility-administered medications for this visit. Allergies   Allergen Reactions    Codeine Other (See Comments)     Migraine    Meperidine     Other Itching and Dermatitis     METAL       Subjective:      Review of Systems   Constitutional: Positive for fatigue. Negative for activity change, chills, diaphoresis and fever. HENT: Positive for congestion, ear pain, postnasal drip, rhinorrhea, sinus pressure, sinus pain, sneezing and sore throat. Negative for trouble swallowing. Yellow discharge with blowing nose. Eyes: Negative for pain, discharge and itching. Respiratory: Positive for cough (worse in AM, intermittent productive). Negative for chest tightness, shortness of breath and wheezing. Gastrointestinal: Negative for abdominal pain. Genitourinary: Negative for dysuria. Neurological: Positive for headaches.        Objective: Physical Exam  Vitals signs and nursing note reviewed. Constitutional:       General: She is not in acute distress. Appearance: Normal appearance. She is not ill-appearing, toxic-appearing or diaphoretic. HENT:      Right Ear: No middle ear effusion. Tympanic membrane is not erythematous or bulging. Left Ear: A middle ear effusion is present. Tympanic membrane is not erythematous or bulging. Nose: Mucosal edema, congestion and rhinorrhea present. Right Turbinates: Swollen. Left Turbinates: Swollen. Right Sinus: Frontal sinus tenderness present. No maxillary sinus tenderness. Left Sinus: Frontal sinus tenderness present. No maxillary sinus tenderness. Mouth/Throat:      Mouth: Mucous membranes are moist.      Pharynx: Posterior oropharyngeal erythema present. No oropharyngeal exudate. Eyes:      General:         Right eye: No discharge. Left eye: No discharge. Conjunctiva/sclera: Conjunctivae normal.   Neck:      Musculoskeletal: Normal range of motion and neck supple. Cardiovascular:      Rate and Rhythm: Normal rate and regular rhythm. Heart sounds: No murmur. Pulmonary:      Effort: Pulmonary effort is normal. No respiratory distress. Breath sounds: Normal breath sounds. No wheezing or rhonchi. Lymphadenopathy:      Cervical: No cervical adenopathy. Neurological:      General: No focal deficit present. Mental Status: She is alert and oriented to person, place, and time. Psychiatric:         Mood and Affect: Mood normal.         Behavior: Behavior normal.       /77 (Site: Right Upper Arm, Position: Sitting, Cuff Size: Medium Adult)   Pulse 90   Temp 97 °F (36.1 °C)   Resp 17   Ht 5' 4.02\" (1.626 m)   Wt 224 lb 4.8 oz (101.7 kg)   BMI 38.48 kg/m²     Assessment:      Diagnosis Orders   1.  Acute bacterial sinusitis  amoxicillin-clavulanate (AUGMENTIN) 875-125 MG per tablet    guaiFENesin (MUCINEX) 600 MG extended release tablet    fluticasone (FLONASE) 50 MCG/ACT nasal spray     Patient presents with sinus problem has been going on for the last week without improvement of symptoms despite use of decongestants. This is likely a bacterial sinus infection. Plan:     Exam findings and plan of care discussed at bedside including:    · Start Augmentin-  today; discussed administration and side effects. I have encouraged to take with a probiotic and food to descrease side effects. Examples of probiotics discussed. · Supportive management discussed including: Encouraged to increase fluids and rest, Mucinex/Guaifenesin OTC as directed on package, Nasal saline spray OTC every couple of hours for nasal congestion, Warm facial packs applied to face for 5 to 10 minutes, 3 times per day. Aleve/Ibuprofen/Tylenol OTC PRN for pain, discomfort or fever  · Written instructions provided with AVS.      · To ER or call 911 if any difficulty breathing, shortness of breath, inability to swallow, hives, rash, facial/tongue swelling or temp greater than 103 degrees. · Follow up as needed with PCP if symptoms worsen or do not improve        Return if symptoms worsen or fail to improve. Orders Placed This Encounter   Medications    amoxicillin-clavulanate (AUGMENTIN) 875-125 MG per tablet     Sig: Take 1 tablet by mouth 2 times daily for 7 days     Dispense:  14 tablet     Refill:  0    guaiFENesin (MUCINEX) 600 MG extended release tablet     Sig: Take 1 tablet by mouth 2 times daily for 15 days     Dispense:  30 tablet     Refill:  0    fluticasone (FLONASE) 50 MCG/ACT nasal spray     Si spray by Each Nostril route daily     Dispense:  1 Bottle     Refill:  0          All patient questions answered. Pt voiced understanding.       Electronically signed by DI Casey CNP on 1/10/2020 at 9:59 AM

## 2020-01-10 ASSESSMENT — ENCOUNTER SYMPTOMS
CHEST TIGHTNESS: 0
SHORTNESS OF BREATH: 0

## 2020-03-03 ENCOUNTER — TELEPHONE (OUTPATIENT)
Dept: PRIMARY CARE CLINIC | Age: 55
End: 2020-03-03

## 2020-03-04 NOTE — TELEPHONE ENCOUNTER
Attempted to call pt and inform her that Lantus was sent to the pharmacy for her and no answer and voice mail not set up.
Prescription for lantus sent.
[employer] and they do not know what will be covered until something is ordered and they try to run it through insurance.
with long-term current use of insulin ([facility])     Vitamin D deficiency     Obstructive sleep apnea     Sepsis due to pneumonia ([facility]     Positive FIT (fecal immunochemical test)     Dysthymia     Dyspepsia     Bilateral leg and foot pain     Cervical radiculitis     Chronic left shoulder pain     Complex regional pain syndrome type 1 of left lower extremity     Cervicalgia     Diabetic autonomic neuropathy associated with type 1 diabetes mellitus ([facility]     Encounter for long-term opiate analgesic use     Ocular hypertension of left eye     Regular astigmatism, bilateral     Vitreous floaters of both eyes     Cellulitis, abdominal wall     Sepsis due to abdominal wall cellulitis secondary to DM

## 2020-03-12 ENCOUNTER — OFFICE VISIT (OUTPATIENT)
Dept: PRIMARY CARE CLINIC | Age: 55
End: 2020-03-12
Payer: COMMERCIAL

## 2020-03-12 VITALS
BODY MASS INDEX: 38.6 KG/M2 | HEIGHT: 64 IN | TEMPERATURE: 96.2 F | DIASTOLIC BLOOD PRESSURE: 63 MMHG | RESPIRATION RATE: 18 BRPM | WEIGHT: 226.1 LBS | HEART RATE: 81 BPM | SYSTOLIC BLOOD PRESSURE: 136 MMHG

## 2020-03-12 LAB — HBA1C MFR BLD: 12.7 %

## 2020-03-12 PROCEDURE — 2022F DILAT RTA XM EVC RTNOPTHY: CPT | Performed by: NURSE PRACTITIONER

## 2020-03-12 PROCEDURE — G8427 DOCREV CUR MEDS BY ELIG CLIN: HCPCS | Performed by: NURSE PRACTITIONER

## 2020-03-12 PROCEDURE — 1036F TOBACCO NON-USER: CPT | Performed by: NURSE PRACTITIONER

## 2020-03-12 PROCEDURE — 3046F HEMOGLOBIN A1C LEVEL >9.0%: CPT | Performed by: NURSE PRACTITIONER

## 2020-03-12 PROCEDURE — G8482 FLU IMMUNIZE ORDER/ADMIN: HCPCS | Performed by: NURSE PRACTITIONER

## 2020-03-12 PROCEDURE — 99214 OFFICE O/P EST MOD 30 MIN: CPT | Performed by: NURSE PRACTITIONER

## 2020-03-12 PROCEDURE — 83036 HEMOGLOBIN GLYCOSYLATED A1C: CPT | Performed by: NURSE PRACTITIONER

## 2020-03-12 PROCEDURE — 3017F COLORECTAL CA SCREEN DOC REV: CPT | Performed by: NURSE PRACTITIONER

## 2020-03-12 PROCEDURE — G8417 CALC BMI ABV UP PARAM F/U: HCPCS | Performed by: NURSE PRACTITIONER

## 2020-03-12 RX ORDER — OMEPRAZOLE 40 MG/1
40 CAPSULE, DELAYED RELEASE ORAL DAILY
Qty: 90 CAPSULE | Refills: 1 | Status: SHIPPED | OUTPATIENT
Start: 2020-03-12 | End: 2020-10-13

## 2020-03-12 RX ORDER — LIRAGLUTIDE 6 MG/ML
1.2 INJECTION SUBCUTANEOUS DAILY
Qty: 3 PEN | Refills: 5 | Status: SHIPPED | OUTPATIENT
Start: 2020-03-12 | End: 2021-03-09

## 2020-03-12 RX ORDER — ATORVASTATIN CALCIUM 40 MG/1
40 TABLET, FILM COATED ORAL DAILY
Qty: 90 TABLET | Refills: 3 | Status: SHIPPED | OUTPATIENT
Start: 2020-03-12 | End: 2021-06-28

## 2020-03-12 RX ORDER — ESCITALOPRAM OXALATE 20 MG/1
20 TABLET ORAL DAILY
Qty: 90 TABLET | Refills: 3 | Status: SHIPPED | OUTPATIENT
Start: 2020-03-12 | End: 2021-03-29

## 2020-03-12 RX ORDER — LISINOPRIL 2.5 MG/1
TABLET ORAL
Qty: 90 TABLET | Refills: 3 | Status: SHIPPED | OUTPATIENT
Start: 2020-03-12 | End: 2021-04-15

## 2020-03-12 RX ORDER — RANITIDINE 150 MG/1
150 TABLET ORAL 2 TIMES DAILY
Qty: 180 TABLET | Refills: 1 | Status: ON HOLD | OUTPATIENT
Start: 2020-03-12 | End: 2020-07-19

## 2020-03-12 RX ORDER — INSULIN GLARGINE 100 [IU]/ML
50 INJECTION, SOLUTION SUBCUTANEOUS NIGHTLY
Qty: 5 PEN | Refills: 3 | Status: SHIPPED | OUTPATIENT
Start: 2020-03-12 | End: 2020-08-26 | Stop reason: SDUPTHER

## 2020-03-12 RX ORDER — GLIPIZIDE 10 MG/1
10 TABLET, FILM COATED, EXTENDED RELEASE ORAL DAILY
Qty: 90 TABLET | Refills: 3 | Status: SHIPPED | OUTPATIENT
Start: 2020-03-12 | End: 2021-04-15

## 2020-03-12 ASSESSMENT — ENCOUNTER SYMPTOMS
NAUSEA: 0
BLURRED VISION: 0
DIARRHEA: 0
VISUAL CHANGE: 0
ABDOMINAL PAIN: 0
RHINORRHEA: 0
BACK PAIN: 0
SHORTNESS OF BREATH: 0
VOMITING: 0
SORE THROAT: 0
COUGH: 0
WHEEZING: 0
CONSTIPATION: 0
HEARTBURN: 0

## 2020-03-12 NOTE — PROGRESS NOTES
Name: Pat Lewis  : 1965         Chief Complaint:     Chief Complaint   Patient presents with    Diabetes     Routine office visit. States \"wants to see about switching some medicines that insurance won't pay for anymore. \"     Mental Health Problem       History of Present Illness:      Pat Lewis is a 48 y.o.  female who presents with Diabetes (Routine office visit. States \"wants to see about switching some medicines that insurance won't pay for anymore. \" ) and [address] is here today for a routine office visit. DM-worsening, A1c greater than 12 in the office, patient is not taking insulin at this time. Patient states there was confusion with her coverage and she has not had Basaglar for quite some time. We did send a prescription for Lantus which is covered on  however she has not picked the medication up yet. Diabetes   She presents for her follow-up diabetic visit. She has type 2 diabetes mellitus. Her disease course has been worsening. Hypoglycemia symptoms include nervousness/anxiousness. Pertinent negatives for hypoglycemia include no confusion, dizziness, headaches, seizures, sleepiness, speech difficulty or sweats. Associated symptoms include foot paresthesias. Pertinent negatives for diabetes include no blurred vision, no chest pain, no fatigue, no polydipsia, no polyphagia, no polyuria and no visual change. There are no hypoglycemic complications. Symptoms are stable. Diabetic complications include peripheral neuropathy. Pertinent negatives for diabetic complications include no CVA, heart disease or nephropathy. Risk factors for coronary artery disease include diabetes mellitus and post-menopausal. Current diabetic treatment includes insulin injections and oral agent (monotherapy) (GLP-1). She is compliant with treatment some of the time. Her weight is stable. She is following a generally healthy diet.  Meal planning includes avoidance of concentrated sweets. She has had a previous visit with a dietitian. She rarely participates in exercise. Her home blood glucose trend is increasing steadily. Her breakfast blood glucose range is generally >200 mg/dl. An ACE inhibitor/angiotensin II receptor blocker is being taken. She does not see a podiatrist.Eye exam is not current. Mental Health Problem   The primary symptoms include dysphoric mood and somatic symptoms. The primary symptoms do not include delusions, hallucinations, bizarre behavior, disorganized speech or negative symptoms. The current episode started more than 1 month ago. This is a chronic problem. The somatic symptoms began more than 1 month ago. The somatic symptoms have been worsening since their onset. The symptoms are moderate. Somatic symptoms do not include fatigue, headaches, abdominal pain, heartburn, constipation, back pain or myalgias. The onset of the illness is precipitated by emotional stress. The degree of incapacity that she is experiencing as a consequence of her illness is moderate. Sequelae of the illness include harmed interpersonal relations. Additional symptoms of the illness include insomnia and distractible. Additional symptoms of the illness do not include anhedonia, hypersomnia, appetite change, unexpected weight change, fatigue, agitation, psychomotor retardation, feelings of worthlessness, attention impairment, euphoric mood, increased goal-directed activity, flight of ideas, inflated self-esteem, decreased need for sleep, poor judgment, visual change, headaches, abdominal pain or seizures. She does not admit to suicidal ideas. She does not have a plan to commit suicide. She does not contemplate harming herself. She has not already injured self. She does not contemplate injuring another person. She has not already  injured another person. Risk factors that are present for mental illness include a history of mental illness. Hyperlipidemia   This is a chronic problem.  The current episode started more than 1 year ago. The problem is controlled. Recent lipid tests were reviewed and are normal. Exacerbating diseases include diabetes and obesity. She has no history of chronic renal disease, hypothyroidism, liver disease or nephrotic syndrome. Factors aggravating her hyperlipidemia include fatty foods and smoking. Pertinent negatives include no chest pain, leg pain, myalgias or shortness of breath. Current antihyperlipidemic treatment includes statins. The current treatment provides moderate improvement of lipids. Compliance problems include adherence to exercise and adherence to diet. Risk factors for coronary artery disease include diabetes mellitus, dyslipidemia, family history, obesity, hypertension, post-menopausal, a sedentary lifestyle and stress. Past Medical History:     Past Medical History:   Diagnosis Date    Anxiety     Arthritis     Depression     Diabetes mellitus ([address]     Diabetic neuropathy (HCC)     Esophageal reflux     Heart burn     Limitation of joint motion     Muscle weakness     Sleep apnea     DOES NOT USE A MACHINE.  DIAGNOSED > 15 YEARS AGO WITH SLEEP APNEA    Sleepiness       Reviewed all health maintenance requirements and ordered appropriate tests  Health Maintenance Due   Topic Date Due    Shingles Vaccine (1 of 2) 2017    A1C test (Diabetic or Prediabetic)  2019    Diabetic microalbuminuria test  2020    Lipid screen  2020    Diabetic foot exam  2020    Breast cancer screen  02/15/2020       Past Surgical History:     Past Surgical History:   Procedure Laterality Date    ABSCESS DRAINAGE Left     BUTTOCK    APPENDECTOMY      CARPAL TUNNEL RELEASE Left 2018    CATARACT REMOVAL WITH IMPLANT Bilateral 10/24/2016     SECTION      CHOLECYSTECTOMY      COLONOSCOPY  2019    Dr. Dinesh Cannon -normal poor prep    COLONOSCOPY N/A 3/25/2019    COLORECTAL CANCER SCREENING, NOT HIGH RISK performed by Kenia Garcia MD at [facility] COLONOSCOPY N/A 4/10/2019    -diverticulosis,hemorrhoids,lipoma at ileocecal valve    HERNIA REPAIR Right     [facility]    HERNIA REPAIR  2006    HERNIA REPAIR WITH HYSTERECTOMY SURGERY    HYSTERECTOMY      LAPAROSCOPY          Medications:       Prior to Admission medications    Medication Sig Start Date End Date Taking? Authorizing Provider   insulin glargine (LANTUS SOLOSTAR) 100 UNIT/ML injection pen Inject 50 Units into the skin nightly 3/12/20  Yes DI Henderson - CNP   escitalopram (LEXAPRO) 20 MG tablet Take 1 tablet by mouth daily 3/12/20  Yes DI Henderson - CNP   raNITIdine (ZANTAC) 150 MG tablet Take 1 tablet by mouth 2 times daily 3/12/20  Yes DI Henderson - CNP   omeprazole (PRILOSEC) 40 MG delayed release capsule Take 1 capsule by mouth Daily 3/12/20  Yes DI Henderson - MARI   Liraglutide (VICTOZA) 18 MG/3ML SOPN SC injection Inject 1.2 mg into the skin daily 3/12/20  Yes DI Henderson - MARI   glipiZIDE (GLUCOTROL XL) 10 MG extended release tablet Take 1 tablet by mouth daily 3/12/20  Yes DI Henderson - CNP   atorvastatin (LIPITOR) 40 MG tablet Take 1 tablet by mouth daily 3/12/20  Yes DI Henderson - MARI   lisinopril (PRINIVIL;ZESTRIL) 2.5 MG tablet TAKE 1 TABLET DAILY 3/12/20  Yes DI Henderson - CNP   fluticasone (FLONASE) 50 MCG/ACT nasal spray 1 spray by Each Nostril route daily 1/9/20  Yes DI Rice CNP   albuterol sulfate  (90 Base) MCG/ACT inhaler Inhale 2 puffs into the lungs 4 times daily as needed for Wheezing 9/24/19  Yes DI Henderson - CNP   docusate sodium (COLACE, DULCOLAX) 100 MG CAPS Take 100 mg by mouth 2 times daily 9/17/19  Yes Mert Combs PA-C   gabapentin (NEURONTIN) 600 MG tablet Take 900 mg by mouth 3 times daily.  Takes 1 1/2 tabs TID. 1/17/19  Yes Historical Provider, MD   lidocaine-prilocaine (EMLA) 2.5-2.5 % cream Apply topically as needed 11/8/18  Yes Historical Provider, MD        Allergies:       Codeine; Meperidine; and Other    Social History:     Tobacco:    reports that she quit smoking about 8 years ago. She has never used smokeless tobacco.  Alcohol:      reports no history of alcohol use. Drug Use:  reports no history of drug use. Family History:     Family History   Problem Relation Age of Onset    Diabetes Mother     High Cholesterol Mother     Hypertension Mother     Heart Disease Mother     Diabetes Father     Heart Disease Father     High Cholesterol Father     Hypertension Father     Diabetes Sister        Review of Systems:     Positive and Negative as described in HPI    Review of Systems   Constitutional: Negative for appetite change, chills, fatigue, fever and unexpected weight change. HENT: Negative for congestion, rhinorrhea and sore throat. Eyes: Negative for blurred vision and visual disturbance. Respiratory: Negative for cough, shortness of breath and wheezing. Cardiovascular: Negative for chest pain and palpitations. Gastrointestinal: Negative for abdominal pain, constipation, diarrhea, heartburn, nausea and vomiting. Endocrine: Negative for polydipsia, polyphagia and polyuria. Genitourinary: Negative for difficulty urinating and dysuria. Musculoskeletal: Negative for back pain, gait problem and myalgias. Skin: Negative for rash. Neurological: Negative for dizziness, seizures, syncope, speech difficulty and headaches. Psychiatric/Behavioral: Positive for dysphoric mood. Negative for agitation, behavioral problems, confusion, decreased concentration, hallucinations, self-injury, sleep disturbance and suicidal ideas. The patient is nervous/anxious and has insomnia. The patient is not hyperactive.         Physical Exam:   Vitals:  /63 (Site: Left Upper Arm, Position: Sitting, Cuff Size: Large Adult)   Pulse 81   Temp 96.2 °F (35.7 °C) (Temporal)   Resp 18   Ht 5' 4\" (1.626 m)   Wt 226 lb 1.6 oz (102.6 kg)   BMI 38.81 kg/m²     Physical Exam  Vitals signs and nursing note reviewed. Constitutional:       General: She is not in acute distress. Appearance: Normal appearance. She is well-developed. She is obese. HENT:      Mouth/Throat:      Mouth: Mucous membranes are moist.   Eyes:      General: No scleral icterus. Neck:      Musculoskeletal: Normal range of motion and neck supple. Cardiovascular:      Rate and Rhythm: Normal rate and regular rhythm. Pulmonary:      Effort: Pulmonary effort is normal.      Breath sounds: Normal breath sounds. No wheezing. Abdominal:      General: Bowel sounds are normal. There is no distension. Palpations: Abdomen is soft. Tenderness: There is no abdominal tenderness. Musculoskeletal:      Right lower leg: Edema (Trace pitting) present. Left lower leg: Edema (Trace pitting) present. Lymphadenopathy:      Cervical: No cervical adenopathy. Skin:     General: Skin is warm and dry. Findings: No rash. Neurological:      Mental Status: She is alert and oriented to person, place, and time. Psychiatric:         Attention and Perception: She is attentive. Mood and Affect: Mood is anxious. Mood is not depressed. Speech: Speech normal.         Behavior: Behavior normal.         Thought Content: Thought content normal. Thought content does not include homicidal or suicidal ideation. Thought content does not include homicidal or suicidal plan.          Judgment: Judgment normal.         Data:     Lab Results   Component Value Date     09/17/2019    K 3.7 09/17/2019     09/17/2019    CO2 25 09/17/2019    BUN 6 09/17/2019    CREATININE 0.43 09/17/2019    GLUCOSE 150 09/17/2019    GLUCOSE 351 03/11/2012    PROT 6.3 09/17/2019    LABALBU 2.8 09/17/2019    BILITOT 0.36 09/17/2019    ALKPHOS 137 09/17/2019    AST 26 09/17/2019    ALT 50 09/17/2019     Lab Results   Component Value Date    WBC 8.1 09/17/2019 RBC 3.58 09/17/2019    RBC 5.34 03/11/2012    HGB 10.4 09/17/2019    HCT 33.0 09/17/2019    MCV 92.2 09/17/2019    MCH 29.1 09/17/2019    MCHC 31.5 09/17/2019    RDW 13.5 09/17/2019     09/17/2019     03/11/2012    MPV 10.1 09/17/2019     Lab Results   Component Value Date    TSH 0.88 02/06/2013     Lab Results   Component Value Date    CHOL 153 02/05/2019    HDL 26 02/05/2019    LABA1C 12.7 03/12/2020       Assessment/Plan:      Diagnosis Orders   1. Uncontrolled type 2 diabetes mellitus with diabetic polyneuropathy, with long-term current use of insulin ([facility])   DIABETES FOOT EXAM    insulin glargine (LANTUS SOLOSTAR) 100 UNIT/ML injection pen    Liraglutide (VICTOZA) 18 MG/3ML SOPN SC injection    glipiZIDE (GLUCOTROL XL) 10 MG extended release tablet    lisinopril (PRINIVIL;ZESTRIL) 2.5 MG tablet    POCT glycosylated hemoglobin (Hb A1C)    CBC Auto Differential    ALT    AST    Basic Metabolic Panel    Lipid Panel    Microalbumin, Ur   2. Dyslipidemia  atorvastatin (LIPITOR) 40 MG tablet   3. Dysthymia  escitalopram (LEXAPRO) 20 MG tablet   4. Encounter for screening mammogram for breast cancer  [facility] DIGITAL SCREEN W CAD BILATERAL     We will need her to restart Lantus ASAP. We will reevaluate in 3 months. Patient is preparing for weight loss surgery but is unable to be cleared with her A1c at this point. 1.  Lacie received counseling on the following healthy behaviors: nutrition, exercise and medication adherence  2. Patient given educational materials - see patient instructions  3. Was a self-tracking handout given in paper form or via [employer]? No  If yes, see orders or list here. 4.  Discussed use, benefit, and side effects of prescribed medications. Barriers to medication compliance addressed. All patient questions answered. Pt voiced understanding. 5.  Reviewed prior labs and health maintenance  6. Continue current medications, diet and exercise.     Completed Refills Requested Prescriptions     Signed Prescriptions Disp Refills    insulin glargine (LANTUS SOLOSTAR) 100 UNIT/ML injection pen 5 pen 3     Sig: Inject 50 Units into the skin nightly    escitalopram (LEXAPRO) 20 MG tablet 90 tablet 3     Sig: Take 1 tablet by mouth daily    raNITIdine (ZANTAC) 150 MG tablet 180 tablet 1     Sig: Take 1 tablet by mouth 2 times daily    omeprazole (PRILOSEC) 40 MG delayed release capsule 90 capsule 1     Sig: Take 1 capsule by mouth Daily    Liraglutide (VICTOZA) 18 MG/3ML SOPN SC injection 3 pen 5     Sig: Inject 1.2 mg into the skin daily    glipiZIDE (GLUCOTROL XL) 10 MG extended release tablet 90 tablet 3     Sig: Take 1 tablet by mouth daily    atorvastatin (LIPITOR) 40 MG tablet 90 tablet 3     Sig: Take 1 tablet by mouth daily    lisinopril (PRINIVIL;ZESTRIL) 2.5 MG tablet 90 tablet 3     Sig: TAKE 1 TABLET DAILY         Return in about 3 months (around 6/12/2020) for Checkup.

## 2020-03-20 ENCOUNTER — TELEPHONE (OUTPATIENT)
Dept: PRIMARY CARE CLINIC | Age: 55
End: 2020-03-20

## 2020-03-27 ENCOUNTER — TELEPHONE (OUTPATIENT)
Dept: PRIMARY CARE CLINIC | Age: 55
End: 2020-03-27

## 2020-04-06 ENCOUNTER — TELEPHONE (OUTPATIENT)
Dept: PRIMARY CARE CLINIC | Age: 55
End: 2020-04-06

## 2020-04-20 ENCOUNTER — TELEPHONE (OUTPATIENT)
Dept: PRIMARY CARE CLINIC | Age: 55
End: 2020-04-20

## 2020-04-21 ENCOUNTER — TELEPHONE (OUTPATIENT)
Dept: PRIMARY CARE CLINIC | Age: 55
End: 2020-04-21

## 2020-05-19 ENCOUNTER — TELEPHONE (OUTPATIENT)
Dept: PRIMARY CARE CLINIC | Age: 55
End: 2020-05-19

## 2020-06-01 ENCOUNTER — TELEPHONE (OUTPATIENT)
Dept: PRIMARY CARE CLINIC | Age: 55
End: 2020-06-01

## 2020-06-15 ENCOUNTER — TELEPHONE (OUTPATIENT)
Dept: PRIMARY CARE CLINIC | Age: 55
End: 2020-06-15

## 2020-06-17 ENCOUNTER — TELEPHONE (OUTPATIENT)
Dept: PRIMARY CARE CLINIC | Age: 55
End: 2020-06-17

## 2020-06-17 NOTE — TELEPHONE ENCOUNTER
Patient has notified numerous times for labs and reschedule appointment. Patient canceled appointment 6/11/2020. No up coming appointment. Cancel labs?

## 2020-07-07 ENCOUNTER — TELEPHONE (OUTPATIENT)
Dept: PRIMARY CARE CLINIC | Age: 55
End: 2020-07-07

## 2020-07-18 ENCOUNTER — HOSPITAL ENCOUNTER (INPATIENT)
Age: 55
LOS: 3 days | Discharge: HOME | DRG: 111 | End: 2020-07-21
Attending: FAMILY MEDICINE | Admitting: FAMILY MEDICINE
Payer: COMMERCIAL

## 2020-07-18 LAB
ABSOLUTE EOS #: 0.13 K/UL (ref 0–0.44)
ABSOLUTE IMMATURE GRANULOCYTE: 0.03 K/UL (ref 0–0.3)
ABSOLUTE LYMPH #: 3.76 K/UL (ref 1.1–3.7)
ABSOLUTE MONO #: 0.66 K/UL (ref 0.1–1.2)
ANION GAP SERPL CALCULATED.3IONS-SCNC: 12 MMOL/L (ref 9–17)
BASOPHILS # BLD: 1 % (ref 0–2)
BASOPHILS ABSOLUTE: 0.05 K/UL (ref 0–0.2)
BUN BLDV-MCNC: 17 MG/DL (ref 6–20)
BUN/CREAT BLD: 24 (ref 9–20)
CALCIUM SERPL-MCNC: 9.5 MG/DL (ref 8.6–10.4)
CHLORIDE BLD-SCNC: 101 MMOL/L (ref 98–107)
CO2: 23 MMOL/L (ref 20–31)
CREAT SERPL-MCNC: 0.71 MG/DL (ref 0.5–0.9)
DIFFERENTIAL TYPE: ABNORMAL
EOSINOPHILS RELATIVE PERCENT: 1 % (ref 1–4)
GFR AFRICAN AMERICAN: >60 ML/MIN
GFR NON-AFRICAN AMERICAN: >60 ML/MIN
GFR SERPL CREATININE-BSD FRML MDRD: ABNORMAL ML/MIN/{1.73_M2}
GFR SERPL CREATININE-BSD FRML MDRD: ABNORMAL ML/MIN/{1.73_M2}
GLUCOSE BLD-MCNC: 271 MG/DL (ref 70–99)
HCT VFR BLD CALC: 46.2 % (ref 36.3–47.1)
HEMOGLOBIN: 15 G/DL (ref 11.9–15.1)
IMMATURE GRANULOCYTES: 0 %
LYMPHOCYTES # BLD: 39 % (ref 24–43)
MCH RBC QN AUTO: 29.2 PG (ref 25.2–33.5)
MCHC RBC AUTO-ENTMCNC: 32.5 G/DL (ref 28.4–34.8)
MCV RBC AUTO: 90.1 FL (ref 82.6–102.9)
MONOCYTES # BLD: 7 % (ref 3–12)
NRBC AUTOMATED: 0 PER 100 WBC
PDW BLD-RTO: 13 % (ref 11.8–14.4)
PLATELET # BLD: 248 K/UL (ref 138–453)
PLATELET ESTIMATE: ABNORMAL
PMV BLD AUTO: 10.1 FL (ref 8.1–13.5)
POTASSIUM SERPL-SCNC: 4.2 MMOL/L (ref 3.7–5.3)
RBC # BLD: 5.13 M/UL (ref 3.95–5.11)
RBC # BLD: ABNORMAL 10*6/UL
SEG NEUTROPHILS: 52 % (ref 36–65)
SEGMENTED NEUTROPHILS ABSOLUTE COUNT: 5.05 K/UL (ref 1.5–8.1)
SODIUM BLD-SCNC: 136 MMOL/L (ref 135–144)
WBC # BLD: 9.7 K/UL (ref 3.5–11.3)
WBC # BLD: ABNORMAL 10*3/UL

## 2020-07-18 PROCEDURE — 6370000000 HC RX 637 (ALT 250 FOR IP): Performed by: FAMILY MEDICINE

## 2020-07-18 PROCEDURE — 2580000003 HC RX 258: Performed by: PHYSICIAN ASSISTANT

## 2020-07-18 PROCEDURE — 96375 TX/PRO/DX INJ NEW DRUG ADDON: CPT

## 2020-07-18 PROCEDURE — 6360000002 HC RX W HCPCS: Performed by: PHYSICIAN ASSISTANT

## 2020-07-18 PROCEDURE — 1200000000 HC SEMI PRIVATE

## 2020-07-18 PROCEDURE — 2580000003 HC RX 258: Performed by: FAMILY MEDICINE

## 2020-07-18 PROCEDURE — 93005 ELECTROCARDIOGRAM TRACING: CPT | Performed by: FAMILY MEDICINE

## 2020-07-18 PROCEDURE — 85025 COMPLETE CBC W/AUTO DIFF WBC: CPT

## 2020-07-18 PROCEDURE — 80048 BASIC METABOLIC PNL TOTAL CA: CPT

## 2020-07-18 PROCEDURE — 99284 EMERGENCY DEPT VISIT MOD MDM: CPT

## 2020-07-18 PROCEDURE — 6370000000 HC RX 637 (ALT 250 FOR IP): Performed by: PHYSICIAN ASSISTANT

## 2020-07-18 PROCEDURE — G0378 HOSPITAL OBSERVATION PER HR: HCPCS

## 2020-07-18 PROCEDURE — 96361 HYDRATE IV INFUSION ADD-ON: CPT

## 2020-07-18 PROCEDURE — 36415 COLL VENOUS BLD VENIPUNCTURE: CPT

## 2020-07-18 PROCEDURE — 96374 THER/PROPH/DIAG INJ IV PUSH: CPT

## 2020-07-18 RX ORDER — LORAZEPAM 2 MG/ML
1 INJECTION INTRAMUSCULAR ONCE
Status: COMPLETED | OUTPATIENT
Start: 2020-07-18 | End: 2020-07-18

## 2020-07-18 RX ORDER — SODIUM CHLORIDE 0.9 % (FLUSH) 0.9 %
10 SYRINGE (ML) INJECTION EVERY 12 HOURS SCHEDULED
Status: DISCONTINUED | OUTPATIENT
Start: 2020-07-18 | End: 2020-07-21 | Stop reason: HOSPADM

## 2020-07-18 RX ORDER — FAMOTIDINE 10 MG
20 TABLET ORAL 2 TIMES DAILY
Status: DISCONTINUED | OUTPATIENT
Start: 2020-07-18 | End: 2020-07-21 | Stop reason: HOSPADM

## 2020-07-18 RX ORDER — POLYETHYLENE GLYCOL 3350 17 G/17G
17 POWDER, FOR SOLUTION ORAL DAILY PRN
Status: DISCONTINUED | OUTPATIENT
Start: 2020-07-18 | End: 2020-07-21 | Stop reason: HOSPADM

## 2020-07-18 RX ORDER — 0.9 % SODIUM CHLORIDE 0.9 %
1000 INTRAVENOUS SOLUTION INTRAVENOUS ONCE
Status: COMPLETED | OUTPATIENT
Start: 2020-07-18 | End: 2020-07-18

## 2020-07-18 RX ORDER — MECLIZINE HYDROCHLORIDE 25 MG/1
25 TABLET ORAL 3 TIMES DAILY PRN
Qty: 15 TABLET | Refills: 0 | Status: SHIPPED | OUTPATIENT
Start: 2020-07-18 | End: 2020-07-28

## 2020-07-18 RX ORDER — SODIUM CHLORIDE, SODIUM LACTATE, POTASSIUM CHLORIDE, CALCIUM CHLORIDE 600; 310; 30; 20 MG/100ML; MG/100ML; MG/100ML; MG/100ML
INJECTION, SOLUTION INTRAVENOUS CONTINUOUS
Status: DISCONTINUED | OUTPATIENT
Start: 2020-07-18 | End: 2020-07-21 | Stop reason: HOSPADM

## 2020-07-18 RX ORDER — ACETAMINOPHEN 650 MG/1
650 SUPPOSITORY RECTAL EVERY 6 HOURS PRN
Status: DISCONTINUED | OUTPATIENT
Start: 2020-07-18 | End: 2020-07-21 | Stop reason: HOSPADM

## 2020-07-18 RX ORDER — ONDANSETRON 4 MG/1
4 TABLET, ORALLY DISINTEGRATING ORAL ONCE
Status: COMPLETED | OUTPATIENT
Start: 2020-07-18 | End: 2020-07-18

## 2020-07-18 RX ORDER — ONDANSETRON 4 MG/1
4 TABLET, ORALLY DISINTEGRATING ORAL EVERY 8 HOURS PRN
Status: DISCONTINUED | OUTPATIENT
Start: 2020-07-18 | End: 2020-07-21 | Stop reason: HOSPADM

## 2020-07-18 RX ORDER — SODIUM CHLORIDE 0.9 % (FLUSH) 0.9 %
10 SYRINGE (ML) INJECTION PRN
Status: DISCONTINUED | OUTPATIENT
Start: 2020-07-18 | End: 2020-07-21 | Stop reason: HOSPADM

## 2020-07-18 RX ORDER — DIPHENHYDRAMINE HYDROCHLORIDE 50 MG/ML
25 INJECTION INTRAMUSCULAR; INTRAVENOUS ONCE
Status: COMPLETED | OUTPATIENT
Start: 2020-07-18 | End: 2020-07-18

## 2020-07-18 RX ORDER — ONDANSETRON 4 MG/1
4 TABLET, FILM COATED ORAL 3 TIMES DAILY PRN
Qty: 9 TABLET | Refills: 0 | Status: SHIPPED | OUTPATIENT
Start: 2020-07-18 | End: 2020-08-26 | Stop reason: ALTCHOICE

## 2020-07-18 RX ORDER — MECLIZINE HCL 12.5 MG/1
25 TABLET ORAL ONCE
Status: COMPLETED | OUTPATIENT
Start: 2020-07-18 | End: 2020-07-18

## 2020-07-18 RX ORDER — ACETAMINOPHEN 325 MG/1
650 TABLET ORAL EVERY 6 HOURS PRN
Status: DISCONTINUED | OUTPATIENT
Start: 2020-07-18 | End: 2020-07-21 | Stop reason: HOSPADM

## 2020-07-18 RX ORDER — ONDANSETRON 2 MG/ML
4 INJECTION INTRAMUSCULAR; INTRAVENOUS EVERY 6 HOURS PRN
Status: DISCONTINUED | OUTPATIENT
Start: 2020-07-18 | End: 2020-07-21 | Stop reason: HOSPADM

## 2020-07-18 RX ADMIN — ONDANSETRON 4 MG: 4 TABLET, ORALLY DISINTEGRATING ORAL at 18:06

## 2020-07-18 RX ADMIN — FAMOTIDINE 20 MG: 10 TABLET ORAL at 23:47

## 2020-07-18 RX ADMIN — DIPHENHYDRAMINE HYDROCHLORIDE 25 MG: 50 INJECTION, SOLUTION INTRAMUSCULAR; INTRAVENOUS at 19:26

## 2020-07-18 RX ADMIN — SODIUM CHLORIDE 1000 ML: 9 INJECTION, SOLUTION INTRAVENOUS at 19:26

## 2020-07-18 RX ADMIN — MECLIZINE 25 MG: 12.5 TABLET ORAL at 18:06

## 2020-07-18 RX ADMIN — SODIUM CHLORIDE, POTASSIUM CHLORIDE, SODIUM LACTATE AND CALCIUM CHLORIDE: 600; 310; 30; 20 INJECTION, SOLUTION INTRAVENOUS at 23:48

## 2020-07-18 RX ADMIN — LORAZEPAM 1 MG: 2 INJECTION INTRAMUSCULAR; INTRAVENOUS at 19:26

## 2020-07-18 ASSESSMENT — PAIN SCALES - GENERAL
PAINLEVEL_OUTOF10: 3
PAINLEVEL_OUTOF10: 7

## 2020-07-18 ASSESSMENT — PAIN DESCRIPTION - ONSET: ONSET: ON-GOING

## 2020-07-18 ASSESSMENT — PAIN DESCRIPTION - PROGRESSION: CLINICAL_PROGRESSION: GRADUALLY IMPROVING

## 2020-07-18 ASSESSMENT — PAIN DESCRIPTION - PAIN TYPE
TYPE: ACUTE PAIN
TYPE: CHRONIC PAIN

## 2020-07-18 ASSESSMENT — PAIN DESCRIPTION - ORIENTATION
ORIENTATION: LEFT
ORIENTATION: LEFT

## 2020-07-18 ASSESSMENT — PAIN DESCRIPTION - DESCRIPTORS
DESCRIPTORS: ACHING
DESCRIPTORS: THROBBING

## 2020-07-18 ASSESSMENT — PAIN DESCRIPTION - FREQUENCY: FREQUENCY: INTERMITTENT

## 2020-07-18 ASSESSMENT — PAIN DESCRIPTION - LOCATION: LOCATION: EAR

## 2020-07-18 ASSESSMENT — PAIN - FUNCTIONAL ASSESSMENT: PAIN_FUNCTIONAL_ASSESSMENT: PREVENTS OR INTERFERES SOME ACTIVE ACTIVITIES AND ADLS

## 2020-07-18 NOTE — ED PROVIDER NOTES
[facility] ED  EMERGENCY DEPARTMENT ENCOUNTER      Pt Name: Dileep Luu  MRN: [id number]  [place] 1965  Date of evaluation: 2020  Provider: Terrence Elmore PA-C    CHIEF COMPLAINT       Chief Complaint   Patient presents with    Dizziness     Onset 2 days ago, history of vertigo with nausea    Otalgia     Onset 2 days ago, bilateral, worse on left       HISTORY OF PRESENT ILLNESS    Dileep Luu is a 48 y.o. female who presents to the emergency department from home 2 days ago started with bilateral ear pain left worse than right she has had a history of recurrent ear infections but yesterday started with nausea and vertigo any movement of her head causes a sense of spinning she states that she suffers from motion sickness but this is more severe. She has previously had positional vertigo treated with meclizine 4 years ago but states it seems worse today denies numbness tingling or weakness of extremities chest pain or palpitations headache confusion or disorientation. Triage notes and Nursing notes were reviewed by myself. Any discrepancies are addressed above. PAST MEDICAL HISTORY     Past Medical History:   Diagnosis Date    Anxiety     Arthritis     Depression     Diabetes mellitus ([address]     Diabetic neuropathy (HCC)     Esophageal reflux     Heart burn     Limitation of joint motion     Muscle weakness     Sleep apnea     DOES NOT USE A MACHINE.  DIAGNOSED > 15 YEARS AGO WITH SLEEP APNEA    Sleepiness     Vertigo        SURGICAL HISTORY       Past Surgical History:   Procedure Laterality Date    ABSCESS DRAINAGE Left     BUTTOCK    APPENDECTOMY      CARPAL TUNNEL RELEASE Left 2018    CATARACT REMOVAL WITH IMPLANT Bilateral 10/24/2016     SECTION      CHOLECYSTECTOMY      COLONOSCOPY  2019    Dr. Charles Miller -normal poor prep    COLONOSCOPY N/A 3/25/2019    COLORECTAL CANCER SCREENING, NOT HIGH RISK performed by Andreas Wagoner MD at [facility]    COLONOSCOPY N/A 4/10/2019    -diverticulosis,hemorrhoids,lipoma at ileocecal valve    HERNIA REPAIR Right     [facility]    HERNIA REPAIR  2006    HERNIA REPAIR WITH HYSTERECTOMY SURGERY    HYSTERECTOMY      LAPAROSCOPY         CURRENT MEDICATIONS       Previous Medications    ALBUTEROL SULFATE  (90 BASE) MCG/ACT INHALER    Inhale 2 puffs into the lungs 4 times daily as needed for Wheezing    ATORVASTATIN (LIPITOR) 40 MG TABLET    Take 1 tablet by mouth daily    DOCUSATE SODIUM (COLACE, DULCOLAX) 100 MG CAPS    Take 100 mg by mouth 2 times daily    ESCITALOPRAM (LEXAPRO) 20 MG TABLET    Take 1 tablet by mouth daily    FLUTICASONE (FLONASE) 50 MCG/ACT NASAL SPRAY    1 spray by Each Nostril route daily    GABAPENTIN (NEURONTIN) 600 MG TABLET    Take 900 mg by mouth 3 times daily. Takes 1 1/2 tabs TID.     GLIPIZIDE (GLUCOTROL XL) 10 MG EXTENDED RELEASE TABLET    Take 1 tablet by mouth daily    INSULIN GLARGINE (LANTUS SOLOSTAR) 100 UNIT/ML INJECTION PEN    Inject 50 Units into the skin nightly    LIDOCAINE-PRILOCAINE (EMLA) 2.5-2.5 % CREAM    Apply topically as needed     LIRAGLUTIDE (VICTOZA) 18 MG/3ML SOPN SC INJECTION    Inject 1.2 mg into the skin daily    LISINOPRIL (PRINIVIL;ZESTRIL) 2.5 MG TABLET    TAKE 1 TABLET DAILY    OMEPRAZOLE (PRILOSEC) 40 MG DELAYED RELEASE CAPSULE    Take 1 capsule by mouth Daily    RANITIDINE (ZANTAC) 150 MG TABLET    Take 1 tablet by mouth 2 times daily       ALLERGIES     Codeine; Meperidine; and Other    FAMILY HISTORY       Family History   Problem Relation Age of Onset    Diabetes Mother     High Cholesterol Mother     Hypertension Mother     Heart Disease Mother     Diabetes Father     Heart Disease Father     High Cholesterol Father     Hypertension Father     Diabetes Sister         SOCIAL HISTORY       Social History     Socioeconomic History    Marital status: Legally      Spouse name: None    Number of children: None    Years of education: None    Highest education level: None   Occupational History    None   Social Needs    Financial resource strain: None    Food insecurity     Worry: None     Inability: None    Transportation needs     Medical: None     Non-medical: None   Tobacco Use    Smoking status: Former Smoker     Last attempt to quit: 2011     Years since quittin.1    Smokeless tobacco: Never Used   Substance and Sexual Activity    Alcohol use: No    Drug use: No    Sexual activity: None   Lifestyle    Physical activity     Days per week: None     Minutes per session: None    Stress: None   Relationships    Social connections     Talks on phone: None     Gets together: None     Attends [employer] service: None     Active member of club or organization: None     Attends meetings of clubs or organizations: None     Relationship status: None    Intimate partner violence     Fear of current or ex partner: None     Emotionally abused: None     Physically abused: None     Forced sexual activity: None   Other Topics Concern    None   Social History Narrative    None       REVIEW OF SYSTEMS     Review of Systems  Except as noted above the remainder of the review of systems was reviewed and is negative. SCREENINGS           PHYSICAL EXAM    (up to 7 for level 4, 8 or more for level 5)     ED Triage Vitals [20 1732]   BP Temp Temp src Pulse Resp SpO2 Height Weight   116/62 98.1 °F (36.7 °C) -- 94 14 98 % 5' 4\" (1.626 m) 225 lb (102.1 kg)       Physical Exam  Active and oriented ×3. Nontoxic. No acute distress. Well-hydrated. Head is atraumatic, facies symmetrical.  Pupils equal round and reactive to light, anterior chambers clear bilaterally, there is lateral nystagmus noted as the patient is experiencing vertigo. Neck is supple. No adenopathy. Respirations nonlabored. Lungs clear to auscultation. No wheezes rales or rhonchi noted. Heart regular rate and rhythm.   No murmur noted  Abdomen positive bowel sounds, no bruit. soft and nontender. No organomegaly or masses noted. No pulsatile masses noted. Skin free of any obvious rashes or lesions. Extremities without edema. No calf tenderness noted. Distal pulses and sensation intact. Good capillary refill noted. No acute neurologic deficit noted. Clear speech. Vertigo reproducible with any range of motion of the head patient cannot tolerate a Hallpike maneuver as it elicits vertigo, laying the patient back in bed when he hits 45 degrees she gets sudden sense of vertigo and cannot lay flat. Good affect. Pleasant patient. DIAGNOSTIC RESULTS     RADIOLOGY: (none if blank)   Interpretation per the Radiologistbelow, if available at the time of this note:    No orders to display       LABS:  Labs Reviewed   CBC WITH AUTO DIFFERENTIAL - Abnormal; Notable for the following components:       Result Value    RBC 5.13 (*)     Absolute Lymph # 3.76 (*)     All other components within normal limits   BASIC METABOLIC PANEL W/ REFLEX TO MG FOR LOW K - Abnormal; Notable for the following components:    Glucose 271 (*)     Bun/Cre Ratio 24 (*)     All other components within normal limits       All other labs were within normal range or not returned as of this dictation. EMERGENCY DEPARTMENT COURSE andMedical Decision Making:     Vitals:    Vitals:    07/18/20 1732   BP: 116/62   Pulse: 94   Resp: 14   Temp: 98.1 °F (36.7 °C)   SpO2: 98%   Weight: 225 lb (102.1 kg)   Height: 5' 4\" (1.626 m)       MDM/     Patient was initially medicated with oral Zofran and meclizine, she states she is feeling better her nausea is gone and while she is sitting up she does not have any sense of vertigo but on attempting to lay the patient flat to attempt a Hallpike maneuver she gets to about 45 degrees and had sudden onset of vertigo again.   My suspicion is for peripheral vertigo given the positional nature with no other neurologic findings at this time my suspicion for acute CVA is low and given the initial evaluation imaging is not indicated at this time. It is currently under my shift the patient has been medicated we are awaiting the response. Please see the attending physician's note for final medical decision making. ED Medications administered this visit:    Medications   LORazepam (ATIVAN) injection 1 mg (has no administration in time range)   diphenhydrAMINE (BENADRYL) injection 25 mg (has no administration in time range)   0.9 % sodium chloride bolus (has no administration in time range)   ondansetron (ZOFRAN-ODT) disintegrating tablet 4 mg (4 mg Oral Given 7/18/20 1806)   meclizine (ANTIVERT) tablet 25 mg (25 mg Oral Given 7/18/20 1806)       CONSULTS: (None if blank)  None    Procedures: (None if blank)       CLINICAL       1.  Benign paroxysmal positional vertigo, unspecified laterality              (Please note that portions of this note were completed with a voice recognition program.  Efforts were made to edit the dictations but occasionallywords are mis-transcribed.)      Junior Rutherford II, PA-C (electronically signed)           Junior Rutherford II, PA-C  07/18/20 1923

## 2020-07-19 ENCOUNTER — APPOINTMENT (OUTPATIENT)
Dept: CT IMAGING | Age: 55
DRG: 111 | End: 2020-07-19
Payer: COMMERCIAL

## 2020-07-19 PROBLEM — R42 ACUTE VERTIGO WITH VOMITING AND INABILITY TO STAND: Status: ACTIVE | Noted: 2020-07-19

## 2020-07-19 PROBLEM — E11.43 DIABETIC AUTONOMIC NEUROPATHY ASSOCIATED WITH TYPE 2 DIABETES MELLITUS (HCC): Status: ACTIVE | Noted: 2018-06-25

## 2020-07-19 PROBLEM — R42 DIZZINESS: Status: ACTIVE | Noted: 2020-07-19

## 2020-07-19 PROBLEM — E11.8 TYPE 2 DIABETES MELLITUS WITH COMPLICATION, WITHOUT LONG-TERM CURRENT USE OF INSULIN (HCC): Status: ACTIVE | Noted: 2017-02-17

## 2020-07-19 PROBLEM — R11.10 ACUTE VERTIGO WITH VOMITING AND INABILITY TO STAND: Status: ACTIVE | Noted: 2020-07-19

## 2020-07-19 PROBLEM — E11.43 DIABETIC AUTONOMIC NEUROPATHY ASSOCIATED WITH TYPE 2 DIABETES MELLITUS (HCC): Chronic | Status: ACTIVE | Noted: 2018-06-25

## 2020-07-19 LAB
EKG ATRIAL RATE: 87 BPM
EKG P AXIS: 67 DEGREES
EKG P-R INTERVAL: 146 MS
EKG Q-T INTERVAL: 384 MS
EKG QRS DURATION: 92 MS
EKG QTC CALCULATION (BAZETT): 462 MS
EKG R AXIS: 19 DEGREES
EKG T AXIS: 38 DEGREES
EKG VENTRICULAR RATE: 87 BPM
ESTIMATED AVERAGE GLUCOSE: 217 MG/DL
GLUCOSE BLD-MCNC: 216 MG/DL (ref 74–100)
GLUCOSE BLD-MCNC: 237 MG/DL (ref 74–100)
GLUCOSE BLD-MCNC: 241 MG/DL (ref 74–100)
GLUCOSE BLD-MCNC: 252 MG/DL (ref 74–100)
HBA1C MFR BLD: 9.2 % (ref 4.8–5.9)
HCT VFR BLD CALC: 40.2 % (ref 36.3–47.1)
HEMOGLOBIN: 13.1 G/DL (ref 11.9–15.1)
MCH RBC QN AUTO: 29.6 PG (ref 25.2–33.5)
MCHC RBC AUTO-ENTMCNC: 32.6 G/DL (ref 28.4–34.8)
MCV RBC AUTO: 90.7 FL (ref 82.6–102.9)
NRBC AUTOMATED: 0 PER 100 WBC
PDW BLD-RTO: 13.1 % (ref 11.8–14.4)
PLATELET # BLD: 199 K/UL (ref 138–453)
PMV BLD AUTO: 9.9 FL (ref 8.1–13.5)
RBC # BLD: 4.43 M/UL (ref 3.95–5.11)
WBC # BLD: 8.5 K/UL (ref 3.5–11.3)

## 2020-07-19 PROCEDURE — 6360000002 HC RX W HCPCS: Performed by: FAMILY MEDICINE

## 2020-07-19 PROCEDURE — 97116 GAIT TRAINING THERAPY: CPT

## 2020-07-19 PROCEDURE — 36415 COLL VENOUS BLD VENIPUNCTURE: CPT

## 2020-07-19 PROCEDURE — 82947 ASSAY GLUCOSE BLOOD QUANT: CPT

## 2020-07-19 PROCEDURE — 70470 CT HEAD/BRAIN W/O & W/DYE: CPT

## 2020-07-19 PROCEDURE — 85027 COMPLETE CBC AUTOMATED: CPT

## 2020-07-19 PROCEDURE — G0378 HOSPITAL OBSERVATION PER HR: HCPCS

## 2020-07-19 PROCEDURE — 93010 ELECTROCARDIOGRAM REPORT: CPT | Performed by: INTERNAL MEDICINE

## 2020-07-19 PROCEDURE — 6360000004 HC RX CONTRAST MEDICATION: Performed by: FAMILY MEDICINE

## 2020-07-19 PROCEDURE — 96375 TX/PRO/DX INJ NEW DRUG ADDON: CPT

## 2020-07-19 PROCEDURE — 97161 PT EVAL LOW COMPLEX 20 MIN: CPT

## 2020-07-19 PROCEDURE — 6370000000 HC RX 637 (ALT 250 FOR IP): Performed by: FAMILY MEDICINE

## 2020-07-19 PROCEDURE — 1200000000 HC SEMI PRIVATE

## 2020-07-19 PROCEDURE — 96372 THER/PROPH/DIAG INJ SC/IM: CPT

## 2020-07-19 PROCEDURE — 2580000003 HC RX 258: Performed by: FAMILY MEDICINE

## 2020-07-19 PROCEDURE — 97110 THERAPEUTIC EXERCISES: CPT

## 2020-07-19 PROCEDURE — 83036 HEMOGLOBIN GLYCOSYLATED A1C: CPT

## 2020-07-19 RX ORDER — ATORVASTATIN CALCIUM 40 MG/1
40 TABLET, FILM COATED ORAL NIGHTLY
Status: DISCONTINUED | OUTPATIENT
Start: 2020-07-19 | End: 2020-07-21 | Stop reason: HOSPADM

## 2020-07-19 RX ORDER — NICOTINE POLACRILEX 4 MG
15 LOZENGE BUCCAL PRN
Status: DISCONTINUED | OUTPATIENT
Start: 2020-07-19 | End: 2020-07-21 | Stop reason: HOSPADM

## 2020-07-19 RX ORDER — GLIPIZIDE 5 MG/1
10 TABLET ORAL NIGHTLY
Status: DISCONTINUED | OUTPATIENT
Start: 2020-07-19 | End: 2020-07-21 | Stop reason: HOSPADM

## 2020-07-19 RX ORDER — GABAPENTIN 300 MG/1
900 CAPSULE ORAL 3 TIMES DAILY
Status: DISCONTINUED | OUTPATIENT
Start: 2020-07-19 | End: 2020-07-21 | Stop reason: HOSPADM

## 2020-07-19 RX ORDER — KETOROLAC TROMETHAMINE 15 MG/ML
15 INJECTION, SOLUTION INTRAMUSCULAR; INTRAVENOUS EVERY 6 HOURS PRN
Status: DISCONTINUED | OUTPATIENT
Start: 2020-07-19 | End: 2020-07-21 | Stop reason: HOSPADM

## 2020-07-19 RX ORDER — LIDOCAINE AND PRILOCAINE 25; 25 MG/G; MG/G
CREAM TOPICAL DAILY PRN
Status: DISCONTINUED | OUTPATIENT
Start: 2020-07-19 | End: 2020-07-21 | Stop reason: HOSPADM

## 2020-07-19 RX ORDER — MECLIZINE HYDROCHLORIDE 25 MG/1
25 TABLET ORAL 3 TIMES DAILY PRN
Status: DISCONTINUED | OUTPATIENT
Start: 2020-07-19 | End: 2020-07-21 | Stop reason: HOSPADM

## 2020-07-19 RX ORDER — DEXTROSE MONOHYDRATE 50 MG/ML
100 INJECTION, SOLUTION INTRAVENOUS PRN
Status: DISCONTINUED | OUTPATIENT
Start: 2020-07-19 | End: 2020-07-21 | Stop reason: HOSPADM

## 2020-07-19 RX ORDER — LISINOPRIL 2.5 MG/1
2.5 TABLET ORAL NIGHTLY
Status: DISCONTINUED | OUTPATIENT
Start: 2020-07-19 | End: 2020-07-21 | Stop reason: HOSPADM

## 2020-07-19 RX ORDER — INSULIN GLARGINE 100 [IU]/ML
50 INJECTION, SOLUTION SUBCUTANEOUS NIGHTLY
Status: DISCONTINUED | OUTPATIENT
Start: 2020-07-19 | End: 2020-07-21 | Stop reason: HOSPADM

## 2020-07-19 RX ORDER — FLUTICASONE PROPIONATE 50 MCG
1 SPRAY, SUSPENSION (ML) NASAL DAILY PRN
Status: DISCONTINUED | OUTPATIENT
Start: 2020-07-19 | End: 2020-07-21 | Stop reason: HOSPADM

## 2020-07-19 RX ORDER — DEXTROSE MONOHYDRATE 25 G/50ML
12.5 INJECTION, SOLUTION INTRAVENOUS PRN
Status: DISCONTINUED | OUTPATIENT
Start: 2020-07-19 | End: 2020-07-21 | Stop reason: HOSPADM

## 2020-07-19 RX ORDER — ESCITALOPRAM OXALATE 5 MG/1
20 TABLET ORAL NIGHTLY
Status: DISCONTINUED | OUTPATIENT
Start: 2020-07-19 | End: 2020-07-21 | Stop reason: HOSPADM

## 2020-07-19 RX ADMIN — INSULIN GLARGINE 50 UNITS: 100 INJECTION, SOLUTION SUBCUTANEOUS at 20:47

## 2020-07-19 RX ADMIN — GABAPENTIN 900 MG: 300 CAPSULE ORAL at 08:59

## 2020-07-19 RX ADMIN — LISINOPRIL 2.5 MG: 2.5 TABLET ORAL at 20:47

## 2020-07-19 RX ADMIN — GLIPIZIDE 10 MG: 5 TABLET ORAL at 01:58

## 2020-07-19 RX ADMIN — MECLIZINE HYDROCHLORIDE 25 MG: 25 TABLET ORAL at 10:59

## 2020-07-19 RX ADMIN — INSULIN LISPRO 2 UNITS: 100 INJECTION, SOLUTION INTRAVENOUS; SUBCUTANEOUS at 17:26

## 2020-07-19 RX ADMIN — INSULIN LISPRO 3 UNITS: 100 INJECTION, SOLUTION INTRAVENOUS; SUBCUTANEOUS at 13:25

## 2020-07-19 RX ADMIN — SODIUM CHLORIDE, POTASSIUM CHLORIDE, SODIUM LACTATE AND CALCIUM CHLORIDE: 600; 310; 30; 20 INJECTION, SOLUTION INTRAVENOUS at 20:57

## 2020-07-19 RX ADMIN — LISINOPRIL 2.5 MG: 2.5 TABLET ORAL at 01:58

## 2020-07-19 RX ADMIN — GABAPENTIN 900 MG: 300 CAPSULE ORAL at 01:57

## 2020-07-19 RX ADMIN — INSULIN GLARGINE 50 UNITS: 100 INJECTION, SOLUTION SUBCUTANEOUS at 02:02

## 2020-07-19 RX ADMIN — ESCITALOPRAM 20 MG: 5 TABLET, FILM COATED ORAL at 20:57

## 2020-07-19 RX ADMIN — ATORVASTATIN CALCIUM 40 MG: 40 TABLET, FILM COATED ORAL at 01:58

## 2020-07-19 RX ADMIN — FAMOTIDINE 20 MG: 10 TABLET ORAL at 09:00

## 2020-07-19 RX ADMIN — ESCITALOPRAM 20 MG: 5 TABLET, FILM COATED ORAL at 01:58

## 2020-07-19 RX ADMIN — IOPAMIDOL 75 ML: 755 INJECTION, SOLUTION INTRAVENOUS at 10:10

## 2020-07-19 RX ADMIN — GLIPIZIDE 10 MG: 5 TABLET ORAL at 20:46

## 2020-07-19 RX ADMIN — GABAPENTIN 900 MG: 300 CAPSULE ORAL at 13:01

## 2020-07-19 RX ADMIN — ENOXAPARIN SODIUM 40 MG: 40 INJECTION, SOLUTION INTRAVENOUS; SUBCUTANEOUS at 08:59

## 2020-07-19 RX ADMIN — ATORVASTATIN CALCIUM 40 MG: 40 TABLET, FILM COATED ORAL at 20:47

## 2020-07-19 RX ADMIN — GABAPENTIN 900 MG: 300 CAPSULE ORAL at 20:46

## 2020-07-19 RX ADMIN — KETOROLAC TROMETHAMINE 15 MG: 15 INJECTION, SOLUTION INTRAMUSCULAR; INTRAVENOUS at 10:59

## 2020-07-19 RX ADMIN — ACETAMINOPHEN 650 MG: 325 TABLET, FILM COATED ORAL at 13:01

## 2020-07-19 RX ADMIN — FAMOTIDINE 20 MG: 10 TABLET ORAL at 20:46

## 2020-07-19 ASSESSMENT — PAIN DESCRIPTION - PAIN TYPE
TYPE: ACUTE PAIN

## 2020-07-19 ASSESSMENT — PAIN DESCRIPTION - FREQUENCY
FREQUENCY: INTERMITTENT
FREQUENCY: CONTINUOUS
FREQUENCY: CONTINUOUS

## 2020-07-19 ASSESSMENT — PAIN SCALES - GENERAL
PAINLEVEL_OUTOF10: 0
PAINLEVEL_OUTOF10: 3
PAINLEVEL_OUTOF10: 6
PAINLEVEL_OUTOF10: 6
PAINLEVEL_OUTOF10: 0
PAINLEVEL_OUTOF10: 6

## 2020-07-19 ASSESSMENT — PAIN DESCRIPTION - PROGRESSION
CLINICAL_PROGRESSION: GRADUALLY IMPROVING
CLINICAL_PROGRESSION: NOT CHANGED
CLINICAL_PROGRESSION: GRADUALLY IMPROVING

## 2020-07-19 ASSESSMENT — PAIN DESCRIPTION - LOCATION
LOCATION: HEAD
LOCATION: HEAD
LOCATION: HEAD;EAR

## 2020-07-19 ASSESSMENT — PAIN DESCRIPTION - ORIENTATION
ORIENTATION: MID

## 2020-07-19 ASSESSMENT — PAIN DESCRIPTION - DESCRIPTORS
DESCRIPTORS: ACHING

## 2020-07-19 ASSESSMENT — PAIN DESCRIPTION - ONSET
ONSET: ON-GOING

## 2020-07-19 NOTE — ED PROVIDER NOTES
Patient was signed out to me at the time of shift change. My evaluation patient symptoms and history were consistent with peripheral vertigo. There is no clinical suspicion for a posterior stroke based on exam and history. Patient was originally comfortable being discharged after receiving Ativan and Benadryl. However she was trying to ambulate and was not able to ambulate and was still having nausea vomiting. Therefore the hospice was spoken to regards to meeting the patient for intractable vomiting, dizziness. At time being mated patient had no acute focal neuro deficits was not hypotensive or tachycardic and otherwise clinically stable.      Manjit Prasad MD  07/18/20 0532

## 2020-07-19 NOTE — PROGRESS NOTES
Call placed to Dr. Jignesh Westbrook for patient's home medications. Doctor gave the ok to order patient's gabapentin 900mg TID,Lexapro 20mg PO nightly, glipizide 10 mg PO nightly, Lantus 50u subQ nightly (pt supplied), Victoza 1.2mg subQ nightly (patient supplied), Atorvastatin 40mg PO nightly, Lisinopril 2.5mg PO nightly, EMLA cream PRN topically (patient supplied), fluticasone 50 mcg 1 spray each nostril daily.

## 2020-07-19 NOTE — PROGRESS NOTES
Patient had incontinence of bowel at this time. Stool is watery/loose yellow stool. Patient assisted in cleaning up. Unable to obtain sample. Went more once in bathroom but was mixed with urine. Will continue to monitor.

## 2020-07-19 NOTE — PLAN OF CARE
Problem: Pain:  Goal: Pain level will decrease  Description: Pain level will decrease  7/19/2020 0355 by Quynh Almanza RN  Outcome: Ongoing  Note: Pain assessed every 4 hours. Patient is able to communicate with staff the need for pain interventions. Patient currently reports pain in ear, and rates it using a 0-10 scale. Will continue to monitor. Problem: Falls - Risk of:  Goal: Will remain free from falls  Description: Will remain free from falls  Outcome: Ongoing  Note: Patient is alert and oriented and has demonstrated the use of using the call light for assistance before getting up. Education has been provided to defer the use of the bed alarm and/or restraint free alarm as the patient has shown competency in calling for assistance and verbalizing the risk. Problem: Falls - Risk of:  Goal: Absence of physical injury  Description: Absence of physical injury  Outcome: Ongoing  Note: Patient free from physical injury at this time.

## 2020-07-19 NOTE — PROGRESS NOTES
Patient informed writer that she takes [facility] at home for diabetic foot pain, and that she would also like to have \"the medicine they gave me in the ER for dizziness\". Patient was referring to meclizine. Will pass on to day nurse.

## 2020-07-19 NOTE — PROGRESS NOTES
Patient brought up to floor via wheelchair with a diagnosis of dizziness and N/V. Patient was able to stand and pivot from chair to bed and tolerated fairly well. Navigator completed, vitals and EKG obtained, bedside tablet activated, and patient shown how to use the call light. Patient reports pain in her ear, rates it at a 3 on a 0-10 scale. Patient is lying on her right side to help the dizziness subside. She is not complaining of any nausea at this time. Will continue to monitor.

## 2020-07-19 NOTE — PROGRESS NOTES
phyPhysical Therapy    Facility/Department: [facility] AT THE [facility] MED SURG  Initial Assessment    NAME: Deena Roberson  : 1965  MRN: [id number]    Date of Service: 2020    Discharge Recommendations:  Continue to assess pending progress        Assessment   Body structures, Functions, Activity limitations: Decreased functional mobility ; Decreased balance;Decreased ADL status; Decreased strength  Assessment: Generalized weakness with dizziness  Treatment Diagnosis: Acute vertigo  Prognosis: Good  Decision Making: Low Complexity  PT Education: Goals;PT Role;Plan of Care;General Safety;Transfer Training;Gait Training  REQUIRES PT FOLLOW UP: Yes  Activity Tolerance  Activity Tolerance: Patient Tolerated treatment well  Activity Tolerance: Limited by dizziness       Patient Diagnosis(es): The primary encounter diagnosis was Benign paroxysmal positional vertigo, unspecified laterality. Diagnoses of Dizziness and Intractable vomiting with nausea, unspecified vomiting type were also pertinent to this visit. has a past medical history of Anxiety, Arthritis, Depression, Diabetes mellitus ([address] Diabetic neuropathy ([address] Esophageal reflux, Heart burn, Limitation of joint motion, Muscle weakness, Sleep apnea, Sleepiness, and Vertigo. has a past surgical history that includes Hysterectomy;  section; Cholecystectomy; Appendectomy; Cataract removal with implant (Bilateral, 10/24/2016); laparoscopy; hernia repair (Right); hernia repair (); Abscess Drainage (Left, ); Carpal tunnel release (Left, 2018); Colonoscopy (2019); Colonoscopy (N/A, 3/25/2019); and Colonoscopy (N/A, 4/10/2019).     Restrictions  Restrictions/Precautions  Restrictions/Precautions: Fall Risk, General Precautions  Vision/Hearing  Vision: Impaired  Vision Exceptions: Wears glasses for reading;Cataracts  Hearing: Within functional limits     Subjective  General  Chart Reviewed: Yes  Family / Caregiver Present: No  Pain Screening  Patient Currently in Pain: Denies          Orientation     Social/Functional History  Social/Functional History  Lives With: Spouse  Type of Home: House  Home Layout: One level  Home Access: Stairs to enter with rails  Cognition        Objective          PROM RLE (degrees)  RLE PROM: WFL  AROM RLE (degrees)  RLE AROM: WFL  PROM LLE (degrees)  LLE PROM: WFL  AROM LLE (degrees)  LLE AROM : WFL  PROM RUE (degrees)  RUE PROM: WFL  AROM RUE (degrees)  RUE AROM : WFL  PROM LUE (degrees)  LUE PROM: WFL  AROM LUE (degrees)  LUE AROM : WFL  Strength Other  Other: Grossly 4/5 with activities        Bed mobility  Rolling to Left: Independent  Rolling to Right: Independent  Supine to Sit: Contact guard assistance  Sit to Supine: Contact guard assistance  Transfers  Sit to Stand: Contact guard assistance  Stand to sit: Contact guard assistance  Ambulation  Ambulation?: Yes  Ambulation 1  Surface: level tile  Device: Rolling Walker  Assistance: Contact guard assistance  Gait Deviations: Decreased step length  Distance: 20 feet     Balance  Sitting - Static: Good  Sitting - Dynamic: Fair;+  Standing - Static: Good  Standing - Dynamic: Fair;+  Exercises  Comments: Tolerated supine and sitting exercises. Plan   Plan  Times per week: 7 times per week  Times per day: Twice a day(except weekends once per day)  Plan weeks: 2 weeks  Current Treatment Recommendations: Strengthening, Transfer Training, Patient/Caregiver Education & Training, Balance Training, Gait Training, Home Exercise Program, Stair training, Functional Mobility Training  Plan Comment: Will be assessed for vertigo at next visit  Safety Devices  Type of devices:  All fall risk precautions in place, Left in chair, Nurse notified, Call light within reach    G-Code       OutComes Score                                                  AM-PAC Score             Goals  Short term goals  Time Frame for Short term goals: 10 days  Short term goal 1: Patient will transfers safely and independently to improve mobility. Short term goal 2: Patient will ambulate with least restrictive device without dizziness and supervision for 50 feet. Short term goal 3: Patient will tolerate 20-30 minutes of ther ex/act to improve functional strength. Short term goal 4: Patient will ascend/descend 4 steps with handrails and supervision to safely enter home.   Patient Goals   Patient goals : Return home       Therapy Time   Individual Concurrent Group Co-treatment   Time In 8512         Time Out 1115         Minutes 30         Timed Code Treatment Minutes: Yosvany Moore PT

## 2020-07-19 NOTE — H&P
reports that she quit smoking about 9 years ago. She has never used smokeless tobacco.  ETOH:   reports no history of alcohol use. OCCUPATION: none    Allergies:  Codeine; Meperidine; and Other    Medications Prior to Admission:    Prior to Admission medications    Medication Sig Start Date End Date Taking? Authorizing Provider   ondansetron (ZOFRAN) 4 MG tablet Take 1 tablet by mouth 3 times daily as needed for Nausea or Vomiting 7/18/20  Yes Avel Zhao MD   meclizine (ANTIVERT) 25 MG tablet Take 1 tablet by mouth 3 times daily as needed for Dizziness 7/18/20 7/28/20 Yes Avel Zhao MD   insulin glargine (LANTUS SOLOSTAR) 100 UNIT/ML injection pen Inject 50 Units into the skin nightly 3/12/20  Yes DI Allan - CNP   escitalopram (LEXAPRO) 20 MG tablet Take 1 tablet by mouth daily 3/12/20  Yes DI Allan - CNP   omeprazole (PRILOSEC) 40 MG delayed release capsule Take 1 capsule by mouth Daily 3/12/20  Yes Sandie Byrd APRN - MARI   Liraglutide (VICTOZA) 18 MG/3ML SOPN SC injection Inject 1.2 mg into the skin daily 3/12/20  Yes DI Allan - MARI   glipiZIDE (GLUCOTROL XL) 10 MG extended release tablet Take 1 tablet by mouth daily 3/12/20  Yes DI Allan - CNP   atorvastatin (LIPITOR) 40 MG tablet Take 1 tablet by mouth daily 3/12/20  Yes Sandie Byrd APRN - MARI   lisinopril (PRINIVIL;ZESTRIL) 2.5 MG tablet TAKE 1 TABLET DAILY  Patient taking differently: Take 2.5 mg by mouth daily TAKE 1 TABLET DAILY 3/12/20  Yes DI Allan CNP   fluticasone (FLONASE) 50 MCG/ACT nasal spray 1 spray by Each Nostril route daily 1/9/20  Yes DI Rice CNP   gabapentin (NEURONTIN) 600 MG tablet Take 900 mg by mouth 3 times daily.  Takes 1 1/2 tabs TID. 1/17/19  Yes Historical Provider, MD   lidocaine-prilocaine (EMLA) 2.5-2.5 % cream Apply topically as needed  11/8/18  Yes Historical Provider, MD       Review of Systems:  Constitutional:negative  for fevers, and negative for chills. Has diffuse headache and dizziness  Eyes: negative for visual disturbance   ENT: negative for sore throat, negative nasal congestion, and negative for earache  Respiratory: negative for shortness of breath, negative for cough, and negative for wheezing  Cardiovascular: negative for chest pain, negative for palpitations, and negative for syncope  Gastrointestinal: negative for abdominal pain, positive for nausea,positive for vomiting, negative for diarrhea, negative for constipation, and negative for hematochezia or melena  Genitourinary: negative for dysuria, negative for urinary urgency, negative for urinary frequency, and negative for hematuria  Skin: negative for skin rash, and negative for skin lesions  Neurological: negative for unilateral weakness, numbness or tingling.     Physical Exam:    Vitals:   Vitals:    07/19/20 1843   BP: (!) 140/78   Pulse: 84   Resp: 20   Temp: 96.9 °F (36.1 °C)   SpO2: 94%     Weight: 222 lb 4.8 oz (100.8 kg)   Height: 5' 4\" (162.6 cm)     GEN:  alert and oriented to person, place and time, well-developed and well-nourished, in no acute distress  EYES: PERRL and has hrizontal nystagmus on lt side  NECK: normal, supple, no lymphadenopathy,  no carotid bruits  PULM: clear to auscultation bilaterally- no wheezes, rales or rhonchi, normal air movement, no respiratory distress  COR: regular rate & rhythm, no murmurs and no gallops  ABD:  soft, non-tender, non-distended, normal bowel sounds, no masses or organomegaly  EXT:   no cyanosis, clubbing or edema present    NEURO: follows commands, COREAS, no deficits  SKIN:  no rashes or significant lesions  -----------------------------------------------------------------  Diagnostic Data:   Lab Results   Component Value Date    WBC 8.5 07/19/2020    HGB 13.1 07/19/2020     07/19/2020       Lab Results   Component Value Date    BUN 17 07/18/2020    CREATININE 0.71 07/18/2020     07/18/2020    K 4.2 07/18/2020    CALCIUM pneumonia ([facility] 11/21/2017    Vitamin D deficiency 04/19/2017    Type 2 diabetes mellitus with complication, without long-term current use of insulin ([facility] 02/17/2017    Nuclear sclerotic cataract of left eye 10/20/2016    Controlled type 2 diabetes mellitus with diabetic polyneuropathy, without long-term current use of insulin ([facility] 09/01/2016       Plan:     · This patient requires inpatient admission because of acute vertigo,not responding to out patient treatment,requiring iv fluids,antaemetics  · Factors affecting the medical complexity of this patient include type 2 dm with neuropathy,bing,headache  · Estimated length of stay is 2 days  ·   · High risk medication monitoring: none    CORE MEASURES  DVT prophylaxis: Lovenox  Decubitus ulcer present on admission: No  CODE STATUS: FULL CODE  Nutrition Status: good   Physical therapy: Yes   Old Charts reviewed: Yes  EKG Reviewed:  Yes  Advance Directive Addressed: Yes  Total time spent in evaluating this patient and formulating plan of care 25 minutes  Lesa Cool MD  7/19/2020, 7:07 PM

## 2020-07-20 LAB
GLUCOSE BLD-MCNC: 121 MG/DL (ref 74–100)
GLUCOSE BLD-MCNC: 243 MG/DL (ref 74–100)
GLUCOSE BLD-MCNC: 250 MG/DL (ref 74–100)

## 2020-07-20 PROCEDURE — G0378 HOSPITAL OBSERVATION PER HR: HCPCS

## 2020-07-20 PROCEDURE — 82947 ASSAY GLUCOSE BLOOD QUANT: CPT

## 2020-07-20 PROCEDURE — 96372 THER/PROPH/DIAG INJ SC/IM: CPT

## 2020-07-20 PROCEDURE — 1200000000 HC SEMI PRIVATE

## 2020-07-20 PROCEDURE — 97112 NEUROMUSCULAR REEDUCATION: CPT

## 2020-07-20 PROCEDURE — 96376 TX/PRO/DX INJ SAME DRUG ADON: CPT

## 2020-07-20 PROCEDURE — 97530 THERAPEUTIC ACTIVITIES: CPT

## 2020-07-20 PROCEDURE — 6360000002 HC RX W HCPCS: Performed by: FAMILY MEDICINE

## 2020-07-20 PROCEDURE — 2580000003 HC RX 258: Performed by: FAMILY MEDICINE

## 2020-07-20 PROCEDURE — 6370000000 HC RX 637 (ALT 250 FOR IP): Performed by: FAMILY MEDICINE

## 2020-07-20 PROCEDURE — 97110 THERAPEUTIC EXERCISES: CPT

## 2020-07-20 PROCEDURE — 97116 GAIT TRAINING THERAPY: CPT

## 2020-07-20 RX ADMIN — MECLIZINE HYDROCHLORIDE 25 MG: 25 TABLET ORAL at 03:01

## 2020-07-20 RX ADMIN — ATORVASTATIN CALCIUM 40 MG: 40 TABLET, FILM COATED ORAL at 22:34

## 2020-07-20 RX ADMIN — GABAPENTIN 900 MG: 300 CAPSULE ORAL at 22:34

## 2020-07-20 RX ADMIN — ESCITALOPRAM 20 MG: 5 TABLET, FILM COATED ORAL at 22:33

## 2020-07-20 RX ADMIN — ENOXAPARIN SODIUM 40 MG: 40 INJECTION, SOLUTION INTRAVENOUS; SUBCUTANEOUS at 08:10

## 2020-07-20 RX ADMIN — LISINOPRIL 2.5 MG: 2.5 TABLET ORAL at 22:34

## 2020-07-20 RX ADMIN — MECLIZINE HYDROCHLORIDE 25 MG: 25 TABLET ORAL at 08:10

## 2020-07-20 RX ADMIN — INSULIN LISPRO 1 UNITS: 100 INJECTION, SOLUTION INTRAVENOUS; SUBCUTANEOUS at 11:33

## 2020-07-20 RX ADMIN — FAMOTIDINE 20 MG: 10 TABLET ORAL at 08:10

## 2020-07-20 RX ADMIN — SODIUM CHLORIDE, POTASSIUM CHLORIDE, SODIUM LACTATE AND CALCIUM CHLORIDE: 600; 310; 30; 20 INJECTION, SOLUTION INTRAVENOUS at 09:27

## 2020-07-20 RX ADMIN — MECLIZINE HYDROCHLORIDE 25 MG: 25 TABLET ORAL at 19:28

## 2020-07-20 RX ADMIN — INSULIN GLARGINE 50 UNITS: 100 INJECTION, SOLUTION SUBCUTANEOUS at 22:33

## 2020-07-20 RX ADMIN — SODIUM CHLORIDE, POTASSIUM CHLORIDE, SODIUM LACTATE AND CALCIUM CHLORIDE: 600; 310; 30; 20 INJECTION, SOLUTION INTRAVENOUS at 19:26

## 2020-07-20 RX ADMIN — GLIPIZIDE 10 MG: 5 TABLET ORAL at 22:34

## 2020-07-20 RX ADMIN — INSULIN LISPRO 3 UNITS: 100 INJECTION, SOLUTION INTRAVENOUS; SUBCUTANEOUS at 16:47

## 2020-07-20 RX ADMIN — FAMOTIDINE 20 MG: 10 TABLET ORAL at 22:34

## 2020-07-20 RX ADMIN — GABAPENTIN 900 MG: 300 CAPSULE ORAL at 08:10

## 2020-07-20 RX ADMIN — ENOXAPARIN SODIUM 30 MG: 30 INJECTION SUBCUTANEOUS at 22:33

## 2020-07-20 RX ADMIN — GABAPENTIN 900 MG: 300 CAPSULE ORAL at 13:51

## 2020-07-20 RX ADMIN — KETOROLAC TROMETHAMINE 15 MG: 15 INJECTION, SOLUTION INTRAMUSCULAR; INTRAVENOUS at 16:54

## 2020-07-20 ASSESSMENT — PAIN SCALES - GENERAL
PAINLEVEL_OUTOF10: 6
PAINLEVEL_OUTOF10: 0
PAINLEVEL_OUTOF10: 5

## 2020-07-20 NOTE — PROGRESS NOTES
Physical Therapy  Facility/Department: [facility] AT THE [facility] MED SURG  Daily Treatment Note  NAME: Nia Gongora  : 1965  MRN: [id number]    Date of Service: 2020    Discharge Recommendations:  Continue to assess pending progress        Assessment   Assessment: Pt demonstrated positive left hallpike. PT repositioned x1 and pt had good response. Pt educated on not laying flat and will be checked again this afternoon  Treatment Diagnosis: Acute vertigo  Prognosis: Good  Activity Tolerance  Activity Tolerance: Patient Tolerated treatment well     Patient Diagnosis(es): The primary encounter diagnosis was Benign paroxysmal positional vertigo, unspecified laterality. Diagnoses of Dizziness and Intractable vomiting with nausea, unspecified vomiting type were also pertinent to this visit. has a past medical history of Anxiety, Arthritis, Depression, Diabetes mellitus ([address] Diabetic neuropathy ([address] Esophageal reflux, Heart burn, Limitation of joint motion, Muscle weakness, Sleep apnea, Sleepiness, and Vertigo. has a past surgical history that includes Hysterectomy;  section; Cholecystectomy; Appendectomy; Cataract removal with implant (Bilateral, 10/24/2016); laparoscopy; hernia repair (Right); hernia repair (); Abscess Drainage (Left, ); Carpal tunnel release (Left, 2018); Colonoscopy (2019); Colonoscopy (N/A, 3/25/2019); and Colonoscopy (N/A, 4/10/2019). Restrictions  Restrictions/Precautions  Restrictions/Precautions: Fall Risk, General Precautions  Subjective   General  Referring Practitioner: Dr. Yasmin Urrutia  Subjective  Subjective: pt reports being dizzy and nauseous with any movement but turning her head left seems worse than right    Goals  Short term goals  Time Frame for Short term goals: 10 days  Short term goal 1: Patient will transfers safely and independently to improve mobility.   Short term goal 2: Patient will ambulate with least restrictive device without dizziness and supervision for 50 feet. Short term goal 3: Patient will tolerate 20-30 minutes of ther ex/act to improve functional strength. Short term goal 4: Patient will ascend/descend 4 steps with handrails and supervision to safely enter home. Patient Goals   Patient goals : Return home    Plan    Plan  Times per week: 7 times per week  Times per day: Twice a day(except weekends once per day)  Plan weeks: 2 weeks  Current Treatment Recommendations: Strengthening, Transfer Training, Patient/Caregiver Education & Training, Balance Training, Gait Training, Home Exercise Program, Stair training, Functional Mobility Training  Plan Comment: Will be assessed for vertigo at next visit  Safety Devices  Type of devices:  All fall risk precautions in place, Left in chair, Nurse notified, Call light within reach     Therapy Time   Individual Concurrent Group Co-treatment   Time In 0718         Time Out 0738         Minutes 5 [facility] , PT

## 2020-07-20 NOTE — PROGRESS NOTES
Physical Therapy  Facility/Department: [facility] AT THE [facility] MED SURG  Daily Treatment Note  NAME: Brian Burns  : 1965  MRN: [id number]    Date of Service: 2020    Discharge Recommendations:  Continue to assess pending progress        Assessment      PT Education: Goals;PT Role;Plan of Care;General Safety;Transfer Training;Gait Training  Patient Education: Educated pt on above and on importance of staying upright to avoid recurence of her symptoms. REQUIRES PT FOLLOW UP: Yes  Activity Tolerance  Activity Tolerance: Patient Tolerated treatment well  Activity Tolerance: Limited by dizziness     Patient Diagnosis(es): The primary encounter diagnosis was Benign paroxysmal positional vertigo, unspecified laterality. Diagnoses of Dizziness and Intractable vomiting with nausea, unspecified vomiting type were also pertinent to this visit. has a past medical history of Anxiety, Arthritis, Depression, Diabetes mellitus ([address] Diabetic neuropathy ([address] Esophageal reflux, Heart burn, Limitation of joint motion, Muscle weakness, Sleep apnea, Sleepiness, and Vertigo. has a past surgical history that includes Hysterectomy;  section; Cholecystectomy; Appendectomy; Cataract removal with implant (Bilateral, 10/24/2016); laparoscopy; hernia repair (Right); hernia repair (); Abscess Drainage (Left, ); Carpal tunnel release (Left, 2018); Colonoscopy (2019); Colonoscopy (N/A, 3/25/2019); and Colonoscopy (N/A, 4/10/2019). Restrictions  Restrictions/Precautions  Restrictions/Precautions: Fall Risk, General Precautions  Subjective   General  Chart Reviewed: Yes  Family / Caregiver Present: No  Referring Practitioner: Dr. Vonda Tony: Pt reported that she is still feeling dizzy with any movement. General Comment  Comments: Pt was supine in bed upon entry and educated her on importance of sitting upright after having epply maneuver done. Pt verbalized good understanding. Orientation  Orientation  Overall Orientation Status: Within Normal Limits  Cognition      Objective   Bed mobility  Supine to Sit: Contact guard assistance  Transfers  Sit to Stand: Contact guard assistance  Stand to sit: Contact guard assistance  Comment: Verbal cues for technique with good understanding noted. Ambulation  Ambulation?: Yes  Ambulation 1  Surface: level tile  Device: Rolling Walker  Assistance: Contact guard assistance  Distance: 15 feet x 2  Comments: Pt limited with amb d/t dizziness. Stairs/Curb  Stairs?: No     Balance  Posture: Fair  Sitting - Static: Good  Sitting - Dynamic: Good  Standing - Static: Good  Standing - Dynamic: Fair;+  Exercises  Comments: Completed epply maneuver to the L. Positive L hallpike. Pt noted to have slightly decreased dizziness. G-Code     OutComes Score    AM-PAC Score    Goals  Short term goals  Time Frame for Short term goals: 10 days  Short term goal 1: Patient will transfers safely and independently to improve mobility. Short term goal 2: Patient will ambulate with least restrictive device without dizziness and supervision for 50 feet. Short term goal 3: Patient will tolerate 20-30 minutes of ther ex/act to improve functional strength. Short term goal 4: Patient will ascend/descend 4 steps with handrails and supervision to safely enter home. Patient Goals   Patient goals : Return home    Plan    Plan  Times per week: 7 times per week  Times per day: Twice a day(except weekends once per day)  Plan weeks: 2 weeks  Current Treatment Recommendations: Strengthening, Transfer Training, Patient/Caregiver Education & Training, Balance Training, Gait Training, Home Exercise Program, Stair training, Functional Mobility Training  Plan Comment: Will be assessed for vertigo at next visit  Safety Devices  Type of devices: (Pt sitting upright in bed upon exit with call button within reach and RN aware.  No alarm upon entry and none needed per Fayrene Six.) Therapy Time   Individual Concurrent Group Co-treatment   Time In 6277         Time Out 5837 [address] [place], [place]

## 2020-07-20 NOTE — PLAN OF CARE
Problem: Pain:  Goal: Pain level will decrease  Description: Pain level will decrease  7/20/2020 0940 by Isabela Vazquez  Outcome: Ongoing  Note: Pain assessed every four hours and as needed using 0-10 pain scale. Pt educated on scale and uses scale appropriately. Encourage pt to notify staff of pain before pain becomes uncontrollable. Correlate periods of heavy activity after pain medication administration. Use pharmacological and non pharmacological methods for pain relief such as: warm blankets, ice, television, reading, or rest.       Problem: Falls - Risk of:  Goal: Will remain free from falls  Description: Will remain free from falls  7/20/2020 0940 by Isabela Vazquez  Outcome: Ongoing  Note: Call light in reach at all times, frequent checks, bed in lowest position, wheels of bed and chair locked, non skid footwear on, appropriate transfer techniques, personal items within reach, walkways free of obstructions, fall risk armband and sign displayed, Gonsalez fall risk score per protocol. No falls this shift, will continue to monitor. Problem: Infection:  Goal: Will remain free from infection  Description: Will remain free from infection  Outcome: Ongoing  Note: Monitor lab work. Will continue to monitor.

## 2020-07-20 NOTE — PROGRESS NOTES
Pt was in bed with eyes closed. Arouses easily. Vitals and assessment were completed, see flow chart for details. No complains of pain. Complained of vertigo. Bed locked and in low position. No bed alarm at this time. Pt A&O, uses call light as needed. Tray table and call light within reach. Nonskid socks encouraged when out of bed. No needs at this time. Will continue to monitor.

## 2020-07-20 NOTE — PLAN OF CARE
Problem: Pain:  Goal: Pain level will decrease  Description: Pain level will decrease  Outcome: Ongoing  Note: Patient is able to communicate when pain intervention is required. Patient is also able to rate the pain on a scale of 0-10. Pain is assessed with hourly rounding while patient is awake. Patient has not complained of any pain at this time. Will continue to monitor. Goal: Control of acute pain  Description: Control of acute pain  Outcome: Ongoing  Goal: Control of chronic pain  Description: Control of chronic pain  Outcome: Ongoing     Problem: Falls - Risk of:  Goal: Will remain free from falls  Description: Will remain free from falls  Outcome: Ongoing  Note: Patient is a high fall risk. Patient is alert and oriented and calls out appropriately. Bed wheels locked and kept in lowest position. Nonskid wear on, fall sign posted and fall sticker on. Bedside table and call light within reach. Needs assessed with hourly rounding and environment scanned for any safety issue.    Goal: Absence of physical injury  Description: Absence of physical injury  Outcome: Ongoing

## 2020-07-20 NOTE — PROGRESS NOTES
Progress Note  Aliyah Diaz MD    SUBJECTIVE: Patient is seen for Principal Problem:    Acute vertigo with vomiting and inability to stand  Active Problems:    Obstructive sleep apnea    Diabetic autonomic neuropathy associated with type 2 diabetes mellitus ([address]    Dizziness  Resolved Problems:    * No resolved hospital problems. *     pt continues to have nausea,dizziness and photophobia    OBJECTIVE:    Vitals:   Vitals:    07/20/20 0715   BP: 123/61   Pulse: 68   Resp: 18   Temp: 99.5 °F (37.5 °C)   SpO2: 93%     Weight: 233 lb 8 oz (105.9 kg)(Bed rezeroed/will communicate the wt. difference to day RN)   Height: 5' 4\" (162.6 cm)   -----------------------------------------------------------------  Exam:    CONSTITUTIONAL:  awake, alert, cooperative, no apparent distress, and appears stated age  EYES:  Has horizontal nystagmus  ENT:  normocepalic, without obvious abnormality  NECK:  Supple, symmetrical, trachea midline, no adenopathy, thyroid symmetric, not enlarged and no tenderness, skin normal  HEMATOLOGIC/LYMPHATICS:  no cervical lymphadenopathy  LUNGS:  No increased work of breathing, good air exchange, clear to auscultation bilaterally, no crackles or wheezing  CARDIOVASCULAR:  Normal apical impulse, regular rate and rhythm, normal S1 and S2, no S3 or S4, and no murmur noted  ABDOMEN:  No scars, normal bowel sounds, soft, non-distended, non-tender, no masses palpated, no hepatosplenomegally    MUSCULOSKELETAL:  there is no redness, warmth, or swelling of the joints  NEUROLOGIC:  Awake, alert, oriented to name, place and time. Cranial nerves II-XII are grossly intact. Motor is 5 out of 5 bilaterally. Cerebellar finger to nose, heel to shin intact. Sensory is intact.   Babinski down going, Romberg negative, and gait is normal.  SKIN:  no bruising or bleeding  EXT:     no cyanosis, clubbing or edema present    -----------------------------------------------------------------  Diagnostic Data: Reviewed    More Recent Labs:    Results for orders placed or performed during the hospital encounter of 07/18/20   CBC Auto Differential   Result Value Ref Range    WBC 9.7 3.5 - 11.3 k/uL    RBC 5.13 (H) 3.95 - 5.11 m/uL    Hemoglobin 15.0 11.9 - 15.1 g/dL    Hematocrit 46.2 36.3 - 47.1 %    MCV 90.1 82.6 - 102.9 fL    MCH 29.2 25.2 - 33.5 pg    MCHC 32.5 28.4 - 34.8 g/dL    RDW 13.0 11.8 - 14.4 %    Platelets 472 454 - 067 k/uL    MPV 10.1 8.1 - 13.5 fL    NRBC Automated 0.0 0.0 per 100 WBC    Differential Type NOT REPORTED     Seg Neutrophils 52 36 - 65 %    Lymphocytes 39 24 - 43 %    Monocytes 7 3 - 12 %    Eosinophils % 1 1 - 4 %    Basophils 1 0 - 2 %    Immature Granulocytes 0 0 %    Segs Absolute 5.05 1.50 - 8.10 k/uL    Absolute Lymph # 3.76 (H) 1.10 - 3.70 k/uL    Absolute Mono # 0.66 0.10 - 1.20 k/uL    Absolute Eos # 0.13 0.00 - 0.44 k/uL    Basophils Absolute 0.05 0.00 - 0.20 k/uL    Absolute Immature Granulocyte 0.03 0.00 - 0.30 k/uL    WBC Morphology NOT REPORTED     RBC Morphology NOT REPORTED     Platelet Estimate NOT REPORTED    Basic Metabolic Panel w/ Reflex to MG   Result Value Ref Range    Glucose 271 (H) 70 - 99 mg/dL    BUN 17 6 - 20 mg/dL    CREATININE 0.71 0.50 - 0.90 mg/dL    Bun/Cre Ratio 24 (H) 9 - 20    Calcium 9.5 8.6 - 10.4 mg/dL    Sodium 136 135 - 144 mmol/L    Potassium 4.2 3.7 - 5.3 mmol/L    Chloride 101 98 - 107 mmol/L    CO2 23 20 - 31 mmol/L    Anion Gap 12 9 - 17 mmol/L    GFR Non-African American >60 >60 mL/min    GFR African American >60 >60 mL/min    GFR Comment          GFR Staging         CBC   Result Value Ref Range    WBC 8.5 3.5 - 11.3 k/uL    RBC 4.43 3.95 - 5.11 m/uL    Hemoglobin 13.1 11.9 - 15.1 g/dL    Hematocrit 40.2 36.3 - 47.1 %    MCV 90.7 82.6 - 102.9 fL    MCH 29.6 25.2 - 33.5 pg    MCHC 32.6 28.4 - 34.8 g/dL    RDW 13.1 11.8 - 14.4 %    Platelets 072 747 - 941 k/uL    MPV 9.9 8.1 - 13.5 fL    NRBC Automated 0.0 0.0 per 100 WBC   Hemoglobin A1c   Result Value Ref Range    Hemoglobin A1C 9.2 (H) 4.8 - 5.9 %    Estimated Avg Glucose 217 mg/dL   Glucose, Whole Blood   Result Value Ref Range    POC Glucose 241 (H) 74 - 100 mg/dL   Glucose, Whole Blood   Result Value Ref Range    POC Glucose 252 (H) 74 - 100 mg/dL   Glucose, Whole Blood   Result Value Ref Range    POC Glucose 216 (H) 74 - 100 mg/dL   Glucose, Whole Blood   Result Value Ref Range    POC Glucose 237 (H) 74 - 100 mg/dL   Glucose, Whole Blood   Result Value Ref Range    POC Glucose 121 (H) 74 - 100 mg/dL   EKG 12 lead   Result Value Ref Range    Ventricular Rate 87 BPM    Atrial Rate 87 BPM    P-R Interval 146 ms    QRS Duration 92 ms    Q-T Interval 384 ms    QTc Calculation (Bazett) 462 ms    P Axis 67 degrees    R Axis 19 degrees    T Axis 38 degrees       ASSESSMENT:   Patient Active Problem List    Diagnosis Date Noted    Dizziness 07/19/2020    Acute vertigo with vomiting and inability to stand 07/19/2020    Sepsis due to abdominal wall cellulitis secondary to DM 09/17/2019    Cellulitis, abdominal wall 09/14/2019    Dysthymia 03/14/2019    Dyspepsia 03/14/2019    Positive FIT (fecal immunochemical test) 03/01/2019    Bilateral leg and foot pain 09/27/2018    Encounter for long-term opiate analgesic use 08/22/2018    Ocular hypertension of left eye 07/16/2018    Regular astigmatism, bilateral 07/16/2018    Vitreous floaters of both eyes 07/16/2018    Cervical radiculitis 06/25/2018    Chronic left shoulder pain 06/25/2018    Complex regional pain syndrome type 1 of left lower extremity 06/25/2018    Cervicalgia 06/25/2018    Diabetic autonomic neuropathy associated with type 2 diabetes mellitus ([address] 06/25/2018    Obstructive sleep apnea 11/21/2017    Sepsis due to pneumonia ([facility] 11/21/2017    Vitamin D deficiency 04/19/2017    Type 2 diabetes mellitus with complication, without long-term current use of insulin ([address] 02/17/2017    Nuclear sclerotic cataract of left eye 10/20/2016    Controlled type 2 diabetes mellitus with diabetic polyneuropathy, without long-term current use of insulin ([facility] 09/01/2016         PLAN:  · Iv fluids  · Vestibular exercises  · Monitor blood sugars      Allyssa Hurtado M.D.

## 2020-07-20 NOTE — PROGRESS NOTES
The patient is resting in her bed quietly. Vitals checked and are within the normal limit. Physical assessment is also completed at this time. Patient is alert and oriented. Needs are assessed. Bedside table and call light are within the reach. Will continue to monitor.

## 2020-07-20 NOTE — PROGRESS NOTES
Comprehensive Nutrition Assessment    Type and Reason for Visit:  Initial    Nutrition Recommendations/Plan:   1. Modify diet to CC 4 carbs per meal.   2. Provided basic CC diet education. 3. Encouraged comprehensive diabetes education. Nutrition Assessment:  Altered nutrition related lab values r/t endocrine dysfunction aeb A1c 9.2 (A1c historical value 12.7). Glucose has improved. Pt c/o decreased PO x 4 days due to dizzines/N/V. States she is unsure of weight changes due to no scale at home. States she went through weight loss appointmentsin prep for bariatric surgery \"and they helped me with my diabetes. Pt encouraged to pursue comprehensive diabetes education. She eats only 2 meals per day. Pt encouraged to eat 3 meals per day and start with something such as crackers, peanut butter toast, or yogurt. Pt tests her blood sugars once daily, 150-250 \"depending if she tests before or after I eat. \" No PA regimen, encouraged PA, chair exercises, etc. Does not seem to be highly motivated to make diet and lifestyle modifications.     Malnutrition Assessment:  Malnutrition Status:  No malnutrition    Context:  Acute Illness     Findings of the 6 clinical characteristics of malnutrition:  Energy Intake:  Mild decrease in energy intake (Comment)(decreased PO x 4 days)  Weight Loss:  No significant weight loss     Body Fat Loss:  No significant body fat loss     Muscle Mass Loss:  No significant muscle mass loss    Fluid Accumulation:  1 - Mild Extremities   Strength:  Not Performed      Nutrition Related Findings:  Appears overly nourished      Wounds:  None       Current Nutrition Therapies:    DIET GENERAL; Carb Control: 4 carb choices (60 gms)/meal    Anthropometric Measures:  · Height: 5' 4\" (162.6 cm)  · Current Body Weight: 233 lb 8 oz (105.9 kg)   · Admission Body Weight: 222 lb 3.2 oz (100.8 kg)    · Usual Body Weight: 224 lb (101.6 kg)     · Ideal Body Weight: 120 lbs; % Ideal Body Weight 194.6 % · BMI: 40.1  · BMI Categories: Obese Class 3 (BMI 40.0 or greater)       Nutrition Diagnosis:   · Altered nutrition-related lab values related to endocrine dysfuntion as evidenced by lab values(A1c 9.2)      Nutrition Interventions:   Food and/or Nutrient Delivery:  Modify Current Diet(CC 4 carbs per meals)  Nutrition Education/Counseling:  Education initiated(Basic CC diet education provided)   Coordination of Nutrition Care:  Continued Inpatient Monitoring    Goals:  PO > 75% of meals, A1c level improved to < 7.0%.        Nutrition Monitoring and Evaluation:   Behavioral-Environmental Outcomes:  Beliefs and Attitutes   Food/Nutrient Intake Outcomes:  Food and Nutrient Intake  Physical Signs/Symptoms Outcomes:  Weight     Discharge Planning:    Recommend pursue outpatient nutrition counseling, Recommend pursue outpatient diabetes education     Electronically signed by Devin Terry RD, LD on 7/20/20 at 10:12 AM EDT    Contact: [phone]

## 2020-07-21 VITALS
HEIGHT: 64 IN | TEMPERATURE: 97.4 F | HEART RATE: 70 BPM | WEIGHT: 238.7 LBS | SYSTOLIC BLOOD PRESSURE: 161 MMHG | RESPIRATION RATE: 18 BRPM | OXYGEN SATURATION: 97 % | DIASTOLIC BLOOD PRESSURE: 70 MMHG | BODY MASS INDEX: 40.75 KG/M2

## 2020-07-21 LAB
GLUCOSE BLD-MCNC: 216 MG/DL (ref 74–100)
GLUCOSE BLD-MCNC: 232 MG/DL (ref 74–100)
GLUCOSE BLD-MCNC: 261 MG/DL (ref 74–100)

## 2020-07-21 PROCEDURE — 97530 THERAPEUTIC ACTIVITIES: CPT | Performed by: PHYSICAL THERAPY ASSISTANT

## 2020-07-21 PROCEDURE — 6370000000 HC RX 637 (ALT 250 FOR IP): Performed by: FAMILY MEDICINE

## 2020-07-21 PROCEDURE — 97110 THERAPEUTIC EXERCISES: CPT | Performed by: PHYSICAL THERAPY ASSISTANT

## 2020-07-21 PROCEDURE — 97116 GAIT TRAINING THERAPY: CPT | Performed by: PHYSICAL THERAPY ASSISTANT

## 2020-07-21 PROCEDURE — 96372 THER/PROPH/DIAG INJ SC/IM: CPT

## 2020-07-21 PROCEDURE — G0378 HOSPITAL OBSERVATION PER HR: HCPCS

## 2020-07-21 PROCEDURE — 2580000003 HC RX 258: Performed by: FAMILY MEDICINE

## 2020-07-21 PROCEDURE — 6360000002 HC RX W HCPCS: Performed by: FAMILY MEDICINE

## 2020-07-21 RX ADMIN — GABAPENTIN 900 MG: 300 CAPSULE ORAL at 08:21

## 2020-07-21 RX ADMIN — FAMOTIDINE 20 MG: 10 TABLET ORAL at 08:21

## 2020-07-21 RX ADMIN — INSULIN LISPRO 2 UNITS: 100 INJECTION, SOLUTION INTRAVENOUS; SUBCUTANEOUS at 08:24

## 2020-07-21 RX ADMIN — MECLIZINE HYDROCHLORIDE 25 MG: 25 TABLET ORAL at 07:04

## 2020-07-21 RX ADMIN — INSULIN LISPRO 3 UNITS: 100 INJECTION, SOLUTION INTRAVENOUS; SUBCUTANEOUS at 12:58

## 2020-07-21 RX ADMIN — ENOXAPARIN SODIUM 30 MG: 30 INJECTION SUBCUTANEOUS at 08:22

## 2020-07-21 RX ADMIN — SODIUM CHLORIDE, POTASSIUM CHLORIDE, SODIUM LACTATE AND CALCIUM CHLORIDE: 600; 310; 30; 20 INJECTION, SOLUTION INTRAVENOUS at 04:18

## 2020-07-21 ASSESSMENT — PAIN SCALES - GENERAL
PAINLEVEL_OUTOF10: 0
PAINLEVEL_OUTOF10: 3

## 2020-07-21 NOTE — PLAN OF CARE
Problem: Pain:  Goal: Pain level will decrease  7/21/2020 1429 by Johny Dailey RN  Outcome: Completed  7/21/2020 0926 by Johny Dailey RN  Outcome: Ongoing  Goal: Control of acute pain  7/21/2020 1429 by Johny Dailey RN  Outcome: Completed  7/21/2020 0926 by Johny Dailey RN  Outcome: Ongoing  Goal: Control of chronic pain  7/21/2020 1429 by Johny Dailey RN  Outcome: Completed  7/21/2020 0926 by Johny Dailey RN  Outcome: Ongoing

## 2020-07-21 NOTE — PROGRESS NOTES
Physical Therapy  Facility/Department: [facility] AT THE [facility] MED SURG  Daily Treatment Note  NAME: Rachel Sharma  : 1965  MRN: [id number]    Date of Service: 2020    Discharge Recommendations:  Continue to assess pending progress        Assessment   Body structures, Functions, Activity limitations: Decreased endurance;Decreased balance;Decreased strength  PT Education: Goals;Gait Training;Functional Mobility Training  Patient Education: Pt denied the need for additional Epply or outpatient therapy upon dc this afternoon. REQUIRES PT FOLLOW UP: Yes     Patient Diagnosis(es): The primary encounter diagnosis was Benign paroxysmal positional vertigo, unspecified laterality. Diagnoses of Dizziness and Intractable vomiting with nausea, unspecified vomiting type were also pertinent to this visit. has a past medical history of Anxiety, Arthritis, Depression, Diabetes mellitus ([address] Diabetic neuropathy ([address] Esophageal reflux, Heart burn, Limitation of joint motion, Muscle weakness, Sleep apnea, Sleepiness, and Vertigo. has a past surgical history that includes Hysterectomy;  section; Cholecystectomy; Appendectomy; Cataract removal with implant (Bilateral, 10/24/2016); laparoscopy; hernia repair (Right); hernia repair (); Abscess Drainage (Left, ); Carpal tunnel release (Left, 2018); Colonoscopy (2019); Colonoscopy (N/A, 3/25/2019); and Colonoscopy (N/A, 4/10/2019).     Restrictions  Restrictions/Precautions  Restrictions/Precautions: Fall Risk, General Precautions  Subjective       Orientation  Orientation  Overall Orientation Status: Within Normal Limits  Cognition      Objective   Bed mobility  Bridging: Modified independent   Supine to Sit: Modified independent  Sit to Supine: Modified independent  Comment: Pt used R HR to assist sup<>sit  Transfers  Sit to Stand: Stand by assistance  Stand to sit: Stand by assistance  Squat Pivot Transfers: Stand by assistance  Comment: Pt completed transfers S AD  Ambulation  Ambulation?: Yes  WB Status: FWB  Ambulation 1  Surface: level tile  Device: No Device  Assistance: Contact guard assistance  Quality of Gait: CGA provided d/t recent dizziness with amb (Vertigo). Pt took standing rest breaks as needed with pt citing \"feeling foggy\". Gait Deviations: Slow Raisa;Decreased step length;Decreased step height;Decreased arm swing  Distance: Pt amb 30' within her room making several directional turns. Comments: Standing rest breaks taken with pt reporting that she was fearful of s/s of Vertigo returning. Pt denied nausea/dizziness. Pt able to push IV pole independently during amb. Stairs/Curb  Stairs?: No     Balance  Posture: Good  Sitting - Static: Good  Standing - Static: Fair  Exercises  Hip Flexion: 2 x's 10  Hip Abduction: 2 x's 10  Knee Long Arc Quad: 2 x's 10  Ankle Pumps: 2 x's 10  Comments: All ther ex completed EOB with rest breaks taken btwn ther ex/sets    G-Code     OutComes Score   AM-PAC Score     Goals  Short term goals  Time Frame for Short term goals: 10 days  Short term goal 1: Patient will transfers safely and independently to improve mobility. Short term goal 2: Patient will ambulate with least restrictive device without dizziness and supervision for 50 feet. Short term goal 3: Patient will tolerate 20-30 minutes of ther ex/act to improve functional strength. Short term goal 4: Patient will ascend/descend 4 steps with handrails and supervision to safely enter home.   Patient Goals   Patient goals : Return home    Plan    Plan  Times per week: 7 times per week  Times per day: Twice a day(except weekends once per day)  Plan weeks: 2 weeks  Current Treatment Recommendations: Strengthening, Transfer Training, Patient/Caregiver Education & Training, Balance Training, Gait Training, Home Exercise Program, Stair training, Functional Mobility Training  Plan Comment: Will be assessed for vertigo at next visit  Safety Devices  Type of devices:

## 2020-07-21 NOTE — PROGRESS NOTES
Physician Progress Note      PATIENT:               Mirian Coulter  CSN #:                  [id number]  :                       1965  ADMIT DATE:       2020 5:37 PM  [address] [employer] DATE:  RESPONDING  PROVIDER #:        Danica Metz MD          QUERY TEXT:    Patient admitted with Dizziness ,headache ,nausea and vomiting, inability to   stand. Noted to have diabetic autonomic neuropathy, also positive left   hallpike    If possible, please document in progress notes and discharge summary if you   are evaluating and/or treating any of the following: The medical record reflects the following:  Risk Factors: DM type 2, diabetic neuropathy, anxiety, sleep apnea  Clinical Indicators: horizontal nystagmus on lt side;   Per PT: positive left   hallpike  Treatment: IV fluids, Vestibular exercises, monitor blood sugars  Options provided:  -- Dizziness, headache ,nausea and vomiting due to Diabetic autonomic   neuropathy  -- Dizziness, headache, ,nausea and vomiting due to Benign Paroxysmal   Positional Vertigo (BPPV)  -- Other - I will add my own diagnosis  -- Dismiss - Clinically unable to determine / Unknown  -- Refer to Clinical Documentation Reviewer    PROVIDER RESPONSE TEXT:    The Dizziness , headache ,nausea and vomiting are due to Benign Paroxysmal   Positional Vertigo (BPPV).     Query created by: Mark Monterroso on 2020 11:29 AM      Electronically signed by:  Danica Metz MD 2020 7:33 AM

## 2020-07-21 NOTE — DISCHARGE SUMMARY
non-tender, non-distended, normal bowel sounds, no masses or organomegaly  EXT:   no cyanosis, clubbing or edema present    NEURO: follows commands, COREAS, no deficits  SKIN:  no rashes or significant lesions    Significant Diagnostic Studies:   Lab Results   Component Value Date    WBC 8.5 07/19/2020    HGB 13.1 07/19/2020     07/19/2020       Lab Results   Component Value Date    BUN 17 07/18/2020    CREATININE 0.71 07/18/2020     07/18/2020    K 4.2 07/18/2020    CALCIUM 9.5 07/18/2020     07/18/2020    CO2 23 07/18/2020    LABGLOM >60 07/18/2020       Lab Results   Component Value Date    WBCUA 50  09/13/2019    RBCUA 0 TO 2 09/13/2019    EPITHUA 2 TO 5 09/13/2019    LEUKOCYTESUR MODERATE (A) 09/13/2019    SPECGRAV 1.025 (H) 09/13/2019    GLUCOSEU TRACE (A) 09/13/2019    KETUA NEGATIVE 09/13/2019    PROTEINU TRACE (A) 09/13/2019    HGBUR NEGATIVE 09/13/2019    CASTUA NOT REPORTED 09/13/2019    CRYSTUA NOT REPORTED 09/13/2019    BACTERIA 1+ (A) 09/13/2019    YEAST NOT REPORTED 09/13/2019       Ct Head W Wo Contrast    Result Date: 7/19/2020  EXAMINATION: CT OF THE HEAD WITH AND WITHOUT CONTRAST  7/19/2020 10:09 am TECHNIQUE: CT of the head/brain was performed without and with the administration of intravenous contrast. Multiplanar reformatted images are provided for review. Dose modulation, iterative reconstruction, and/or weight based adjustment of the mA/kV was utilized to reduce the radiation dose to as low as reasonably achievable. COMPARISON: 08/24/2017. HISTORY: ORDERING SYSTEM PROVIDED HISTORY: [facility] TECHNOLOGIST PROVIDED HISTORY: [facility] Initial evaluation. FINDINGS: BRAIN/VENTRICLES: There is no acute intracranial hemorrhage, mass effect or midline shift. No abnormal extra-axial fluid collection. The gray-white differentiation is maintained without evidence of an acute infarct. There is no evidence of hydrocephalus. An empty sella is noted.   No abnormal enhancement is seen in the brain. ORBITS: The visualized portion of the orbits demonstrate no acute abnormality. SINUSES: The visualized paranasal sinuses and mastoid air cells demonstrate no acute abnormality. SOFT TISSUES/SKULL:  No acute abnormality of the visualized skull or soft tissues. 1. No acute intracranial abnormality. 2. No abnormal enhancement seen in the brain.        Assessment and Plan:  Patient Active Problem List    Diagnosis Date Noted    Dizziness 07/19/2020    Acute vertigo with vomiting and inability to stand 07/19/2020    Sepsis due to abdominal wall cellulitis secondary to DM 09/17/2019    Cellulitis, abdominal wall 09/14/2019    Dysthymia 03/14/2019    Dyspepsia 03/14/2019    Positive FIT (fecal immunochemical test) 03/01/2019    Bilateral leg and foot pain 09/27/2018    Encounter for long-term opiate analgesic use 08/22/2018    Ocular hypertension of left eye 07/16/2018    Regular astigmatism, bilateral 07/16/2018    Vitreous floaters of both eyes 07/16/2018    Cervical radiculitis 06/25/2018    Chronic left shoulder pain 06/25/2018    Complex regional pain syndrome type 1 of left lower extremity 06/25/2018    Cervicalgia 06/25/2018    Diabetic autonomic neuropathy associated with type 2 diabetes mellitus ([address] 06/25/2018    Obstructive sleep apnea 11/21/2017    Sepsis due to pneumonia ([address] 11/21/2017    Vitamin D deficiency 04/19/2017    Type 2 diabetes mellitus with complication, without long-term current use of insulin ([address] 02/17/2017    Nuclear sclerotic cataract of left eye 10/20/2016    Controlled type 2 diabetes mellitus with diabetic polyneuropathy, without long-term current use of insulin ([address] 09/01/2016        Discharge Medications:       Rimma Kate, [address] Medication Instructions JARRETT:[id number]    Printed on:07/21/20 5244   Medication Information                      atorvastatin (LIPITOR) 40 MG tablet  Take 1 tablet by mouth daily             escitalopram (LEXAPRO) 20 MG tablet  Take 1 tablet by mouth daily             fluticasone (FLONASE) 50 MCG/ACT nasal spray  1 spray by Each Nostril route daily             gabapentin (NEURONTIN) 600 MG tablet  Take 900 mg by mouth 3 times daily. Takes 1 1/2 tabs TID. glipiZIDE (GLUCOTROL XL) 10 MG extended release tablet  Take 1 tablet by mouth daily             insulin glargine (LANTUS SOLOSTAR) 100 UNIT/ML injection pen  Inject 50 Units into the skin nightly             lidocaine-prilocaine (EMLA) 2.5-2.5 % cream  Apply topically as needed              Liraglutide (VICTOZA) 18 MG/3ML SOPN SC injection  Inject 1.2 mg into the skin daily             lisinopril (PRINIVIL;ZESTRIL) 2.5 MG tablet  TAKE 1 TABLET DAILY             meclizine (ANTIVERT) 25 MG tablet  Take 1 tablet by mouth 3 times daily as needed for Dizziness             omeprazole (PRILOSEC) 40 MG delayed release capsule  Take 1 capsule by mouth Daily             ondansetron (ZOFRAN) 4 MG tablet  Take 1 tablet by mouth 3 times daily as needed for Nausea or Vomiting                 Patient Instructions:    Activity: activity as tolerated  Diet: encourage fluids  Wound Care: none needed  Other: None     Disposition:   Discharge to Home    Follow up:  Patient will be followed by DI Cuellar CNP in 1-2 weeks    CORE MEASURES on Discharge (if applicable)  ACE/ARB in CHF: NA  Statin in MI: NA  ASA in MI: NA  Statin in CVA: NA  Antiplatelet in CVA: NA    Total time spent on discharge services: 35 minutes    Including the following activities:  Evaluation and Management of patient  Discussion with patient and/or surrogate about current care plan  Coordination with Case Management and/or   Coordination of care with Consultants (if applicable)   Coordination of care with Receiving Facility Physician (if applicable)  Completion of DME forms (if applicable)  Preparation of Discharge Summary  Preparation of Medication Reconciliation  Preparation of Discharge Prescriptions    Signed:  DI Low, NP-C  7/21/2020, 1:39 PM

## 2020-07-21 NOTE — FLOWSHEET NOTE
Resting with eyes closed. Awake for vitals and assessment. Denies vertigo at rest if she doesn't move. States still dizzy when up ambulating. No improvement since admission.  Denies nausea this AM. Continue to monitor

## 2020-07-21 NOTE — FLOWSHEET NOTE
Up to bathroom with assist. Steady on feet. States dizzy when up. Encouraged to sit up in chair at bedside, refuses. States she didn't sleep much last night.

## 2020-07-21 NOTE — PROGRESS NOTES
Physical Therapy  Facility/Department: [facility] AT THE [facility] MED SURG  Daily Treatment Note  NAME: Nannette Quinn  : 1965  MRN: [id number]    Date of Service: 2020    Discharge Recommendations:  Continue to assess pending progress        Assessment   Body structures, Functions, Activity limitations: Decreased endurance;Decreased balance;Decreased strength  PT Education: Goals;Gait Training;Functional Mobility Training  REQUIRES PT FOLLOW UP: Yes     Patient Diagnosis(es): The primary encounter diagnosis was Benign paroxysmal positional vertigo, unspecified laterality. Diagnoses of Dizziness and Intractable vomiting with nausea, unspecified vomiting type were also pertinent to this visit. has a past medical history of Anxiety, Arthritis, Depression, Diabetes mellitus ([facility] [facility] Diabetic neuropathy ([facility] Esophageal reflux, Heart burn, Limitation of joint motion, Muscle weakness, Sleep apnea, Sleepiness, and Vertigo. has a past surgical history that includes Hysterectomy;  section; Cholecystectomy; Appendectomy; Cataract removal with implant (Bilateral, 10/24/2016); laparoscopy; hernia repair (Right); hernia repair (); Abscess Drainage (Left, ); Carpal tunnel release (Left, 2018); Colonoscopy (2019); Colonoscopy (N/A, 3/25/2019); and Colonoscopy (N/A, 4/10/2019).     Restrictions  Restrictions/Precautions  Restrictions/Precautions: Fall Risk, General Precautions  Subjective      Orientation  Orientation  Overall Orientation Status: Within Normal Limits  Cognition      Objective   Bed mobility  Bridging: Modified independent   Supine to Sit: Modified independent  Sit to Supine: Modified independent  Comment: Pt used R HR to assist sup<>sit  Transfers  Sit to Stand: Stand by assistance  Stand to sit: Stand by assistance  Squat Pivot Transfers: Stand by assistance  Comment: Pt completed transfers S AD  Ambulation  Ambulation?: Yes  WB Status: FWB  Ambulation 1  Surface: level tile  Device: No In [address] [place] [address] Deanna         Time Out 0934         Minutes [address] [place], [place]

## 2020-07-21 NOTE — FLOWSHEET NOTE
Reviewed discharge instructions with patient and her . Voices understanding. To front entrance per w/c for discahrge home.

## 2020-07-22 ENCOUNTER — TELEPHONE (OUTPATIENT)
Dept: PRIMARY CARE CLINIC | Age: 55
End: 2020-07-22

## 2020-07-22 NOTE — TELEPHONE ENCOUNTER
[facility] Transitions Initial Follow Up Call    Outreach made within 2 business days of discharge: Yes    Patient: Mili Palacio Patient : 1965   MRN: [id number]  Reason for Admission: There are no discharge diagnoses documented for the most recent discharge. Discharge Date: 20       Spoke with: 2020 @ 8:27am Called patient re: recent hospitalization and left message for pt to call office. Cf  2020 @ 9:30am Called patient for second time and left message for her to call office re: recent ER visit. cf    Discharge department/facility: [facility]    TCM Interactive Patient Contact:  Was patient able to fill all prescriptions:   Was patient instructed to bring all medications to the follow-up visit:   Is patient taking all medications as directed in the discharge summary?    Does patient understand their discharge instructions:   Does patient have questions or concerns that need addressed prior to 7-14 day follow up office visit:     Scheduled appointment with PCP within 7-14 days    Follow Up  Future Appointments   Date Time Provider Osiel Owen   2020  9:40 AM Batsheva Byrd, APRN - CNP [id number] [facility]       [facility]

## 2020-07-28 ENCOUNTER — TELEPHONE (OUTPATIENT)
Dept: PRIMARY CARE CLINIC | Age: 55
End: 2020-07-28

## 2020-08-03 ENCOUNTER — TELEPHONE (OUTPATIENT)
Dept: PRIMARY CARE CLINIC | Age: 55
End: 2020-08-03

## 2020-08-12 ENCOUNTER — TELEPHONE (OUTPATIENT)
Dept: PRIMARY CARE CLINIC | Age: 55
End: 2020-08-12

## 2020-08-18 ENCOUNTER — TELEPHONE (OUTPATIENT)
Dept: PRIMARY CARE CLINIC | Age: 55
End: 2020-08-18

## 2020-08-24 ENCOUNTER — HOSPITAL ENCOUNTER (OUTPATIENT)
Age: 55
Discharge: HOME | End: 2020-08-24
Payer: COMMERCIAL

## 2020-08-24 LAB
ABSOLUTE EOS #: 0.16 K/UL (ref 0–0.44)
ABSOLUTE IMMATURE GRANULOCYTE: 0.04 K/UL (ref 0–0.3)
ABSOLUTE LYMPH #: 3.07 K/UL (ref 1.1–3.7)
ABSOLUTE MONO #: 0.6 K/UL (ref 0.1–1.2)
ALT SERPL-CCNC: 21 U/L (ref 5–33)
ANION GAP SERPL CALCULATED.3IONS-SCNC: 10 MMOL/L (ref 9–17)
AST SERPL-CCNC: 16 U/L
BASOPHILS # BLD: 0 % (ref 0–2)
BASOPHILS ABSOLUTE: 0.03 K/UL (ref 0–0.2)
BUN BLDV-MCNC: 15 MG/DL (ref 6–20)
BUN/CREAT BLD: 23 (ref 9–20)
CALCIUM SERPL-MCNC: 9.5 MG/DL (ref 8.6–10.4)
CHLORIDE BLD-SCNC: 101 MMOL/L (ref 98–107)
CHOLESTEROL/HDL RATIO: 5.9
CHOLESTEROL: 160 MG/DL
CO2: 26 MMOL/L (ref 20–31)
CREAT SERPL-MCNC: 0.65 MG/DL (ref 0.5–0.9)
DIFFERENTIAL TYPE: NORMAL
EOSINOPHILS RELATIVE PERCENT: 2 % (ref 1–4)
GFR AFRICAN AMERICAN: >60 ML/MIN
GFR NON-AFRICAN AMERICAN: >60 ML/MIN
GFR SERPL CREATININE-BSD FRML MDRD: ABNORMAL ML/MIN/{1.73_M2}
GFR SERPL CREATININE-BSD FRML MDRD: ABNORMAL ML/MIN/{1.73_M2}
GLUCOSE BLD-MCNC: 267 MG/DL (ref 70–99)
HCT VFR BLD CALC: 43.6 % (ref 36.3–47.1)
HDLC SERPL-MCNC: 27 MG/DL
HEMOGLOBIN: 14.1 G/DL (ref 11.9–15.1)
IMMATURE GRANULOCYTES: 0 %
LDL CHOLESTEROL: 63 MG/DL (ref 0–130)
LYMPHOCYTES # BLD: 30 % (ref 24–43)
MCH RBC QN AUTO: 29.8 PG (ref 25.2–33.5)
MCHC RBC AUTO-ENTMCNC: 32.3 G/DL (ref 28.4–34.8)
MCV RBC AUTO: 92.2 FL (ref 82.6–102.9)
MONOCYTES # BLD: 6 % (ref 3–12)
NRBC AUTOMATED: 0 PER 100 WBC
PDW BLD-RTO: 12.9 % (ref 11.8–14.4)
PLATELET # BLD: 213 K/UL (ref 138–453)
PLATELET ESTIMATE: NORMAL
PMV BLD AUTO: 10.4 FL (ref 8.1–13.5)
POTASSIUM SERPL-SCNC: 4.5 MMOL/L (ref 3.7–5.3)
RBC # BLD: 4.73 M/UL (ref 3.95–5.11)
RBC # BLD: NORMAL 10*6/UL
SEG NEUTROPHILS: 62 % (ref 36–65)
SEGMENTED NEUTROPHILS ABSOLUTE COUNT: 6.5 K/UL (ref 1.5–8.1)
SODIUM BLD-SCNC: 137 MMOL/L (ref 135–144)
TRIGL SERPL-MCNC: 349 MG/DL
VLDLC SERPL CALC-MCNC: ABNORMAL MG/DL (ref 1–30)
WBC # BLD: 10.4 K/UL (ref 3.5–11.3)
WBC # BLD: NORMAL 10*3/UL

## 2020-08-24 PROCEDURE — 80048 BASIC METABOLIC PNL TOTAL CA: CPT

## 2020-08-24 PROCEDURE — 84460 ALANINE AMINO (ALT) (SGPT): CPT

## 2020-08-24 PROCEDURE — 82570 ASSAY OF URINE CREATININE: CPT

## 2020-08-24 PROCEDURE — 84450 TRANSFERASE (AST) (SGOT): CPT

## 2020-08-24 PROCEDURE — 80061 LIPID PANEL: CPT

## 2020-08-24 PROCEDURE — 36415 COLL VENOUS BLD VENIPUNCTURE: CPT

## 2020-08-24 PROCEDURE — 85025 COMPLETE CBC W/AUTO DIFF WBC: CPT

## 2020-08-24 PROCEDURE — 82043 UR ALBUMIN QUANTITATIVE: CPT

## 2020-08-25 LAB
CREATININE URINE: 114.7 MG/DL (ref 28–217)
MICROALBUMIN/CREAT 24H UR: 19 MG/L
MICROALBUMIN/CREAT UR-RTO: 17 MCG/MG CREAT

## 2020-08-26 ENCOUNTER — OFFICE VISIT (OUTPATIENT)
Dept: PRIMARY CARE CLINIC | Age: 55
End: 2020-08-26
Payer: COMMERCIAL

## 2020-08-26 VITALS
RESPIRATION RATE: 20 BRPM | WEIGHT: 228.8 LBS | DIASTOLIC BLOOD PRESSURE: 68 MMHG | TEMPERATURE: 97.6 F | SYSTOLIC BLOOD PRESSURE: 112 MMHG | HEART RATE: 72 BPM | BODY MASS INDEX: 39.27 KG/M2

## 2020-08-26 PROBLEM — I72.8 SPLENIC ARTERY ANEURYSM (HCC): Status: ACTIVE | Noted: 2020-08-26

## 2020-08-26 PROBLEM — M25.511 BILATERAL SHOULDER PAIN: Status: ACTIVE | Noted: 2020-04-23

## 2020-08-26 PROBLEM — E66.01 CLASS 2 SEVERE OBESITY DUE TO EXCESS CALORIES WITH SERIOUS COMORBIDITY AND BODY MASS INDEX (BMI) OF 37.0 TO 37.9 IN ADULT (HCC): Status: ACTIVE | Noted: 2020-08-26

## 2020-08-26 PROBLEM — M25.512 BILATERAL SHOULDER PAIN: Status: ACTIVE | Noted: 2020-04-23

## 2020-08-26 PROCEDURE — 3017F COLORECTAL CA SCREEN DOC REV: CPT | Performed by: NURSE PRACTITIONER

## 2020-08-26 PROCEDURE — 3046F HEMOGLOBIN A1C LEVEL >9.0%: CPT | Performed by: NURSE PRACTITIONER

## 2020-08-26 PROCEDURE — 99214 OFFICE O/P EST MOD 30 MIN: CPT | Performed by: NURSE PRACTITIONER

## 2020-08-26 PROCEDURE — G8417 CALC BMI ABV UP PARAM F/U: HCPCS | Performed by: NURSE PRACTITIONER

## 2020-08-26 PROCEDURE — 2022F DILAT RTA XM EVC RTNOPTHY: CPT | Performed by: NURSE PRACTITIONER

## 2020-08-26 PROCEDURE — G8427 DOCREV CUR MEDS BY ELIG CLIN: HCPCS | Performed by: NURSE PRACTITIONER

## 2020-08-26 PROCEDURE — 1036F TOBACCO NON-USER: CPT | Performed by: NURSE PRACTITIONER

## 2020-08-26 RX ORDER — HYDROCODONE BITARTRATE AND ACETAMINOPHEN 5; 325 MG/1; MG/1
1 TABLET ORAL 2 TIMES DAILY PRN
COMMUNITY
Start: 2020-09-08

## 2020-08-26 RX ORDER — INSULIN GLARGINE 100 [IU]/ML
50 INJECTION, SOLUTION SUBCUTANEOUS NIGHTLY
Qty: 5 PEN | Refills: 3 | Status: SHIPPED | OUTPATIENT
Start: 2020-08-26 | End: 2021-01-18 | Stop reason: SDUPTHER

## 2020-08-26 ASSESSMENT — ENCOUNTER SYMPTOMS
DIARRHEA: 0
ABDOMINAL PAIN: 0
VOMITING: 0
SHORTNESS OF BREATH: 0
SORE THROAT: 0
CONSTIPATION: 0
RHINORRHEA: 0
BLURRED VISION: 0
COUGH: 0
NAUSEA: 0
WHEEZING: 0

## 2020-08-26 ASSESSMENT — PATIENT HEALTH QUESTIONNAIRE - PHQ9
SUM OF ALL RESPONSES TO PHQ QUESTIONS 1-9: 0
SUM OF ALL RESPONSES TO PHQ QUESTIONS 1-9: 0
1. LITTLE INTEREST OR PLEASURE IN DOING THINGS: 0
2. FEELING DOWN, DEPRESSED OR HOPELESS: 0
SUM OF ALL RESPONSES TO PHQ9 QUESTIONS 1 & 2: 0

## 2020-08-26 NOTE — LETTER
[address] [facility]  [address] [place] [address] [address] [facility]  Phone: [phone]  Fax: [phone] [facility], APRN - CNP        August 26, 2020     Patient: Jem Lorenzo   YOB: 1965   Date of Visit: 8/26/2020       To Whom It May Concern: It is my medical opinion that Kian Lassiter requires a disability parking placard for the following reasons:  She cannot walk 200 feet without stopping to rest.  Duration of need: 1 year    If you have any questions or concerns, please don't hesitate to call.     Sincerely,        Kwan Byrd, APRN - CNP

## 2020-08-26 NOTE — PATIENT INSTRUCTIONS
SURVEY:    You may be receiving a survey from [employer] regarding your visit today. Please complete the survey to enable us to provide the highest quality of care to you and your family. If you cannot score us a very good on any question, please call the office to discuss how we could have made your experience a very good one. Thank you. Patient Education        Counting Carbohydrates: Care Instructions  Your Care Instructions     You don't have to eat special foods when you have diabetes. You just have to be careful to eat healthy foods. Carbohydrates (carbs) raise blood sugar higher and quicker than any other nutrient. Carbs are found in desserts, breads and cereals, and fruit. They're also in starchy vegetables. These include potatoes, corn, and grains such as rice and pasta. Carbs are also in milk and yogurt. The more carbs you eat at one time, the higher your blood sugar will rise. Spreading carbs all through the day helps keep your blood sugar levels within your target range. Counting carbs is one of the best ways to keep your blood sugar under control. If you use insulin, counting carbs helps you match the right amount of insulin to the number of grams of carbs in a meal. Then you can change your diet and insulin dose as needed. Testing your blood sugar several times a day can help you learn how carbs affect your blood sugar. A registered dietitian or certified diabetes educator can help you plan meals and snacks. Follow-up care is a key part of your treatment and safety. Be sure to make and go to all appointments, and call your doctor if you are having problems. It's also a good idea to know your test results and keep a list of the medicines you take. How can you care for yourself at home?   Know your daily amount of carbohydrates  Your daily amount depends on several things, such as your weight, how active you are, which diabetes medicines you take, and what your goals are for your blood sugar levels. A registered dietitian or certified diabetes educator can help you plan how many carbs to include in each meal and snack. For most adults, a guideline for the daily amount of carbs is:  · 45 to 60 grams at each meal. That's about the same as 3 to 4 carbohydrate servings. · 15 to 20 grams at each snack. That's about the same as 1 carbohydrate serving. Count carbs  Counting carbs lets you know how much rapid-acting insulin to take before you eat. If you use an insulin pump, you get a constant rate of insulin during the day. So the pump must be programmed at meals. This gives you extra insulin to cover the rise in blood sugar after meals. If you take insulin:  · Learn your own insulin-to-carb ratio. You and your diabetes health professional will figure out the ratio. You can do this by testing your blood sugar after meals. For example, you may need a certain amount of insulin for every 15 grams of carbs. · Add up the carb grams in a meal. Then you can figure out how many units of insulin to take based on your insulin-to-carb ratio. · Exercise lowers blood sugar. You can use less insulin than you would if you were not doing exercise. Keep in mind that timing matters. If you exercise within 1 hour after a meal, your body may need less insulin for that meal than it would if you exercised 3 hours after the meal. Test your blood sugar to find out how exercise affects your need for insulin. If you do or don't take insulin:  · Look at labels on packaged foods. This can tell you how many carbs are in a serving. You can also use guides from the American Diabetes Association. · Be aware of portions, or serving sizes. If a package has two servings and you eat the whole package, you need to double the number of grams of carbohydrate listed for one serving. · Protein, fat, and fiber do not raise blood sugar as much as carbs do.  If you eat a lot of these nutrients in a meal, your blood sugar will rise more slowly than it would otherwise. Eat from all food groups  · Eat at least three meals a day. · Plan meals to include food from all the food groups. The food groups include grains, fruits, dairy, proteins, and vegetables. · Talk to your dietitian or diabetes educator about ways to add limited amounts of sweets into your meal plan. · If you drink alcohol, talk to your doctor. It may not be recommended when you are taking certain diabetes medicines. Where can you learn more? Go to https://[employer]. org and sign in to your [facility] account. Enter P589 in the [place] box to learn more about \"Counting Carbohydrates: Care Instructions. \"     If you do not have an account, please click on the \"Sign Up Now\" link. Current as of: December 20, 2019               Content Version: 12.5  © 0502-7973 Healthwise, Incorporated. Care instructions adapted under license by [employer]. If you have questions about a medical condition or this instruction, always ask your healthcare professional. [address] any warranty or liability for your use of this information.

## 2020-08-26 NOTE — PROGRESS NOTES
Name: Deena Roberson  : 1965         Chief Complaint:     Chief Complaint   Patient presents with    Diabetes     routine check    Hyperlipidemia       History of Present Illness:      Deena Roberson is a 50 y.o.  female who presents with Diabetes (routine check) and Hyperlipidemia      Lacie is here today for a routine office visit. Splenic artery aneurysm- stable, follows with vascular and hematology as needed. Morbid obesity- has met with bariatric surgeon, states COVID delayed progression but she is now completing steps to have surgery          Diabetes   She presents for her follow-up diabetic visit. She has type 2 diabetes mellitus. Her disease course has been stable. Hypoglycemia symptoms include dizziness. Pertinent negatives for hypoglycemia include no confusion, headaches, nervousness/anxiousness, sleepiness, speech difficulty or sweats. Associated symptoms include foot paresthesias. Pertinent negatives for diabetes include no blurred vision, no chest pain, no fatigue, no polydipsia, no polyphagia and no polyuria. There are no hypoglycemic complications. Symptoms are stable. Diabetic complications include peripheral neuropathy. Pertinent negatives for diabetic complications include no CVA, heart disease or nephropathy. Risk factors for coronary artery disease include diabetes mellitus and post-menopausal. Current diabetic treatment includes insulin injections and oral agent (monotherapy) (GLP-1). She is compliant with treatment some of the time. Her weight is stable. She is following a generally healthy diet. Meal planning includes avoidance of concentrated sweets. She has had a previous visit with a dietitian. She rarely participates in exercise. Her home blood glucose trend is increasing steadily. Her breakfast blood glucose range is generally >200 mg/dl. An ACE inhibitor/angiotensin II receptor blocker is being taken. She does not see a podiatrist.Eye exam is not current.    Hyperlipidemia This is a chronic problem. The current episode started more than 1 year ago. The problem is controlled. Recent lipid tests were reviewed and are normal. Exacerbating diseases include diabetes and obesity. She has no history of chronic renal disease, hypothyroidism, liver disease or nephrotic syndrome. Factors aggravating her hyperlipidemia include fatty foods and smoking. Pertinent negatives include no chest pain, leg pain or shortness of breath. Current antihyperlipidemic treatment includes statins. The current treatment provides moderate improvement of lipids. Compliance problems include adherence to exercise and adherence to diet. Risk factors for coronary artery disease include diabetes mellitus, dyslipidemia, family history, obesity, hypertension, post-menopausal, a sedentary lifestyle and stress. Dizziness   This is a recurrent problem. The current episode started more than 1 month ago. The problem occurs intermittently. The problem has been unchanged. Associated symptoms include vertigo. Pertinent negatives include no abdominal pain, chest pain, chills, congestion, coughing, fatigue, fever, headaches, nausea, neck pain, rash, sore throat or vomiting. The symptoms are aggravated by standing and walking. She has tried rest for the symptoms. The treatment provided moderate relief. Past Medical History:     Past Medical History:   Diagnosis Date    Anxiety     Arthritis     Depression     Diabetes mellitus ([facility] [address]     Diabetic neuropathy (HCC)     Esophageal reflux     Heart burn     Limitation of joint motion     Muscle weakness     Sleep apnea     DOES NOT USE A MACHINE.  DIAGNOSED > 15 YEARS AGO WITH SLEEP APNEA    Sleepiness     Vertigo       Reviewed all health maintenance requirements and ordered appropriate tests  Health Maintenance Due   Topic Date Due    Breast cancer screen  02/15/2020       Past Surgical History:     Past Surgical History:   Procedure Laterality Date    ABSCESS DRAINAGE Left     BUTTOCK    APPENDECTOMY      CARPAL TUNNEL RELEASE Left 2018    CATARACT REMOVAL WITH IMPLANT Bilateral 10/24/2016     SECTION      CHOLECYSTECTOMY      COLONOSCOPY  2019    Dr. Terri Escobar -normal poor prep    COLONOSCOPY N/A 3/25/2019    COLORECTAL CANCER SCREENING, NOT HIGH RISK performed by Blanca Edmond MD at [address] [place] [address] COLONOSCOPY N/A 4/10/2019    -diverticulosis,hemorrhoids,lipoma at ileocecal valve    HERNIA REPAIR Right     IHR    HERNIA REPAIR  2006    HERNIA REPAIR WITH HYSTERECTOMY SURGERY    HYSTERECTOMY      LAPAROSCOPY          Medications:       Prior to Admission medications    Medication Sig Start Date End Date Taking? Authorizing Provider   HYDROcodone-acetaminophen (NORCO) 5-325 MG per tablet Take 1 tablet by mouth 2 times daily as needed. 20  Yes Historical Provider, MD   insulin glargine (LANTUS SOLOSTAR) 100 UNIT/ML injection pen Inject 50 Units into the skin nightly 20  Yes Geeta Chang Might, APRN - CNP   escitalopram (LEXAPRO) 20 MG tablet Take 1 tablet by mouth daily 3/12/20  Yes Geeta Chang Might, APRN - CNP   omeprazole (PRILOSEC) 40 MG delayed release capsule Take 1 capsule by mouth Daily 3/12/20  Yes Geeta Chang Might, APRN - CNP   Liraglutide (VICTOZA) 18 MG/3ML SOPN SC injection Inject 1.2 mg into the skin daily 3/12/20  Yes Geeta Chang Might, APRN - CNP   glipiZIDE (GLUCOTROL XL) 10 MG extended release tablet Take 1 tablet by mouth daily 3/12/20  Yes Geeta Chang Might, APRN - CNP   atorvastatin (LIPITOR) 40 MG tablet Take 1 tablet by mouth daily 3/12/20  Yes Getea Chang Might, APRN - CNP   lisinopril (PRINIVIL;ZESTRIL) 2.5 MG tablet TAKE 1 TABLET DAILY  Patient taking differently: Take 2.5 mg by mouth daily TAKE 1 TABLET DAILY 3/12/20  Yes Geeta Chang Might, APRN - CNP   gabapentin (NEURONTIN) 600 MG tablet Take 900 mg by mouth 3 times daily.  Takes 1 1/2 tabs TID. 19  Yes Historical Provider, MD   lidocaine-prilocaine (EMLA) 2.5-2.5 % cream Apply topically as needed  11/8/18  Yes Historical Provider, MD   fluticasone (FLONASE) 50 MCG/ACT nasal spray 1 spray by Each Nostril route daily  Patient not taking: Reported on 8/26/2020 1/9/20   Brock Lott, DI - CNP        Allergies:       Codeine; Meperidine; and Other    Social History:     Tobacco:    reports that she quit smoking about 9 years ago. She has never used smokeless tobacco.  Alcohol:      reports no history of alcohol use. Drug Use:  reports no history of drug use. Family History:     Family History   Problem Relation Age of Onset    Diabetes Mother     High Cholesterol Mother     Hypertension Mother     Heart Disease Mother     Diabetes Father     Heart Disease Father     High Cholesterol Father     Hypertension Father     Diabetes Sister        Review of Systems:     Positive and Negative as described in HPI    Review of Systems   Constitutional: Negative for activity change, chills, fatigue and fever. HENT: Negative for congestion, rhinorrhea and sore throat. Eyes: Negative for blurred vision and visual disturbance. Respiratory: Negative for cough, shortness of breath and wheezing. Cardiovascular: Negative for chest pain and palpitations. Gastrointestinal: Negative for abdominal pain, constipation, diarrhea, nausea and vomiting. Endocrine: Negative for polydipsia, polyphagia and polyuria. Genitourinary: Negative for difficulty urinating and dysuria. Musculoskeletal: Negative for gait problem, neck pain and neck stiffness. Skin: Negative for rash. Neurological: Positive for dizziness, vertigo and light-headedness. Negative for syncope, speech difficulty and headaches. Psychiatric/Behavioral: Negative for confusion. The patient is not nervous/anxious.         Physical Exam:   Vitals:  /68 (Site: Right Upper Arm, Position: Sitting)   Pulse 72   Temp 97.6 °F (36.4 °C) (Temporal)   Resp 20   Wt 228 lb 12.8 oz (103.8 kg)   BMI 39.27 kg/m² Physical Exam  Vitals signs and nursing note reviewed. Constitutional:       General: She is not in acute distress. Appearance: Normal appearance. She is well-developed. She is obese. HENT:      Mouth/Throat:      Mouth: Mucous membranes are moist.   Eyes:      General: No scleral icterus. Conjunctiva/sclera: Conjunctivae normal.   Neck:      Musculoskeletal: Normal range of motion and neck supple. Cardiovascular:      Rate and Rhythm: Normal rate and regular rhythm. Pulmonary:      Effort: Pulmonary effort is normal.      Breath sounds: Normal breath sounds. No wheezing. Abdominal:      General: Bowel sounds are normal. There is no distension. Palpations: Abdomen is soft. Tenderness: There is no abdominal tenderness. Musculoskeletal:      Right lower leg: Edema (Trace pitting) present. Left lower leg: Edema (Trace pitting) present. Lymphadenopathy:      Cervical: No cervical adenopathy. Skin:     General: Skin is warm and dry. Findings: No rash. Neurological:      Mental Status: She is alert and oriented to person, place, and time. Psychiatric:         Mood and Affect: Mood normal.         Behavior: Behavior normal. Behavior is cooperative.          Data:     Lab Results   Component Value Date     08/24/2020    K 4.5 08/24/2020     08/24/2020    CO2 26 08/24/2020    BUN 15 08/24/2020    CREATININE 0.65 08/24/2020    GLUCOSE 267 08/24/2020    GLUCOSE 351 03/11/2012    PROT 6.3 09/17/2019    LABALBU 2.8 09/17/2019    BILITOT 0.36 09/17/2019    ALKPHOS 137 09/17/2019    AST 16 08/24/2020    ALT 21 08/24/2020     Lab Results   Component Value Date    WBC 10.4 08/24/2020    RBC 4.73 08/24/2020    RBC 5.34 03/11/2012    HGB 14.1 08/24/2020    HCT 43.6 08/24/2020    MCV 92.2 08/24/2020    MCH 29.8 08/24/2020    MCHC 32.3 08/24/2020    RDW 12.9 08/24/2020     08/24/2020     03/11/2012    MPV 10.4 08/24/2020     Lab Results   Component Value Date TSH 0.88 02/06/2013     Lab Results   Component Value Date    CHOL 160 08/24/2020    HDL 27 08/24/2020    LABA1C 9.2 07/19/2020       Assessment/Plan:      Diagnosis Orders   1. Uncontrolled type 2 diabetes mellitus with diabetic polyneuropathy, with long-term current use of insulin (HCC)  insulin glargine (LANTUS SOLOSTAR) 100 UNIT/ML injection pen   2. Dyslipidemia     3. Splenic artery aneurysm (HCC)     4. Class 2 severe obesity due to excess calories with serious comorbidity and body mass index (BMI) of 37.0 to 37.9 in adult ([facility] [address]     5. Postural dizziness with near syncope  Tilt Table Test       1. Lacie received counseling on the following healthy behaviors: nutrition, exercise and medication adherence  2. Patient given educational materials - see patient instructions  3. Was a self-tracking handout given in paper form or via [employer]? No  If yes, see orders or list here. 4.  Discussed use, benefit, and side effects of prescribed medications. Barriers to medication compliance addressed. All patient questions answered. Pt voiced understanding. 5.  Reviewed prior labs and health maintenance  6. Continue current medications, diet and exercise. Completed Refills   Requested Prescriptions     Signed Prescriptions Disp Refills    insulin glargine (LANTUS SOLOSTAR) 100 UNIT/ML injection pen 5 pen 3     Sig: Inject 50 Units into the skin nightly         Return in about 3 months (around 11/26/2020) for Check up- A1c in office.

## 2020-08-27 ENCOUNTER — HOSPITAL ENCOUNTER (OUTPATIENT)
Dept: MRI IMAGING | Age: 55
Discharge: HOME | End: 2020-08-29
Payer: COMMERCIAL

## 2020-08-27 PROCEDURE — 73221 MRI JOINT UPR EXTREM W/O DYE: CPT

## 2020-09-10 ENCOUNTER — TELEPHONE (OUTPATIENT)
Dept: PRIMARY CARE CLINIC | Age: 55
End: 2020-09-10

## 2020-09-28 ENCOUNTER — HOSPITAL ENCOUNTER (OUTPATIENT)
Dept: NON INVASIVE DIAGNOSTICS | Age: 55
Discharge: HOME | End: 2020-09-28
Payer: COMMERCIAL

## 2020-09-28 ENCOUNTER — TELEPHONE (OUTPATIENT)
Dept: PRIMARY CARE CLINIC | Age: 55
End: 2020-09-28

## 2020-09-28 PROCEDURE — 6370000000 HC RX 637 (ALT 250 FOR IP): Performed by: FAMILY MEDICINE

## 2020-09-28 PROCEDURE — 93660 TILT TABLE EVALUATION: CPT

## 2020-09-28 RX ORDER — NITROGLYCERIN 0.3 MG/1
0.3 TABLET SUBLINGUAL EVERY 5 MIN PRN
Status: DISCONTINUED | OUTPATIENT
Start: 2020-09-28 | End: 2020-09-29 | Stop reason: HOSPADM

## 2020-09-28 RX ADMIN — NITROGLYCERIN 0.3 MG: 0.3 TABLET SUBLINGUAL at 11:17

## 2020-09-28 NOTE — PROCEDURES
[address] [place], [place] [address]                                TILT TABLE TEST    PATIENT NAME: Cecy Craft                      :        1965  MED REC NO:   [id number]                              ROOM:  ACCOUNT NO:   [de-identified]                           ADMIT DATE: 2020  PROVIDER:     Mandy Baird. [facility]    Cardiovascular Diagnostics Department    DATE OF PROCEDURE:  2020    ORDERING PROVIDER:  Tori Bond CNP    PRIMARY CARE PROVIDER:  Tori Bond CNP    INTERPRETING PHYSICIAN:  Mandy Baird. [facility], MD     Diagnosis:  Postural dizziness, near syncope. PROCEDURE SUMMARY:  After explaining the risk, benefits and alternatives  to the procedure, informed written consent was obtained. The patient  was brought to the tilt table laboratory in a fasting and resting state. The patient was placed on the tilt table in the supine position, ECG  patches were applied and an IV was placed. The patient's baseline blood  pressure was 153/56 mmHg with a heart rate of 81/minute. The patient  was then raised to the 70 degree head upright tilt position with and  pulse rate, blood pressure and cardiac rhythm were monitored and  recorded each minute of the study for a maximum of 30 minutes. During the initial 20 minutes of the study, the patient's blood pressure  ranged from a high of 132/55 mmHg to a low of 105/47 mmHg, while their  heart rate ranged from a low of 84/minute to a high of 90/minute. During this period, the patient reported leg weakness. During the last 3 minutes of the study, nitroglycerin 0.3 mg was give  sublingually. During this time, the patient's blood pressure ranged  from a high of 104/59 mmHg to a low of 68/39 mmHg, while their heart  rate ranged from a low of 90/minute to a high of 100/minute. During  this period, the patient reported leg weakness and vision changes.     At this point, the patient was returned to the supine position and  monitored for an additional ten minutes. Once the patient felt well  enough to be discharged home, they were discharged home with  instructions to follow up with their primary care physician and/or  cardiologist as previously scheduled. STUDY CONCLUSIONS:  Abnormal head upright tilt table study. The patient's heart rate, blood  pressure response and symptoms were most consistent with orthostatic  intolerance, dysautonomia. Combined with vigilant maintenance of  euvolemia and maintaining a moderate salt intake, pharmacologic  treatment with Florinef, and/or a Serotonin Selective Reuptake Inhibitor  such as Lexapro/Florinef, ProAmatine and/or Mestinon, among other  treatments have shown some effectiveness in the treatment of this  condition. OTILIA ANGEL    D: 09/28/2020 12:46:16       T: 09/28/2020 12:48:09     SOHAN  Job#: [id number]     Doc#: Unknown    CC:  Raul Byrd

## 2020-10-06 ENCOUNTER — HOSPITAL ENCOUNTER (OUTPATIENT)
Age: 55
Discharge: HOME | End: 2020-10-06
Payer: COMMERCIAL

## 2020-10-06 LAB
ABSOLUTE EOS #: 0.12 K/UL (ref 0–0.44)
ABSOLUTE IMMATURE GRANULOCYTE: 0.04 K/UL (ref 0–0.3)
ABSOLUTE LYMPH #: 3.49 K/UL (ref 1.1–3.7)
ABSOLUTE MONO #: 0.52 K/UL (ref 0.1–1.2)
ANION GAP SERPL CALCULATED.3IONS-SCNC: 8 MMOL/L (ref 9–17)
BASOPHILS # BLD: 1 % (ref 0–2)
BASOPHILS ABSOLUTE: 0.05 K/UL (ref 0–0.2)
BUN BLDV-MCNC: 12 MG/DL (ref 6–20)
BUN/CREAT BLD: 24 (ref 9–20)
CALCIUM SERPL-MCNC: 9.1 MG/DL (ref 8.6–10.4)
CHLORIDE BLD-SCNC: 103 MMOL/L (ref 98–107)
CO2: 26 MMOL/L (ref 20–31)
CREAT SERPL-MCNC: 0.5 MG/DL (ref 0.5–0.9)
DIFFERENTIAL TYPE: NORMAL
EOSINOPHILS RELATIVE PERCENT: 1 % (ref 1–4)
GFR AFRICAN AMERICAN: >60 ML/MIN
GFR NON-AFRICAN AMERICAN: >60 ML/MIN
GFR SERPL CREATININE-BSD FRML MDRD: ABNORMAL ML/MIN/{1.73_M2}
GFR SERPL CREATININE-BSD FRML MDRD: ABNORMAL ML/MIN/{1.73_M2}
GLUCOSE BLD-MCNC: 190 MG/DL (ref 70–99)
HCT VFR BLD CALC: 42.8 % (ref 36.3–47.1)
HEMOGLOBIN: 13.8 G/DL (ref 11.9–15.1)
IMMATURE GRANULOCYTES: 0 %
LYMPHOCYTES # BLD: 35 % (ref 24–43)
MCH RBC QN AUTO: 29.7 PG (ref 25.2–33.5)
MCHC RBC AUTO-ENTMCNC: 32.2 G/DL (ref 28.4–34.8)
MCV RBC AUTO: 92.2 FL (ref 82.6–102.9)
MONOCYTES # BLD: 5 % (ref 3–12)
NRBC AUTOMATED: 0 PER 100 WBC
PDW BLD-RTO: 12.6 % (ref 11.8–14.4)
PLATELET # BLD: 204 K/UL (ref 138–453)
PLATELET ESTIMATE: NORMAL
PMV BLD AUTO: 10.5 FL (ref 8.1–13.5)
POTASSIUM SERPL-SCNC: 4.9 MMOL/L (ref 3.7–5.3)
RBC # BLD: 4.64 M/UL (ref 3.95–5.11)
RBC # BLD: NORMAL 10*6/UL
SEG NEUTROPHILS: 58 % (ref 36–65)
SEGMENTED NEUTROPHILS ABSOLUTE COUNT: 5.75 K/UL (ref 1.5–8.1)
SODIUM BLD-SCNC: 137 MMOL/L (ref 135–144)
WBC # BLD: 10 K/UL (ref 3.5–11.3)
WBC # BLD: NORMAL 10*3/UL

## 2020-10-06 PROCEDURE — 36415 COLL VENOUS BLD VENIPUNCTURE: CPT

## 2020-10-06 PROCEDURE — 85025 COMPLETE CBC W/AUTO DIFF WBC: CPT

## 2020-10-06 PROCEDURE — 83036 HEMOGLOBIN GLYCOSYLATED A1C: CPT

## 2020-10-06 PROCEDURE — 80048 BASIC METABOLIC PNL TOTAL CA: CPT

## 2020-10-07 LAB
ESTIMATED AVERAGE GLUCOSE: 217 MG/DL
HBA1C MFR BLD: 9.2 % (ref 4–6)

## 2020-11-04 ENCOUNTER — HOSPITAL ENCOUNTER (OUTPATIENT)
Dept: NON INVASIVE DIAGNOSTICS | Age: 55
Discharge: HOME | End: 2020-11-04
Payer: COMMERCIAL

## 2020-11-04 ENCOUNTER — OFFICE VISIT (OUTPATIENT)
Dept: CARDIOLOGY | Age: 55
End: 2020-11-04
Payer: COMMERCIAL

## 2020-11-04 VITALS
HEIGHT: 64 IN | HEART RATE: 92 BPM | SYSTOLIC BLOOD PRESSURE: 106 MMHG | BODY MASS INDEX: 39.57 KG/M2 | WEIGHT: 231.8 LBS | DIASTOLIC BLOOD PRESSURE: 61 MMHG | RESPIRATION RATE: 18 BRPM | OXYGEN SATURATION: 100 %

## 2020-11-04 PROCEDURE — 99244 OFF/OP CNSLTJ NEW/EST MOD 40: CPT | Performed by: INTERNAL MEDICINE

## 2020-11-04 PROCEDURE — 0296T HC EXT ECG RECORDING 2-21 DAY HOOKUP: CPT

## 2020-11-04 PROCEDURE — 2022F DILAT RTA XM EVC RTNOPTHY: CPT | Performed by: INTERNAL MEDICINE

## 2020-11-04 PROCEDURE — G8427 DOCREV CUR MEDS BY ELIG CLIN: HCPCS | Performed by: INTERNAL MEDICINE

## 2020-11-04 PROCEDURE — G8484 FLU IMMUNIZE NO ADMIN: HCPCS | Performed by: INTERNAL MEDICINE

## 2020-11-04 PROCEDURE — 93000 ELECTROCARDIOGRAM COMPLETE: CPT | Performed by: INTERNAL MEDICINE

## 2020-11-04 PROCEDURE — G8417 CALC BMI ABV UP PARAM F/U: HCPCS | Performed by: INTERNAL MEDICINE

## 2020-11-04 NOTE — PROGRESS NOTES
Trinidad Ellis am scribing for and in the presence of Burt Roberts MD, F.A.C.C..    Patient: Sidney Peace  : 1965  Date of Visit: 2020    REASON FOR VISIT / CONSULTATION: Establish Cardiologist (abn tilt, POTS. Referred by Elvira Byrd. . she said when she stands her body gets heavy and she has to hold on to something. it takes a minute for it to pass. this has been going on for a few months. SOB worse now that she wears mask. some dizziness Denies: CP, palps)      History of Present Illness:        Dear [facility], APRN - CNP    I had the pleasure of seeing  Sidney Peace in my office today. Ms. Lv Reyes is a 50 y.o. female with a recent history of Abnormal tilt table test. She is diabetic using insulin and has had hyperlipidemia for years. She is a former smoker of 1-2 packs daily for a long time, she quit by using acupuncture. Her family history includes her mother having heart disease in her 42's her father has had at least 2 heart attacks in his 52's and has had stents placed. Ms. Lv Reyes  Is here to establish care after having an abnormal tilt table test. She said when she stands she feels a weakness come over her and she has to hold on to something until it passes. She had two episodes yesterday. She has had no syncope. She has had falls because it feels like her legs are going to giving out. She does try to stay hydrated. She was diagnosed with sleep apnea years ago but never got a CPAP machine. She notes that night and sometimes wake up choking. She also reported having daytime fatigue. She does have to take OTC sleep aid to help her fall asleep. She has been on gabapentin for years due to leg pain from her diabetes. Her diabetes isn't controlled with the use of insulin with a recent hemoglobin of 9.2 on 10/6/2020. She does have occasional chest pain, she describes this as left-sided heart thumping. Not an actual pain, pressure or tightness. No relation with exertion. Not associated with any sweating or diaphoresis. She does have stomach pain, but no nausea, vomiting. She attributes this to her history of gastroparesis. She does have problems moving her bowels, it varies day to day. Some day she goes 3-4 times a day and other times she will go 3-4 days without going at all. Exercise Tolerance: Ms. Margaret Diggs reports that she has a fairly poor exercise tolerance. She says that she only walks around her apartment. She goes shopping 1-2 times weekly. She denied any current or recent chest pain, shortness of breath,  bleeding problems, problems with her medications or any other concerns at this time. EKG done in office today 2020: showed normal sinus rhythm, possible inferior infarct. No acute ischemic changes. PAST MEDICAL HISTORY:         Past Medical History:   Diagnosis Date    Anxiety     Arthritis     Depression     Diabetes mellitus ([address]     Diabetic neuropathy (HCC)     Esophageal reflux     Heart burn     Limitation of joint motion     Muscle weakness     Sleep apnea     DOES NOT USE A MACHINE. DIAGNOSED > 15 YEARS AGO WITH SLEEP APNEA    Sleepiness     Vertigo        CURRENT ALLERGIES: Codeine; Meperidine; and Other REVIEW OF SYSTEMS: 14 systems were reviewed. Pertinent positives and negatives as above, all else negative.      Past Surgical History:   Procedure Laterality Date    ABSCESS DRAINAGE Left     BUTTOCK    APPENDECTOMY      CARPAL TUNNEL RELEASE Left 2018    CATARACT REMOVAL WITH IMPLANT Bilateral 10/24/2016     SECTION      CHOLECYSTECTOMY      COLONOSCOPY  2019    Dr. Delfina Mahoney -normal poor prep    COLONOSCOPY N/A 3/25/2019    COLORECTAL CANCER SCREENING, NOT HIGH RISK performed by Dorita Turner MD at [facility] COLONOSCOPY N/A 4/10/2019    -diverticulosis,hemorrhoids,lipoma at ileocecal valve    HERNIA REPAIR Right     IHR    HERNIA REPAIR  2006    HERNIA REPAIR WITH HYSTERECTOMY SURGERY  HYSTERECTOMY      LAPAROSCOPY      Social History:  Social History     Tobacco Use    Smoking status: Former Smoker     Packs/day: 1.00     Types: Cigarettes     Last attempt to quit: 2011     Years since quittin.4    Smokeless tobacco: Never Used   Substance Use Topics    Alcohol use: No    Drug use: No        CURRENT MEDICATIONS:        Outpatient Medications Marked as Taking for the 20 encounter (Office Visit) with Fransico Chaidez MD   Medication Sig Dispense Refill    omeprazole (PRILOSEC) 40 MG delayed release capsule Take 1 capsule by mouth once daily 90 capsule 3    HYDROcodone-acetaminophen (NORCO) 5-325 MG per tablet Take 1 tablet by mouth 2 times daily as needed.  insulin glargine (LANTUS SOLOSTAR) 100 UNIT/ML injection pen Inject 50 Units into the skin nightly 5 pen 3    escitalopram (LEXAPRO) 20 MG tablet Take 1 tablet by mouth daily 90 tablet 3    Liraglutide (VICTOZA) 18 MG/3ML SOPN SC injection Inject 1.2 mg into the skin daily 3 pen 5    glipiZIDE (GLUCOTROL XL) 10 MG extended release tablet Take 1 tablet by mouth daily 90 tablet 3    atorvastatin (LIPITOR) 40 MG tablet Take 1 tablet by mouth daily 90 tablet 3    lisinopril (PRINIVIL;ZESTRIL) 2.5 MG tablet TAKE 1 TABLET DAILY (Patient taking differently: Take 2.5 mg by mouth daily TAKE 1 TABLET DAILY) 90 tablet 3    gabapentin (NEURONTIN) 600 MG tablet Take 900 mg by mouth 3 times daily. Takes 1 1/2 tabs TID.  lidocaine-prilocaine (EMLA) 2.5-2.5 % cream Apply topically as needed          FAMILY HISTORY: family history includes Diabetes in her father, mother, and sister; Heart Disease (age of onset: 36) in her mother; Heart Disease (age of onset: 48) in her father; High Cholesterol in her father and mother; Hypertension in her father and mother.      Physical Examination:     /61 (Site: Left Upper Arm, Position: Standing, Cuff Size: Large Adult)   Pulse 92   Resp 18   Ht 5' 4.02\" (1.626 m)   Wt 231 lb 12.8 oz (105.1 kg)   SpO2 100%   BMI 39.77 kg/m²  Body mass index is 39.77 kg/m². Constitutional: She appeared oriented to person and place. She appears well-developed and well-nourished. In no acute distress. HEENT: Normocephalic and atraumatic. No JVD present. Carotid bruit is not present. No mass and no thyromegaly present. No lymphadenopathy noted. Cardiovascular: Normal rate, regular rhythm, normal heart sounds. Exam reveals no gallop and no friction rubs. No murmur was heard. Pulmonary/Chest: Effort normal and breath sounds normal. No respiratory distress. She has no wheezes, rhonchi or rales. Abdominal: Soft, non-tender. She exhibits no organomegaly, mass or bruit. Extremities: No edema. No cyanosis or clubbing. 2+ radial and carotid pulses. Distal extremity pulses: 2+ bilaterally. Neurological: Alertness and orientation as per Constitutional exam. No evidence of gross cranial nerve deficit. Coordination appeared normal.   Skin: Skin is warm and dry. There is no rash or diaphoresis. Psychiatric: She has a normal mood and affect. Her speech is normal and behavior is normal.      MOST RECENT LABS ON RECORD:   Lab Results   Component Value Date    WBC 10.0 10/06/2020    HGB 13.8 10/06/2020    HCT 42.8 10/06/2020     10/06/2020    CHOL 160 08/24/2020    TRIG 349 (H) 08/24/2020    HDL 27 (L) 08/24/2020    LDLDIRECT 159 (H) 08/22/2017    ALT 21 08/24/2020    AST 16 08/24/2020     10/06/2020    K 4.9 10/06/2020     10/06/2020    CREATININE 0.50 10/06/2020    BUN 12 10/06/2020    CO2 26 10/06/2020    TSH 0.88 02/06/2013    INR 1.2 11/20/2017    LABA1C 9.2 (H) 10/06/2020    LABMICR 17 08/24/2020       ASSESSMENT:     1. POTS (postural orthostatic tachycardia syndrome)    2. Abnormal tilt table test    3. Dyslipidemia    4. Type 2 diabetes mellitus without complication, with long-term current use of insulin ([address]    5. Former smoker    6. Gastroparesis    7. Frequent falls    8.  Family history of heart disease    9. EUNICE (obstructive sleep apnea)    10. Class 2 obesity due to excess calories without serious comorbidity with body mass index (BMI) of 39.0 to 39.9 in adult         PLAN:        Recurrent intermittent palpitations  · Discussed possible etiology of her palpitations. · She also reported having occasional dizziness and recurrent falls but she attributes her falls to her neuropathy. · I told her it is essential to make sure her heart is structurally normal so I ordered an echo. · It is also essential to known the nature of her palpitations. We decided to go for a CAM monitor for 1 week to correlate her symptoms of palpitations and dizziness with a heart rate and rhythm. · Mildly abnormal tilt table test  · Discussed in detail the findings in her tilt table test.  · Symptoms and low blood pressure 23 minutes into the tilt table test.  · She reported having dizziness. No passing out episodes. · Continue Lexapro 20 mg daily. · Depending on her heart monitor we may consider beta-blocker therapy and follow-up. · Counseled regarding the use of compression stockings. I also told her this may help with her neuropathy. · Nonpharmacologic counseling: Because of her condition, I reminded her to try and keep herself well-hydrated and to take extra time when moving from laying to sitting, sitting to standing and standing to walking. I also explained to her to help improve her symptoms she should include 3 g sodium diet, 1 or 2 L of sports drinks daily, knee-high compressions stockings. · Additional Testing List: None     · Hyperlipidemia: Mixed  · Cholesterol Reduction Therapy: Continue Atorvastatin (Lipitor) 40 mg daily. · LDL at goal.    · Uncontrolled diabetes/diabetic neuropathy/gastroparesis secondary to diabetes and retinopathy. Continue therapy and follow-up as per Shayan Byrd APRN - CNP     · Multiple risk factors for CAD and atypical chest pain.   Patient has

## 2020-11-04 NOTE — PATIENT INSTRUCTIONS
SURVEY:    You may be receiving a survey from [facility] regarding your visit today. Please complete the survey to enable us to provide the highest quality of care to you and your family. If you cannot score us a very good on any question, please call the office to discuss how we could have made your experience a very good one. Thank you.

## 2020-11-10 ENCOUNTER — TELEPHONE (OUTPATIENT)
Dept: PRIMARY CARE CLINIC | Age: 55
End: 2020-11-10

## 2020-11-10 NOTE — TELEPHONE ENCOUNTER
Pt. Calls asking to be placed on your schedule sooner for surg clearance. I explained this was a different appt. Than she is already scheduled for. Ang Diaz has plans to perform R Rotator Cuff Repair at [facility] as soon as she is cleared. Pt. Mentions she was seen at [facility] office last week and had EKG, but was ordered Stress Test, Echo, and Holter Monitor. I suggested she confirm she does not need to be cleared from Cardiology instead of PCP (pt. Was unsure if she has had abnormal EKG, maybe d/t POTS?) Are we able to clear pt. For her upcoming surgery? Please advise, thank you.

## 2020-11-16 ENCOUNTER — HOSPITAL ENCOUNTER (OUTPATIENT)
Dept: NON INVASIVE DIAGNOSTICS | Age: 55
Discharge: HOME | End: 2020-11-16
Payer: COMMERCIAL

## 2020-11-16 ENCOUNTER — HOSPITAL ENCOUNTER (OUTPATIENT)
Dept: SLEEP CENTER | Age: 55
Discharge: HOME | End: 2020-11-16
Payer: COMMERCIAL

## 2020-11-16 LAB
LV EF: 60 %
LVEF MODALITY: NORMAL

## 2020-11-16 PROCEDURE — 93306 TTE W/DOPPLER COMPLETE: CPT

## 2020-11-16 PROCEDURE — A9500 TC99M SESTAMIBI: HCPCS | Performed by: INTERNAL MEDICINE

## 2020-11-16 PROCEDURE — 6360000002 HC RX W HCPCS: Performed by: FAMILY MEDICINE

## 2020-11-16 PROCEDURE — 95806 SLEEP STUDY UNATT&RESP EFFT: CPT

## 2020-11-16 PROCEDURE — 93017 CV STRESS TEST TRACING ONLY: CPT

## 2020-11-16 PROCEDURE — 3430000000 HC RX DIAGNOSTIC RADIOPHARMACEUTICAL: Performed by: INTERNAL MEDICINE

## 2020-11-16 RX ADMIN — Medication 30 MILLICURIE: at 09:01

## 2020-11-16 RX ADMIN — REGADENOSON 0.4 MG: 0.08 INJECTION, SOLUTION INTRAVENOUS at 09:01

## 2020-11-17 ENCOUNTER — TELEPHONE (OUTPATIENT)
Dept: CARDIOLOGY | Age: 55
End: 2020-11-17

## 2020-11-17 ENCOUNTER — HOSPITAL ENCOUNTER (OUTPATIENT)
Dept: NON INVASIVE DIAGNOSTICS | Age: 55
Discharge: HOME | End: 2020-11-17
Payer: COMMERCIAL

## 2020-11-17 PROCEDURE — 78452 HT MUSCLE IMAGE SPECT MULT: CPT

## 2020-11-17 PROCEDURE — 3430000000 HC RX DIAGNOSTIC RADIOPHARMACEUTICAL: Performed by: INTERNAL MEDICINE

## 2020-11-17 PROCEDURE — A9500 TC99M SESTAMIBI: HCPCS | Performed by: INTERNAL MEDICINE

## 2020-11-17 RX ADMIN — Medication 30 MILLICURIE: at 13:39

## 2020-11-18 NOTE — PROCEDURES
[address] [place], [place] [address]                              CARDIAC STRESS TEST    PATIENT NAME: Ирина Oliver                      :        1965  MED REC NO:   [id number]                              ROOM:  ACCOUNT NO:   [de-identified]                           ADMIT DATE: 2020  PROVIDER:     Riley Roach. [facility]    CARDIOVASCULAR DIAGNOSTIC DEPARTMENT    DATE OF STUDY:  2020    ORDERING PROVIDER:  Riley Roach. [facility], MD    PRIMARY CARE PROVIDER:  [address] [place] [address] [facility]    INTERPRETING PHYSICIAN:  Riley Roach. [facility], [place]    PHARMACOLOGIC MYOCARDIAL PERFUSION STRESS TESTING    Stress/Rest single isotope SPECT imaging with exercise stress and gated  SPECT imaging. INDICATIONS:  Assessment of a cardiac cause:  Abnormal ECG. CLINICAL HISTORY:  The patient is a 72-year-old woman with no known  coronary artery disease. Previous cardiac history includes:  Stress test.    Other previous history includes:  Chest pain, palpitations, fatigue,  dyspnea, lightheadedness, diabetes mellitus, indigestion, heartburn,  family history of coronary artery disease in mother and father. Symptoms just prior to testing include:  None. Relevant medications:  None. PROCEDURE:  The heart rate was 90 at baseline and godwin to 109 beats per  minute during the regadenoson infusion. The rest blood pressure was  130/70 mm/Hg and decreased to 116/50 mm/Hg. The patient did not  complain of any significant symptoms following infusion. Pharmacologic stress testing was performed with regadenoson at a dose of  0.4 mg. Additionally, low level exercise using slow treadmill walking  was performed along with vasodilator infusion. MYOCARDIAL PERFUSION IMAGING:  Imaging was performed at rest 30-45  minutes following the injection of 30 mCi of sestamibi. Approximately  10 seconds after Lexiscan injection, the patient was injected with 30  mCi of sestamibi.   Gating additional testing by coronary  angiography may be indicated. OTILIA ANGEL    D: 11/18/2020 10:14:57       T: 11/18/2020 10:16:16     [email or web address]_[email or web address] DANIEL  Job#: [id number]     Doc#: Unknown    CC:  Sunita Byrd

## 2020-11-19 LAB — STATUS: NORMAL

## 2020-11-20 ENCOUNTER — TELEPHONE (OUTPATIENT)
Dept: CARDIOLOGY | Age: 55
End: 2020-11-20

## 2020-11-23 ENCOUNTER — HOSPITAL ENCOUNTER (OUTPATIENT)
Dept: LAB | Age: 55
Setting detail: SPECIMEN
Discharge: HOME | End: 2020-11-23
Payer: COMMERCIAL

## 2020-11-23 ENCOUNTER — OFFICE VISIT (OUTPATIENT)
Dept: PRIMARY CARE CLINIC | Age: 55
End: 2020-11-23
Payer: COMMERCIAL

## 2020-11-23 VITALS
SYSTOLIC BLOOD PRESSURE: 116 MMHG | RESPIRATION RATE: 22 BRPM | OXYGEN SATURATION: 88 % | BODY MASS INDEX: 45.16 KG/M2 | WEIGHT: 230 LBS | HEIGHT: 60 IN | TEMPERATURE: 101.1 F | HEART RATE: 100 BPM | DIASTOLIC BLOOD PRESSURE: 60 MMHG

## 2020-11-23 PROCEDURE — 3017F COLORECTAL CA SCREEN DOC REV: CPT | Performed by: NURSE PRACTITIONER

## 2020-11-23 PROCEDURE — C9803 HOPD COVID-19 SPEC COLLECT: HCPCS

## 2020-11-23 PROCEDURE — G8427 DOCREV CUR MEDS BY ELIG CLIN: HCPCS | Performed by: NURSE PRACTITIONER

## 2020-11-23 PROCEDURE — U0003 INFECTIOUS AGENT DETECTION BY NUCLEIC ACID (DNA OR RNA); SEVERE ACUTE RESPIRATORY SYNDROME CORONAVIRUS 2 (SARS-COV-2) (CORONAVIRUS DISEASE [COVID-19]), AMPLIFIED PROBE TECHNIQUE, MAKING USE OF HIGH THROUGHPUT TECHNOLOGIES AS DESCRIBED BY CMS-2020-01-R: HCPCS

## 2020-11-23 PROCEDURE — 1036F TOBACCO NON-USER: CPT | Performed by: NURSE PRACTITIONER

## 2020-11-23 PROCEDURE — G8417 CALC BMI ABV UP PARAM F/U: HCPCS | Performed by: NURSE PRACTITIONER

## 2020-11-23 PROCEDURE — 99214 OFFICE O/P EST MOD 30 MIN: CPT | Performed by: NURSE PRACTITIONER

## 2020-11-23 PROCEDURE — G8484 FLU IMMUNIZE NO ADMIN: HCPCS | Performed by: NURSE PRACTITIONER

## 2020-11-23 RX ORDER — AMOXICILLIN 875 MG/1
875 TABLET, COATED ORAL 2 TIMES DAILY
Qty: 20 TABLET | Refills: 0 | Status: SHIPPED | OUTPATIENT
Start: 2020-11-23 | End: 2020-12-03

## 2020-11-23 ASSESSMENT — ENCOUNTER SYMPTOMS
EYES NEGATIVE: 1
VOMITING: 0
ALLERGIC/IMMUNOLOGIC NEGATIVE: 1
SHORTNESS OF BREATH: 1
DIARRHEA: 1
COUGH: 0
NAUSEA: 1
RHINORRHEA: 0
SORE THROAT: 1

## 2020-11-23 NOTE — PROGRESS NOTES
[address] [facility] WALK-IN [facility]ARE  [address] [facility] [address] [facility]  Dept: [phone]  Dept Fax: [phone]    Cindy Hayes is a 50 y.o. female who presents to the [facility] in Care today for her medical conditions/complaints as noted below. Cindy Hayes is c/o of Shortness of Breath (X's 4 days,  was tested posistive for Covid 2 weeks ago)      HPI:    Cindy Hayes is a 50 y.o. female who presents with  Suspected covid. On Friday started with headache and ear pain. Saturday started with no taste or smell and body aches. Having fatigue and shortness of breath. Thinks she had a fever but no thermometer at home. She has had chills and sweats. No cough. Having nausea with no vomiting but is having some diarrhea. Having headache and irritated throat. She has taken some OTC cold medication. Her  had Covid 2 weeks ago. Past Medical History:   Diagnosis Date    Anxiety     Arthritis     Depression     Diabetes mellitus (HCC)     Diabetic neuropathy (HCC)     Esophageal reflux     Heart burn     Limitation of joint motion     Muscle weakness     Sleep apnea     DOES NOT USE A MACHINE. DIAGNOSED > 15 YEARS AGO WITH SLEEP APNEA    Sleepiness     Vertigo         Current Outpatient Medications   Medication Sig Dispense Refill    amoxicillin (AMOXIL) 875 MG tablet Take 1 tablet by mouth 2 times daily for 10 days 20 tablet 0    omeprazole (PRILOSEC) 40 MG delayed release capsule Take 1 capsule by mouth once daily 90 capsule 3    HYDROcodone-acetaminophen (NORCO) 5-325 MG per tablet Take 1 tablet by mouth 2 times daily as needed.       insulin glargine (LANTUS SOLOSTAR) 100 UNIT/ML injection pen Inject 50 Units into the skin nightly 5 pen 3    escitalopram (LEXAPRO) 20 MG tablet Take 1 tablet by mouth daily 90 tablet 3    Liraglutide (VICTOZA) 18 MG/3ML SOPN SC injection Inject 1.2 mg into the skin daily 3 pen 5    glipiZIDE (GLUCOTROL XL) 10 MG extended release tablet Take 1 tablet by mouth daily 90 tablet 3    atorvastatin (LIPITOR) 40 MG tablet Take 1 tablet by mouth daily 90 tablet 3    lisinopril (PRINIVIL;ZESTRIL) 2.5 MG tablet TAKE 1 TABLET DAILY (Patient taking differently: Take 2.5 mg by mouth daily TAKE 1 TABLET DAILY) 90 tablet 3    gabapentin (NEURONTIN) 600 MG tablet Take 900 mg by mouth 3 times daily. Takes 1 1/2 tabs TID.  lidocaine-prilocaine (EMLA) 2.5-2.5 % cream Apply topically as needed       fluticasone (FLONASE) 50 MCG/ACT nasal spray 1 spray by Each Nostril route daily (Patient not taking: Reported on 8/26/2020) 1 Bottle 0     No current facility-administered medications for this visit. Allergies   Allergen Reactions    Codeine Other (See Comments)     Migraine    Meperidine     Other Itching and Dermatitis     METAL       Subjective:      Review of Systems   Constitutional: Positive for appetite change, chills, diaphoresis, fatigue and fever. HENT: Positive for ear pain and sore throat. Negative for rhinorrhea. Eyes: Negative. Respiratory: Positive for shortness of breath. Negative for cough. Cardiovascular: Negative. Gastrointestinal: Positive for diarrhea and nausea. Negative for vomiting. Endocrine: Negative. Genitourinary: Negative. Musculoskeletal: Positive for myalgias. Allergic/Immunologic: Negative. Neurological: Positive for headaches. Hematological: Negative. Psychiatric/Behavioral: Negative. Objective:     Physical Exam  Vitals signs and nursing note reviewed. Constitutional:       Appearance: Normal appearance. HENT:      Head: Normocephalic. Right Ear: Tenderness present. Tympanic membrane is injected and erythematous. Tympanic membrane is not perforated. Left Ear: Tympanic membrane is erythematous. Nose: Nose normal.      Mouth/Throat:      Lips: Pink. Mouth: Mucous membranes are moist.      Pharynx: Oropharynx is clear.    Eyes: Pupils: Pupils are equal, round, and reactive to light. Neck:      Musculoskeletal: Normal range of motion and neck supple. Cardiovascular:      Rate and Rhythm: Normal rate and regular rhythm. Pulmonary:      Effort: Pulmonary effort is normal.      Breath sounds: Normal breath sounds. Comments: SaO2%  93%  Musculoskeletal: Normal range of motion. Lymphadenopathy:      Cervical: Cervical adenopathy (anterior) present. Skin:     General: Skin is warm. Capillary Refill: Capillary refill takes less than 2 seconds. Neurological:      General: No focal deficit present. Mental Status: She is alert. Psychiatric:         Mood and Affect: Mood normal.       /60 (Site: Left Upper Arm, Position: Sitting, Cuff Size: Large Adult)   Pulse 100   Temp 101.1 °F (38.4 °C) (Temporal)   Resp 22   Ht 5' (1.524 m)   Wt 230 lb (104.3 kg)   SpO2 (!) 88%   BMI 44.92 kg/m²     Assessment:      Diagnosis Orders   1. Suspected COVID-19 virus infection  COVID-19 Ambulatory   2. Acute suppurative otitis media of right ear without spontaneous rupture of tympanic membrane, recurrence not specified       No results found for this visit on 11/23/20. Plan:   -Advised to self quarantine for 14 days at home or until receives negative results from COVID-19 test.  -May return to work after negative test results, and symptoms improving. No fever for 24 hours. -Advised they will receive test results in 3-5 days.  -Tylenol as needed for fever and comfort. Avoid taking Ibuprofen. -Increase fluids and rest.  -Warm salt water gargles and hot tea with lemon and honey for sore throat.  -Cool mist humidifier  -Mucinex recommended if cough becomes productive. -If shortness of breath or chest discomfort develop call Emergency Room before arrival for instructions.  -Follow up with PCP if not improvement after 14 days. No follow-ups on file.     Orders Placed This Encounter   Medications    amoxicillin (AMOXIL) 875 MG tablet     Sig: Take 1 tablet by mouth 2 times daily for 10 days     Dispense:  20 tablet     Refill:  0        Electronically signed by DI Myers CNP on 11/23/2020 at 9:49 AM

## 2020-11-25 ENCOUNTER — TELEPHONE (OUTPATIENT)
Dept: PRIMARY CARE CLINIC | Age: 55
End: 2020-11-25

## 2020-11-25 LAB — SARS-COV-2, NAA: NOT DETECTED

## 2020-11-25 NOTE — TELEPHONE ENCOUNTER
----- Message from DI Reinoso CNP sent at 11/25/2020  9:13 AM EST -----  Please notify patient that her covid test returned negative.   Thanks, Koby Atkins

## 2021-01-18 ENCOUNTER — OFFICE VISIT (OUTPATIENT)
Dept: PRIMARY CARE CLINIC | Age: 56
End: 2021-01-18
Payer: COMMERCIAL

## 2021-01-18 VITALS
TEMPERATURE: 97.5 F | HEART RATE: 94 BPM | DIASTOLIC BLOOD PRESSURE: 64 MMHG | SYSTOLIC BLOOD PRESSURE: 118 MMHG | RESPIRATION RATE: 20 BRPM | BODY MASS INDEX: 43.67 KG/M2 | WEIGHT: 223.6 LBS | OXYGEN SATURATION: 98 %

## 2021-01-18 DIAGNOSIS — E78.5 DYSLIPIDEMIA: ICD-10-CM

## 2021-01-18 DIAGNOSIS — I72.8 SPLENIC ARTERY ANEURYSM (HCC): ICD-10-CM

## 2021-01-18 DIAGNOSIS — E66.01 MORBID OBESITY (HCC): ICD-10-CM

## 2021-01-18 DIAGNOSIS — E11.42 CONTROLLED TYPE 2 DIABETES MELLITUS WITH DIABETIC POLYNEUROPATHY, WITHOUT LONG-TERM CURRENT USE OF INSULIN (HCC): Primary | ICD-10-CM

## 2021-01-18 PROBLEM — E11.10 DIABETIC KETOACIDOSIS WITHOUT COMA ASSOCIATED WITH TYPE 2 DIABETES MELLITUS (HCC): Status: RESOLVED | Noted: 2021-01-18 | Resolved: 2021-01-18

## 2021-01-18 PROBLEM — E11.10 DIABETIC KETOACIDOSIS WITHOUT COMA ASSOCIATED WITH TYPE 2 DIABETES MELLITUS (HCC): Status: ACTIVE | Noted: 2021-01-18

## 2021-01-18 PROBLEM — I10 ESSENTIAL HYPERTENSION: Status: ACTIVE | Noted: 2021-01-18

## 2021-01-18 PROBLEM — E11.3399 MODERATE NONPROLIFERATIVE DIABETIC RETINOPATHY ASSOCIATED WITH TYPE 2 DIABETES MELLITUS (HCC): Status: ACTIVE | Noted: 2017-12-15

## 2021-01-18 LAB — HBA1C MFR BLD: 8.7 %

## 2021-01-18 PROCEDURE — 3017F COLORECTAL CA SCREEN DOC REV: CPT | Performed by: NURSE PRACTITIONER

## 2021-01-18 PROCEDURE — 83036 HEMOGLOBIN GLYCOSYLATED A1C: CPT | Performed by: NURSE PRACTITIONER

## 2021-01-18 PROCEDURE — 1036F TOBACCO NON-USER: CPT | Performed by: NURSE PRACTITIONER

## 2021-01-18 PROCEDURE — G8484 FLU IMMUNIZE NO ADMIN: HCPCS | Performed by: NURSE PRACTITIONER

## 2021-01-18 PROCEDURE — G8417 CALC BMI ABV UP PARAM F/U: HCPCS | Performed by: NURSE PRACTITIONER

## 2021-01-18 PROCEDURE — G8427 DOCREV CUR MEDS BY ELIG CLIN: HCPCS | Performed by: NURSE PRACTITIONER

## 2021-01-18 PROCEDURE — 2022F DILAT RTA XM EVC RTNOPTHY: CPT | Performed by: NURSE PRACTITIONER

## 2021-01-18 PROCEDURE — 3052F HG A1C>EQUAL 8.0%<EQUAL 9.0%: CPT | Performed by: NURSE PRACTITIONER

## 2021-01-18 PROCEDURE — 99214 OFFICE O/P EST MOD 30 MIN: CPT | Performed by: NURSE PRACTITIONER

## 2021-01-18 RX ORDER — HYDROXYZINE HYDROCHLORIDE 25 MG/1
25 TABLET, FILM COATED ORAL NIGHTLY PRN
Qty: 30 TABLET | Refills: 0 | Status: SHIPPED | OUTPATIENT
Start: 2021-01-18 | End: 2021-04-23 | Stop reason: SDUPTHER

## 2021-01-18 RX ORDER — INSULIN GLARGINE 100 [IU]/ML
50 INJECTION, SOLUTION SUBCUTANEOUS NIGHTLY
Qty: 5 PEN | Refills: 3 | Status: SHIPPED | OUTPATIENT
Start: 2021-01-18 | End: 2021-07-07

## 2021-01-18 ASSESSMENT — PATIENT HEALTH QUESTIONNAIRE - PHQ9
SUM OF ALL RESPONSES TO PHQ QUESTIONS 1-9: 0
SUM OF ALL RESPONSES TO PHQ QUESTIONS 1-9: 0
2. FEELING DOWN, DEPRESSED OR HOPELESS: 0
1. LITTLE INTEREST OR PLEASURE IN DOING THINGS: 0

## 2021-01-18 ASSESSMENT — ENCOUNTER SYMPTOMS
BLURRED VISION: 0
VOMITING: 0
SORE THROAT: 0
WHEEZING: 0
COUGH: 0
CONSTIPATION: 0
RHINORRHEA: 0
NAUSEA: 0
DIARRHEA: 0
SHORTNESS OF BREATH: 0
ABDOMINAL PAIN: 0

## 2021-01-18 NOTE — PATIENT INSTRUCTIONS
SURVEY:    You may be receiving a survey from [facility] regarding your visit today. Please complete the survey to enable us to provide the highest quality of care to you and your family. If you cannot score us a very good on any question, please call the office to discuss how we could have made your experience a very good one. Thank you. Patient Education        Counting Carbohydrates: Care Instructions  Your Care Instructions     You don't have to eat special foods when you have diabetes. You just have to be careful to eat healthy foods. Carbohydrates (carbs) raise blood sugar higher and quicker than any other nutrient. Carbs are found in desserts, breads and cereals, and fruit. They're also in starchy vegetables. These include potatoes, corn, and grains such as rice and pasta. Carbs are also in milk and yogurt. The more carbs you eat at one time, the higher your blood sugar will rise. Spreading carbs all through the day helps keep your blood sugar levels within your target range. Counting carbs is one of the best ways to keep your blood sugar under control. If you use insulin, counting carbs helps you match the right amount of insulin to the number of grams of carbs in a meal. Then you can change your diet and insulin dose as needed. Testing your blood sugar several times a day can help you learn how carbs affect your blood sugar. A registered dietitian or certified diabetes educator can help you plan meals and snacks. Follow-up care is a key part of your treatment and safety. Be sure to make and go to all appointments, and call your doctor if you are having problems. It's also a good idea to know your test results and keep a list of the medicines you take. How can you care for yourself at home?   Know your daily amount of carbohydrates Your daily amount depends on several things, such as your weight, how active you are, which diabetes medicines you take, and what your goals are for your blood sugar levels. A registered dietitian or certified diabetes educator can help you plan how many carbs to include in each meal and snack. For most adults, a guideline for the daily amount of carbs is:  · 45 to 60 grams at each meal. That's about the same as 3 to 4 carbohydrate servings. · 15 to 20 grams at each snack. That's about the same as 1 carbohydrate serving. Count carbs  Counting carbs lets you know how much rapid-acting insulin to take before you eat. If you use an insulin pump, you get a constant rate of insulin during the day. So the pump must be programmed at meals. This gives you extra insulin to cover the rise in blood sugar after meals. If you take insulin:  · Learn your own insulin-to-carb ratio. You and your diabetes health professional will figure out the ratio. You can do this by testing your blood sugar after meals. For example, you may need a certain amount of insulin for every 15 grams of carbs. · Add up the carb grams in a meal. Then you can figure out how many units of insulin to take based on your insulin-to-carb ratio. · Exercise lowers blood sugar. You can use less insulin than you would if you were not doing exercise. Keep in mind that timing matters. If you exercise within 1 hour after a meal, your body may need less insulin for that meal than it would if you exercised 3 hours after the meal. Test your blood sugar to find out how exercise affects your need for insulin. If you do or don't take insulin:  · Look at labels on packaged foods. This can tell you how many carbs are in a serving. You can also use guides from the American Diabetes Association. · Be aware of portions, or serving sizes. If a package has two servings and you eat the whole package, you need to double the number of grams of carbohydrate listed for one serving. · Protein, fat, and fiber do not raise blood sugar as much as carbs do. If you eat a lot of these nutrients in a meal, your blood sugar will rise more slowly than it would otherwise. Eat from all food groups  · Eat at least three meals a day. · Plan meals to include food from all the food groups. The food groups include grains, fruits, dairy, proteins, and vegetables. · Talk to your dietitian or diabetes educator about ways to add limited amounts of sweets into your meal plan. · If you drink alcohol, talk to your doctor. It may not be recommended when you are taking certain diabetes medicines. Where can you learn more? Go to https://[employer] Agencypen[employer]leweb.[employer]. org and sign in to your [employer] account. Enter Q433 in the Redux box to learn more about \"Counting Carbohydrates: Care Instructions. \"     If you do not have an account, please click on the \"Sign Up Now\" link. Current as of: December 20, 2019               Content Version: 12.6  © 5641-7763 [employer]. Care instructions adapted under license by [facility] ([facility]). If you have questions about a medical condition or this instruction, always ask your healthcare professional. [address]  any warranty or liability for your use of this information.

## 2021-01-18 NOTE — PROGRESS NOTES
Name: Unique Alfaro  : 1965         Chief Complaint:     Chief Complaint   Patient presents with    Diabetes     routine check    Hypertension    Hyperlipidemia       History of Present Illness:      Unique Alfaro is a 50 y.o.  female who presents with Diabetes (routine check), Hypertension, and Hyperlipidemia      Lacie is here today for a routine office visit. Splenic artery aneurysm- stable, follows with vascular and hematology as needed. Morbid obesity-stable, unable to continue with gastric bypass surgery at this time due to critically ill . Diabetes  She presents for her follow-up diabetic visit. She has type 2 diabetes mellitus. Her disease course has been stable. Pertinent negatives for hypoglycemia include no confusion, dizziness, headaches, nervousness/anxiousness, sleepiness, speech difficulty or sweats. Associated symptoms include foot paresthesias. Pertinent negatives for diabetes include no blurred vision, no chest pain, no fatigue, no polydipsia, no polyphagia and no polyuria. There are no hypoglycemic complications. Symptoms are stable. Diabetic complications include peripheral neuropathy. Pertinent negatives for diabetic complications include no CVA, heart disease or nephropathy. Risk factors for coronary artery disease include diabetes mellitus, post-menopausal, dyslipidemia, family history, obesity, sedentary lifestyle and stress. Current diabetic treatment includes insulin injections and oral agent (monotherapy) (GLP-1). She is compliant with treatment all of the time. Her weight is stable. She is following a generally healthy diet. Meal planning includes avoidance of concentrated sweets. She has had a previous visit with a dietitian. She rarely participates in exercise. Her home blood glucose trend is decreasing steadily. Her breakfast blood glucose range is generally 180-200 mg/dl. An ACE inhibitor/angiotensin II receptor blocker is being taken.  She does not see a podiatrist.Eye exam is not current. Hyperlipidemia  This is a chronic problem. The current episode started more than 1 year ago. The problem is controlled. Recent lipid tests were reviewed and are normal. Exacerbating diseases include diabetes and obesity. She has no history of chronic renal disease, hypothyroidism, liver disease or nephrotic syndrome. Factors aggravating her hyperlipidemia include fatty foods and smoking. Pertinent negatives include no chest pain, leg pain or shortness of breath. Current antihyperlipidemic treatment includes statins. The current treatment provides moderate improvement of lipids. Compliance problems include adherence to exercise and adherence to diet. Risk factors for coronary artery disease include diabetes mellitus, dyslipidemia, family history, obesity, hypertension, post-menopausal, a sedentary lifestyle and stress. Past Medical History:     Past Medical History:   Diagnosis Date    Anxiety     Arthritis     Depression     Diabetic neuropathy (HCC)     Esophageal reflux     Heart burn     Limitation of joint motion     Muscle weakness     Sleep apnea     DOES NOT USE A MACHINE.  DIAGNOSED > 15 YEARS AGO WITH SLEEP APNEA    Sleepiness     Vertigo       Reviewed all health maintenance requirements and ordered appropriate tests  Health Maintenance Due   Topic Date Due    Breast cancer screen  02/15/2020       Past Surgical History:     Past Surgical History:   Procedure Laterality Date    ABSCESS DRAINAGE Left 2014    BUTTOCK    APPENDECTOMY      CARPAL TUNNEL RELEASE Left 2018    CATARACT REMOVAL WITH IMPLANT Bilateral 10/24/2016     SECTION      CHOLECYSTECTOMY      COLONOSCOPY  2019    Dr. Katerin Escobar -normal poor prep    COLONOSCOPY N/A 3/25/2019    COLORECTAL CANCER SCREENING, NOT HIGH RISK performed by Prince Hayes MD at [address] [place] [address] COLONOSCOPY N/A 4/10/2019    -diverticulosis,hemorrhoids,lipoma at ileocecal valve    HERNIA REPAIR Right     [facility]    HERNIA REPAIR  2006    HERNIA REPAIR WITH HYSTERECTOMY SURGERY    HYSTERECTOMY      LAPAROSCOPY          Medications:       Prior to Admission medications    Medication Sig Start Date End Date Taking? Authorizing Provider   hydrOXYzine (ATARAX) 25 MG tablet Take 1 tablet by mouth nightly as needed for Anxiety 1/18/21  Yes DI Monique CNP   insulin glargine (LANTUS SOLOSTAR) 100 UNIT/ML injection pen Inject 50 Units into the skin nightly 1/18/21  Yes DI Monique CNP   omeprazole (PRILOSEC) 40 MG delayed release capsule Take 1 capsule by mouth once daily 10/13/20  Yes DI Monique CNP   HYDROcodone-acetaminophen (NORCO) 5-325 MG per tablet Take 1 tablet by mouth 2 times daily as needed. 9/8/20  Yes Historical Provider, MD   escitalopram (LEXAPRO) 20 MG tablet Take 1 tablet by mouth daily 3/12/20  Yes DI Monique CNP   Liraglutide (VICTOZA) 18 MG/3ML SOPN SC injection Inject 1.2 mg into the skin daily 3/12/20  Yes DI Monique CNP   glipiZIDE (GLUCOTROL XL) 10 MG extended release tablet Take 1 tablet by mouth daily 3/12/20  Yes DI Monique CNP   atorvastatin (LIPITOR) 40 MG tablet Take 1 tablet by mouth daily 3/12/20  Yes DI Monique CNP   lisinopril (PRINIVIL;ZESTRIL) 2.5 MG tablet TAKE 1 TABLET DAILY  Patient taking differently: Take 2.5 mg by mouth daily  3/12/20  Yes DI Monique CNP   gabapentin (NEURONTIN) 600 MG tablet Take 900 mg by mouth 3 times daily.  Takes 1 1/2 tabs TID. 1/17/19  Yes Historical Provider, MD   lidocaine-prilocaine (EMLA) 2.5-2.5 % cream Apply topically as needed  11/8/18  Yes Historical Provider, MD   fluticasone (FLONASE) 50 MCG/ACT nasal spray 1 spray by Each Nostril route daily  Patient not taking: Reported on 8/26/2020 1/9/20   Dixie Hill Deats, APRN - CNP        Allergies:       Codeine, Meperidine, and Other    Social History:     Tobacco:    reports that she quit smoking about 9 years ago. Her smoking use included cigarettes. She smoked 1.00 pack per day. She has never used smokeless tobacco.  Alcohol:      reports no history of alcohol use. Drug Use:  reports no history of drug use. Family History:     Family History   Problem Relation Age of Onset    Diabetes Mother     High Cholesterol Mother     Hypertension Mother     Heart Disease Mother 36    Diabetes Father     Heart Disease Father 48    High Cholesterol Father     Hypertension Father     Diabetes Sister        Review of Systems:     Positive and Negative as described in HPI    Review of Systems   Constitutional: Negative for activity change, chills, fatigue and fever. HENT: Negative for congestion, rhinorrhea and sore throat. Eyes: Negative for blurred vision and visual disturbance. Respiratory: Negative for cough, shortness of breath and wheezing. Cardiovascular: Negative for chest pain and palpitations. Gastrointestinal: Negative for abdominal pain, constipation, diarrhea, nausea and vomiting. Endocrine: Negative for polydipsia, polyphagia and polyuria. Genitourinary: Negative for difficulty urinating and dysuria. Musculoskeletal: Negative for gait problem, neck pain and neck stiffness. Skin: Negative for rash. Neurological: Positive for numbness. Negative for dizziness, syncope, speech difficulty, light-headedness and headaches. Psychiatric/Behavioral: Negative for confusion. The patient is not nervous/anxious. Physical Exam:   Vitals:  /64 (Site: Left Upper Arm)   Pulse 94   Temp 97.5 °F (36.4 °C) (Temporal)   Resp 20   Wt 223 lb 9.6 oz (101.4 kg)   SpO2 98%   BMI 43.67 kg/m²     Physical Exam  Vitals signs and nursing note reviewed. Constitutional:       General: She is not in acute distress. Appearance: Normal appearance. She is well-developed. She is obese.    HENT:      Mouth/Throat:      Mouth: Mucous membranes are moist.   Eyes:      General: No scleral icterus. Conjunctiva/sclera: Conjunctivae normal.   Neck:      Musculoskeletal: Normal range of motion and neck supple. Cardiovascular:      Rate and Rhythm: Normal rate and regular rhythm. Heart sounds: No murmur. Pulmonary:      Effort: Pulmonary effort is normal.      Breath sounds: Normal breath sounds. No wheezing. Abdominal:      General: Bowel sounds are normal. There is no distension. Palpations: Abdomen is soft. Tenderness: There is no abdominal tenderness. Musculoskeletal:      Right lower leg: Edema (Trace pitting) present. Left lower leg: Edema (Trace pitting) present. Lymphadenopathy:      Cervical: No cervical adenopathy. Skin:     General: Skin is warm and dry. Findings: No rash. Neurological:      Mental Status: She is alert and oriented to person, place, and time. Psychiatric:         Mood and Affect: Mood normal.         Behavior: Behavior normal. Behavior is cooperative. Data:     Lab Results   Component Value Date     10/06/2020    K 4.9 10/06/2020     10/06/2020    CO2 26 10/06/2020    BUN 12 10/06/2020    CREATININE 0.50 10/06/2020    GLUCOSE 190 10/06/2020    GLUCOSE 351 03/11/2012    PROT 6.3 09/17/2019    LABALBU 2.8 09/17/2019    BILITOT 0.36 09/17/2019    ALKPHOS 137 09/17/2019    AST 16 08/24/2020    ALT 21 08/24/2020     Lab Results   Component Value Date    WBC 10.0 10/06/2020    RBC 4.64 10/06/2020    RBC 5.34 03/11/2012    HGB 13.8 10/06/2020    HCT 42.8 10/06/2020    MCV 92.2 10/06/2020    MCH 29.7 10/06/2020    MCHC 32.2 10/06/2020    RDW 12.6 10/06/2020     10/06/2020     03/11/2012    MPV 10.5 10/06/2020     Lab Results   Component Value Date    TSH 0.88 02/06/2013     Lab Results   Component Value Date    CHOL 160 08/24/2020    HDL 27 08/24/2020    LABA1C 8.7 01/18/2021       Assessment/Plan:      Diagnosis Orders   1.  Controlled type 2 diabetes mellitus with diabetic polyneuropathy, without long-term current use of insulin (HCC)  POCT glycosylated hemoglobin (Hb A1C)    CBC Auto Differential    ALT    AST    Basic Metabolic Panel    Hemoglobin A1C    Lipid Panel    Microalbumin, Ur    insulin glargine (LANTUS SOLOSTAR) 100 UNIT/ML injection pen   2. Essential hypertension     3. Dyslipidemia     4. Morbid obesity ([address]         1. Lacie received counseling on the following healthy behaviors: nutrition, exercise and medication adherence  2. Patient given educational materials - see patient instructions  3. Was a self-tracking handout given in paper form or via [employer]? No  If yes, see orders or list here. 4.  Discussed use, benefit, and side effects of prescribed medications. Barriers to medication compliance addressed. All patient questions answered. Pt voiced understanding. 5.  Reviewed prior labs and health maintenance  6. Continue current medications, diet and exercise. Completed Refills   Requested Prescriptions     Signed Prescriptions Disp Refills    hydrOXYzine (ATARAX) 25 MG tablet 30 tablet 0     Sig: Take 1 tablet by mouth nightly as needed for Anxiety    insulin glargine (LANTUS SOLOSTAR) 100 UNIT/ML injection pen 5 pen 3     Sig: Inject 50 Units into the skin nightly         Return in about 6 months (around 7/18/2021) for Check up.

## 2021-01-20 DIAGNOSIS — Z01.818 PREOPERATIVE TESTING: Primary | ICD-10-CM

## 2021-03-04 ENCOUNTER — HOSPITAL ENCOUNTER (OUTPATIENT)
Dept: LAB | Age: 56
Setting detail: SPECIMEN
Discharge: HOME | End: 2021-03-04
Payer: COMMERCIAL

## 2021-03-04 DIAGNOSIS — Z01.818 PREOPERATIVE TESTING: ICD-10-CM

## 2021-03-04 PROCEDURE — U0003 INFECTIOUS AGENT DETECTION BY NUCLEIC ACID (DNA OR RNA); SEVERE ACUTE RESPIRATORY SYNDROME CORONAVIRUS 2 (SARS-COV-2) (CORONAVIRUS DISEASE [COVID-19]), AMPLIFIED PROBE TECHNIQUE, MAKING USE OF HIGH THROUGHPUT TECHNOLOGIES AS DESCRIBED BY CMS-2020-01-R: HCPCS

## 2021-03-04 PROCEDURE — U0005 INFEC AGEN DETEC AMPLI PROBE: HCPCS

## 2021-03-04 PROCEDURE — C9803 HOPD COVID-19 SPEC COLLECT: HCPCS

## 2021-03-05 LAB
SARS-COV-2: ABNORMAL
SARS-COV-2: ABNORMAL
SOURCE: ABNORMAL

## 2021-03-06 ENCOUNTER — TELEPHONE (OUTPATIENT)
Dept: PRIMARY CARE CLINIC | Age: 56
End: 2021-03-06

## 2021-03-09 RX ORDER — LIRAGLUTIDE 6 MG/ML
INJECTION SUBCUTANEOUS
Qty: 6 ML | Refills: 3 | Status: SHIPPED | OUTPATIENT
Start: 2021-03-09 | End: 2021-08-25

## 2021-03-09 NOTE — TELEPHONE ENCOUNTER
Health Maintenance   Topic Date Due    Breast cancer screen  02/15/2020    Diabetic foot exam  03/12/2021    Hepatitis B vaccine (1 of 3 - Risk 3-dose series) 08/26/2021 (Originally 8/8/1986)    Cervical cancer screen  08/26/2021 (Originally 8/8/1988)    Shingles Vaccine (1 of 2) 08/26/2021 (Originally 8/8/2017)    Flu vaccine (1) 11/23/2021 (Originally 9/1/2020)    Diabetic retinal exam  01/18/2022 (Originally 1/17/2021)    Hepatitis C screen  01/18/2022 (Originally 1965)    Diabetic microalbuminuria test  08/24/2021    Lipid screen  08/24/2021    Potassium monitoring  10/06/2021    Creatinine monitoring  10/06/2021    A1C test (Diabetic or Prediabetic)  01/18/2022    DTaP/Tdap/Td vaccine (2 - Td) 04/28/2023    Colon cancer screen colonoscopy  04/10/2029    Pneumococcal 0-64 years Vaccine  Completed    HIV screen  Completed    Hepatitis A vaccine  Aged Out    Hib vaccine  Aged Out    Meningococcal (ACWY) vaccine  Aged Out             (applicable per patient's age: Cancer Screenings, Depression Screening, Fall Risk Screening, Immunizations)    Hemoglobin A1C (%)   Date Value   01/18/2021 8.7   10/06/2020 9.2 (H)   07/19/2020 9.2 (H)     Microalb/Crt.  Ratio (mcg/mg creat)   Date Value   08/24/2020 17     LDL Cholesterol (mg/dL)   Date Value   08/24/2020 63     AST (U/L)   Date Value   08/24/2020 16     ALT (U/L)   Date Value   08/24/2020 21     BUN (mg/dL)   Date Value   10/06/2020 12      (goal A1C is < 7)   (goal LDL is <100) need 30-50% reduction from baseline     BP Readings from Last 3 Encounters:   01/18/21 118/64   11/23/20 116/60   11/04/20 106/61    (goal /80)      All Future Testing planned in [facility]:  Lab Frequency Next Occurrence   CLAY DIGITAL SCREEN W CAD BILATERAL Once 05/12/2021   Sleep Study with PAP Titration Once 02/19/2021   CBC Auto Differential Once 07/17/2021   ALT Once 07/18/2021   AST Once 65/67/1645   Basic Metabolic Panel Once 79/27/3131   Hemoglobin A1C Once 07/17/2021   Lipid Panel Once 07/17/2021   Microalbumin, Ur Once 07/17/2021       Next Visit Date:  Future Appointments   Date Time Provider Osiel Owen   7/20/2021  2:20 PM Jett Byrd, DI - CNP [facility]            Patient Active Problem List:     Dyslipidemia     Nuclear sclerotic cataract of left eye     Controlled type 2 diabetes mellitus with diabetic polyneuropathy, without long-term current use of insulin ([address]     Vitamin D deficiency     Obstructive sleep apnea     Sepsis due to pneumonia ([address]     Positive FIT (fecal immunochemical test)     Dysthymia     Dyspepsia     Bilateral leg and foot pain     Cervical radiculitis     Chronic left shoulder pain     Complex regional pain syndrome type 1 of left lower extremity     Cervicalgia     Diabetic autonomic neuropathy associated with type 2 diabetes mellitus ([address]     Encounter for long-term opiate analgesic use     Ocular hypertension of left eye     Regular astigmatism, bilateral     Vitreous floaters of both eyes     Cellulitis, abdominal wall     Sepsis due to abdominal wall cellulitis secondary to DM     Dizziness     Acute vertigo with vomiting and inability to stand     Bilateral shoulder pain     Morbid obesity (HCC)     Moderate nonproliferative diabetic retinopathy associated with type 2 diabetes mellitus ([address]     Essential hypertension

## 2021-04-14 ENCOUNTER — OFFICE VISIT (OUTPATIENT)
Dept: PRIMARY CARE CLINIC | Age: 56
End: 2021-04-14
Payer: COMMERCIAL

## 2021-04-14 VITALS
BODY MASS INDEX: 38.93 KG/M2 | HEIGHT: 64 IN | SYSTOLIC BLOOD PRESSURE: 122 MMHG | DIASTOLIC BLOOD PRESSURE: 58 MMHG | TEMPERATURE: 97.2 F | RESPIRATION RATE: 18 BRPM | OXYGEN SATURATION: 97 % | HEART RATE: 97 BPM | WEIGHT: 228 LBS

## 2021-04-14 DIAGNOSIS — R39.11 URINARY HESITANCY: ICD-10-CM

## 2021-04-14 DIAGNOSIS — H66.001 NON-RECURRENT ACUTE SUPPURATIVE OTITIS MEDIA OF RIGHT EAR WITHOUT SPONTANEOUS RUPTURE OF TYMPANIC MEMBRANE: Primary | ICD-10-CM

## 2021-04-14 PROCEDURE — 99213 OFFICE O/P EST LOW 20 MIN: CPT | Performed by: NURSE PRACTITIONER

## 2021-04-14 PROCEDURE — 1036F TOBACCO NON-USER: CPT | Performed by: NURSE PRACTITIONER

## 2021-04-14 PROCEDURE — G8427 DOCREV CUR MEDS BY ELIG CLIN: HCPCS | Performed by: NURSE PRACTITIONER

## 2021-04-14 PROCEDURE — G8417 CALC BMI ABV UP PARAM F/U: HCPCS | Performed by: NURSE PRACTITIONER

## 2021-04-14 PROCEDURE — 3017F COLORECTAL CA SCREEN DOC REV: CPT | Performed by: NURSE PRACTITIONER

## 2021-04-14 RX ORDER — AMOXICILLIN AND CLAVULANATE POTASSIUM 875; 125 MG/1; MG/1
1 TABLET, FILM COATED ORAL 2 TIMES DAILY
Qty: 14 TABLET | Refills: 0 | Status: SHIPPED | OUTPATIENT
Start: 2021-04-14 | End: 2021-04-21

## 2021-04-14 ASSESSMENT — ENCOUNTER SYMPTOMS
TROUBLE SWALLOWING: 0
SORE THROAT: 0
RHINORRHEA: 1
BACK PAIN: 1

## 2021-04-14 NOTE — PROGRESS NOTES
[facility]  [address] [facility] [address] [facility]  Dept: [phone]  Dept Fax: [phone]    Brandon Perkins is a 50 y.o. female who presentsto the [facility] today for her medical conditions/complaints as noted below. Brandon Covert is c/o of Other (c/o ear crackling, ear ache on both ears. States \"been off and on for 2 months\") and Back Pain (x 2 days. c/o middle to lower back pain. )      HPI:     Maris Osborne is here today for a walk in visit. She reports over the last couple months she has had a crackling sounds in her ear bilaterally, seems to correlate with when she has sinus congestion. It is worse when she is blowing her nose. Pain is not worse with eating. She has taken mucinex, sudafed and tylenol sinus. It will get better and then come back. Over the last couple days her right ear has had worsening pain. She denies any fever or chills. She reports over the last 2 days she has had right-sided back pain. She denies any mechanism of injury. She just woke up 1 morning and it was bothering her. If she lays on her left side the pain is worse. If she lays on her right side pain is improved. Sometimes a heating pad will help. She has a history of chronic pain is currently on Norco.  She has been having urinary hesitancy over the last month so she thought this might be a kidney infection. She denies any dysuria, hematuria or urinary frequency. Past Medical History:   Diagnosis Date    Anxiety     Arthritis     Depression     Diabetic neuropathy (HCC)     Esophageal reflux     Heart burn     Limitation of joint motion     Muscle weakness     Sleep apnea     DOES NOT USE A MACHINE.  DIAGNOSED > 15 YEARS AGO WITH SLEEP APNEA    Sleepiness     Vertigo         Current Outpatient Medications   Medication Sig Dispense Refill    amoxicillin-clavulanate (AUGMENTIN) 875-125 MG per tablet Take 1 tablet by mouth 2 times daily for 7 days 14 tablet 0    escitalopram (LEXAPRO) 20 MG tablet Take 1 tablet by mouth once daily 90 tablet 3    VICTOZA 18 MG/3ML SOPN SC injection INJECT 1.2 MG INTO SKIN DAILY 6 mL 3    hydrOXYzine (ATARAX) 25 MG tablet Take 1 tablet by mouth nightly as needed for Anxiety 30 tablet 0    insulin glargine (LANTUS SOLOSTAR) 100 UNIT/ML injection pen Inject 50 Units into the skin nightly 5 pen 3    omeprazole (PRILOSEC) 40 MG delayed release capsule Take 1 capsule by mouth once daily 90 capsule 3    HYDROcodone-acetaminophen (NORCO) 5-325 MG per tablet Take 1 tablet by mouth 2 times daily as needed.  glipiZIDE (GLUCOTROL XL) 10 MG extended release tablet Take 1 tablet by mouth daily 90 tablet 3    atorvastatin (LIPITOR) 40 MG tablet Take 1 tablet by mouth daily 90 tablet 3    lisinopril (PRINIVIL;ZESTRIL) 2.5 MG tablet TAKE 1 TABLET DAILY (Patient taking differently: Take 2.5 mg by mouth daily ) 90 tablet 3    gabapentin (NEURONTIN) 600 MG tablet Take 900 mg by mouth 3 times daily. Takes 1 1/2 tabs TID.  fluticasone (FLONASE) 50 MCG/ACT nasal spray 1 spray by Each Nostril route daily (Patient not taking: Reported on 8/26/2020) 1 Bottle 0    lidocaine-prilocaine (EMLA) 2.5-2.5 % cream Apply topically as needed        No current facility-administered medications for this visit. Allergies   Allergen Reactions    Codeine Other (See Comments)     Migraine    Meperidine     Other Itching and Dermatitis     METAL       Subjective:      Review of Systems   Constitutional: Negative for activity change, appetite change and fever. HENT: Positive for congestion (On and off), ear pain and rhinorrhea (On and off). Negative for sore throat, tinnitus and trouble swallowing. Respiratory: Negative for cough, shortness of breath and wheezing. Genitourinary: Positive for difficulty urinating. Negative for decreased urine volume, dysuria, flank pain, frequency, hematuria and pelvic pain.         Urinary hesitancy for 1 month. Feels like she has to go but cant go. Denies any urinary incontinence. Musculoskeletal: Positive for back pain (over last the couple days). Skin: Negative for rash and wound. Objective:     Physical Exam  Vitals signs and nursing note reviewed. Constitutional:       General: She is not in acute distress. Appearance: Normal appearance. She is not toxic-appearing or diaphoretic. HENT:      Head: Normocephalic and atraumatic. Right Ear: Tenderness present. No mastoid tenderness. Tympanic membrane is erythematous and bulging. Left Ear: No tenderness. No mastoid tenderness. Tympanic membrane is not erythematous or bulging. Nose: No congestion or rhinorrhea. Mouth/Throat:      Mouth: Mucous membranes are moist.      Pharynx: No oropharyngeal exudate. Eyes:      General:         Right eye: No discharge. Left eye: No discharge. Conjunctiva/sclera: Conjunctivae normal.   Cardiovascular:      Rate and Rhythm: Normal rate and regular rhythm. Heart sounds: No murmur. Pulmonary:      Effort: Pulmonary effort is normal.      Breath sounds: Normal breath sounds. No wheezing, rhonchi or rales. Abdominal:      Palpations: Abdomen is soft. Tenderness: There is no abdominal tenderness. There is no right CVA tenderness or left CVA tenderness. Musculoskeletal:      Thoracic back: She exhibits tenderness and pain. Back:    Neurological:      General: No focal deficit present. Mental Status: She is alert and oriented to person, place, and time. BP (!) 122/58 (Site: Left Upper Arm, Position: Sitting, Cuff Size: Large Adult)   Pulse 97   Temp 97.2 °F (36.2 °C) (Temporal)   Resp 18   Ht 5' 4\" (1.626 m)   Wt 228 lb (103.4 kg)   SpO2 97%   BMI 39.14 kg/m²     Assessment:      Diagnosis Orders   1.  Non-recurrent acute suppurative otitis media of right ear without spontaneous rupture of tympanic membrane  amoxicillin-clavulanate (AUGMENTIN) 875-125 MG per tablet   2. Urinary hesitancy  POCT Urinalysis No Micro (Auto)    Uzma Canales, CNP, Urology, [place]    Culture, Urine   Will treat patient for acute otitis media with Augmentin. Patient unable to provide urine sample in the office advised her to bring urine sample and by the end of the day. Update unable to provide urine sample to this morning. She has not taken her antibiotic yet for otitis media. UA not convincing of urinary tract infection. Will send out for urine culture. Due to urinary hesitancy over the last month we will have her see urology. Did with her that she has glucose in her urine. Stressed the importance of glycemic control and to keep scheduled appointment with primary care provider. Back painlikely musculoskeletal.  Educated on supportive care. Follow-up if no improvement. Plan:     Exam findings and plan of care discussed at bedside including:    · Start Augmentin-  today as prescribed; administration and side effects reviewed. Encouraged that it be taken with food and a probiotic and examples give. · Supportive management discussed including: rest, hydration, OTC Tylenol as needed for pain. · To ER or call 911 if any difficulty breathing, shortness of breath, inability to swallow, hives, rash, facial/tongue swelling or temp greater than 103 degrees. · Follow up with PCP as needed if symptoms worsen or do not improve. Return if symptoms worsen or fail to improve. Orders Placed This Encounter   Medications    amoxicillin-clavulanate (AUGMENTIN) 875-125 MG per tablet     Sig: Take 1 tablet by mouth 2 times daily for 7 days     Dispense:  14 tablet     Refill:  0          All patient questions answered. Pt voiced understanding.       Electronically signed by DI Butts CNP on 4/15/2021 at 1:21 PM

## 2021-04-14 NOTE — PATIENT INSTRUCTIONS
SURVEY:    You may be receiving a survey from [employer] regarding your visit today. Please complete the survey to enable us to provide the highest quality of care to you and your family. If you cannot score us a very good on any question, please call the office to discuss how we could have made your experience a very good one. Thank you.

## 2021-04-15 ENCOUNTER — TELEPHONE (OUTPATIENT)
Dept: PRIMARY CARE CLINIC | Age: 56
End: 2021-04-15

## 2021-04-15 ENCOUNTER — HOSPITAL ENCOUNTER (OUTPATIENT)
Age: 56
Setting detail: SPECIMEN
Discharge: HOME | End: 2021-04-15
Payer: COMMERCIAL

## 2021-04-15 DIAGNOSIS — R39.11 URINARY HESITANCY: ICD-10-CM

## 2021-04-15 LAB
BILIRUBIN, POC: ABNORMAL
BLOOD URINE, POC: ABNORMAL
CLARITY, POC: ABNORMAL
COLOR, POC: YELLOW
GLUCOSE URINE, POC: 250
KETONES, POC: ABNORMAL
LEUKOCYTE EST, POC: ABNORMAL
NITRITE, POC: ABNORMAL
PH, POC: 5.5
PROTEIN, POC: ABNORMAL
SPECIFIC GRAVITY, POC: >=1.03
UROBILINOGEN, POC: 0.2

## 2021-04-15 PROCEDURE — 81003 URINALYSIS AUTO W/O SCOPE: CPT | Performed by: NURSE PRACTITIONER

## 2021-04-15 PROCEDURE — 87086 URINE CULTURE/COLONY COUNT: CPT

## 2021-04-15 ASSESSMENT — ENCOUNTER SYMPTOMS
WHEEZING: 0
COUGH: 0
SHORTNESS OF BREATH: 0

## 2021-04-15 NOTE — TELEPHONE ENCOUNTER
Left message in regards to Lacie's appointment yesterday. Informed to call the office back to discuss appointment 4/14/2021.

## 2021-04-17 LAB
CULTURE: NORMAL
Lab: NORMAL
SPECIMEN DESCRIPTION: NORMAL

## 2021-04-23 ENCOUNTER — OFFICE VISIT (OUTPATIENT)
Dept: PRIMARY CARE CLINIC | Age: 56
End: 2021-04-23
Payer: COMMERCIAL

## 2021-04-23 VITALS
SYSTOLIC BLOOD PRESSURE: 122 MMHG | BODY MASS INDEX: 39.48 KG/M2 | OXYGEN SATURATION: 97 % | TEMPERATURE: 97.2 F | WEIGHT: 230 LBS | RESPIRATION RATE: 22 BRPM | DIASTOLIC BLOOD PRESSURE: 70 MMHG | HEART RATE: 88 BPM

## 2021-04-23 DIAGNOSIS — F41.9 ANXIETY: Primary | ICD-10-CM

## 2021-04-23 DIAGNOSIS — H60.311 ACUTE DIFFUSE OTITIS EXTERNA OF RIGHT EAR: Primary | ICD-10-CM

## 2021-04-23 PROCEDURE — G8427 DOCREV CUR MEDS BY ELIG CLIN: HCPCS | Performed by: NURSE PRACTITIONER

## 2021-04-23 PROCEDURE — 99213 OFFICE O/P EST LOW 20 MIN: CPT | Performed by: NURSE PRACTITIONER

## 2021-04-23 PROCEDURE — 3017F COLORECTAL CA SCREEN DOC REV: CPT | Performed by: NURSE PRACTITIONER

## 2021-04-23 PROCEDURE — G8417 CALC BMI ABV UP PARAM F/U: HCPCS | Performed by: NURSE PRACTITIONER

## 2021-04-23 PROCEDURE — 1036F TOBACCO NON-USER: CPT | Performed by: NURSE PRACTITIONER

## 2021-04-23 PROCEDURE — 4130F TOPICAL PREP RX AOE: CPT | Performed by: NURSE PRACTITIONER

## 2021-04-23 RX ORDER — HYDROXYZINE HYDROCHLORIDE 25 MG/1
25 TABLET, FILM COATED ORAL NIGHTLY PRN
Qty: 90 TABLET | Refills: 0 | Status: SHIPPED | OUTPATIENT
Start: 2021-04-23 | End: 2021-07-20 | Stop reason: SDUPTHER

## 2021-04-23 RX ORDER — NEOMYCIN SULFATE, POLYMYXIN B SULFATE AND HYDROCORTISONE 10; 3.5; 1 MG/ML; MG/ML; [USP'U]/ML
3 SUSPENSION/ DROPS AURICULAR (OTIC) 3 TIMES DAILY
Qty: 1 BOTTLE | Refills: 0 | Status: SHIPPED | OUTPATIENT
Start: 2021-04-23 | End: 2021-05-03

## 2021-04-23 ASSESSMENT — ENCOUNTER SYMPTOMS
ABDOMINAL PAIN: 0
RHINORRHEA: 0
SORE THROAT: 0
COUGH: 0

## 2021-04-23 NOTE — TELEPHONE ENCOUNTER
07/18/2021   AST Once 29/96/0445   Basic Metabolic Panel Once 80/03/8917   Hemoglobin A1C Once 07/17/2021   Lipid Panel Once 07/17/2021   Microalbumin, Ur Once 07/17/2021       Next Visit Date:  Future Appointments   Date Time Provider Osiel Owen   5/7/2021 11:00 AM [facility]   7/20/2021  2:20 PM DI Barron - CNP [facility]            Patient Active Problem List:     Dyslipidemia     Nuclear sclerotic cataract of left eye     Controlled type 2 diabetes mellitus with diabetic polyneuropathy, without long-term current use of insulin ([address]     Vitamin D deficiency     Obstructive sleep apnea     Sepsis due to pneumonia ([address]     Positive FIT (fecal immunochemical test)     Dysthymia     Dyspepsia     Bilateral leg and foot pain     Cervical radiculitis     Chronic left shoulder pain     Complex regional pain syndrome type 1 of left lower extremity     Cervicalgia     Diabetic autonomic neuropathy associated with type 2 diabetes mellitus ([address]     Encounter for long-term opiate analgesic use     Ocular hypertension of left eye     Regular astigmatism, bilateral     Vitreous floaters of both eyes     Cellulitis, abdominal wall     Sepsis due to abdominal wall cellulitis secondary to DM     Dizziness     Acute vertigo with vomiting and inability to stand     Bilateral shoulder pain     Morbid obesity (HCC)     Moderate nonproliferative diabetic retinopathy associated with type 2 diabetes mellitus ([address]     Essential hypertension

## 2021-04-23 NOTE — PROGRESS NOTES
Name: Papa Pearson  : 1965         Chief Complaint:     Chief Complaint   Patient presents with    Otalgia     Bilateral X 2 weeks       History of Present Illness:      Papa Pearson is a 50 y.o.  female who presents with Otalgia (Bilateral X 2 weeks)      Janet Gross is here today for a same-day office visit. States she was treated for an ear infection last week with Augmentin but still having a significant amount of pain in the right ear. See below for further comment. Otalgia   There is pain in the right ear. This is a recurrent problem. The current episode started 1 to 4 weeks ago. The problem occurs constantly. The problem has been unchanged. There has been no fever. The pain is at a severity of 5/10. The pain is moderate. Pertinent negatives include no abdominal pain, coughing, ear discharge, hearing loss, neck pain, rash, rhinorrhea or sore throat. She has tried antibiotics for the symptoms. The treatment provided no relief. There is no history of a chronic ear infection, hearing loss or a tympanostomy tube. Past Medical History:     Past Medical History:   Diagnosis Date    Anxiety     Arthritis     Depression     Diabetic neuropathy (HCC)     Esophageal reflux     Heart burn     Limitation of joint motion     Muscle weakness     Sleep apnea     DOES NOT USE A MACHINE.  DIAGNOSED > 15 YEARS AGO WITH SLEEP APNEA    Sleepiness     Vertigo       Reviewed all health maintenance requirements and ordered appropriate tests  Health Maintenance Due   Topic Date Due    Breast cancer screen  02/15/2020       Past Surgical History:     Past Surgical History:   Procedure Laterality Date    ABSCESS DRAINAGE Left     BUTTOCK    APPENDECTOMY      CARPAL TUNNEL RELEASE Left 2018    CATARACT REMOVAL WITH IMPLANT Bilateral 10/24/2016     SECTION      CHOLECYSTECTOMY      COLONOSCOPY  2019    Dr. Stacey Monroe -normal poor prep    COLONOSCOPY N/A 3/25/2019    COLORECTAL CANCER SCREENING, NOT HIGH RISK performed by Michelle Farmer MD at [facility] COLONOSCOPY N/A 4/10/2019    -diverticulosis,hemorrhoids,lipoma at ileocecal valve    HERNIA REPAIR Right     [facility]    HERNIA REPAIR  2006    HERNIA REPAIR WITH HYSTERECTOMY SURGERY    HYSTERECTOMY      LAPAROSCOPY          Medications:       Prior to Admission medications    Medication Sig Start Date End Date Taking? Authorizing Provider   neomycin-polymyxin-hydrocortisone (CORTISPORIN) 3.5-98304-2 otic suspension Place 3 drops into the right ear 3 times daily for 10 days 4/23/21 5/3/21 Yes Bartholome Hole Might, APRN - CNP   glipiZIDE (GLUCOTROL XL) 10 MG extended release tablet Take 1 tablet by mouth once daily 4/15/21  Yes Bartholome Hole Might, APRN - CNP   lisinopril (PRINIVIL;ZESTRIL) 2.5 MG tablet Take 1 tablet by mouth once daily 4/15/21  Yes Bartholome Hole Might, APRN - CNP   escitalopram (LEXAPRO) 20 MG tablet Take 1 tablet by mouth once daily 3/29/21  Yes Bartholome Hole Might, APRN - CNP   VICTOZA 18 MG/3ML SOPN SC injection INJECT 1.2 MG INTO SKIN DAILY 3/9/21  Yes Bartholome Hole Might, APRN - CNP   insulin glargine (LANTUS SOLOSTAR) 100 UNIT/ML injection pen Inject 50 Units into the skin nightly 1/18/21  Yes Bartholome Hole Might, APRN - CNP   omeprazole (PRILOSEC) 40 MG delayed release capsule Take 1 capsule by mouth once daily 10/13/20  Yes Bartholome Hole Might, APRN - CNP   HYDROcodone-acetaminophen (NORCO) 5-325 MG per tablet Take 1 tablet by mouth 2 times daily as needed. 9/8/20  Yes Historical Provider, MD   atorvastatin (LIPITOR) 40 MG tablet Take 1 tablet by mouth daily 3/12/20  Yes Seb Dewitt Might, APRN - CNP   gabapentin (NEURONTIN) 600 MG tablet Take 900 mg by mouth 3 times daily.  Takes 1 1/2 tabs TID. 1/17/19  Yes Historical Provider, MD   hydrOXYzine (ATARAX) 25 MG tablet Take 1 tablet by mouth nightly as needed for Anxiety 4/23/21   Epiolome Esdras Might, APRN - CNP   fluticasone (FLONASE) 50 MCG/ACT nasal spray 1 spray by Each Nostril route daily  Patient not taking: Reported on 8/26/2020 1/9/20   Jersey Lott, APRN - CNP   lidocaine-prilocaine (EMLA) 2.5-2.5 % cream Apply topically as needed  11/8/18   Historical Provider, MD        Allergies:       Codeine, Meperidine, and Other    Social History:     Tobacco:    reports that she quit smoking about 9 years ago. Her smoking use included cigarettes. She smoked 1.00 pack per day. She has never used smokeless tobacco.  Alcohol:      reports no history of alcohol use. Drug Use:  reports no history of drug use. Family History:     Family History   Problem Relation Age of Onset    Diabetes Mother     High Cholesterol Mother     Hypertension Mother     Heart Disease Mother 36    Diabetes Father     Heart Disease Father 48    High Cholesterol Father     Hypertension Father     Diabetes Sister        Review of Systems:     Positive and Negative as described in HPI    Review of Systems   Constitutional: Negative for chills, fatigue and fever. HENT: Positive for ear pain. Negative for congestion, ear discharge, hearing loss, rhinorrhea and sore throat. Respiratory: Negative for cough. Gastrointestinal: Negative for abdominal pain. Musculoskeletal: Negative for neck pain and neck stiffness. Skin: Negative for rash. Physical Exam:   Vitals:  /70 (Position: Sitting)   Pulse 88   Temp 97.2 °F (36.2 °C) (Temporal)   Resp 22   Wt 230 lb (104.3 kg)   SpO2 97%   BMI 39.48 kg/m²     Physical Exam  Constitutional:       General: She is not in acute distress. Appearance: Normal appearance. She is obese. She is not ill-appearing. HENT:      Right Ear: Tympanic membrane normal. Swelling and tenderness present. No drainage. Tympanic membrane is not erythematous. Neck:      Musculoskeletal: Normal range of motion and neck supple. Cardiovascular:      Rate and Rhythm: Normal rate and regular rhythm. Pulmonary:      Effort: Pulmonary effort is normal.      Breath sounds: Normal breath sounds. Lymphadenopathy:      Cervical: No cervical adenopathy. Skin:     General: Skin is warm and dry. Findings: No rash. Neurological:      Mental Status: She is alert. Data:     Lab Results   Component Value Date     10/06/2020    K 4.9 10/06/2020     10/06/2020    CO2 26 10/06/2020    BUN 12 10/06/2020    CREATININE 0.50 10/06/2020    GLUCOSE 190 10/06/2020    GLUCOSE 351 03/11/2012    PROT 6.3 09/17/2019    LABALBU 2.8 09/17/2019    BILITOT 0.36 09/17/2019    ALKPHOS 137 09/17/2019    AST 16 08/24/2020    ALT 21 08/24/2020     Lab Results   Component Value Date    WBC 10.0 10/06/2020    RBC 4.64 10/06/2020    RBC 5.34 03/11/2012    HGB 13.8 10/06/2020    HCT 42.8 10/06/2020    MCV 92.2 10/06/2020    MCH 29.7 10/06/2020    MCHC 32.2 10/06/2020    RDW 12.6 10/06/2020     10/06/2020     03/11/2012    MPV 10.5 10/06/2020     Lab Results   Component Value Date    TSH 0.88 02/06/2013     Lab Results   Component Value Date    CHOL 160 08/24/2020    HDL 27 08/24/2020    LABA1C 8.7 01/18/2021       Assessment/Plan:      Diagnosis Orders   1. Acute diffuse otitis externa of right ear  neomycin-polymyxin-hydrocortisone (CORTISPORIN) 3.5-62361-1 otic suspension     It appears she has more of an otitis external at this time. We will treat with Cortisporin drops. She will call me early next week if no improvement. 1.  Lacie received counseling on the following healthy behaviors: medication adherence  2. Patient given educational materials - see patient instructions  3. Was a self-tracking handout given in paper form or via [employer]? No  If yes, see orders or list here. 4.  Discussed use, benefit, and side effects of prescribed medications. Barriers to medication compliance addressed. All patient questions answered. Pt voiced understanding. 5.  Reviewed prior labs and health maintenance  6. Continue current medications, diet and exercise.     Completed Refills   Requested Prescriptions     Signed Prescriptions Disp Refills    neomycin-polymyxin-hydrocortisone (CORTISPORIN) 3.5-95679-2 otic suspension 1 Bottle 0     Sig: Place 3 drops into the right ear 3 times daily for 10 days         Return if symptoms worsen or fail to improve.

## 2021-04-23 NOTE — PATIENT INSTRUCTIONS
SURVEY:    You may be receiving a survey from [employer] regarding your visit today. Please complete the survey to enable us to provide the highest quality of care to you and your family. If you cannot score us a very good on any question, please call the office to discuss how we could have made your experience a very good one. Thank you. Patient Education        [place]: Care Instructions  Your Care Instructions     Even though infection is a common cause of ear pain, not all ear pain means an infection. If you have ear pain and don't have an infection, it could be because of a jaw problem, such as temporomandibular joint (TMJ) pain. Or it could be because of a neck problem. When ear discomfort or pain is mild or comes and goes without other symptoms, home treatment may be all you need. Follow-up care is a key part of your treatment and safety. Be sure to make and go to all appointments, and call your doctor if you are having problems. It's also a good idea to know your test results and keep a list of the medicines you take. How can you care for yourself at home? · Apply heat on the ear to ease pain. To apply heat, put a warm water bottle, a heating pad set on low, or a warm cloth on your ear. Do not go to sleep with a heating pad on your skin. · Take an over-the-counter pain medicine, such as acetaminophen (Tylenol), ibuprofen (Advil, Motrin), or naproxen (Aleve). Be safe with medicines. Read and follow all instructions on the label. · Do not take two or more pain medicines at the same time unless the doctor told you to. Many pain medicines have acetaminophen, which is Tylenol. Too much acetaminophen (Tylenol) can be harmful. · Never insert anything, such as a cotton swab or a leopoldo pin, into the ear. When should you call for help?    Call your doctor now or seek immediate medical care if:    · You have new or worse symptoms of infection, such as:  ? Increased pain, swelling, warmth, or redness. ? Red streaks leading from the area. ? Pus draining from the area. ? A fever. Watch closely for changes in your health, and be sure to contact your doctor if:    · You have new or worse discharge coming from the ear.     · You do not get better as expected. Where can you learn more? Go to [email or web address] and sign in to your [employer] account. Enter K151 in the [employer] box to learn more about \"[place]: Care Instructions. \"     If you do not have an account, please click on the \"Sign Up Now\" link. Current as of: December 2, 2020               Content Version: 12.8  © 2006-2021 Healthwise, Incorporated. Care instructions adapted under license by [facility] ([facility]). If you have questions about a medical condition or this instruction, always ask your healthcare professional. [address] any warranty or liability for your use of this information.

## 2021-05-13 ENCOUNTER — HOSPITAL ENCOUNTER (OUTPATIENT)
Dept: WOMENS IMAGING | Age: 56
Discharge: HOME | End: 2021-05-15
Payer: COMMERCIAL

## 2021-05-13 ENCOUNTER — TELEPHONE (OUTPATIENT)
Dept: PRIMARY CARE CLINIC | Age: 56
End: 2021-05-13

## 2021-05-13 DIAGNOSIS — Z12.31 BREAST CANCER SCREENING BY MAMMOGRAM: ICD-10-CM

## 2021-05-13 PROCEDURE — 77063 BREAST TOMOSYNTHESIS BI: CPT

## 2021-05-13 NOTE — TELEPHONE ENCOUNTER
----- Message from [address] [place] [address], DI - CNP sent at 5/13/2021  3:10 PM EDT -----  Results are normal, please call patient and make them aware.

## 2021-07-07 DIAGNOSIS — E11.42 CONTROLLED TYPE 2 DIABETES MELLITUS WITH DIABETIC POLYNEUROPATHY, WITHOUT LONG-TERM CURRENT USE OF INSULIN (HCC): ICD-10-CM

## 2021-07-07 RX ORDER — INSULIN GLARGINE 100 [IU]/ML
INJECTION, SOLUTION SUBCUTANEOUS
Qty: 15 ML | Refills: 0 | Status: SHIPPED | OUTPATIENT
Start: 2021-07-07 | End: 2021-07-20 | Stop reason: SDUPTHER

## 2021-07-07 NOTE — TELEPHONE ENCOUNTER
Health Maintenance   Topic Date Due    Hepatitis B vaccine (1 of 3 - Risk 3-dose series) 08/26/2021 (Originally 8/8/1986)    Cervical cancer screen  08/26/2021 (Originally 8/8/1988)    Shingles Vaccine (1 of 2) 08/26/2021 (Originally 8/8/2017)    Diabetic retinal exam  01/18/2022 (Originally 1/17/2021)    Hepatitis C screen  01/18/2022 (Originally 1965)    Diabetic foot exam  04/14/2022 (Originally 3/12/2021)    Diabetic microalbuminuria test  08/24/2021    Lipid screen  08/24/2021    Flu vaccine (1) 09/01/2021    Potassium monitoring  10/06/2021    Creatinine monitoring  10/06/2021    A1C test (Diabetic or Prediabetic)  01/18/2022    DTaP/Tdap/Td vaccine (2 - Td or Tdap) 04/28/2023    Breast cancer screen  05/13/2023    Colon cancer screen colonoscopy  04/10/2029    Pneumococcal 0-64 years Vaccine (2 of 2 - PPSV23) 08/08/2032    COVID-19 Vaccine  Completed    HIV screen  Completed    Hepatitis A vaccine  Aged Out    Hib vaccine  Aged Out    Meningococcal (ACWY) vaccine  Aged Out             (applicable per patient's age: Cancer Screenings, Depression Screening, Fall Risk Screening, Immunizations)    Hemoglobin A1C (%)   Date Value   01/18/2021 8.7   10/06/2020 9.2 (H)   07/19/2020 9.2 (H)     Microalb/Crt.  Ratio (mcg/mg creat)   Date Value   08/24/2020 17     LDL Cholesterol (mg/dL)   Date Value   08/24/2020 63     AST (U/L)   Date Value   08/24/2020 16     ALT (U/L)   Date Value   08/24/2020 21     BUN (mg/dL)   Date Value   10/06/2020 12      (goal A1C is < 7)   (goal LDL is <100) need 30-50% reduction from baseline     BP Readings from Last 3 Encounters:   04/23/21 122/70   04/14/21 (!) 122/58   01/18/21 118/64    (goal /80)      All Future Testing planned in [facility]:  Lab Frequency Next Occurrence   CBC Auto Differential Once 07/17/2021   ALT Once 07/18/2021   AST Once 54/43/4116   Basic Metabolic Panel Once 46/44/8754   Hemoglobin A1C Once 07/17/2021   Lipid Panel Once 07/17/2021   Microalbumin, Ur Once 07/17/2021       Next Visit Date:  Future Appointments   Date Time Provider Osiel Owen   7/20/2021  2:20 PM [facility]            Patient Active Problem List:     Dyslipidemia     Nuclear sclerotic cataract of left eye     Controlled type 2 diabetes mellitus with diabetic polyneuropathy, without long-term current use of insulin ([address]     Vitamin D deficiency     Obstructive sleep apnea     Sepsis due to pneumonia ([address]     Positive FIT (fecal immunochemical test)     Dysthymia     Dyspepsia     Bilateral leg and foot pain     Cervical radiculitis     Chronic left shoulder pain     Complex regional pain syndrome type 1 of left lower extremity     Cervicalgia     Diabetic autonomic neuropathy associated with type 2 diabetes mellitus ([address]     Encounter for long-term opiate analgesic use     Ocular hypertension of left eye     Regular astigmatism, bilateral     Vitreous floaters of both eyes     Cellulitis, abdominal wall     Sepsis due to abdominal wall cellulitis secondary to DM     Dizziness     Acute vertigo with vomiting and inability to stand     Bilateral shoulder pain     Morbid obesity (HCC)     Moderate nonproliferative diabetic retinopathy associated with type 2 diabetes mellitus ([address]     Essential hypertension

## 2021-07-14 ENCOUNTER — TELEPHONE (OUTPATIENT)
Dept: PEDIATRICS CLINIC | Age: 56
End: 2021-07-14

## 2021-07-20 ENCOUNTER — OFFICE VISIT (OUTPATIENT)
Dept: PRIMARY CARE CLINIC | Age: 56
End: 2021-07-20
Payer: COMMERCIAL

## 2021-07-20 VITALS
DIASTOLIC BLOOD PRESSURE: 62 MMHG | OXYGEN SATURATION: 98 % | RESPIRATION RATE: 20 BRPM | SYSTOLIC BLOOD PRESSURE: 110 MMHG | TEMPERATURE: 97.8 F | HEART RATE: 64 BPM | BODY MASS INDEX: 38.45 KG/M2 | WEIGHT: 224 LBS

## 2021-07-20 DIAGNOSIS — E78.5 DYSLIPIDEMIA: ICD-10-CM

## 2021-07-20 DIAGNOSIS — E11.42 CONTROLLED TYPE 2 DIABETES MELLITUS WITH DIABETIC POLYNEUROPATHY, WITHOUT LONG-TERM CURRENT USE OF INSULIN (HCC): Primary | ICD-10-CM

## 2021-07-20 LAB — HBA1C MFR BLD: 8.8 %

## 2021-07-20 PROCEDURE — 3052F HG A1C>EQUAL 8.0%<EQUAL 9.0%: CPT | Performed by: NURSE PRACTITIONER

## 2021-07-20 PROCEDURE — 3017F COLORECTAL CA SCREEN DOC REV: CPT | Performed by: NURSE PRACTITIONER

## 2021-07-20 PROCEDURE — G8427 DOCREV CUR MEDS BY ELIG CLIN: HCPCS | Performed by: NURSE PRACTITIONER

## 2021-07-20 PROCEDURE — G8417 CALC BMI ABV UP PARAM F/U: HCPCS | Performed by: NURSE PRACTITIONER

## 2021-07-20 PROCEDURE — 99214 OFFICE O/P EST MOD 30 MIN: CPT | Performed by: NURSE PRACTITIONER

## 2021-07-20 PROCEDURE — 2022F DILAT RTA XM EVC RTNOPTHY: CPT | Performed by: NURSE PRACTITIONER

## 2021-07-20 PROCEDURE — 83036 HEMOGLOBIN GLYCOSYLATED A1C: CPT | Performed by: NURSE PRACTITIONER

## 2021-07-20 PROCEDURE — 1036F TOBACCO NON-USER: CPT | Performed by: NURSE PRACTITIONER

## 2021-07-20 RX ORDER — INSULIN GLARGINE 100 [IU]/ML
54 INJECTION, SOLUTION SUBCUTANEOUS NIGHTLY
Qty: 15 ML | Refills: 5 | Status: SHIPPED | OUTPATIENT
Start: 2021-07-20 | End: 2022-06-24

## 2021-07-20 RX ORDER — HYDROXYZINE HYDROCHLORIDE 25 MG/1
25 TABLET, FILM COATED ORAL NIGHTLY PRN
Qty: 90 TABLET | Refills: 0 | Status: SHIPPED | OUTPATIENT
Start: 2021-07-20

## 2021-07-20 SDOH — ECONOMIC STABILITY: FOOD INSECURITY: WITHIN THE PAST 12 MONTHS, THE FOOD YOU BOUGHT JUST DIDN'T LAST AND YOU DIDN'T HAVE MONEY TO GET MORE.: NEVER TRUE

## 2021-07-20 SDOH — ECONOMIC STABILITY: FOOD INSECURITY: WITHIN THE PAST 12 MONTHS, YOU WORRIED THAT YOUR FOOD WOULD RUN OUT BEFORE YOU GOT MONEY TO BUY MORE.: NEVER TRUE

## 2021-07-20 ASSESSMENT — ENCOUNTER SYMPTOMS
BLURRED VISION: 0
ABDOMINAL PAIN: 0
WHEEZING: 0
COUGH: 0
DIARRHEA: 0
SHORTNESS OF BREATH: 0
RHINORRHEA: 0
VOMITING: 0
NAUSEA: 0
CONSTIPATION: 0
SORE THROAT: 0

## 2021-07-20 ASSESSMENT — SOCIAL DETERMINANTS OF HEALTH (SDOH): HOW HARD IS IT FOR YOU TO PAY FOR THE VERY BASICS LIKE FOOD, HOUSING, MEDICAL CARE, AND HEATING?: NOT HARD AT ALL

## 2021-07-20 NOTE — PATIENT INSTRUCTIONS
SURVEY:    You may be receiving a survey from [employer] regarding your visit today. Please complete the survey to enable us to provide the highest quality of care to you and your family. If you cannot score us a very good on any question, please call the office to discuss how we could have made your experience a very good one. Thank you. David Byrd, APRN-CNP  Bairon Lott, APRN-CNP  Karissa Early, ANDI [place], ANDI [place], [place]  Bry [place]ala, [facility]    Patient Education        Counting Carbohydrates for Diabetes: Care Instructions  Your Care Instructions     You don't have to eat special foods when you have diabetes. You just have to be careful to eat healthy foods. Carbohydrates (carbs) raise blood sugar higher and quicker than any other nutrient. Carbs are found in desserts, breads and cereals, and fruit. They're also in starchy vegetables. These include potatoes, corn, and grains such as rice and pasta. Carbs are also in milk and yogurt. The more carbs you eat at one time, the higher your blood sugar will rise. Spreading carbs all through the day helps keep your blood sugar levels within your target range. Counting carbs is one of the best ways to keep your blood sugar under control. If you use insulin, counting carbs helps you match the right amount of insulin to the number of grams of carbs in a meal. Then you can change your diet and insulin dose as needed. Testing your blood sugar several times a day can help you learn how carbs affect your blood sugar. A registered dietitian or certified diabetes educator can help you plan meals and snacks. Follow-up care is a key part of your treatment and safety. Be sure to make and go to all appointments, and call your doctor if you are having problems. It's also a good idea to know your test results and keep a list of the medicines you take. How can you care for yourself at home?   Know your daily amount of carbohydrates  Your daily amount depends on several things, such as your weight, how active you are, which diabetes medicines you take, and what your goals are for your blood sugar levels. A registered dietitian or certified diabetes educator can help you plan how many carbs to include in each meal and snack. For most adults, a guideline for the daily amount of carbs is:  · 45 to 60 grams at each meal. That's about the same as 3 to 4 carbohydrate servings. · 15 to 20 grams at each snack. That's about the same as 1 carbohydrate serving. Count carbs  Counting carbs lets you know how much rapid-acting insulin to take before you eat. If you use an insulin pump, you get a constant rate of insulin during the day. So the pump must be programmed at meals. This gives you extra insulin to cover the rise in blood sugar after meals. If you take insulin:  · Learn your own insulin-to-carb ratio. You and your diabetes health professional will figure out the ratio. You can do this by testing your blood sugar after meals. For example, you may need a certain amount of insulin for every 15 grams of carbs. · Add up the carb grams in a meal. Then you can figure out how many units of insulin to take based on your insulin-to-carb ratio. · Exercise lowers blood sugar. You can use less insulin than you would if you were not doing exercise. Keep in mind that timing matters. If you exercise within 1 hour after a meal, your body may need less insulin for that meal than it would if you exercised 3 hours after the meal. Test your blood sugar to find out how exercise affects your need for insulin. If you do or don't take insulin:  · Look at labels on packaged foods. This can tell you how many carbs are in a serving. You can also use guides from the American Diabetes Association. · Be aware of portions, or serving sizes. If a package has two servings and you eat the whole package, you need to double the number of grams of carbohydrate listed for one serving.   · Protein, fat, and fiber do

## 2021-07-20 NOTE — PROGRESS NOTES
Name: Jose Angel Reaves  : 1965         Chief Complaint:     Chief Complaint   Patient presents with    Diabetes     routine check,    Hyperlipidemia       History of Present Illness:      Jose Angel Reaves is a 50 y.o.  female who presents with Diabetes (routine check,) and Hyperlipidemia      Lacie is here today for a routine office visit. DM-stable, A1c has not improved however it has not worsened. See below for further comment. Diabetes  She presents for her follow-up diabetic visit. She has type 2 diabetes mellitus. Her disease course has been stable. Pertinent negatives for hypoglycemia include no confusion, dizziness, headaches, nervousness/anxiousness, sleepiness, speech difficulty or sweats. Associated symptoms include foot paresthesias. Pertinent negatives for diabetes include no blurred vision, no chest pain, no fatigue, no polydipsia, no polyphagia and no polyuria. There are no hypoglycemic complications. Symptoms are stable. Diabetic complications include peripheral neuropathy. Pertinent negatives for diabetic complications include no CVA, heart disease or nephropathy. Risk factors for coronary artery disease include diabetes mellitus, post-menopausal, dyslipidemia, family history, obesity, sedentary lifestyle and stress. Current diabetic treatment includes insulin injections and oral agent (monotherapy) (GLP-1). She is compliant with treatment all of the time. Her weight is stable. She is following a generally healthy diet. Meal planning includes avoidance of concentrated sweets. She has had a previous visit with a dietitian. She rarely participates in exercise. Her home blood glucose trend is decreasing steadily. Her breakfast blood glucose range is generally 180-200 mg/dl. An ACE inhibitor/angiotensin II receptor blocker is being taken. She does not see a podiatrist.Eye exam is not current. Hyperlipidemia  This is a chronic problem. The current episode started more than 1 year ago.  The problem is controlled. Recent lipid tests were reviewed and are normal. Exacerbating diseases include diabetes and obesity. She has no history of chronic renal disease, hypothyroidism, liver disease or nephrotic syndrome. Factors aggravating her hyperlipidemia include fatty foods and smoking. Pertinent negatives include no chest pain, leg pain or shortness of breath. Current antihyperlipidemic treatment includes statins. The current treatment provides moderate improvement of lipids. Compliance problems include adherence to exercise and adherence to diet. Risk factors for coronary artery disease include diabetes mellitus, dyslipidemia, family history, obesity, hypertension, post-menopausal, a sedentary lifestyle and stress. Past Medical History:     Past Medical History:   Diagnosis Date    Anxiety     Arthritis     Depression     Diabetic neuropathy (HCC)     Esophageal reflux     Heart burn     Limitation of joint motion     Muscle weakness     Sleep apnea     DOES NOT USE A MACHINE. DIAGNOSED > 15 YEARS AGO WITH SLEEP APNEA    Sleepiness     Vertigo       Reviewed all health maintenance requirements and ordered appropriate tests  There are no preventive care reminders to display for this patient.     Past Surgical History:     Past Surgical History:   Procedure Laterality Date    ABSCESS DRAINAGE Left 2014    BUTTOCK    APPENDECTOMY      CARPAL TUNNEL RELEASE Left 2018    CATARACT REMOVAL WITH IMPLANT Bilateral 10/24/2016     SECTION      CHOLECYSTECTOMY      COLONOSCOPY  2019    Dr. Jory Ibarra -normal poor prep    COLONOSCOPY N/A 3/25/2019    COLORECTAL CANCER SCREENING, NOT HIGH RISK performed by Ignacio Vogel MD at [address] [place] [address] COLONOSCOPY N/A 4/10/2019    -diverticulosis,hemorrhoids,lipoma at ileocecal valve    HERNIA REPAIR Right     [facility]    HERNIA REPAIR  2006    HERNIA REPAIR WITH HYSTERECTOMY SURGERY    HYSTERECTOMY      LAPAROSCOPY Medications:       Prior to Admission medications    Medication Sig Start Date End Date Taking? Authorizing Provider   hydrOXYzine (ATARAX) 25 MG tablet Take 1 tablet by mouth nightly as needed for Anxiety 7/20/21  Yes Sharolyn Blush Might, APRN - CNP   insulin glargine (LANTUS SOLOSTAR) 100 UNIT/ML injection pen Inject 54 Units into the skin nightly 7/20/21  Yes Sharolyn Blush Might, APRN - CNP   atorvastatin (LIPITOR) 40 MG tablet Take 1 tablet by mouth once daily 6/28/21  Yes Sharolyn Blush Might, APRN - CNP   glipiZIDE (GLUCOTROL XL) 10 MG extended release tablet Take 1 tablet by mouth once daily 4/15/21  Yes Sharolyn Blush Might, APRN - CNP   lisinopril (PRINIVIL;ZESTRIL) 2.5 MG tablet Take 1 tablet by mouth once daily 4/15/21  Yes Sharolyn Blush Might, APRN - CNP   escitalopram (LEXAPRO) 20 MG tablet Take 1 tablet by mouth once daily 3/29/21  Yes Sharolyn Blush Might, APRN - CNP   VICTOZA 18 MG/3ML SOPN SC injection INJECT 1.2 MG INTO SKIN DAILY 3/9/21  Yes Sharolyn Blush Might, APRN - CNP   omeprazole (PRILOSEC) 40 MG delayed release capsule Take 1 capsule by mouth once daily 10/13/20  Yes Sharolyn Blush Might, APRN - CNP   HYDROcodone-acetaminophen (NORCO) 5-325 MG per tablet Take 1 tablet by mouth 2 times daily as needed. 9/8/20  Yes Historical Provider, MD   fluticasone (FLONASE) 50 MCG/ACT nasal spray 1 spray by Each Nostril route daily 1/9/20  Yes Bairon Lott, APRN - CNP   gabapentin (NEURONTIN) 600 MG tablet Take 900 mg by mouth 3 times daily. Takes 1 1/2 tabs TID. 1/17/19  Yes Historical Provider, MD   lidocaine-prilocaine (EMLA) 2.5-2.5 % cream Apply topically as needed   Patient not taking: Reported on 7/20/2021 11/8/18   Historical Provider, MD        Allergies:       Codeine, Meperidine, and Other    Social History:     Tobacco:    reports that she quit smoking about 10 years ago. Her smoking use included cigarettes. She has a 10.00 pack-year smoking history.  She has never used smokeless tobacco.  Alcohol:      reports no history of alcohol use.  Drug Use:  reports no history of drug use. Family History:     Family History   Problem Relation Age of Onset    Diabetes Mother     High Cholesterol Mother     Hypertension Mother     Heart Disease Mother 36    Diabetes Father     Heart Disease Father 48    High Cholesterol Father     Hypertension Father     Diabetes Sister        Review of Systems:     Positive and Negative as described in HPI    Review of Systems   Constitutional: Negative for activity change, chills, fatigue and fever. HENT: Negative for congestion, rhinorrhea and sore throat. Eyes: Negative for blurred vision and visual disturbance. Respiratory: Negative for cough, shortness of breath and wheezing. Cardiovascular: Negative for chest pain and palpitations. Gastrointestinal: Negative for abdominal pain, constipation, diarrhea, nausea and vomiting. Endocrine: Negative for polydipsia, polyphagia and polyuria. Genitourinary: Negative for difficulty urinating and dysuria. Musculoskeletal: Negative for gait problem, neck pain and neck stiffness. Skin: Negative for rash. Neurological: Positive for numbness. Negative for dizziness, syncope, speech difficulty, light-headedness and headaches. Psychiatric/Behavioral: Negative for confusion. The patient is not nervous/anxious. Physical Exam:   Vitals:  /62 (Position: Sitting)   Pulse 64   Temp 97.8 °F (36.6 °C) (Temporal)   Resp 20   Wt 224 lb (101.6 kg)   SpO2 98%   BMI 38.45 kg/m²     Physical Exam  Vitals and nursing note reviewed. Constitutional:       General: She is not in acute distress. Appearance: Normal appearance. She is well-developed. She is obese. HENT:      Mouth/Throat:      Mouth: Mucous membranes are moist.   Eyes:      General: No scleral icterus. Conjunctiva/sclera: Conjunctivae normal.   Cardiovascular:      Rate and Rhythm: Normal rate and regular rhythm. Heart sounds: No murmur heard.      Pulmonary: Effort: Pulmonary effort is normal.      Breath sounds: Normal breath sounds. No wheezing. Abdominal:      General: Bowel sounds are normal. There is no distension. Palpations: Abdomen is soft. Tenderness: There is no abdominal tenderness. Musculoskeletal:      Cervical back: Normal range of motion and neck supple. Right lower leg: No edema. Left lower leg: No edema. Lymphadenopathy:      Cervical: No cervical adenopathy. Skin:     General: Skin is warm and dry. Findings: No rash. Neurological:      Mental Status: She is alert and oriented to person, place, and time. Psychiatric:         Mood and Affect: Mood normal.         Behavior: Behavior normal. Behavior is cooperative. Data:     Lab Results   Component Value Date     10/06/2020    K 4.9 10/06/2020     10/06/2020    CO2 26 10/06/2020    BUN 12 10/06/2020    CREATININE 0.50 10/06/2020    GLUCOSE 190 10/06/2020    GLUCOSE 351 03/11/2012    PROT 6.3 09/17/2019    LABALBU 2.8 09/17/2019    BILITOT 0.36 09/17/2019    ALKPHOS 137 09/17/2019    AST 16 08/24/2020    ALT 21 08/24/2020     Lab Results   Component Value Date    WBC 10.0 10/06/2020    RBC 4.64 10/06/2020    RBC 5.34 03/11/2012    HGB 13.8 10/06/2020    HCT 42.8 10/06/2020    MCV 92.2 10/06/2020    MCH 29.7 10/06/2020    MCHC 32.2 10/06/2020    RDW 12.6 10/06/2020     10/06/2020     03/11/2012    MPV 10.5 10/06/2020     Lab Results   Component Value Date    TSH 0.88 02/06/2013     Lab Results   Component Value Date    CHOL 160 08/24/2020    HDL 27 08/24/2020    LABA1C 8.8 07/20/2021       Assessment/Plan:      Diagnosis Orders   1. Controlled type 2 diabetes mellitus with diabetic polyneuropathy, without long-term current use of insulin (HCC)  POCT glycosylated hemoglobin (Hb A1C)    insulin glargine (LANTUS SOLOSTAR) 100 UNIT/ML injection pen   2. Dyslipidemia       We will increase Lantus to 54 units daily.   She will continue all other medications. She will call with daily fastings and 2-hour postprandials. We will see her back in 6 months, sooner if needed. 1.  Lacie received counseling on the following healthy behaviors: nutrition, exercise and medication adherence  2. Patient given educational materials - see patient instructions  3. Was a self-tracking handout given in paper form or via [employer]? No  If yes, see orders or list here. 4.  Discussed use, benefit, and side effects of prescribed medications. Barriers to medication compliance addressed. All patient questions answered. Pt voiced understanding. 5.  Reviewed prior labs and health maintenance  6. Continue current medications, diet and exercise. Completed Refills   Requested Prescriptions     Signed Prescriptions Disp Refills    hydrOXYzine (ATARAX) 25 MG tablet 90 tablet 0     Sig: Take 1 tablet by mouth nightly as needed for Anxiety    insulin glargine (LANTUS SOLOSTAR) 100 UNIT/ML injection pen 15 mL 5     Sig: Inject 54 Units into the skin nightly         Return in about 6 months (around 1/20/2022) for Check up.

## 2021-07-26 ENCOUNTER — TELEPHONE (OUTPATIENT)
Dept: PRIMARY CARE CLINIC | Age: 56
End: 2021-07-26

## 2021-07-26 NOTE — TELEPHONE ENCOUNTER
Called patient and left message reminding her to have labs done that Evert Severe had ordered. To call office with any questions.

## 2021-08-03 ENCOUNTER — TELEPHONE (OUTPATIENT)
Dept: PRIMARY CARE CLINIC | Age: 56
End: 2021-08-03

## 2021-08-04 ENCOUNTER — APPOINTMENT (OUTPATIENT)
Dept: CT IMAGING | Age: 56
End: 2021-08-04
Payer: COMMERCIAL

## 2021-08-04 ENCOUNTER — HOSPITAL ENCOUNTER (OUTPATIENT)
Age: 56
Setting detail: OBSERVATION
Discharge: HOME | End: 2021-08-05
Attending: EMERGENCY MEDICINE | Admitting: EMERGENCY MEDICINE
Payer: COMMERCIAL

## 2021-08-04 ENCOUNTER — HOSPITAL ENCOUNTER (EMERGENCY)
Age: 56
Discharge: ANOTHER HOSPITAL | End: 2021-08-04
Payer: COMMERCIAL

## 2021-08-04 VITALS
DIASTOLIC BLOOD PRESSURE: 58 MMHG | TEMPERATURE: 96.7 F | BODY MASS INDEX: 38.45 KG/M2 | WEIGHT: 224 LBS | SYSTOLIC BLOOD PRESSURE: 112 MMHG | RESPIRATION RATE: 9 BRPM | OXYGEN SATURATION: 96 % | HEART RATE: 83 BPM

## 2021-08-04 DIAGNOSIS — K55.1 SUPERIOR MESENTERIC ARTERY STENOSIS (HCC): Primary | ICD-10-CM

## 2021-08-04 DIAGNOSIS — R10.84 GENERALIZED ABDOMINAL PAIN: Primary | ICD-10-CM

## 2021-08-04 DIAGNOSIS — I77.1 RIGHT ILIAC ARTERY STENOSIS (HCC): ICD-10-CM

## 2021-08-04 DIAGNOSIS — R79.89 ELEVATED LACTIC ACID LEVEL: ICD-10-CM

## 2021-08-04 DIAGNOSIS — R10.84 GENERALIZED ABDOMINAL PAIN: ICD-10-CM

## 2021-08-04 LAB
-: NORMAL
ABSOLUTE EOS #: 0.13 K/UL (ref 0–0.44)
ABSOLUTE IMMATURE GRANULOCYTE: 0.05 K/UL (ref 0–0.3)
ABSOLUTE LYMPH #: 3.53 K/UL (ref 1.1–3.7)
ABSOLUTE MONO #: 0.52 K/UL (ref 0.1–1.2)
ALBUMIN SERPL-MCNC: 4.4 G/DL (ref 3.5–5.2)
ALBUMIN/GLOBULIN RATIO: 1.4 (ref 1–2.5)
ALP BLD-CCNC: 199 U/L (ref 35–104)
ALT SERPL-CCNC: 15 U/L (ref 5–33)
AMORPHOUS: NORMAL
ANION GAP SERPL CALCULATED.3IONS-SCNC: 10 MMOL/L (ref 9–17)
ANION GAP SERPL CALCULATED.3IONS-SCNC: 16 MMOL/L (ref 9–17)
AST SERPL-CCNC: 14 U/L
BACTERIA: NORMAL
BASOPHILS # BLD: 1 % (ref 0–2)
BASOPHILS ABSOLUTE: 0.05 K/UL (ref 0–0.2)
BILIRUB SERPL-MCNC: 0.45 MG/DL (ref 0.3–1.2)
BILIRUBIN URINE: NEGATIVE
BUN BLDV-MCNC: 14 MG/DL (ref 6–20)
BUN BLDV-MCNC: 15 MG/DL (ref 6–20)
BUN/CREAT BLD: 23 (ref 9–20)
BUN/CREAT BLD: 27 (ref 9–20)
CALCIUM SERPL-MCNC: 10.1 MG/DL (ref 8.6–10.4)
CALCIUM SERPL-MCNC: 9.3 MG/DL (ref 8.6–10.4)
CASTS UA: NORMAL /LPF
CHLORIDE BLD-SCNC: 100 MMOL/L (ref 98–107)
CHLORIDE BLD-SCNC: 96 MMOL/L (ref 98–107)
CO2: 21 MMOL/L (ref 20–31)
CO2: 24 MMOL/L (ref 20–31)
COLOR: YELLOW
COMMENT UA: ABNORMAL
CREAT SERPL-MCNC: 0.55 MG/DL (ref 0.5–0.9)
CREAT SERPL-MCNC: 0.6 MG/DL (ref 0.5–0.9)
CRYSTALS, UA: NORMAL /HPF
DIFFERENTIAL TYPE: ABNORMAL
EOSINOPHILS RELATIVE PERCENT: 1 % (ref 1–4)
EPITHELIAL CELLS UA: NORMAL /HPF (ref 0–25)
GFR AFRICAN AMERICAN: >60 ML/MIN
GFR AFRICAN AMERICAN: >60 ML/MIN
GFR NON-AFRICAN AMERICAN: >60 ML/MIN
GFR NON-AFRICAN AMERICAN: >60 ML/MIN
GFR SERPL CREATININE-BSD FRML MDRD: ABNORMAL ML/MIN/{1.73_M2}
GLUCOSE BLD-MCNC: 275 MG/DL (ref 70–99)
GLUCOSE BLD-MCNC: 321 MG/DL (ref 70–99)
GLUCOSE URINE: ABNORMAL
HCT VFR BLD CALC: 47 % (ref 36.3–47.1)
HEMOGLOBIN: 15.2 G/DL (ref 11.9–15.1)
IMMATURE GRANULOCYTES: 1 %
KETONES, URINE: ABNORMAL
LACTIC ACID, WHOLE BLOOD: ABNORMAL MMOL/L (ref 0.7–2.1)
LACTIC ACID: 2.4 MMOL/L (ref 0.5–2.2)
LACTIC ACID: 2.6 MMOL/L (ref 0.5–2.2)
LACTIC ACID: 2.9 MMOL/L (ref 0.5–2.2)
LEUKOCYTE ESTERASE, URINE: NEGATIVE
LIPASE: 24 U/L (ref 13–60)
LYMPHOCYTES # BLD: 37 % (ref 24–43)
MCH RBC QN AUTO: 29.4 PG (ref 25.2–33.5)
MCHC RBC AUTO-ENTMCNC: 32.3 G/DL (ref 28.4–34.8)
MCV RBC AUTO: 90.9 FL (ref 82.6–102.9)
MONOCYTES # BLD: 6 % (ref 3–12)
MUCUS: NORMAL
NITRITE, URINE: NEGATIVE
NRBC AUTOMATED: 0 PER 100 WBC
OTHER OBSERVATIONS UA: NORMAL
PARTIAL THROMBOPLASTIN TIME: 26 SEC (ref 23.9–33.8)
PARTIAL THROMBOPLASTIN TIME: 28.3 SEC (ref 23.9–33.8)
PDW BLD-RTO: 12.7 % (ref 11.8–14.4)
PH UA: 7.5 (ref 5–9)
PLATELET # BLD: 248 K/UL (ref 138–453)
PLATELET ESTIMATE: ABNORMAL
PMV BLD AUTO: 10.4 FL (ref 8.1–13.5)
POTASSIUM SERPL-SCNC: 4.8 MMOL/L (ref 3.7–5.3)
POTASSIUM SERPL-SCNC: 5.4 MMOL/L (ref 3.7–5.3)
PROTEIN UA: NEGATIVE
RBC # BLD: 5.17 M/UL (ref 3.95–5.11)
RBC # BLD: ABNORMAL 10*6/UL
RBC UA: NORMAL /HPF (ref 0–2)
RENAL EPITHELIAL, UA: NORMAL /HPF
SEG NEUTROPHILS: 54 % (ref 36–65)
SEGMENTED NEUTROPHILS ABSOLUTE COUNT: 5.26 K/UL (ref 1.5–8.1)
SODIUM BLD-SCNC: 133 MMOL/L (ref 135–144)
SODIUM BLD-SCNC: 134 MMOL/L (ref 135–144)
SPECIFIC GRAVITY UA: 1.01 (ref 1.01–1.02)
TOTAL PROTEIN: 7.5 G/DL (ref 6.4–8.3)
TRICHOMONAS: NORMAL
TURBIDITY: CLEAR
URINE HGB: NEGATIVE
UROBILINOGEN, URINE: NORMAL
WBC # BLD: 9.5 K/UL (ref 3.5–11.3)
WBC # BLD: ABNORMAL 10*3/UL
WBC UA: NORMAL /HPF (ref 0–5)
YEAST: NORMAL

## 2021-08-04 PROCEDURE — 6360000004 HC RX CONTRAST MEDICATION: Performed by: PHYSICIAN ASSISTANT

## 2021-08-04 PROCEDURE — 99285 EMERGENCY DEPT VISIT HI MDM: CPT

## 2021-08-04 PROCEDURE — 96376 TX/PRO/DX INJ SAME DRUG ADON: CPT

## 2021-08-04 PROCEDURE — 71275 CT ANGIOGRAPHY CHEST: CPT

## 2021-08-04 PROCEDURE — 85730 THROMBOPLASTIN TIME PARTIAL: CPT

## 2021-08-04 PROCEDURE — 99284 EMERGENCY DEPT VISIT MOD MDM: CPT

## 2021-08-04 PROCEDURE — 96366 THER/PROPH/DIAG IV INF ADDON: CPT

## 2021-08-04 PROCEDURE — 81001 URINALYSIS AUTO W/SCOPE: CPT

## 2021-08-04 PROCEDURE — 2580000003 HC RX 258: Performed by: PHYSICIAN ASSISTANT

## 2021-08-04 PROCEDURE — 6360000002 HC RX W HCPCS: Performed by: PHYSICIAN ASSISTANT

## 2021-08-04 PROCEDURE — 96365 THER/PROPH/DIAG IV INF INIT: CPT

## 2021-08-04 PROCEDURE — 85025 COMPLETE CBC W/AUTO DIFF WBC: CPT

## 2021-08-04 PROCEDURE — 74177 CT ABD & PELVIS W/CONTRAST: CPT

## 2021-08-04 PROCEDURE — 80053 COMPREHEN METABOLIC PANEL: CPT

## 2021-08-04 PROCEDURE — 36415 COLL VENOUS BLD VENIPUNCTURE: CPT

## 2021-08-04 PROCEDURE — 6360000002 HC RX W HCPCS: Performed by: EMERGENCY MEDICINE

## 2021-08-04 PROCEDURE — 83605 ASSAY OF LACTIC ACID: CPT

## 2021-08-04 PROCEDURE — 96375 TX/PRO/DX INJ NEW DRUG ADDON: CPT

## 2021-08-04 PROCEDURE — 83690 ASSAY OF LIPASE: CPT

## 2021-08-04 PROCEDURE — 80048 BASIC METABOLIC PNL TOTAL CA: CPT

## 2021-08-04 PROCEDURE — 2580000003 HC RX 258: Performed by: EMERGENCY MEDICINE

## 2021-08-04 RX ORDER — MORPHINE SULFATE 2 MG/ML
2 INJECTION, SOLUTION INTRAMUSCULAR; INTRAVENOUS ONCE
Status: COMPLETED | OUTPATIENT
Start: 2021-08-04 | End: 2021-08-04

## 2021-08-04 RX ORDER — MORPHINE SULFATE 4 MG/ML
4 INJECTION, SOLUTION INTRAMUSCULAR; INTRAVENOUS ONCE
Status: COMPLETED | OUTPATIENT
Start: 2021-08-04 | End: 2021-08-04

## 2021-08-04 RX ORDER — HEPARIN SODIUM 10000 [USP'U]/100ML
5-30 INJECTION, SOLUTION INTRAVENOUS CONTINUOUS
Status: DISCONTINUED | OUTPATIENT
Start: 2021-08-04 | End: 2021-08-05

## 2021-08-04 RX ORDER — SODIUM CHLORIDE, SODIUM LACTATE, POTASSIUM CHLORIDE, CALCIUM CHLORIDE 600; 310; 30; 20 MG/100ML; MG/100ML; MG/100ML; MG/100ML
INJECTION, SOLUTION INTRAVENOUS ONCE
Status: COMPLETED | OUTPATIENT
Start: 2021-08-04 | End: 2021-08-04

## 2021-08-04 RX ORDER — SODIUM CHLORIDE, SODIUM LACTATE, POTASSIUM CHLORIDE, CALCIUM CHLORIDE 600; 310; 30; 20 MG/100ML; MG/100ML; MG/100ML; MG/100ML
INJECTION, SOLUTION INTRAVENOUS CONTINUOUS
Status: DISCONTINUED | OUTPATIENT
Start: 2021-08-04 | End: 2021-08-05 | Stop reason: HOSPADM

## 2021-08-04 RX ORDER — HEPARIN SODIUM 10000 [USP'U]/100ML
508-1000 INJECTION, SOLUTION INTRAVENOUS CONTINUOUS
Status: DISCONTINUED | OUTPATIENT
Start: 2021-08-04 | End: 2021-08-04 | Stop reason: HOSPADM

## 2021-08-04 RX ORDER — HEPARIN SODIUM 1000 [USP'U]/ML
4000 INJECTION, SOLUTION INTRAVENOUS; SUBCUTANEOUS PRN
Status: DISCONTINUED | OUTPATIENT
Start: 2021-08-04 | End: 2021-08-04 | Stop reason: HOSPADM

## 2021-08-04 RX ORDER — HEPARIN SODIUM 1000 [USP'U]/ML
2000 INJECTION, SOLUTION INTRAVENOUS; SUBCUTANEOUS PRN
Status: DISCONTINUED | OUTPATIENT
Start: 2021-08-04 | End: 2021-08-04 | Stop reason: HOSPADM

## 2021-08-04 RX ORDER — MORPHINE SULFATE 4 MG/ML
2 INJECTION, SOLUTION INTRAMUSCULAR; INTRAVENOUS ONCE
Status: COMPLETED | OUTPATIENT
Start: 2021-08-04 | End: 2021-08-04

## 2021-08-04 RX ORDER — ONDANSETRON 2 MG/ML
4 INJECTION INTRAMUSCULAR; INTRAVENOUS ONCE
Status: COMPLETED | OUTPATIENT
Start: 2021-08-04 | End: 2021-08-04

## 2021-08-04 RX ORDER — HEPARIN SODIUM 1000 [USP'U]/ML
4000 INJECTION, SOLUTION INTRAVENOUS; SUBCUTANEOUS ONCE
Status: COMPLETED | OUTPATIENT
Start: 2021-08-04 | End: 2021-08-04

## 2021-08-04 RX ADMIN — HEPARIN SODIUM AND DEXTROSE 9.84 UNITS/KG/HR: 10000; 5 INJECTION INTRAVENOUS at 20:08

## 2021-08-04 RX ADMIN — IOPAMIDOL 75 ML: 755 INJECTION, SOLUTION INTRAVENOUS at 14:06

## 2021-08-04 RX ADMIN — SODIUM CHLORIDE, POTASSIUM CHLORIDE, SODIUM LACTATE AND CALCIUM CHLORIDE: 600; 310; 30; 20 INJECTION, SOLUTION INTRAVENOUS at 13:30

## 2021-08-04 RX ADMIN — SODIUM CHLORIDE, POTASSIUM CHLORIDE, SODIUM LACTATE AND CALCIUM CHLORIDE: 600; 310; 30; 20 INJECTION, SOLUTION INTRAVENOUS at 18:19

## 2021-08-04 RX ADMIN — ONDANSETRON 4 MG: 2 INJECTION INTRAMUSCULAR; INTRAVENOUS at 13:30

## 2021-08-04 RX ADMIN — MORPHINE SULFATE 2 MG: 4 INJECTION INTRAVENOUS at 23:06

## 2021-08-04 RX ADMIN — ONDANSETRON 4 MG: 2 INJECTION INTRAMUSCULAR; INTRAVENOUS at 16:00

## 2021-08-04 RX ADMIN — MORPHINE SULFATE 2 MG: 2 INJECTION, SOLUTION INTRAMUSCULAR; INTRAVENOUS at 16:00

## 2021-08-04 RX ADMIN — SODIUM CHLORIDE, POTASSIUM CHLORIDE, SODIUM LACTATE AND CALCIUM CHLORIDE: 600; 310; 30; 20 INJECTION, SOLUTION INTRAVENOUS at 23:15

## 2021-08-04 RX ADMIN — ONDANSETRON 4 MG: 2 INJECTION INTRAMUSCULAR; INTRAVENOUS at 23:06

## 2021-08-04 RX ADMIN — MORPHINE SULFATE 4 MG: 4 INJECTION, SOLUTION INTRAMUSCULAR; INTRAVENOUS at 13:33

## 2021-08-04 RX ADMIN — IOPAMIDOL 75 ML: 755 INJECTION, SOLUTION INTRAVENOUS at 16:33

## 2021-08-04 RX ADMIN — HEPARIN SODIUM AND DEXTROSE 9.84 UNITS/KG/HR: 10000; 5 INJECTION INTRAVENOUS at 23:16

## 2021-08-04 RX ADMIN — HEPARIN SODIUM 4000 UNITS: 1000 INJECTION INTRAVENOUS; SUBCUTANEOUS at 20:06

## 2021-08-04 ASSESSMENT — PAIN DESCRIPTION - ORIENTATION
ORIENTATION: MID
ORIENTATION: RIGHT;POSTERIOR

## 2021-08-04 ASSESSMENT — PAIN DESCRIPTION - LOCATION
LOCATION: ABDOMEN;BACK
LOCATION: BACK
LOCATION: FLANK

## 2021-08-04 ASSESSMENT — ENCOUNTER SYMPTOMS
VOMITING: 1
CHEST TIGHTNESS: 0
COUGH: 0
EYE DISCHARGE: 0
SORE THROAT: 0
ABDOMINAL PAIN: 1
SHORTNESS OF BREATH: 0
BACK PAIN: 0
NAUSEA: 1
BLOOD IN STOOL: 0
CONSTIPATION: 0
EYE REDNESS: 0
RHINORRHEA: 0
WHEEZING: 0
DIARRHEA: 0

## 2021-08-04 ASSESSMENT — PAIN SCALES - GENERAL
PAINLEVEL_OUTOF10: 6
PAINLEVEL_OUTOF10: 7
PAINLEVEL_OUTOF10: 7
PAINLEVEL_OUTOF10: 4
PAINLEVEL_OUTOF10: 6
PAINLEVEL_OUTOF10: 6

## 2021-08-04 ASSESSMENT — PAIN DESCRIPTION - DESCRIPTORS
DESCRIPTORS: ACHING
DESCRIPTORS: ACHING
DESCRIPTORS: ACHING;SHARP;RADIATING

## 2021-08-04 ASSESSMENT — PAIN DESCRIPTION - ONSET: ONSET: ON-GOING

## 2021-08-04 ASSESSMENT — PAIN DESCRIPTION - PROGRESSION: CLINICAL_PROGRESSION: NOT CHANGED

## 2021-08-04 ASSESSMENT — PAIN DESCRIPTION - FREQUENCY: FREQUENCY: CONTINUOUS

## 2021-08-04 ASSESSMENT — PAIN DESCRIPTION - DIRECTION: RADIATING_TOWARDS: FLANK

## 2021-08-04 ASSESSMENT — PAIN DESCRIPTION - PAIN TYPE
TYPE: ACUTE PAIN
TYPE: ACUTE PAIN

## 2021-08-04 NOTE — ED NOTES
Called Dr Munira Rocha via cell phone, connected call to [facility].       Zaria Lewis  08/04/21 [id number]

## 2021-08-04 NOTE — ED PROVIDER NOTES
[facility] ED  EMERGENCY DEPARTMENT ENCOUNTER      Pt Name: Frida Griffin  MRN: [id number]  [place] 1965  Date of evaluation: 8/4/2021  Provider: Christal Che Dr     Chief Complaint   Patient presents with    Emesis     started upon waking    Back Pain     mid-back         HISTORY OF PRESENT ILLNESS   (Location/Symptom, Timing/Onset, Context/Setting,Quality, Duration, Modifying Factors, Severity)  Note limiting factors. Frida Griffin is a53 y.o. female who presents to the emergency department with complaints of mid abdominal discomfort that goes into her back that started this morning when she woke up. Associated complaints clued nausea vomiting. Patient reports she is a history of pancreatitis and feels like her symptoms are similar today when she was diagnosed in the past.  States that her gallbladder has been removed. She denies any fever chills that she knows of. She denies any chest pain or shortness of breath. She is not take anything for her symptoms. She otherwise has no other complaints at this time. HPI    Nursing Notes werereviewed. REVIEW OF SYSTEMS    (2-9 systems for level 4, 10 or more for level 5)     Review of Systems   Constitutional: Negative for chills, diaphoresis and fever. HENT: Negative for congestion, ear pain, rhinorrhea and sore throat. Eyes: Negative for discharge, redness and visual disturbance. Respiratory: Negative for cough, chest tightness, shortness of breath and wheezing. Cardiovascular: Negative for chest pain and palpitations. Gastrointestinal: Positive for abdominal pain, nausea and vomiting. Negative for blood in stool, constipation and diarrhea. Endocrine: Negative for polydipsia, polyphagia and polyuria. Genitourinary: Negative for decreased urine volume, difficulty urinating, dysuria, frequency and hematuria. Musculoskeletal: Negative for arthralgias, back pain and myalgias.    Skin: Negative for pallor and rash. Allergic/Immunologic: Negative for food allergies and immunocompromised state. Neurological: Negative for dizziness, syncope, weakness and light-headedness. Hematological: Negative for adenopathy. Does not bruise/bleed easily. Psychiatric/Behavioral: Negative for behavioral problems and suicidal ideas. The patient is not nervous/anxious. Except as noted above the remainder of the review of systems was reviewed and negative. PAST MEDICAL HISTORY     Past Medical History:   Diagnosis Date    Anxiety     Arthritis     Depression     Diabetic neuropathy (HCC)     Esophageal reflux     Heart burn     Limitation of joint motion     Muscle weakness     Sleep apnea     DOES NOT USE A MACHINE. DIAGNOSED > 15 YEARS AGO WITH SLEEP APNEA    Sleepiness     Vertigo          SURGICALHISTORY       Past Surgical History:   Procedure Laterality Date    ABSCESS DRAINAGE Left     BUTTOCK    APPENDECTOMY      CARPAL TUNNEL RELEASE Left 2018    CATARACT REMOVAL WITH IMPLANT Bilateral 10/24/2016     SECTION      CHOLECYSTECTOMY      COLONOSCOPY  2019    Dr. Jitendra Sanchez -normal poor prep    COLONOSCOPY N/A 3/25/2019    COLORECTAL CANCER SCREENING, NOT HIGH RISK performed by Vick Salazar MD at [address] [place] [address] COLONOSCOPY N/A 4/10/2019    -diverticulosis,hemorrhoids,lipoma at ileocecal valve    HERNIA REPAIR Right     [facility]    HERNIA REPAIR  2006    HERNIA REPAIR WITH HYSTERECTOMY SURGERY    HYSTERECTOMY      LAPAROSCOPY           CURRENT MEDICATIONS       Previous Medications    ATORVASTATIN (LIPITOR) 40 MG TABLET    Take 1 tablet by mouth once daily    ESCITALOPRAM (LEXAPRO) 20 MG TABLET    Take 1 tablet by mouth once daily    FLUTICASONE (FLONASE) 50 MCG/ACT NASAL SPRAY    1 spray by Each Nostril route daily    GABAPENTIN (NEURONTIN) 600 MG TABLET    Take 900 mg by mouth 3 times daily. Takes 1 1/2 tabs TID.     GLIPIZIDE (GLUCOTROL XL) 10 MG EXTENDED RELEASE TABLET    Take 1 tablet by mouth once daily    HYDROCODONE-ACETAMINOPHEN (NORCO) 5-325 MG PER TABLET    Take 1 tablet by mouth 2 times daily as needed.     HYDROXYZINE (ATARAX) 25 MG TABLET    Take 1 tablet by mouth nightly as needed for Anxiety    INSULIN GLARGINE (LANTUS SOLOSTAR) 100 UNIT/ML INJECTION PEN    Inject 54 Units into the skin nightly    LIDOCAINE-PRILOCAINE (EMLA) 2.5-2.5 % CREAM    Apply topically as needed     LISINOPRIL (PRINIVIL;ZESTRIL) 2.5 MG TABLET    Take 1 tablet by mouth once daily    OMEPRAZOLE (PRILOSEC) 40 MG DELAYED RELEASE CAPSULE    Take 1 capsule by mouth once daily    VICTOZA 18 MG/3ML SOPN SC INJECTION    INJECT 1.2 MG INTO SKIN DAILY         ALLERGIES   Codeine, Meperidine, and Other    FAMILY HISTORY       Family History   Problem Relation Age of Onset    Diabetes Mother     High Cholesterol Mother     Hypertension Mother     Heart Disease Mother 36    Diabetes Father     Heart Disease Father 48    High Cholesterol Father     Hypertension Father     Diabetes Sister           SOCIAL HISTORY       Social History     Socioeconomic History    Marital status: Legally      Spouse name: None    Number of children: None    Years of education: None    Highest education level: None   Occupational History    None   Tobacco Use    Smoking status: Former Smoker     Packs/day: 1.00     Years: 10.00     Pack years: 10.00     Types: Cigarettes     Quit date: 5/30/2011     Years since quitting: 10.1    Smokeless tobacco: Never Used   Vaping Use    Vaping Use: Never used   Substance and Sexual Activity    Alcohol use: No    Drug use: No    Sexual activity: None   Other Topics Concern    None   Social History Narrative    None     Social Determinants of Health     Financial Resource Strain: Low Risk     Difficulty of Paying Living Expenses: Not hard at all   Food Insecurity: No Food Insecurity    Worried About Running Out of Food in the Last Year: Never true    Ran Out of Food in the Last Year: Never true   Transportation Needs:     Lack of Transportation (Medical):  Lack of Transportation (Non-Medical):    Physical Activity:     Days of Exercise per Week:     Minutes of Exercise per Session:    Stress:     Feeling of Stress :    Social Connections:     Frequency of Communication with Friends and Family:     Frequency of Social Gatherings with Friends and Family:     Attends [employer] Services:     Active Member of Clubs or Organizations:     Attends Club or Organization Meetings:     Marital Status:    Intimate Partner Violence:     Fear of Current or Ex-Partner:     Emotionally Abused:     Physically Abused:     Sexually Abused:        SCREENINGS    [place] Coma Scale  Eye Opening: Spontaneous  Best Verbal Response: Oriented  Best Motor Response: Obeys commands  [place] Coma Scale Score: 15        PHYSICAL EXAM    (up to 7 for level 4, 8 or more for level 5)     ED Triage Vitals [08/04/21 1254]   BP Temp Temp src Pulse Resp SpO2 Height Weight   (!) 142/75 96.7 °F (35.9 °C) -- 86 16 100 % -- --       Physical Exam  Vitals and nursing note reviewed. Constitutional:       General: She is in acute distress. Appearance: She is well-developed. She is not ill-appearing, toxic-appearing or diaphoretic. HENT:      Head: Normocephalic and atraumatic. Right Ear: External ear normal.      Left Ear: External ear normal.      Mouth/Throat:      Pharynx: No oropharyngeal exudate. Eyes:      General: No scleral icterus. Right eye: No discharge. Left eye: No discharge. Conjunctiva/sclera: Conjunctivae normal.      Pupils: Pupils are equal, round, and reactive to light. Neck:      Thyroid: No thyromegaly. Trachea: No tracheal deviation. Cardiovascular:      Rate and Rhythm: Normal rate and regular rhythm. Heart sounds: No murmur heard. No friction rub. No gallop.     Pulmonary:      Effort: Pulmonary effort is normal. No respiratory distress. Breath sounds: Normal breath sounds. No stridor. No wheezing. Abdominal:      General: Bowel sounds are normal. There is no distension. Palpations: Abdomen is soft. Tenderness: There is abdominal tenderness. There is no guarding or rebound. Musculoskeletal:         General: No tenderness or deformity. Normal range of motion. Cervical back: Normal range of motion and neck supple. Lymphadenopathy:      Cervical: No cervical adenopathy. Skin:     General: Skin is warm and dry. Findings: No erythema or rash. Neurological:      Mental Status: She is alert and oriented to person, place, and time. Cranial Nerves: No cranial nerve deficit. Motor: No abnormal muscle tone. Deep Tendon Reflexes: Reflexes are normal and symmetric. Reflexes normal.   Psychiatric:         Behavior: Behavior normal.         DIAGNOSTIC RESULTS     EKG: All EKG's are interpreted by the Emergency Department Physician who either signs orCo-signs this chart in the absence of a cardiologist.      RADIOLOGY:   Non-plainfilm images such as CT, Ultrasound and MRI are read by the radiologist. Plain radiographic images are visualized and preliminarily interpreted by the emergency physician with the below findings:      Interpretationper the Radiologist below, if available at the time of this note:    CTA CHEST [facility]   Final Result   1. CTA CHEST: No acute abnormality. 2. 12 mm spiculated nodule superior segment left lower lobe. Consider   noncontrast chest CT in 3 months, CT guided percutaneous tissue sampling or   whole-body PET-CT imaging as follow-up. 3. Coronary artery disease. 4. Calcific atherosclerosis aorta and its branches. No aneurysm/dissection   or significant luminal narrowing evident. 5. Small hiatal hernia. 6. CTA ABDOMEN/PELVIS: No acute abnormality.    7. Confirmation of 50-60% luminal narrowing at the mid superior mesenteric   artery level.   8. No abdominal aorta aneurysm or dissection. 9. Focal atherosclerotic plaque at the mid left common iliac artery results   in focal dissection extending into the more anterior branch of the left   internal iliac artery seen on axial series 2, image 197. No vessel occlusion   or significant narrowing evident. 10. 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 11. 13 mm peripherally calcified splenic artery aneurysm at the splenic hilum. 12. Calcific atherosclerosis. 13. Hepatic steatosis and hepatomegaly. 14. Cholecystectomy. RECOMMENDATIONS:            CT ABDOMEN PELVIS W IV CONTRAST Additional Contrast? None   Final Result   1. Possible flow limiting stenosis at the mid superior mesenteric artery   related atherosclerosis, on this exam appearing about 50%, however this   should be re-evaluated on CTA abdomen as follow-up for improved   characterization. No GI changes are seen to suggest acute ischemia. 2. Small hiatal hernia. 3. Densely, peripherally calcified, 13 mm splenic artery aneurysm at the   splenic hilum. Recommend annual CT surveillance. 4. Hepatic steatosis and hepatomegaly. 5. Remainder of the CT abdomen and pelvis appears unremarkable. 6. Status post cholecystectomy, hysterectomy and appendectomy.                ED BEDSIDE ULTRASOUND:   Performed by ED Physician - none    LABS:  Labs Reviewed   CBC WITH AUTO DIFFERENTIAL - Abnormal; Notable for the following components:       Result Value    RBC 5.17 (*)     Hemoglobin 15.2 (*)     Immature Granulocytes 1 (*)     All other components within normal limits   COMPREHENSIVE METABOLIC PANEL - Abnormal; Notable for the following components:    Glucose 321 (*)     Bun/Cre Ratio 27 (*)     Sodium 133 (*)     Potassium 5.4 (*)     Chloride 96 (*)     Alkaline Phosphatase 199 (*)     All other components within normal limits   LACTIC ACID, PLASMA - Abnormal; Notable for the following components:    Lactic Acid 2.9 (*)     All other components within normal limits   URINE RT REFLEX TO CULTURE - Abnormal; Notable for the following components:    Glucose, Ur 3+ (*)     Ketones, Urine TRACE (*)     All other components within normal limits   LACTIC ACID, PLASMA - Abnormal; Notable for the following components:    Lactic Acid 2.6 (*)     All other components within normal limits   BASIC METABOLIC PANEL - Abnormal; Notable for the following components:    Glucose 275 (*)     Bun/Cre Ratio 23 (*)     Sodium 134 (*)     All other components within normal limits   LACTIC ACID, PLASMA - Abnormal; Notable for the following components:    Lactic Acid 2.4 (*)     All other components within normal limits   LIPASE   APTT   MICROSCOPIC URINALYSIS   APTT   APTT   APTT       All other labs were within normal range or not returned as of this dictation. EMERGENCY DEPARTMENT COURSE and DIFFERENTIAL DIAGNOSIS/MDM:   Vitals:    Vitals:    08/04/21 1700 08/04/21 1720 08/04/21 1740 08/04/21 1918   BP: (!) 179/73 (!) 126/45 (!) 115/51    Pulse: 82 83 84    Resp: 17 15 18    Temp:       SpO2: 99% (!) 83% 97%    Weight:    224 lb (101.6 kg)         DEQUAN Bergman is a 50 y.o. female who presents to the emergency department with complaints of abdominal pain; will order CBC CMP and lipase amylase UA lactic acid. Rule out infection, dehydration, electroylte imbalance, colitis, diverticulitis, pancreatitis, cholelithiasis, nephrolithiasis, obstruction, mass, AAA    Patient's lactic acid is elevated. CT showing questionable narrowing of the SMA. I called and spoke to general surgeon,  Nausea me who would like to hydrate the patient recheck a lactic acid and then get a CTA. CTA is showing about a 60% stenosis. Patient continued to have elevated lactic acids as well as abdominal tenderness. At this point I called back our general surgeon who recommends transfer to her vascular can see the patient.   I tried to call [address] [facility] [address] their vascular surgeon states that they do not treat mesenteric ischemia at their facility and recommends transfer to [facility]. [place]. I then was able to speak to Dr. Chay Escudero who states he is unsure if this is truly mesenteric ischemia but patient to be transferred to the ER at [facility]. [facility] and can be evaluated there. I discussed heparin he states it will not harm her to start her on heparin. Patient is agreeable to transfer. Patient is stable at time of departure from the ER. Procedures    FINAL IMPRESSION      1. Generalized abdominal pain    2. Elevated lactic acid level        DISPOSITION/PLAN   DISPOSITION Decision To Transfer 08/04/2021 06:44:41 PM      PATIENT REFERRED TO:  No follow-up provider specified.     DISCHARGE MEDICATIONS:  New Prescriptions    No medications on file              Summation      Patient Course:      ED Medications administered this visit:    Medications   heparin (porcine) injection 4,000 Units (has no administration in time range)   heparin (porcine) injection 2,000 Units (has no administration in time range)   heparin 25,000 units in dextrose 5% 250 mL (premix) infusion (9.84 Units/kg/hr × 101.6 kg Intravenous New Bag 8/4/21 2008)   lactated ringers infusion (0 mLs Intravenous Stopped 8/4/21 1818)   ondansetron (ZOFRAN) injection 4 mg (4 mg Intravenous Given 8/4/21 1330)   morphine injection 4 mg (4 mg Intravenous Given 8/4/21 1333)   iopamidol (ISOVUE-370) 76 % injection 75 mL (75 mLs Intravenous Given 8/4/21 1406)   lactated ringers infusion ( Intravenous New Bag 8/4/21 1819)   morphine (PF) injection 2 mg (2 mg Intravenous Given 8/4/21 1600)   ondansetron (ZOFRAN) injection 4 mg (4 mg Intravenous Given 8/4/21 1600)   iopamidol (ISOVUE-370) 76 % injection 75 mL (75 mLs Intravenous Given 8/4/21 1633)   heparin (porcine) injection 4,000 Units (4,000 Units Intravenous Given 8/4/21 2006)       New Prescriptions from this visit:    New Prescriptions    No medications on file Follow-up:  No follow-up provider specified. Final Impression:   1. Generalized abdominal pain    2.  Elevated lactic acid level               (Please note that portions of this note were completed with a voice recognition program.  Efforts were made to edit the dictations but occasionally words are mis-transcribed.)           Yunior Alicea PA-C  08/04/21 0852

## 2021-08-05 VITALS
DIASTOLIC BLOOD PRESSURE: 77 MMHG | SYSTOLIC BLOOD PRESSURE: 146 MMHG | BODY MASS INDEX: 39.75 KG/M2 | RESPIRATION RATE: 16 BRPM | WEIGHT: 232.8 LBS | OXYGEN SATURATION: 97 % | TEMPERATURE: 97.5 F | HEART RATE: 71 BPM | HEIGHT: 64 IN

## 2021-08-05 PROBLEM — R10.9 ABDOMINAL PAIN: Status: ACTIVE | Noted: 2021-08-05

## 2021-08-05 LAB
ABSOLUTE EOS #: 0.13 K/UL (ref 0–0.44)
ABSOLUTE IMMATURE GRANULOCYTE: 0.03 K/UL (ref 0–0.3)
ABSOLUTE LYMPH #: 3.31 K/UL (ref 1.1–3.7)
ABSOLUTE MONO #: 0.54 K/UL (ref 0.1–1.2)
ALBUMIN SERPL-MCNC: 3.6 G/DL (ref 3.5–5.2)
ALBUMIN/GLOBULIN RATIO: 1.4 (ref 1–2.5)
ALP BLD-CCNC: 142 U/L (ref 35–104)
ALT SERPL-CCNC: 12 U/L (ref 5–33)
ANION GAP SERPL CALCULATED.3IONS-SCNC: 13 MMOL/L (ref 9–17)
AST SERPL-CCNC: 14 U/L
BASOPHILS # BLD: 0 % (ref 0–2)
BASOPHILS ABSOLUTE: 0.03 K/UL (ref 0–0.2)
BILIRUB SERPL-MCNC: 0.35 MG/DL (ref 0.3–1.2)
BUN BLDV-MCNC: 13 MG/DL (ref 6–20)
BUN/CREAT BLD: ABNORMAL (ref 9–20)
CALCIUM SERPL-MCNC: 9.1 MG/DL (ref 8.6–10.4)
CHLORIDE BLD-SCNC: 104 MMOL/L (ref 98–107)
CO2: 22 MMOL/L (ref 20–31)
CREAT SERPL-MCNC: 0.62 MG/DL (ref 0.5–0.9)
DIFFERENTIAL TYPE: NORMAL
EOSINOPHILS RELATIVE PERCENT: 1 % (ref 1–4)
GFR AFRICAN AMERICAN: >60 ML/MIN
GFR NON-AFRICAN AMERICAN: >60 ML/MIN
GFR SERPL CREATININE-BSD FRML MDRD: ABNORMAL ML/MIN/{1.73_M2}
GFR SERPL CREATININE-BSD FRML MDRD: ABNORMAL ML/MIN/{1.73_M2}
GLUCOSE BLD-MCNC: 189 MG/DL (ref 70–99)
HCT VFR BLD CALC: 39.1 % (ref 36.3–47.1)
HEMOGLOBIN: 12.5 G/DL (ref 11.9–15.1)
IMMATURE GRANULOCYTES: 0 %
LIPASE: 32 U/L (ref 13–60)
LYMPHOCYTES # BLD: 33 % (ref 24–43)
MCH RBC QN AUTO: 29.4 PG (ref 25.2–33.5)
MCHC RBC AUTO-ENTMCNC: 32 G/DL (ref 28.4–34.8)
MCV RBC AUTO: 92 FL (ref 82.6–102.9)
MONOCYTES # BLD: 5 % (ref 3–12)
NRBC AUTOMATED: 0 PER 100 WBC
PDW BLD-RTO: 13 % (ref 11.8–14.4)
PLATELET # BLD: 204 K/UL (ref 138–453)
PLATELET ESTIMATE: NORMAL
PMV BLD AUTO: 10.6 FL (ref 8.1–13.5)
POTASSIUM SERPL-SCNC: 4.7 MMOL/L (ref 3.7–5.3)
RBC # BLD: 4.25 M/UL (ref 3.95–5.11)
RBC # BLD: NORMAL 10*6/UL
SEG NEUTROPHILS: 61 % (ref 36–65)
SEGMENTED NEUTROPHILS ABSOLUTE COUNT: 6.01 K/UL (ref 1.5–8.1)
SODIUM BLD-SCNC: 139 MMOL/L (ref 135–144)
TOTAL PROTEIN: 6.2 G/DL (ref 6.4–8.3)
TROPONIN INTERP: NORMAL
TROPONIN T: NORMAL NG/ML
TROPONIN, HIGH SENSITIVITY: 10 NG/L (ref 0–14)
WBC # BLD: 10.1 K/UL (ref 3.5–11.3)
WBC # BLD: NORMAL 10*3/UL

## 2021-08-05 PROCEDURE — 83690 ASSAY OF LIPASE: CPT

## 2021-08-05 PROCEDURE — G0378 HOSPITAL OBSERVATION PER HR: HCPCS

## 2021-08-05 PROCEDURE — 6370000000 HC RX 637 (ALT 250 FOR IP): Performed by: EMERGENCY MEDICINE

## 2021-08-05 PROCEDURE — 85025 COMPLETE CBC W/AUTO DIFF WBC: CPT

## 2021-08-05 PROCEDURE — 6360000002 HC RX W HCPCS: Performed by: EMERGENCY MEDICINE

## 2021-08-05 PROCEDURE — 80053 COMPREHEN METABOLIC PANEL: CPT

## 2021-08-05 PROCEDURE — 6370000000 HC RX 637 (ALT 250 FOR IP): Performed by: PEDIATRICS

## 2021-08-05 PROCEDURE — 96376 TX/PRO/DX INJ SAME DRUG ADON: CPT

## 2021-08-05 PROCEDURE — 84484 ASSAY OF TROPONIN QUANT: CPT

## 2021-08-05 PROCEDURE — 94761 N-INVAS EAR/PLS OXIMETRY MLT: CPT

## 2021-08-05 RX ORDER — SODIUM CHLORIDE 9 MG/ML
25 INJECTION, SOLUTION INTRAVENOUS PRN
Status: DISCONTINUED | OUTPATIENT
Start: 2021-08-05 | End: 2021-08-05 | Stop reason: HOSPADM

## 2021-08-05 RX ORDER — MORPHINE SULFATE 4 MG/ML
2 INJECTION, SOLUTION INTRAMUSCULAR; INTRAVENOUS
Status: DISCONTINUED | OUTPATIENT
Start: 2021-08-05 | End: 2021-08-05 | Stop reason: HOSPADM

## 2021-08-05 RX ORDER — PANTOPRAZOLE SODIUM 40 MG/1
40 TABLET, DELAYED RELEASE ORAL
Status: DISCONTINUED | OUTPATIENT
Start: 2021-08-05 | End: 2021-08-05 | Stop reason: HOSPADM

## 2021-08-05 RX ORDER — GABAPENTIN 600 MG/1
900 TABLET ORAL 3 TIMES DAILY
Status: DISCONTINUED | OUTPATIENT
Start: 2021-08-05 | End: 2021-08-05 | Stop reason: HOSPADM

## 2021-08-05 RX ORDER — ONDANSETRON 2 MG/ML
4 INJECTION INTRAMUSCULAR; INTRAVENOUS ONCE
Status: COMPLETED | OUTPATIENT
Start: 2021-08-05 | End: 2021-08-05

## 2021-08-05 RX ORDER — INSULIN GLARGINE 100 [IU]/ML
54 INJECTION, SOLUTION SUBCUTANEOUS NIGHTLY
Status: DISCONTINUED | OUTPATIENT
Start: 2021-08-05 | End: 2021-08-05 | Stop reason: HOSPADM

## 2021-08-05 RX ORDER — GLIPIZIDE 10 MG/1
10 TABLET ORAL
Status: DISCONTINUED | OUTPATIENT
Start: 2021-08-05 | End: 2021-08-05 | Stop reason: HOSPADM

## 2021-08-05 RX ORDER — ONDANSETRON 2 MG/ML
4 INJECTION INTRAMUSCULAR; INTRAVENOUS EVERY 6 HOURS PRN
Status: DISCONTINUED | OUTPATIENT
Start: 2021-08-05 | End: 2021-08-05 | Stop reason: HOSPADM

## 2021-08-05 RX ORDER — SODIUM CHLORIDE 0.9 % (FLUSH) 0.9 %
5-40 SYRINGE (ML) INJECTION EVERY 12 HOURS SCHEDULED
Status: DISCONTINUED | OUTPATIENT
Start: 2021-08-05 | End: 2021-08-05 | Stop reason: HOSPADM

## 2021-08-05 RX ORDER — SODIUM CHLORIDE 0.9 % (FLUSH) 0.9 %
5-40 SYRINGE (ML) INJECTION PRN
Status: DISCONTINUED | OUTPATIENT
Start: 2021-08-05 | End: 2021-08-05 | Stop reason: HOSPADM

## 2021-08-05 RX ORDER — LISINOPRIL 2.5 MG/1
2.5 TABLET ORAL DAILY
Status: DISCONTINUED | OUTPATIENT
Start: 2021-08-05 | End: 2021-08-05 | Stop reason: HOSPADM

## 2021-08-05 RX ORDER — HYDROCODONE BITARTRATE AND ACETAMINOPHEN 5; 325 MG/1; MG/1
1 TABLET ORAL EVERY 6 HOURS PRN
Qty: 12 TABLET | Refills: 0 | Status: SHIPPED | OUTPATIENT
Start: 2021-08-05 | End: 2021-08-08

## 2021-08-05 RX ORDER — HYDROXYZINE HYDROCHLORIDE 25 MG/1
25 TABLET, FILM COATED ORAL NIGHTLY PRN
Status: DISCONTINUED | OUTPATIENT
Start: 2021-08-05 | End: 2021-08-05 | Stop reason: HOSPADM

## 2021-08-05 RX ORDER — FLUTICASONE PROPIONATE 50 MCG
1 SPRAY, SUSPENSION (ML) NASAL DAILY
Status: DISCONTINUED | OUTPATIENT
Start: 2021-08-05 | End: 2021-08-05 | Stop reason: HOSPADM

## 2021-08-05 RX ORDER — ESCITALOPRAM OXALATE 10 MG/1
20 TABLET ORAL DAILY
Status: DISCONTINUED | OUTPATIENT
Start: 2021-08-05 | End: 2021-08-05 | Stop reason: HOSPADM

## 2021-08-05 RX ORDER — ONDANSETRON 4 MG/1
4 TABLET, ORALLY DISINTEGRATING ORAL EVERY 8 HOURS PRN
Status: DISCONTINUED | OUTPATIENT
Start: 2021-08-05 | End: 2021-08-05 | Stop reason: HOSPADM

## 2021-08-05 RX ORDER — ACETAMINOPHEN 500 MG
1000 TABLET ORAL EVERY 8 HOURS PRN
Status: DISCONTINUED | OUTPATIENT
Start: 2021-08-05 | End: 2021-08-05 | Stop reason: HOSPADM

## 2021-08-05 RX ORDER — HYDROCODONE BITARTRATE AND ACETAMINOPHEN 5; 325 MG/1; MG/1
1 TABLET ORAL EVERY 6 HOURS PRN
Status: DISCONTINUED | OUTPATIENT
Start: 2021-08-05 | End: 2021-08-05 | Stop reason: HOSPADM

## 2021-08-05 RX ORDER — ACETAMINOPHEN 500 MG
1000 TABLET ORAL EVERY 8 HOURS
Status: DISCONTINUED | OUTPATIENT
Start: 2021-08-05 | End: 2021-08-05

## 2021-08-05 RX ORDER — ONDANSETRON 4 MG/1
4 TABLET, ORALLY DISINTEGRATING ORAL 3 TIMES DAILY PRN
Qty: 12 TABLET | Refills: 0 | Status: SHIPPED | OUTPATIENT
Start: 2021-08-05 | End: 2022-03-01

## 2021-08-05 RX ORDER — ATORVASTATIN CALCIUM 80 MG/1
40 TABLET, FILM COATED ORAL DAILY
Status: DISCONTINUED | OUTPATIENT
Start: 2021-08-05 | End: 2021-08-05 | Stop reason: HOSPADM

## 2021-08-05 RX ADMIN — GLIPIZIDE 10 MG: 10 TABLET ORAL at 09:42

## 2021-08-05 RX ADMIN — MORPHINE SULFATE 2 MG: 4 INJECTION INTRAVENOUS at 04:47

## 2021-08-05 RX ADMIN — ATORVASTATIN CALCIUM 40 MG: 80 TABLET, FILM COATED ORAL at 09:42

## 2021-08-05 RX ADMIN — LISINOPRIL 2.5 MG: 2.5 TABLET ORAL at 09:42

## 2021-08-05 RX ADMIN — ACETAMINOPHEN 1000 MG: 500 TABLET ORAL at 04:47

## 2021-08-05 RX ADMIN — HYDROCODONE BITARTRATE AND ACETAMINOPHEN 1 TABLET: 5; 325 TABLET ORAL at 11:39

## 2021-08-05 RX ADMIN — GABAPENTIN 900 MG: 600 TABLET ORAL at 09:42

## 2021-08-05 RX ADMIN — PANTOPRAZOLE SODIUM 40 MG: 40 TABLET, DELAYED RELEASE ORAL at 09:42

## 2021-08-05 RX ADMIN — ONDANSETRON 4 MG: 2 INJECTION INTRAMUSCULAR; INTRAVENOUS at 03:14

## 2021-08-05 RX ADMIN — ESCITALOPRAM OXALATE 20 MG: 10 TABLET ORAL at 09:42

## 2021-08-05 ASSESSMENT — ENCOUNTER SYMPTOMS
CONSTIPATION: 0
NAUSEA: 1
ABDOMINAL PAIN: 1
SHORTNESS OF BREATH: 0
DIARRHEA: 0
VOMITING: 1
SORE THROAT: 0

## 2021-08-05 ASSESSMENT — PAIN SCALES - GENERAL
PAINLEVEL_OUTOF10: 6
PAINLEVEL_OUTOF10: 7
PAINLEVEL_OUTOF10: 2
PAINLEVEL_OUTOF10: 0
PAINLEVEL_OUTOF10: 6

## 2021-08-05 ASSESSMENT — PAIN DESCRIPTION - PAIN TYPE: TYPE: ACUTE PAIN

## 2021-08-05 ASSESSMENT — PAIN DESCRIPTION - ORIENTATION: ORIENTATION: RIGHT

## 2021-08-05 ASSESSMENT — PAIN DESCRIPTION - LOCATION: LOCATION: ABDOMEN

## 2021-08-05 NOTE — ED NOTES
Report given to [facility] RN     Alexx Ferrer RN  08/05/21 7798

## 2021-08-05 NOTE — ED PROVIDER NOTES
[facility]     Emergency Department     Faculty Attestation    I performed a history and physical examination of the patient and discussed management with the resident. I reviewed the residents note and agree with the documented findings including all diagnostic interpretations and plan of care. Any areas of disagreement are noted on the chart. I was personally present for the key portions of any procedures. I have documented in the chart those procedures where I was not present during the key portions. I have reviewed the emergency nurses triage note. I agree with the chief complaint, past medical history, past surgical history, allergies, medications, social and family history as documented unless otherwise noted below. Documentation of the HPI, Physical Exam and Medical Decision Making performed by scribes is based on my personal performance of the HPI, PE and MDM. For Physician Assistant/ Nurse Practitioner cases/documentation I have personally evaluated this patient and have completed at least one if not all key elements of the E/M (history, physical exam, and MDM). Additional findings are as noted. This patient was evaluated in the Emergency Department for symptoms described in the history of present illness. He/she was evaluated in the context of the global COVID-19 pandemic, which necessitated consideration that the patient might be at risk for infection with the SARS-CoV-2 virus that causes COVID-19. Institutional protocols and algorithms that pertain to the evaluation of patients at risk for COVID-19 are in a state of rapid change based on information released by regulatory bodies including the CDC and federal and state organizations. These policies and algorithms were followed during the patient's care in the ED. Primary Care Physician: Natalia Byrd, APRN - CNP    History:  This is a 50 y.o. female who presents to the Emergency Department with complaint of back pain abdominal pain. Worsening over the past day. Had imaging performed over at [facility] which showed area of partial stenosis at the SMA as well as focal atherosclerotic plaque in dissection of an iliac branch. Lactate improved with fluids. Transferred here for vascular consultation    Physical:     weight is 223 lb 15.8 oz (101.6 kg). Her temperature is 98.4 °F (36.9 °C). Her blood pressure is 140/84 (abnormal) and her pulse is 82. Her respiration is 14 and oxygen saturation is 100%.     50 y.o. female no acute distress, cardiac exam regular rate and rhythm no murmurs rubs gallops, pulmonary clear bilaterally abdomen is soft, appears uncomfortable but exam is minimally tender no rebound no guarding no masses    Impression: Abdominal pain, concern for possible partial mesenteric ischemia    Plan: Continue heparin, vascular consult, fluids, admit       Keturah Carter MD, Ranjit Mccord  Attending Emergency Physician        Jose Angel Alaniz MD  08/04/21 5084

## 2021-08-05 NOTE — DISCHARGE SUMMARY
CDU Discharge Summary        Patient:  Cindy Hayes  YOB: 1965    MRN: [id number]   Acct: [de-identified]    Primary Care Physician: DI Campbell CNP    Admit date:  8/4/2021 10:36 PM  Discharge date: 8/5/2021  6:50 PM     Discharge Diagnoses:     1.)  Acute intractable nausea vomiting secondary to unknown etiology. Improved with IV fluids, antiemetics, analgesics and rest    Follow-up:  Call today/tomorrow for a follow up appointment with DI Campbell CNP , or return to the Emergency Room with worsening symptoms    Stressed to patient the importance of following up with primary care doctor for further workup/management of symptoms. Pt verbalizes understanding and agrees with plan. Discharge Medication Changes:       Medication List      START taking these medications    ondansetron 4 MG disintegrating tablet  Commonly known as: ZOFRAN-ODT  Take 1 tablet by mouth 3 times daily as needed for Nausea or Vomiting        CHANGE how you take these medications    atorvastatin 40 MG tablet  Commonly known as: LIPITOR  Take 1 tablet by mouth once daily  What changed: See the new instructions. escitalopram 20 MG tablet  Commonly known as: LEXAPRO  Take 1 tablet by mouth once daily  What changed: See the new instructions. glipiZIDE 10 MG extended release tablet  Commonly known as: GLUCOTROL XL  Take 1 tablet by mouth once daily  What changed: See the new instructions. * HYDROcodone-acetaminophen 5-325 MG per tablet  Commonly known as: Joanie Dupes  What changed: Another medication with the same name was added. Make sure you understand how and when to take each. * HYDROcodone-acetaminophen 5-325 MG per tablet  Commonly known as: NORCO  Take 1 tablet by mouth every 6 hours as needed for Pain for up to 3 days. What changed: You were already taking a medication with the same name, and this prescription was added. Make sure you understand how and when to take each.      lisinopril 2.5 MG tablet  Commonly known as: PRINIVIL;ZESTRIL  Take 1 tablet by mouth once daily  What changed:   · how much to take  · how to take this  · when to take this     omeprazole 40 MG delayed release capsule  Commonly known as: PRILOSEC  Take 1 capsule by mouth once daily  What changed: See the new instructions. Victoza 18 MG/3ML Sopn SC injection  Generic drug: Liraglutide  INJECT 1.2 MG INTO SKIN DAILY  What changed: See the new instructions. * This list has 2 medication(s) that are the same as other medications prescribed for you. Read the directions carefully, and ask your doctor or other care provider to review them with you. CONTINUE taking these medications    fluticasone 50 MCG/ACT nasal spray  Commonly known as: FLONASE  1 spray by Each Nostril route daily     gabapentin 600 MG tablet  Commonly known as: NEURONTIN     hydrOXYzine 25 MG tablet  Commonly known as: ATARAX  Take 1 tablet by mouth nightly as needed for Anxiety     Lantus SoloStar 100 UNIT/ML injection pen  Generic drug: insulin glargine  Inject 54 Units into the skin nightly     lidocaine-prilocaine 2.5-2.5 % cream  Commonly known as: EMLA           Where to Get Your Medications      You can get these medications from any pharmacy    Bring a paper prescription for each of these medications  · HYDROcodone-acetaminophen 5-325 MG per tablet  · ondansetron 4 MG disintegrating tablet         Diet:  ADULT DIET;  Regular, advance as tolerated     Activity:  As tolerated    Consultants: IP CONSULT TO VASCULAR SURGERY    Procedures:  Not indicated     Diagnostic Test:   Results for orders placed or performed during the hospital encounter of 08/04/21   CBC WITH AUTO DIFFERENTIAL   Result Value Ref Range    WBC 10.1 3.5 - 11.3 k/uL    RBC 4.25 3.95 - 5.11 m/uL    Hemoglobin 12.5 11.9 - 15.1 g/dL    Hematocrit 39.1 36.3 - 47.1 %    MCV 92.0 82.6 - 102.9 fL    MCH 29.4 25.2 - 33.5 pg    MCHC 32.0 28.4 - 34.8 g/dL    RDW 13.0 11.8 - 14.4 % reconstruction, and/or weight based adjustment of the mA/kV was utilized to reduce the radiation dose to as low as reasonably achievable. COMPARISON: CT abdomen and pelvis 09/13/2019 and 03/31/2016 HISTORY: ORDERING SYSTEM PROVIDED HISTORY: mid abdominal pain, h/o pancreatitis TECHNOLOGIST PROVIDED HISTORY: mid abdominal pain, h/o pancreatitis Decision Support Exception - unselect if not a suspected or confirmed emergency medical condition->Emergency Medical Condition (MA) FINDINGS: Lower Chest: Visualized portions of the lungs are clear. Small hiatal hernia is noted. Cardiac and posterior mediastinal structures visualized are otherwise unremarkable. Organs: Hypoattenuation throughout the liver reflects hepatic steatosis. Craniocaudal liver length about 19 cm. No discrete hepatic lesion or intrahepatic bile duct dilatation is seen. The gallbladder is absent following cholecystectomy. Dystrophic vascular calcifications are demonstrated mid pole region left kidney. The kidneys, spleen, adrenal glands and pancreas appear unremarkable. GI/Bowel: The small bowel and stomach appear unremarkable. No diffuse or focal bowel wall thickening evident. No inflammatory changes evident. No obstruction is seen. The appendix is absent following appendectomy. Pelvis: Partially filled urinary bladder appears unremarkable. No pelvic adenopathy or free fluid is seen. Vascular calcifications are noted reflecting calcific atherosclerosis. Status post hysterectomy. Peritoneum/Retroperitoneum: Densely, peripherally calcified, 13 mm splenic artery aneurysm at the splenic hilum axial series 2, image 51. Calcific atherosclerotic disease aorta. No aneurysm. Prominent atherosclerosis involving portions of the mid right superior mesenteric artery with focal area of possible significant stenosis, about 50%. Unremarkable appearance of the IVC. No adenopathy or fluid. No peripancreatic fluid or inflammatory changes are evident. Bones/Soft Tissues: No acute superficial soft tissue or osseous structure abnormality evident. 1. Possible flow limiting stenosis at the mid superior mesenteric artery related atherosclerosis, on this exam appearing about 50%, however this should be re-evaluated on CTA abdomen as follow-up for improved characterization. No GI changes are seen to suggest acute ischemia. 2. Small hiatal hernia. 3. Densely, peripherally calcified, 13 mm splenic artery aneurysm at the splenic hilum. Recommend annual CT surveillance. 4. Hepatic steatosis and hepatomegaly. 5. Remainder of the CT abdomen and pelvis appears unremarkable. 6. Status post cholecystectomy, hysterectomy and appendectomy. CTA CHEST ABDOMEN PELVIS W CONTRAST    Result Date: 8/4/2021  EXAMINATION: CTA OF THE CHEST, ABDOMEN AND PELVIS WITH CONTRAST 8/4/2021 4:32 pm: TECHNIQUE: CTA of the chest, abdomen and pelvis was performed after the administration of intravenous contrast.  Multiplanar reformatted images are provided for review. MIP images are provided for review. Dose modulation, iterative reconstruction, and/or weight based adjustment of the mA/kV was utilized to reduce the radiation dose to as low as reasonably achievable. COMPARISON: CT abdomen and pelvis earlier today. HISTORY: ORDERING SYSTEM PROVIDED HISTORY: Back pain abdominal pain further evaluation of mesenteric stenosis TECHNOLOGIST PROVIDED HISTORY: Back pain abdominal pain further evaluation of mesenteric stenosis Decision Support Exception - unselect if not a suspected or confirmed emergency medical condition->Emergency Medical Condition (MA) FINDINGS: CTA CHEST: Thoracic aorta: No evidence of thoracic aortic aneurysm or dissection. No acute abnormality of the aorta. Normal aortic arch branching with atherosclerotic plaques no aneurysm or dissection. No significant luminal narrowing is seen. Ascending thoracic aorta 3.1 cm and descending thoracic aorta 2.4 cm.   Calcific atherosclerosis coronary arteries. Pulmonary arteries: Unremarkable appearance. Main pulmonary artery 2.7 cm. Mediastinum: No evidence of mediastinal lymphadenopathy. The heart and pericardium demonstrate no acute abnormality. Small hiatal hernia. Lungs/Pleura: The lungs are without acute process. Spiculated 12 mm mean diameter pulmonary nodule superior segment left lower lobe axial series 3, image 67. No focal consolidation or pulmonary edema. No evidence of pleural effusion or pneumothorax. Soft Tissues/Bones: No acute bone or soft tissue abnormality. CTA ABDOMEN/PELVIS: Abdominal aorta/Branches: Infrarenal abdominal aorta is 1.8 cm. Mild calcific atherosclerosis throughout the abdominal aorta. No abdominal aorta aneurysm or dissection. Mild atherosclerotic plaque involving branch vessels aorta with unremarkable appearance of the celiac axis, 2 right and single left renal arteries and inferior mesenteric artery. Just beyond 1st posterior right-sided branch off of the superior mesenteric artery is a prominent atherosclerotic plaque, well seen axial series 2, images 133 and 134, resulting in 50-60% luminal narrowing. Distal abdominal aorta tapers normally with unremarkable appearance of the bilateral common and external iliac arteries. There is 70-80% stenosis proximal right internal iliac artery axial series 2, image 177. Focal atherosclerotic plaque at the mid left common iliac artery results in focal dissection extending into the more anterior branch of the left internal iliac artery seen on axial series 2, image 197. No vessel occlusion or significant narrowing evident. Visualized portions of the bilateral femoral artery branches appear unremarkable. 13 mm peripherally calcified splenic artery aneurysm at the splenic hilum. Organs: Hypoattenuation throughout the liver reflects hepatic steatosis. No discrete hepatic lesion or intrahepatic bile duct dilatation is seen.  Craniocaudal liver length 18 cm.  The gallbladder is absent following cholecystectomy. The kidneys, spleen, adrenal glands and pancreas appear unremarkable. GI/Bowel: The small bowel and stomach appear unremarkable. No diffuse or focal bowel wall thickening evident. No inflammatory changes evident. No obstruction is seen. The appendix is absent following appendectomy. Pelvis: Partially filled urinary bladder appears unremarkable. No pelvic adenopathy or free fluid is seen. Vascular calcifications are noted reflecting calcific atherosclerosis. Status post hysterectomy. Peritoneum/Retroperitoneum: No pneumoperitoneum. Unremarkable appearance of the IVC. No adenopathy or fluid. Bones/Soft Tissues: No acute superficial soft tissue or osseous structure abnormality evident. CTA 3D images: 3D IMAGES:  3D reconstructed images were performed on a separate workstation and provided for review. These images were reviewed. 1.  CTA CHEST: No acute abnormality. 2. 12 mm spiculated nodule superior segment left lower lobe. Consider noncontrast chest CT in 3 months, CT guided percutaneous tissue sampling or whole-body PET-CT imaging as follow-up. 3. Coronary artery disease. 4. Calcific atherosclerosis aorta and its branches. No aneurysm/dissection or significant luminal narrowing evident. 5. Small hiatal hernia. 6. CTA ABDOMEN/PELVIS: No acute abnormality. 7. Confirmation of 50-60% luminal narrowing at the mid superior mesenteric artery level. 8. No abdominal aorta aneurysm or dissection. 9. Focal atherosclerotic plaque at the mid left common iliac artery results in focal dissection extending into the more anterior branch of the left internal iliac artery seen on axial series 2, image 197. No vessel occlusion or significant narrowing evident. 10. 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 11. 13 mm peripherally calcified splenic artery aneurysm at the splenic hilum. 12. Calcific atherosclerosis. 13. Hepatic steatosis and hepatomegaly.  14. Cholecystectomy. RECOMMENDATIONS:           Physical Exam:    General appearance - NAD, AOx 3   Lungs -CTAB, no R/R/R  Heart - RRR, no M/R/G  Abdomen - Soft, NT/ND  Neurological:  MAEx4, No focal motor deficit, sensory loss  Extremities - Cap refil <2 sec in all ext., no edema  Skin -warm, dry      Hospital Course:  Clinical course has improved, labs and imaging reviewed. Danette Martin originally presented to the hospital on 8/4/2021 10:36 PM with complaints of right upper quadrant discomfort with associated nausea, vomiting and dizziness. At that time it was determined that She required further observation and further work-up to determine etiology. Labs and imaging were followed daily. Imaging results as above. She is medically stable to be discharged. Patient educated to follow-up with primary care provider within 1 week. Patient given prescription for Norco and Zofran to manage symptoms and instructed to return to the emergency department with any returning or worsening symptoms. Disposition: Home    Patient stated that they will not drive themselves home from the hospital if they have gotten pain killers/ narcotics earlier that day and that they will arrange for transportation on their own or work with the  for a ride. Patient counseled NOT to drive while under the influence of narcotics/ pain killers. Condition: Good    Patient stable and ready for discharge home. I have discussed plan of care with patient and they are in understanding. They were instructed to read discharge paperwork. All of their questions and concerns were addressed. Time Spent: 0 day      --  My Supervising Physician for today is Dr. Edison Miller  We discussed and agreed upon a treatment plan  Mathew Agudelo, [facility]  Emergency Medicine Attending Physician    This dictation was generated by voice recognition computer software.   Although all attempts are made to edit the dictation for accuracy, there may be errors in the transcription that are not intended.

## 2021-08-05 NOTE — ED NOTES
Bed: 03  Expected date:   Expected time:   Means of arrival:   Comments:  Pineda Kw[facility]     Roopa Mckeon RN  08/04/21 [id number]

## 2021-08-05 NOTE — ED NOTES
Report given to Mara BETANCOURT  Antiemetic helped to decrease nausea  Continues to rest quietly  Call light at side     Tamia Sandoval RN  08/05/21 [id number]

## 2021-08-05 NOTE — H&P
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 female who presents abdominal pain that is RUQ associated with nausea, dizziness. Started on Wednesday morning,  But patient states she was dealing with nausea for a few days prior to onset of pain. Not associated with food, but patient states she has not been hungry due to nausea. Nothing makes it better or worse, but she does have some relief when laying on her right side. Prior cholecystectomy. Transferred here from [place]. Location/Symptom: RUQ  Timing/Onset: 2 days  Provocation: none  Quality: ache  Radiation: none  Severity:5- 10/10  Timing/Duration: constant  Modifying Factors: pain medication, and laying on her right side. REVIEW OF SYSTEMS       General ROS - No fevers, No malaise   Ophthalmic ROS - No discharge, No changes in vision  ENT ROS -  No sore throat, No rhinorrhea,   Respiratory ROS - no shortness of breath, no cough, no  wheezing  Cardiovascular ROS - No chest pain, no dyspnea on exertion  Gastrointestinal ROS - positive abdominal pain, but at this time no nausea or vomiting, no change in bowel habits, no black or bloody stools  Genito-Urinary ROS - No dysuria, trouble voiding, or hematuria  Musculoskeletal ROS - No myalgias, No arthalgias  Neurological ROS - No headache, no dizziness/lightheadedness, No focal weakness, no loss of sensation at this time  Dermatological ROS - pruritis on arms bl    (PQRS) Advance directives on face sheet per hospital policy.  No change unless specifically mentioned in chart    PAST MEDICAL HISTORY has a past medical history of Anxiety, Arthritis, Depression, Diabetic neuropathy ([address] Esophageal reflux, Heart burn, Limitation of joint motion, Muscle weakness, Sleep apnea, Sleepiness, and Vertigo. I have reviewed the past medical history with the patient and it is pertinent to this complaint. SURGICAL HISTORY      has a past surgical history that includes Hysterectomy;  section; Cholecystectomy; Appendectomy; Cataract removal with implant (Bilateral, 10/24/2016); laparoscopy; hernia repair (Right); hernia repair (); Abscess Drainage (Left, ); Carpal tunnel release (Left, 2018); Colonoscopy (2019); Colonoscopy (N/A, 3/25/2019); and Colonoscopy (N/A, 4/10/2019). I have reviewed and agree with Surgical History entered and it is pertinent to this complaint. CURRENT MEDICATIONS     gabapentin (NEURONTIN) tablet 900 mg, TID  fluticasone (FLONASE) 50 MCG/ACT nasal spray 1 spray, Daily  glipiZIDE (GLUCOTROL) tablet 10 mg, QAM AC  hydrOXYzine (ATARAX) tablet 25 mg, Nightly PRN  insulin glargine (LANTUS) injection vial 54 Units, Nightly  atorvastatin (LIPITOR) tablet 40 mg, Daily  escitalopram (LEXAPRO) tablet 20 mg, Daily  lisinopril (PRINIVIL;ZESTRIL) tablet 2.5 mg, Daily  pantoprazole (PROTONIX) tablet 40 mg, QAM AC  Liraglutide (VICTOZA) SC injection 1.2 mg, Daily  sodium chloride flush 0.9 % injection 5-40 mL, 2 times per day  sodium chloride flush 0.9 % injection 5-40 mL, PRN  0.9 % sodium chloride infusion, PRN  enoxaparin (LOVENOX) injection 40 mg, Daily  acetaminophen (TYLENOL) tablet 1,000 mg, Q8H  ondansetron (ZOFRAN-ODT) disintegrating tablet 4 mg, Q8H PRN   Or  ondansetron (ZOFRAN) injection 4 mg, Q6H PRN  morphine injection 2 mg, Q2H PRN  lactated ringers infusion, Continuous        All medication charted and reviewed. ALLERGIES     is allergic to codeine, meperidine, and other. FAMILY HISTORY     She indicated that her mother is alive.  She indicated that her father is alive. She indicated that her sister is alive. family history includes Diabetes in her father, mother, and sister; Heart Disease (age of onset: 36) in her mother; Heart Disease (age of onset: 48) in her father; High Cholesterol in her father and mother; Hypertension in her father and mother. The patient denies any pertinent family history. I have reviewed and agree with the family history entered. I have reviewed the Family History and it is not significant to the case    SOCIAL HISTORY      reports that she quit smoking about 10 years ago. Her smoking use included cigarettes. She has a 10.00 pack-year smoking history. She has never used smokeless tobacco. She reports that she does not drink alcohol and does not use drugs. I have reviewed and agree with all Social.  There are concerns for substance abuse/use. PHYSICAL EXAM     INITIAL VITALS:  weight is 223 lb 15.8 oz (101.6 kg). Her temperature is 98.4 °F (36.9 °C). Her blood pressure is 140/84 (abnormal) and her pulse is 82. Her respiration is 14 and oxygen saturation is 100%.       CONSTITUTIONAL: AOx4, no apparent distress, appears stated age , obese   HEAD: normocephalic, atraumatic   EYES: PERRLA, EOMI    ENT: moist mucous membranes, uvula midline   NECK: supple, symmetric   BACK: symmetric   LUNGS: clear to auscultation bilaterally   CARDIOVASCULAR: regular rate and rhythm, no murmurs, rubs or gallops   ABDOMEN: soft, non-tender, non-distended with normal active bowel sounds   NEUROLOGIC:  MAEx4, no focal sensory or motor deficits, CN 2-12 intact bl   MUSCULOSKELETAL: no clubbing, cyanosis or edema   SKIN: No lesions to observable skin, freckling to RUQ abdomen       DIFFERENTIAL DIAGNOSIS/MDM:   Hypertension:  DDX: HTN evaluate (acute EOD -- brain, renal, thoracic aorta, lung, kidney, eyes)   (renal dx, vascular lesion, drugs (MAO inhibitor + tyramine, steroids, cocaine, amphetamines)    Abdominal Pain:  DDX: GERD, PUD, pancreatitis, cholecystitis, GB colic, cholangitis, [id number]-[id number]-Unmcfy, ACS/ MI, pneumonia, SBO, DKA, AAA, mesenteric ischemia, perforated viscous, acute gastroenteritis, NSAP, pyelonephritis, kidney stone, appendicitis, hernia, D-TICS, testicular torsion, ectopic, ovarian torsion, ovarian cyst, PID, Mittelschmerz, period/ fibroid, UTI, constipation, epididymitis/ orchitis  Ada criteria: WBC>16, age >49, glucose>200, AST>80, LDH>350  Evaluate for: EtOH abuse, ACS risk factors, point tenderness, rebound, guardingm Rivero sign, GB US (stone, sono Rivero, wall thick>3mm, CBD>6mm)    Smith Score: (appendicitis)  1. Abdominal pain (RLQ)     2  2. Anorexia (loss of appetite) or ketones in the urine  1  3. Nausea or vomiting      1  4. Migration to R iliac fossa     1  5. Rebound tenderness     1  6. Fever of 37.3 °C/ 99.1 F +     1  7. Leukocytosis > 24107 WBC    2  8. Neutrophilia, or an increase in % of neutrophils in WBC 1  Total:  /10    (<3 no CT, 4-6 CT, 7-8 Surgery consult, 5-6 is consistent with diagnosis of acute appendicitis, 7-8 indicates a probable appendicitis, 9-10 indicates a very probable acute appendicitis)    BISAP Score: (pancreatitis)  BUN >25           1  Impaired mental status        1  SIRS criteria (HR >90, T 100.4, 36, RR >20/ CO2 <32, WBC >12, <4)  1  Age >60          1   Pleural Effusion          1  Total: 4    (Patients with a BISAP Score >0 had an increasing risk of mortality, with mortality increasing significantly with a score of 3 or greater.  A score of 5 had a mortality rate of 22%.)    Vomiting:  DDX: SBO, DKA, Abdominal (gastroenteritis, appendicitis, gallbladder, pancreatitis, PUD, perforation), ICH, meningitis, vertigo, hyperemesis, abnormal lytes, EtOH intoxication/ tox, post-tussive, ACS/ MI.      DIAGNOSTIC RESULTS     EKG: All EKG's are interpreted by the Observation Physician who either signs or Co-signs this chart in the absence of a cardiologist.    EKG Interpretation    Interpreted by observation physician - one was not performed / is not in the EMR    RADIOLOGY:   I directly visualized the following  images and reviewed the radiologist interpretations:    CT ABDOMEN PELVIS W IV CONTRAST Additional Contrast? None    Result Date: 8/4/2021  EXAMINATION: CT OF THE ABDOMEN AND PELVIS WITH CONTRAST 8/4/2021 2:05 pm TECHNIQUE: CT of the abdomen and pelvis was performed with the administration of intravenous contrast. Multiplanar reformatted images are provided for review. Dose modulation, iterative reconstruction, and/or weight based adjustment of the mA/kV was utilized to reduce the radiation dose to as low as reasonably achievable. COMPARISON: CT abdomen and pelvis 09/13/2019 and 03/31/2016 HISTORY: ORDERING SYSTEM PROVIDED HISTORY: mid abdominal pain, h/o pancreatitis TECHNOLOGIST PROVIDED HISTORY: mid abdominal pain, h/o pancreatitis Decision Support Exception - unselect if not a suspected or confirmed emergency medical condition->Emergency Medical Condition (MA) FINDINGS: Lower Chest: Visualized portions of the lungs are clear. Small hiatal hernia is noted. Cardiac and posterior mediastinal structures visualized are otherwise unremarkable. Organs: Hypoattenuation throughout the liver reflects hepatic steatosis. Craniocaudal liver length about 19 cm. No discrete hepatic lesion or intrahepatic bile duct dilatation is seen. The gallbladder is absent following cholecystectomy. Dystrophic vascular calcifications are demonstrated mid pole region left kidney. The kidneys, spleen, adrenal glands and pancreas appear unremarkable. GI/Bowel: The small bowel and stomach appear unremarkable. No diffuse or focal bowel wall thickening evident. No inflammatory changes evident. No obstruction is seen. The appendix is absent following appendectomy. Pelvis: Partially filled urinary bladder appears unremarkable. No pelvic adenopathy or free fluid is seen.   Vascular calcifications are noted reflecting calcific atherosclerosis. Status post hysterectomy. Peritoneum/Retroperitoneum: Densely, peripherally calcified, 13 mm splenic artery aneurysm at the splenic hilum axial series 2, image 51. Calcific atherosclerotic disease aorta. No aneurysm. Prominent atherosclerosis involving portions of the mid right superior mesenteric artery with focal area of possible significant stenosis, about 50%. Unremarkable appearance of the IVC. No adenopathy or fluid. No peripancreatic fluid or inflammatory changes are evident. Bones/Soft Tissues: No acute superficial soft tissue or osseous structure abnormality evident. 1. Possible flow limiting stenosis at the mid superior mesenteric artery related atherosclerosis, on this exam appearing about 50%, however this should be re-evaluated on CTA abdomen as follow-up for improved characterization. No GI changes are seen to suggest acute ischemia. 2. Small hiatal hernia. 3. Densely, peripherally calcified, 13 mm splenic artery aneurysm at the splenic hilum. Recommend annual CT surveillance. 4. Hepatic steatosis and hepatomegaly. 5. Remainder of the CT abdomen and pelvis appears unremarkable. 6. Status post cholecystectomy, hysterectomy and appendectomy. CTA CHEST ABDOMEN PELVIS W CONTRAST    Result Date: 8/4/2021  EXAMINATION: CTA OF THE CHEST, ABDOMEN AND PELVIS WITH CONTRAST 8/4/2021 4:32 pm: TECHNIQUE: CTA of the chest, abdomen and pelvis was performed after the administration of intravenous contrast.  Multiplanar reformatted images are provided for review. MIP images are provided for review. Dose modulation, iterative reconstruction, and/or weight based adjustment of the mA/kV was utilized to reduce the radiation dose to as low as reasonably achievable. COMPARISON: CT abdomen and pelvis earlier today.  HISTORY: ORDERING SYSTEM PROVIDED HISTORY: Back pain abdominal pain further evaluation of mesenteric stenosis TECHNOLOGIST PROVIDED HISTORY: Back pain abdominal pain further evaluation of mesenteric stenosis Decision Support Exception - unselect if not a suspected or confirmed emergency medical condition->Emergency Medical Condition (MA) FINDINGS: CTA CHEST: Thoracic aorta: No evidence of thoracic aortic aneurysm or dissection. No acute abnormality of the aorta. Normal aortic arch branching with atherosclerotic plaques no aneurysm or dissection. No significant luminal narrowing is seen. Ascending thoracic aorta 3.1 cm and descending thoracic aorta 2.4 cm. Calcific atherosclerosis coronary arteries. Pulmonary arteries: Unremarkable appearance. Main pulmonary artery 2.7 cm. Mediastinum: No evidence of mediastinal lymphadenopathy. The heart and pericardium demonstrate no acute abnormality. Small hiatal hernia. Lungs/Pleura: The lungs are without acute process. Spiculated 12 mm mean diameter pulmonary nodule superior segment left lower lobe axial series 3, image 67. No focal consolidation or pulmonary edema. No evidence of pleural effusion or pneumothorax. Soft Tissues/Bones: No acute bone or soft tissue abnormality. CTA ABDOMEN/PELVIS: Abdominal aorta/Branches: Infrarenal abdominal aorta is 1.8 cm. Mild calcific atherosclerosis throughout the abdominal aorta. No abdominal aorta aneurysm or dissection. Mild atherosclerotic plaque involving branch vessels aorta with unremarkable appearance of the celiac axis, 2 right and single left renal arteries and inferior mesenteric artery. Just beyond 1st posterior right-sided branch off of the superior mesenteric artery is a prominent atherosclerotic plaque, well seen axial series 2, images 133 and 134, resulting in 50-60% luminal narrowing. Distal abdominal aorta tapers normally with unremarkable appearance of the bilateral common and external iliac arteries. There is 70-80% stenosis proximal right internal iliac artery axial series 2, image 177.   Focal atherosclerotic plaque at the mid left common iliac artery results in focal dissection extending into the more anterior branch of the left internal iliac artery seen on axial series 2, image 197. No vessel occlusion or significant narrowing evident. Visualized portions of the bilateral femoral artery branches appear unremarkable. 13 mm peripherally calcified splenic artery aneurysm at the splenic hilum. Organs: Hypoattenuation throughout the liver reflects hepatic steatosis. No discrete hepatic lesion or intrahepatic bile duct dilatation is seen. Craniocaudal liver length 18 cm. The gallbladder is absent following cholecystectomy. The kidneys, spleen, adrenal glands and pancreas appear unremarkable. GI/Bowel: The small bowel and stomach appear unremarkable. No diffuse or focal bowel wall thickening evident. No inflammatory changes evident. No obstruction is seen. The appendix is absent following appendectomy. Pelvis: Partially filled urinary bladder appears unremarkable. No pelvic adenopathy or free fluid is seen. Vascular calcifications are noted reflecting calcific atherosclerosis. Status post hysterectomy. Peritoneum/Retroperitoneum: No pneumoperitoneum. Unremarkable appearance of the IVC. No adenopathy or fluid. Bones/Soft Tissues: No acute superficial soft tissue or osseous structure abnormality evident. CTA 3D images: 3D IMAGES:  3D reconstructed images were performed on a separate workstation and provided for review. These images were reviewed. 1.  CTA CHEST: No acute abnormality. 2. 12 mm spiculated nodule superior segment left lower lobe. Consider noncontrast chest CT in 3 months, CT guided percutaneous tissue sampling or whole-body PET-CT imaging as follow-up. 3. Coronary artery disease. 4. Calcific atherosclerosis aorta and its branches. No aneurysm/dissection or significant luminal narrowing evident. 5. Small hiatal hernia. 6. CTA ABDOMEN/PELVIS: No acute abnormality. 7. Confirmation of 50-60% luminal narrowing at the mid superior mesenteric artery level.  8. No abdominal aorta aneurysm or dissection. 9. Focal atherosclerotic plaque at the mid left common iliac artery results in focal dissection extending into the more anterior branch of the left internal iliac artery seen on axial series 2, image 197. No vessel occlusion or significant narrowing evident. 10. 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 11. 13 mm peripherally calcified splenic artery aneurysm at the splenic hilum. 12. Calcific atherosclerosis. 13. Hepatic steatosis and hepatomegaly. 14. Cholecystectomy. RECOMMENDATIONS:       LABS:  I have reviewed and interpreted all available lab results. Labs Reviewed   CBC WITH AUTO DIFFERENTIAL   COMPREHENSIVE METABOLIC PANEL   LIPASE   TROPONIN       [facility] / Tata Davenport is a 50 y.o. female who presents with abdominal pain associated with nausea/vomiting and dizziness. 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 Vascular surgery consulted. Vitals remain stable. Nausea significantly improved. Requesting a meal.    · Vascular surgery on board  · Continue home medications and pain control  · Monitor vitals, labs, and imaging  · DISPO: pending consults and clinical improvement    CONSULTS:    IP CONSULT TO VASCULAR SURGERY    PROCEDURES:  Not indicated       PATIENT REFERRED TO:    Sujit Deras MD  [facility], [address] [place] [address] follow up      --  Yelena Templeton MD   Emergency Medicine Resident     This dictation was generated by voice recognition computer software. Although all attempts are made to edit the dictation for accuracy, there may be errors in the transcription that are not intended.

## 2021-08-05 NOTE — CONSULTS
[address]      Patient's Name/ Date of Birth/ Gender: León Bergman / 1965 (50 y.o.) / female     Referring Physician: Courtney Gabriel MD    Consulting Physician: Dr. Lindi Baumgarten    History of present Illness: Pt is a 50 y.o. female who presents form [place] with 1 day of abdominal pain, nausea, and vomiting. 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 The abdominal pain is right sided and radiates posteriorly to her back. She states she had similar pain when she had pancreatitis. She has a history of T2DM on home glipizide. She is also a former smoker. She has had numerous abdominal surgeries including a cholecystectomy, hysterectomy, inguinal hernia repair, and appendectomy. She denies any history of SBO, hematochezia, melena. She also denies leg pain, claudication, numbness/tingling in her legs. She is mildy hypertensive at 046W systolic, otherwise hemodynamically stable. Her labs are significant for a LA of 2.4 that is downtrending in response to fluids, and glucose of 275. On exam, she is not acutely ill, she complains of right sided abdominal pain to palpation, however abdomen is soft, nondistended. Bilateral Dp/PT pulses are palpable. Legs without edema, symmetric in color. We are consulted for CT vessel findings. Past Medical History:  has a past medical history of Anxiety, Arthritis, Depression, Diabetic neuropathy ([address] Esophageal reflux, Heart burn, Limitation of joint motion, Muscle weakness, Sleep apnea, Sleepiness, and Vertigo.     Past Surgical History:   Past Surgical History:   Procedure Laterality Date    ABSCESS DRAINAGE Left     BUTTOCK    APPENDECTOMY      CARPAL TUNNEL RELEASE Left 2018    CATARACT REMOVAL WITH IMPLANT Bilateral 10/24/2016     SECTION      CHOLECYSTECTOMY      COLONOSCOPY  03/25/2019    Dr. Derek Graf -normal poor prep    COLONOSCOPY N/A 3/25/2019    COLORECTAL CANCER SCREENING, NOT HIGH RISK performed by Randy Garcia MD at [facility] COLONOSCOPY N/A 4/10/2019    -diverticulosis,hemorrhoids,lipoma at ileocecal valve    HERNIA REPAIR Right     [facility]    HERNIA REPAIR  2006    HERNIA REPAIR WITH HYSTERECTOMY SURGERY    HYSTERECTOMY      LAPAROSCOPY         Social History:  reports that she quit smoking about 10 years ago. Her smoking use included cigarettes. She has a 10.00 pack-year smoking history. She has never used smokeless tobacco. She reports that she does not drink alcohol and does not use drugs. Family History: family history includes Diabetes in her father, mother, and sister; Heart Disease (age of onset: 36) in her mother; Heart Disease (age of onset: 48) in her father; High Cholesterol in her father and mother; Hypertension in her father and mother. Review of Systems:   General: Denies fever, chills, night sweats, weight loss, malaise, fatigue  HEENT: Denies sore throat, sinus problems, allergic rhinosinusitis  Card: Denies chest pain, palpitations, orthopnea/PND. Pulm: Denies cough, shortness of breath  GI: denies history of constipation, diarrhea, hematochezia or melena  : Denies polyuria, dysuria, hematuria  Endo: Denies thyroid problems, admits to history of DM  Heme: Denies anemia, h/o bleeding or clotting problems.    Neuro: Denies h/o CVA, TIA  Skin: Denies rashes, ulcers  Musculoskeletal: Denies muscle, joint pain    Allergies: Codeine, Meperidine, and Other    Current Meds:  Current Facility-Administered Medications:     heparin 25,000 units in dextrose 5% 250 mL (premix) infusion, 5-30 Units/kg/hr, Intravenous, Continuous, Roque Mao MD, Last Rate: 10 mL/hr at 08/04/21 2316, 9.84 Units/kg/hr at 08/04/21 2316    lactated ringers infusion, , Intravenous, Continuous, Roque Mao MD, Last Rate: 100 mL/hr at 08/04/21 2315, New Bag at 08/04/21 2315    Current Outpatient Medications:     hydrOXYzine (ATARAX) 25 MG tablet, Take 1 tablet by mouth nightly as needed for Anxiety, Disp: 90 tablet, Rfl: 0    insulin glargine (LANTUS SOLOSTAR) 100 UNIT/ML injection pen, Inject 54 Units into the skin nightly, Disp: 15 mL, Rfl: 5    atorvastatin (LIPITOR) 40 MG tablet, Take 1 tablet by mouth once daily (Patient taking differently: Take 40 mg by mouth daily ), Disp: 90 tablet, Rfl: 3    glipiZIDE (GLUCOTROL XL) 10 MG extended release tablet, Take 1 tablet by mouth once daily (Patient taking differently: Take 10 mg by mouth daily ), Disp: 90 tablet, Rfl: 3    lisinopril (PRINIVIL;ZESTRIL) 2.5 MG tablet, Take 1 tablet by mouth once daily (Patient taking differently: Take 2.5 mg by mouth daily Take 1 tablet by mouth once daily), Disp: 90 tablet, Rfl: 3    escitalopram (LEXAPRO) 20 MG tablet, Take 1 tablet by mouth once daily (Patient taking differently: Take 20 mg by mouth daily ), Disp: 90 tablet, Rfl: 3    VICTOZA 18 MG/3ML SOPN SC injection, INJECT 1.2 MG INTO SKIN DAILY (Patient taking differently: Inject 1.2 mg into the skin daily ), Disp: 6 mL, Rfl: 3    omeprazole (PRILOSEC) 40 MG delayed release capsule, Take 1 capsule by mouth once daily (Patient taking differently: Take 40 mg by mouth daily ), Disp: 90 capsule, Rfl: 3    HYDROcodone-acetaminophen (NORCO) 5-325 MG per tablet, Take 1 tablet by mouth 2 times daily as needed. , Disp: , Rfl:     fluticasone (FLONASE) 50 MCG/ACT nasal spray, 1 spray by Each Nostril route daily, Disp: 1 Bottle, Rfl: 0    gabapentin (NEURONTIN) 600 MG tablet, Take 900 mg by mouth 3 times daily.  Takes 1 1/2 tabs TID., Disp: , Rfl:     lidocaine-prilocaine (EMLA) 2.5-2.5 % cream, Apply topically as needed  (Patient not taking: Reported on 7/20/2021), Disp: , Rfl:     Vital Signs:  Vitals:    08/04/21 2250   BP: (!) 140/84   Pulse: 82   Resp: 14   Temp: 98.4 °F (36.9 °C)   SpO2: 100% Physical Exam:  Vital signs and Nurse's note reviewed  Gen:  A&Ox3, NAD  HEENT: PERRLA, EOMI, no scleral icterus, oral mucosa moist  Neck: Supple  Chest: Symmetric rise with inhalation, no evidence of trauma  CVS: Regular rate and rhythm, no murmurs, no rubs or gallops  Resp: Good bilateral air entry, clear to auscultation b/l, no wheeze or rhonchi  Abd: soft, non-tender, non-distended, bowel sounds present. Ext: No clubbing, cyanosis, edema, peripheral pulses 2+ Rad/Fem/DP/PT  CNS: Moves all extremities, no gross focal motor deficits  Skin: No erythema or ulcerations     Labs:   Lab Results   Component Value Date    WBC 9.5 08/04/2021    HGB 15.2 08/04/2021    HCT 47.0 08/04/2021    MCV 90.9 08/04/2021     08/04/2021     03/11/2012     Lab Results   Component Value Date     08/04/2021    K 4.8 08/04/2021     08/04/2021    CO2 24 08/04/2021    BUN 14 08/04/2021    CREATININE 0.60 08/04/2021    GLUCOSE 275 08/04/2021    GLUCOSE 351 03/11/2012    CALCIUM 9.3 08/04/2021     Lab Results   Component Value Date    INR 1.2 11/20/2017       Imaging: CT ABDOMEN PELVIS W IV CONTRAST Additional Contrast? None    Result Date: 8/4/2021  1. Possible flow limiting stenosis at the mid superior mesenteric artery related atherosclerosis, on this exam appearing about 50%, however this should be re-evaluated on CTA abdomen as follow-up for improved characterization. No GI changes are seen to suggest acute ischemia. 2. Small hiatal hernia. 3. Densely, peripherally calcified, 13 mm splenic artery aneurysm at the splenic hilum. Recommend annual CT surveillance. 4. Hepatic steatosis and hepatomegaly. 5. Remainder of the CT abdomen and pelvis appears unremarkable. 6. Status post cholecystectomy, hysterectomy and appendectomy. CTA CHEST ABDOMEN PELVIS W CONTRAST    Result Date: 8/4/2021  1. CTA CHEST: No acute abnormality. 2. 12 mm spiculated nodule superior segment left lower lobe.   Consider noncontrast chest CT in 3 months, CT guided percutaneous tissue sampling or whole-body PET-CT imaging as follow-up. 3. Coronary artery disease. 4. Calcific atherosclerosis aorta and its branches. No aneurysm/dissection or significant luminal narrowing evident. 5. Small hiatal hernia. 6. CTA ABDOMEN/PELVIS: No acute abnormality. 7. Confirmation of 50-60% luminal narrowing at the mid superior mesenteric artery level. 8. No abdominal aorta aneurysm or dissection. 9. Focal atherosclerotic plaque at the mid left common iliac artery results in focal dissection extending into the more anterior branch of the left internal iliac artery seen on axial series 2, image 197. No vessel occlusion or significant narrowing evident. 10. 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 11. 13 mm peripherally calcified splenic artery aneurysm at the splenic hilum. 12. Calcific atherosclerosis. 13. Hepatic steatosis and hepatomegaly. 14. Cholecystectomy.  RECOMMENDATIONS:           Impression:  Patient Active Problem List   Diagnosis    Dyslipidemia    Nuclear sclerotic cataract of left eye    Controlled type 2 diabetes mellitus with diabetic polyneuropathy, without long-term current use of insulin ([address]    Vitamin D deficiency    Obstructive sleep apnea    Sepsis due to pneumonia ([address]    Positive FIT (fecal immunochemical test)    Dysthymia    Dyspepsia    Bilateral leg and foot pain    Cervical radiculitis    Chronic left shoulder pain    Complex regional pain syndrome type 1 of left lower extremity    Cervicalgia    Diabetic autonomic neuropathy associated with type 2 diabetes mellitus ([address]    Encounter for long-term opiate analgesic use    Ocular hypertension of left eye    Regular astigmatism, bilateral    Vitreous floaters of both eyes    Cellulitis, abdominal wall    Sepsis due to abdominal wall cellulitis secondary to DM    Dizziness    Acute vertigo with vomiting and inability to stand    Bilateral shoulder pain    Morbid obesity (HCC)    Moderate nonproliferative diabetic retinopathy associated with type 2 diabetes mellitus ([facility] [facility]    Essential hypertension       3 50year old female with abdominal pain, CT showing SMA luminal narrowing, right internal iliac stenosis, and left internal iliac dissection. Recommendation:    1. Findings on CT do not correlate with physical exam findings, low suspicion for acute mesenteric ischemia. 2. Splenic aneurysm stable from previous scans, no intervention at this time  3. continue statin therapy  4. Recommend blood glucose <180  5. Continue IV fluids  6. Medical management per IM team  7. Vascular follow up OP as needed, no intervention    Debra Chun DO  8/5/2021    General Surgery Resident Statement/Note:  I have discussed the case, including pertinent history and exam findings with the above resident and have personally seen the patient.      Pt seen and examined at bedside. I performed both history and physical exam. Vitals stable. Patient reports pain somewhat improved. Abdomen soft, nondistended. Mild TTP in RUQ and right flank. Nonoperative management at this time. OP follow up. Vascular to sign off.     Elba Lesches, MD  General Surgery PGY4

## 2021-08-05 NOTE — ED NOTES
Assisted up to bedside commode and back to bed  Siderails up x2  Call light at side of bed     Solomon Crigler, RN  08/05/21 0041

## 2021-08-05 NOTE — CARE COORDINATION
Case Management Initial Discharge Plan  Jd,             Met with:patient to discuss discharge plans. Information verified: address, contacts, phone number, , insurance Yes  Insurance Provider: [facility]    Emergency Contact/Next of Kin name & number: Kellen Owens - [phone] Kasie Fitch dtr [address] [facility] [phone]   Who are involved in patient's support system? Above -    PCP: Randa Byrd, APRN - CNP  Date of last visit:       Discharge Planning    Living Arrangements:        Home has 0 stories-Apt   0 stairs to climb to get into front door, 0stairs to climb to reach second floor  Location of bedroom/bathroom in home main    Patient able to perform ADL's:Independent    Current Services (outpatient & in home) no  DME equipment: no  DME provider: no    Is patient receiving oral anticoagulation therapy? No    If indicated:   Physician managing anticoagulation treatment:   Where does patient obtain lab work for ATC treatment? Potential Assistance Needed:       Patient agreeable to home care: Yes  Freedom of choice provided:  no    Prior SNF/Rehab Placement and Facility: no  Agreeable to SNF/Rehab: No  [place] of choice provided: no     Evaluation: no    Expected Discharge date:       Patient expects to be discharged to: If home: is the family and/or caregiver wiling & able to provide support at home?    Who will be providing this support? Shan    Follow Up Appointment: Best Day/ Time:      Transportation provider: family  Transportation arrangements needed for discharge: No    Readmission Risk              Risk of Unplanned Readmission:  0             Does patient have a readmission risk score greater than 14?: No  If yes, follow-up appointment must be made within 7 days of discharge.      Goals of Care:       Educated pt on transitional options,did not  provide freedom of choice and is agreeable with plan      Discharge Plan: return to home independently has pcp and ride           Electronically signed by Alea Leslie RN on 8/5/21 at 10:35 AM EDT

## 2021-08-05 NOTE — ED PROVIDER NOTES
[facility] ED  Emergency Department Encounter  EmergencyMedicine Resident     Pt Name:Lacie Cárdenas  MRN: [id number]  [place] 1965  Date of evaluation: 21  PCP:  Tyrone Tristan       Chief Complaint   Patient presents with    Back Pain     Radiating to flank,     Circulatory Problem     CT shows stenosis to iliac artery and splenic aneurism       HISTORY OF PRESENT ILLNESS  (Location/Symptom, Timing/Onset, Context/Setting, Quality, Duration, Modifying Factors, Severity.)      Niyah Hicks is a 50 y.o. female who presents to the emergency department with transfer from [facility] ED for vascular surgery evaluation for superior mesenteric artery stenosis, right iliac artery stenosis, and left iliac artery focal dissection. Patient presented originally to [place] ED for a 1 day history of worsening back and flank pain with abdominal tenderness. Labs demonstrated lactate 2.9, normal lipase glucose 321. After IV hydration lactic acid decreased to 2.4 and CTA chest abdomen pelvis diagnostic of the above occlusions. Vascular surgery requested transfer to [facility] for further vascular surgery evaluation. On arrival patient states her pain has returned after a total of 6 mg of morphine given at the outlying facility and patient also states she is somewhat nauseous, states that Zofran previously helped with her nausea. Denies recent fever, chills, chest pain, shortness of breath, HEENT symptoms, problems with urination or bowel movements, or new onset numbness or tingling anywhere. PAST MEDICAL / SURGICAL / SOCIAL / FAMILY HISTORY      has a past medical history of Anxiety, Arthritis, Depression, Diabetic neuropathy ([facility] [address] Esophageal reflux, Heart burn, Limitation of joint motion, Muscle weakness, Sleep apnea, Sleepiness, and Vertigo. has a past surgical history that includes Hysterectomy;  section; Cholecystectomy; Appendectomy;  Cataract removal with implant (Bilateral, 10/24/2016); laparoscopy; hernia repair (Right); hernia repair (2006); Abscess Drainage (Left, 2014); Carpal tunnel release (Left, 04/2018); Colonoscopy (03/25/2019); Colonoscopy (N/A, 3/25/2019); and Colonoscopy (N/A, 4/10/2019). Social History     Socioeconomic History    Marital status: Legally      Spouse name: Not on file    Number of children: Not on file    Years of education: Not on file    Highest education level: Not on file   Occupational History    Not on file   Tobacco Use    Smoking status: Former Smoker     Packs/day: 1.00     Years: 10.00     Pack years: 10.00     Types: Cigarettes     Quit date: 5/30/2011     Years since quitting: 10.1    Smokeless tobacco: Never Used   Vaping Use    Vaping Use: Never used   Substance and Sexual Activity    Alcohol use: No    Drug use: No    Sexual activity: Not on file   Other Topics Concern    Not on file   Social History Narrative    Not on file     Social Determinants of Health     Financial Resource Strain: Low Risk     Difficulty of Paying Living Expenses: Not hard at all   Food Insecurity: No Food Insecurity    Worried About [address] in the Last Year: Never true    [facility] in the Last Year: Never true   Transportation Needs:     Lack of Transportation (Medical):      Lack of Transportation (Non-Medical):    Physical Activity:     Days of Exercise per Week:     Minutes of Exercise per Session:    Stress:     Feeling of Stress :    Social Connections:     Frequency of Communication with Friends and Family:     Frequency of Social Gatherings with Friends and Family:     Attends Gnosticism Services:     Active Member of Clubs or Organizations:     Attends Club or Organization Meetings:     Marital Status:    Intimate Partner Violence:     Fear of Current or Ex-Partner:     Emotionally Abused:     Physically Abused:     Sexually Abused:        Family History   Problem Relation Age of Onset    Diabetes Mother     High Cholesterol Mother     Hypertension Mother     Heart Disease Mother 36    Diabetes Father     Heart Disease Father 48    High Cholesterol Father     Hypertension Father     Diabetes Sister        Allergies:  Codeine, Meperidine, and Other    Home Medications:  Prior to Admission medications    Medication Sig Start Date End Date Taking? Authorizing Provider   hydrOXYzine (ATARAX) 25 MG tablet Take 1 tablet by mouth nightly as needed for Anxiety 7/20/21   DI Luu CNP   insulin glargine (LANTUS SOLOSTAR) 100 UNIT/ML injection pen Inject 54 Units into the skin nightly 7/20/21   Kailyn DI Hanson CNP   atorvastatin (LIPITOR) 40 MG tablet Take 1 tablet by mouth once daily  Patient taking differently: Take 40 mg by mouth daily  6/28/21   DI Luu CNP   glipiZIDE (GLUCOTROL XL) 10 MG extended release tablet Take 1 tablet by mouth once daily  Patient taking differently: Take 10 mg by mouth daily  4/15/21   DI Luu CNP   lisinopril (PRINIVIL;ZESTRIL) 2.5 MG tablet Take 1 tablet by mouth once daily  Patient taking differently: Take 2.5 mg by mouth daily Take 1 tablet by mouth once daily 4/15/21   Kailyn DI Hanson CNP   escitalopram (LEXAPRO) 20 MG tablet Take 1 tablet by mouth once daily  Patient taking differently: Take 20 mg by mouth daily  3/29/21   DI Luu CNP   VICTOZA 18 MG/3ML SOPN SC injection INJECT 1.2 MG INTO SKIN DAILY  Patient taking differently: Inject 1.2 mg into the skin daily  3/9/21   DI Luu CNP   omeprazole (PRILOSEC) 40 MG delayed release capsule Take 1 capsule by mouth once daily  Patient taking differently: Take 40 mg by mouth daily  10/13/20   Kailyn Hum DI Byrd CNP   HYDROcodone-acetaminophen (NORCO) 5-325 MG per tablet Take 1 tablet by mouth 2 times daily as needed.  9/8/20   Historical Provider, MD   fluticasone (FLONASE) 50 MCG/ACT nasal spray 1 spray by Each Nostril route daily 1/9/20   Bairon Lott, APRN - CNP   gabapentin (NEURONTIN) 600 MG tablet Take 900 mg by mouth 3 times daily. Takes 1 1/2 tabs TID. 1/17/19   Historical Provider, MD   lidocaine-prilocaine (EMLA) 2.5-2.5 % cream Apply topically as needed   Patient not taking: Reported on 7/20/2021 11/8/18   Historical Provider, MD       REVIEW OF SYSTEMS    (2-9 systems for level 4, 10 or more for level 5)      Review of Systems   Constitutional: Negative for chills and fever. HENT: Negative for ear pain, hearing loss and sore throat. Eyes: Negative for visual disturbance. Respiratory: Negative for shortness of breath. Cardiovascular: Negative for chest pain. Gastrointestinal: Positive for abdominal pain, nausea and vomiting. Negative for constipation and diarrhea. Genitourinary: Negative for difficulty urinating and dysuria. Musculoskeletal: Negative for arthralgias and myalgias. Neurological: Negative for weakness and numbness (other than chronic BLE). Psychiatric/Behavioral: Negative for agitation and confusion. PHYSICAL EXAM   (up to 7 for level 4, 8 or more for level 5)      INITIAL VITALS:   BP (!) 140/84   Pulse 82   Temp 98.4 °F (36.9 °C)   Resp 14   Wt 223 lb 15.8 oz (101.6 kg)   SpO2 100%   BMI 38.45 kg/m²     Physical Exam  Vitals and nursing note reviewed. Constitutional:       General: She is not in acute distress. Appearance: Normal appearance. She is well-developed. She is ill-appearing. She is not diaphoretic. HENT:      Head: Normocephalic and atraumatic. Right Ear: External ear normal.      Left Ear: External ear normal.      Nose: Nose normal.      Mouth/Throat:      Mouth: Mucous membranes are moist.   Eyes:      Extraocular Movements: Extraocular movements intact. Conjunctiva/sclera: Conjunctivae normal.   Neck:      Trachea: No tracheal deviation. Cardiovascular:      Rate and Rhythm: Normal rate and regular rhythm.       Heart sounds: vital signs demonstrate blood pressure 140/84 and heart rate 82 bpm and examination demonstrates an ill-appearing woman of stated age whose exam and findings appear consistent with intra-abdominal pathology, including pancreatitis or mesenteric occlusive disease. Patient transferred here for vascular surgery evaluation and they have been consulted. Plan for continuation of heparin, pain management, admission. RADIOLOGY:  CT ABDOMEN PELVIS W IV CONTRAST Additional Contrast? None    Result Date: 8/4/2021  EXAMINATION: CT OF THE ABDOMEN AND PELVIS WITH CONTRAST 8/4/2021 2:05 pm TECHNIQUE: CT of the abdomen and pelvis was performed with the administration of intravenous contrast. Multiplanar reformatted images are provided for review. Dose modulation, iterative reconstruction, and/or weight based adjustment of the mA/kV was utilized to reduce the radiation dose to as low as reasonably achievable. COMPARISON: CT abdomen and pelvis 09/13/2019 and 03/31/2016 HISTORY: ORDERING SYSTEM PROVIDED HISTORY: mid abdominal pain, h/o pancreatitis TECHNOLOGIST PROVIDED HISTORY: mid abdominal pain, h/o pancreatitis Decision Support Exception - unselect if not a suspected or confirmed emergency medical condition->Emergency Medical Condition (MA) FINDINGS: Lower Chest: Visualized portions of the lungs are clear. Small hiatal hernia is noted. Cardiac and posterior mediastinal structures visualized are otherwise unremarkable. Organs: Hypoattenuation throughout the liver reflects hepatic steatosis. Craniocaudal liver length about 19 cm. No discrete hepatic lesion or intrahepatic bile duct dilatation is seen. The gallbladder is absent following cholecystectomy. Dystrophic vascular calcifications are demonstrated mid pole region left kidney. The kidneys, spleen, adrenal glands and pancreas appear unremarkable. GI/Bowel: The small bowel and stomach appear unremarkable. No diffuse or focal bowel wall thickening evident.  No inflammatory changes evident. No obstruction is seen. The appendix is absent following appendectomy. Pelvis: Partially filled urinary bladder appears unremarkable. No pelvic adenopathy or free fluid is seen. Vascular calcifications are noted reflecting calcific atherosclerosis. Status post hysterectomy. Peritoneum/Retroperitoneum: Densely, peripherally calcified, 13 mm splenic artery aneurysm at the splenic hilum axial series 2, image 51. Calcific atherosclerotic disease aorta. No aneurysm. Prominent atherosclerosis involving portions of the mid right superior mesenteric artery with focal area of possible significant stenosis, about 50%. Unremarkable appearance of the IVC. No adenopathy or fluid. No peripancreatic fluid or inflammatory changes are evident. Bones/Soft Tissues: No acute superficial soft tissue or osseous structure abnormality evident. 1. Possible flow limiting stenosis at the mid superior mesenteric artery related atherosclerosis, on this exam appearing about 50%, however this should be re-evaluated on CTA abdomen as follow-up for improved characterization. No GI changes are seen to suggest acute ischemia. 2. Small hiatal hernia. 3. Densely, peripherally calcified, 13 mm splenic artery aneurysm at the splenic hilum. Recommend annual CT surveillance. 4. Hepatic steatosis and hepatomegaly. 5. Remainder of the CT abdomen and pelvis appears unremarkable. 6. Status post cholecystectomy, hysterectomy and appendectomy. CTA CHEST ABDOMEN PELVIS W CONTRAST    Result Date: 8/4/2021  EXAMINATION: CTA OF THE CHEST, ABDOMEN AND PELVIS WITH CONTRAST 8/4/2021 4:32 pm: TECHNIQUE: CTA of the chest, abdomen and pelvis was performed after the administration of intravenous contrast.  Multiplanar reformatted images are provided for review. MIP images are provided for review.  Dose modulation, iterative reconstruction, and/or weight based adjustment of the mA/kV was utilized to reduce the radiation dose to as low as reasonably achievable. COMPARISON: CT abdomen and pelvis earlier today. HISTORY: ORDERING SYSTEM PROVIDED HISTORY: Back pain abdominal pain further evaluation of mesenteric stenosis TECHNOLOGIST PROVIDED HISTORY: Back pain abdominal pain further evaluation of mesenteric stenosis Decision Support Exception - unselect if not a suspected or confirmed emergency medical condition->Emergency Medical Condition (MA) FINDINGS: CTA CHEST: Thoracic aorta: No evidence of thoracic aortic aneurysm or dissection. No acute abnormality of the aorta. Normal aortic arch branching with atherosclerotic plaques no aneurysm or dissection. No significant luminal narrowing is seen. Ascending thoracic aorta 3.1 cm and descending thoracic aorta 2.4 cm. Calcific atherosclerosis coronary arteries. Pulmonary arteries: Unremarkable appearance. Main pulmonary artery 2.7 cm. Mediastinum: No evidence of mediastinal lymphadenopathy. The heart and pericardium demonstrate no acute abnormality. Small hiatal hernia. Lungs/Pleura: The lungs are without acute process. Spiculated 12 mm mean diameter pulmonary nodule superior segment left lower lobe axial series 3, image 67. No focal consolidation or pulmonary edema. No evidence of pleural effusion or pneumothorax. Soft Tissues/Bones: No acute bone or soft tissue abnormality. CTA ABDOMEN/PELVIS: Abdominal aorta/Branches: Infrarenal abdominal aorta is 1.8 cm. Mild calcific atherosclerosis throughout the abdominal aorta. No abdominal aorta aneurysm or dissection. Mild atherosclerotic plaque involving branch vessels aorta with unremarkable appearance of the celiac axis, 2 right and single left renal arteries and inferior mesenteric artery. Just beyond 1st posterior right-sided branch off of the superior mesenteric artery is a prominent atherosclerotic plaque, well seen axial series 2, images 133 and 134, resulting in 50-60% luminal narrowing.   Distal abdominal aorta tapers normally with unremarkable appearance of the bilateral common and external iliac arteries. There is 70-80% stenosis proximal right internal iliac artery axial series 2, image 177. Focal atherosclerotic plaque at the mid left common iliac artery results in focal dissection extending into the more anterior branch of the left internal iliac artery seen on axial series 2, image 197. No vessel occlusion or significant narrowing evident. Visualized portions of the bilateral femoral artery branches appear unremarkable. 13 mm peripherally calcified splenic artery aneurysm at the splenic hilum. Organs: Hypoattenuation throughout the liver reflects hepatic steatosis. No discrete hepatic lesion or intrahepatic bile duct dilatation is seen. Craniocaudal liver length 18 cm. The gallbladder is absent following cholecystectomy. The kidneys, spleen, adrenal glands and pancreas appear unremarkable. GI/Bowel: The small bowel and stomach appear unremarkable. No diffuse or focal bowel wall thickening evident. No inflammatory changes evident. No obstruction is seen. The appendix is absent following appendectomy. Pelvis: Partially filled urinary bladder appears unremarkable. No pelvic adenopathy or free fluid is seen. Vascular calcifications are noted reflecting calcific atherosclerosis. Status post hysterectomy. Peritoneum/Retroperitoneum: No pneumoperitoneum. Unremarkable appearance of the IVC. No adenopathy or fluid. Bones/Soft Tissues: No acute superficial soft tissue or osseous structure abnormality evident. CTA 3D images: 3D IMAGES:  3D reconstructed images were performed on a separate workstation and provided for review. These images were reviewed. 1.  CTA CHEST: No acute abnormality. 2. 12 mm spiculated nodule superior segment left lower lobe. Consider noncontrast chest CT in 3 months, CT guided percutaneous tissue sampling or whole-body PET-CT imaging as follow-up. 3. Coronary artery disease.  4. Calcific atherosclerosis aorta and its branches. No aneurysm/dissection or significant luminal narrowing evident. 5. Small hiatal hernia. 6. CTA ABDOMEN/PELVIS: No acute abnormality. 7. Confirmation of 50-60% luminal narrowing at the mid superior mesenteric artery level. 8. No abdominal aorta aneurysm or dissection. 9. Focal atherosclerotic plaque at the mid left common iliac artery results in focal dissection extending into the more anterior branch of the left internal iliac artery seen on axial series 2, image 197. No vessel occlusion or significant narrowing evident. 10. 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 11. 13 mm peripherally calcified splenic artery aneurysm at the splenic hilum. 12. Calcific atherosclerosis. 13. Hepatic steatosis and hepatomegaly. 14. Cholecystectomy. RECOMMENDATIONS:       EKG  None    All EKG's are interpreted by the Emergency Department Physician who either signs or co-signs this chart in the absence of a cardiologist.    EMERGENCY DEPARTMENT COURSE:  ED Course as of Aug 05 0317   Thu Aug 05, 2021   0214 For the observation resident this is a 68-year-old female with history of recurrent episodes of pancreatitis who presented to [facility] with epigastric abdominal pain radiating to the back which she stated was typical for her prior episodes of pancreatitis. Lipase was normal at the time and imaging demonstrated multiple concerning areas of partial occlusion of the arteries of the abdomen. Vascular surgery evaluated here at [facility] and deemed these partial occlusions as noncontributory to her pain, advised that she could be taken off of the heparin drip. At this point she is admitted to the observation unit for repeat morning labs, slow advancement of diet, pain control, nausea control, and right upper quadrant ultrasound if labs are nondiagnostic.     [TS]      ED Course User Index  [TS] Rah Alex MD       PROCEDURES:  None    CONSULTS:  IP CONSULT TO VASCULAR SURGERY    CRITICAL CARE:  Please see attending note. FINAL IMPRESSION      1. Superior mesenteric artery stenosis ([address]    2. Right iliac artery stenosis ([address]          DISPOSITION / PLAN     DISPOSITION Admitted 08/05/2021 02:14:12 AM      PATIENT REFERRED TO:  No follow-up provider specified. DISCHARGE MEDICATIONS:  New Prescriptions    No medications on file       Dominic Garg MD  Emergency Medicine Resident    This patient was evaluated in the Emergency Department for symptoms described in the history of present illness. He/she was evaluated in the context of the global COVID-19 pandemic, which necessitated consideration that the patient might be at risk for infection with the SARS-CoV-2 virus that causes COVID-19. Institutional protocols and algorithms that pertain to the evaluation of patients at risk for COVID-19 are in a state of rapid change based on information released by regulatory bodies including the CDC and federal and state organizations. These policies and algorithms were followed during the patient's care in the ED.     (Please note that portions of thisnote were completed with a voice recognition program.  Efforts were made to edit the dictations but occasionally words are mis-transcribed.)       Dominic Garg MD  Resident  08/05/21 4054

## 2021-08-05 NOTE — ED NOTES
[facility]   Patient has     Kimberly Zhu RN  08/04/21 1765

## 2021-08-16 ENCOUNTER — TELEPHONE (OUTPATIENT)
Dept: PRIMARY CARE CLINIC | Age: 56
End: 2021-08-16

## 2021-08-16 NOTE — TELEPHONE ENCOUNTER
----- Message from Renate Brown sent at 8/16/2021  2:10 PM EDT -----  Subject: Appointment Request    Reason for Call: Urgent (Patient Request) Hospital Follow Up    QUESTIONS  Type of Appointment? Established Patient  Reason for appointment request? No appointments available during search  Additional Information for Provider? Pt went to urgent care almost two   weeks ago and transferred to [facility]. At urgent care they found   spots in her lungs and she needs a referral for that. She also needs a   follow up and would like to be seen earlier than the end of September. Please call and schedule  ---------------------------------------------------------------------------  --------------  CALL BACK INFO  What is the best way for the office to contact you? OK to leave message on   voicemail  Preferred Call Back Phone Number? [phone]  ---------------------------------------------------------------------------  --------------  SCRIPT ANSWERS  Relationship to Patient? Self  (Patient requests to see provider urgently. )? Yes  (Has the patient been discharged from the hospital within 2 business days   AND does not have a Telephone Encounter  Follow Up From [address] [place] [address]   documented in [address]?)? Yes  Do you have any questions for your primary care provider that need to be   answered prior to your appointment? (Use RN Triage if question pertains to   anything on the red flag list)? No  (Patient needs follow up visit after hospital discharge) Book first   available appointment within 7 days OF DISCHARGE, if no appt, proceed to   book the next available time slot within 14 days OF DISCHARGE AND Send   Message to Provider. [address] [facility] Follow Up appointment cannot be booked   beyond 14 Days and should result in a Message to Provider. ? Yes   Have you been diagnosed with, awaiting test results for, or told that you   are suspected of having COVID-19 (Coronavirus)?  (If patient has tested   negative or was tested as a

## 2021-08-17 ENCOUNTER — TELEPHONE (OUTPATIENT)
Dept: PRIMARY CARE CLINIC | Age: 56
End: 2021-08-17

## 2021-08-18 ENCOUNTER — OFFICE VISIT (OUTPATIENT)
Dept: PRIMARY CARE CLINIC | Age: 56
End: 2021-08-18
Payer: COMMERCIAL

## 2021-08-18 VITALS
HEART RATE: 72 BPM | TEMPERATURE: 97.8 F | BODY MASS INDEX: 38.11 KG/M2 | WEIGHT: 222 LBS | SYSTOLIC BLOOD PRESSURE: 130 MMHG | OXYGEN SATURATION: 97 % | RESPIRATION RATE: 22 BRPM | DIASTOLIC BLOOD PRESSURE: 74 MMHG

## 2021-08-18 DIAGNOSIS — F34.1 DYSTHYMIA: ICD-10-CM

## 2021-08-18 DIAGNOSIS — R91.8 MASS OF LOWER LOBE OF LEFT LUNG: Primary | ICD-10-CM

## 2021-08-18 DIAGNOSIS — F33.1 MODERATE EPISODE OF RECURRENT MAJOR DEPRESSIVE DISORDER (HCC): ICD-10-CM

## 2021-08-18 PROCEDURE — G8417 CALC BMI ABV UP PARAM F/U: HCPCS | Performed by: NURSE PRACTITIONER

## 2021-08-18 PROCEDURE — 99214 OFFICE O/P EST MOD 30 MIN: CPT | Performed by: NURSE PRACTITIONER

## 2021-08-18 PROCEDURE — G8427 DOCREV CUR MEDS BY ELIG CLIN: HCPCS | Performed by: NURSE PRACTITIONER

## 2021-08-18 PROCEDURE — 3017F COLORECTAL CA SCREEN DOC REV: CPT | Performed by: NURSE PRACTITIONER

## 2021-08-18 PROCEDURE — 1036F TOBACCO NON-USER: CPT | Performed by: NURSE PRACTITIONER

## 2021-08-18 RX ORDER — ARIPIPRAZOLE 5 MG/1
5 TABLET ORAL DAILY
Qty: 30 TABLET | Refills: 0 | Status: SHIPPED | OUTPATIENT
Start: 2021-08-18 | End: 2021-09-21 | Stop reason: CLARIF

## 2021-08-18 ASSESSMENT — ENCOUNTER SYMPTOMS
NAUSEA: 0
ABDOMINAL PAIN: 0
VOMITING: 0
DIARRHEA: 0
WHEEZING: 0
SHORTNESS OF BREATH: 0
VISUAL CHANGE: 0
CONSTIPATION: 0
COUGH: 0
RHINORRHEA: 0
SORE THROAT: 0

## 2021-08-18 NOTE — LETTER
[facility]  [address]  [place] [address]  Phone: [phone]  Fax: [phone] [facility], APRN - CNP         August 18, 2021     Patient: Bernadette Del Rio   YOB: 1965   Date of Visit: 8/18/2021       To Whom It May Concern: It is my medical opinion that Charisma Goldberg requires a disability parking placard for the following reasons:  She cannot walk 200 feet without stopping to rest.  Duration of need: 5 years    If you have any questions or concerns, please don't hesitate to call.     Sincerely,        Abner Byrd, APRN - CNP

## 2021-08-18 NOTE — PROGRESS NOTES
Name: Wil Thibodeaux  : 1965         Chief Complaint:     Chief Complaint   Patient presents with    Follow-up     was in [facility] 8/10/2021, CT scan found nodule on left lower lobe, need referral for pulmonologist    Anxiety     would like Lexapro increased due to health issues       History of Present Illness:      Wil Thibodeaux is a 49 y.o.  female who presents with Follow-up (was in [facility] 8/10/2021, CT scan found nodule on left lower lobe, need referral for pulmonologist) and Anxiety (would like Lexapro increased due to health issues)      Cortez Headley is here today for a hospital follow up visit. Lung mass- found incidentally on CTA of chest due to abdominal pain and elevated lactic acid. Needs follow up with pulmonology and PET CT and/or biopsy. Spiculated nodule. Dysthymia- worsening, would like to increase dose of Lexapro. See below for further comment. Mental Health Problem  The primary symptoms include dysphoric mood. The primary symptoms do not include delusions, hallucinations, bizarre behavior, disorganized speech, negative symptoms or somatic symptoms. The current episode started more than 1 month ago. This is a chronic problem. The onset of the illness is precipitated by emotional stress. The degree of incapacity that she is experiencing as a consequence of her illness is moderate. Sequelae of the illness include harmed interpersonal relations. Additional symptoms of the illness include anhedonia, hypersomnia, appetite change, fatigue, attention impairment and distractible. Additional symptoms of the illness do not include insomnia, unexpected weight change, agitation, psychomotor retardation, feelings of worthlessness, euphoric mood, increased goal-directed activity, flight of ideas, inflated self-esteem, decreased need for sleep, poor judgment, visual change, headaches, abdominal pain or seizures. She does not admit to suicidal ideas. She does not have a plan to attempt suicide.  She does not contemplate harming herself. She has not already injured self. She does not contemplate injuring another person. She has not already  injured another person. Risk factors that are present for mental illness include a history of mental illness. Past Medical History:     Past Medical History:   Diagnosis Date    Anxiety     Arthritis     Depression     Diabetic neuropathy (HCC)     Esophageal reflux     Heart burn     Limitation of joint motion     Muscle weakness     Sleep apnea     DOES NOT USE A MACHINE. DIAGNOSED > 15 YEARS AGO WITH SLEEP APNEA    Sleepiness     Vertigo       Reviewed all health maintenance requirements and ordered appropriate tests  Health Maintenance Due   Topic Date Due    Diabetic microalbuminuria test  2021    Lipid screen  2021       Past Surgical History:     Past Surgical History:   Procedure Laterality Date    ABSCESS DRAINAGE Left 2014    BUTTOCK    APPENDECTOMY      CARPAL TUNNEL RELEASE Left 2018    CATARACT REMOVAL WITH IMPLANT Bilateral 10/24/2016     SECTION      CHOLECYSTECTOMY      COLONOSCOPY  2019    Dr. Jose Stout -normal poor prep    COLONOSCOPY N/A 3/25/2019    COLORECTAL CANCER SCREENING, NOT HIGH RISK performed by Radha Gomez MD at [address] [place] [address] COLONOSCOPY N/A 4/10/2019    -diverticulosis,hemorrhoids,lipoma at ileocecal valve    HERNIA REPAIR Right     [facility]    HERNIA REPAIR  2006    HERNIA REPAIR WITH HYSTERECTOMY SURGERY    HYSTERECTOMY      LAPAROSCOPY          Medications:       Prior to Admission medications    Medication Sig Start Date End Date Taking?  Authorizing Provider   ARIPiprazole (ABILIFY) 5 MG tablet Take 1 tablet by mouth daily 21  Yes Bharatin Blush Might, APRN - CNP   hydrOXYzine (ATARAX) 25 MG tablet Take 1 tablet by mouth nightly as needed for Anxiety 21  Yes Sharolyn Blush Might, APRN - CNP   insulin glargine (LANTUS SOLOSTAR) 100 UNIT/ML injection pen Inject 54 Units into the skin nightly 7/20/21  Yes DI Pringle CNP   atorvastatin (LIPITOR) 40 MG tablet Take 1 tablet by mouth once daily  Patient taking differently: Take 40 mg by mouth daily  6/28/21  Yes DI Pringle CNP   glipiZIDE (GLUCOTROL XL) 10 MG extended release tablet Take 1 tablet by mouth once daily  Patient taking differently: Take 10 mg by mouth daily  4/15/21  Yes DI Pringle CNP   lisinopril (PRINIVIL;ZESTRIL) 2.5 MG tablet Take 1 tablet by mouth once daily  Patient taking differently: Take 2.5 mg by mouth daily Take 1 tablet by mouth once daily 4/15/21  Yes DI Pringle CNP   escitalopram (LEXAPRO) 20 MG tablet Take 1 tablet by mouth once daily  Patient taking differently: Take 20 mg by mouth daily  3/29/21  Yes DI Pringle CNP   VICTOZA 18 MG/3ML SOPN SC injection INJECT 1.2 MG INTO SKIN DAILY  Patient taking differently: Inject 1.2 mg into the skin daily  3/9/21  Yes DI Pringle CNP   omeprazole (PRILOSEC) 40 MG delayed release capsule Take 1 capsule by mouth once daily  Patient taking differently: Take 40 mg by mouth daily  10/13/20  Yes DI Pringle CNP   HYDROcodone-acetaminophen (NORCO) 5-325 MG per tablet Take 1 tablet by mouth 2 times daily as needed. 9/8/20  Yes Historical Provider, MD   gabapentin (NEURONTIN) 600 MG tablet Take 900 mg by mouth 3 times daily.  Takes 1 1/2 tabs TID. 1/17/19  Yes Historical Provider, MD   ondansetron (ZOFRAN-ODT) 4 MG disintegrating tablet Take 1 tablet by mouth 3 times daily as needed for Nausea or Vomiting  Patient not taking: Reported on 8/18/2021 8/5/21   DI Kapoor CNP   fluticasone (FLONASE) 50 MCG/ACT nasal spray 1 spray by Each Nostril route daily  Patient not taking: Reported on 8/18/2021 1/9/20   DI Hancock CNP   lidocaine-prilocaine (EMLA) 2.5-2.5 % cream Apply topically as needed   Patient not taking: Reported on 7/20/2021 11/8/18   Historical Provider, MD        Allergies: Codeine, Meperidine, and Other    Social History:     Tobacco:    reports that she quit smoking about 10 years ago. Her smoking use included cigarettes. She has a 10.00 pack-year smoking history. She has never used smokeless tobacco.  Alcohol:      reports no history of alcohol use. Drug Use:  reports no history of drug use. Family History:     Family History   Problem Relation Age of Onset    Diabetes Mother     High Cholesterol Mother     Hypertension Mother     Heart Disease Mother [address]    Diabetes Father     Heart Disease Father 48    High Cholesterol Father     Hypertension Father     Diabetes Sister        Review of Systems:     Positive and Negative as described in HPI    Review of Systems   Constitutional: Positive for appetite change and fatigue. Negative for chills, fever and unexpected weight change. HENT: Negative for congestion, rhinorrhea and sore throat. Eyes: Negative for visual disturbance. Respiratory: Negative for cough, shortness of breath and wheezing. Cardiovascular: Negative for chest pain and palpitations. Gastrointestinal: Negative for abdominal pain, constipation, diarrhea, nausea and vomiting. Genitourinary: Negative for difficulty urinating and dysuria. Musculoskeletal: Negative for gait problem, neck pain and neck stiffness. Skin: Negative for rash. Neurological: Negative for dizziness, seizures, syncope, light-headedness and headaches. Psychiatric/Behavioral: Positive for decreased concentration, dysphoric mood and sleep disturbance. Negative for agitation, behavioral problems, confusion, hallucinations, self-injury and suicidal ideas. The patient is nervous/anxious. The patient does not have insomnia and is not hyperactive. Physical Exam:   Vitals:  /74   Pulse 72   Temp 97.8 °F (36.6 °C) (Temporal)   Resp 22   Wt 222 lb (100.7 kg)   SpO2 97%   BMI 38.11 kg/m²     Physical Exam  Vitals and nursing note reviewed.    Constitutional: RBC 4.25 08/05/2021    RBC 5.34 03/11/2012    HGB 12.5 08/05/2021    HCT 39.1 08/05/2021    MCV 92.0 08/05/2021    MCH 29.4 08/05/2021    MCHC 32.0 08/05/2021    RDW 13.0 08/05/2021     08/05/2021     03/11/2012    MPV 10.6 08/05/2021     Lab Results   Component Value Date    TSH 0.88 02/06/2013     Lab Results   Component Value Date    CHOL 160 08/24/2020    HDL 27 08/24/2020    LABA1C 8.8 07/20/2021       Assessment/Plan:      Diagnosis Orders   1. Mass of lower lobe of left lung   Genaro Brumfield DO, Pulmonology, [place]   2. Dysthymia  ARIPiprazole (ABILIFY) 5 MG tablet   3. Moderate episode of recurrent major depressive disorder [facility])       PET CT and pulmonology ordered. Start aripiprazole 5 mg daily, will follow in 3 to 4 weeks. Call sooner if nay problems. 1.  Lacie received counseling on the following healthy behaviors: nutrition, exercise and medication adherence  2. Patient given educational materials - see patient instructions  3. Was a self-tracking handout given in paper form or via [employer]? No  If yes, see orders or list here. 4.  Discussed use, benefit, and side effects of prescribed medications. Barriers to medication compliance addressed. All patient questions answered. Pt voiced understanding. 5.  Reviewed prior labs and health maintenance  6. Continue current medications, diet and exercise. Completed Refills   Requested Prescriptions     Signed Prescriptions Disp Refills    ARIPiprazole (ABILIFY) 5 MG tablet 30 tablet 0     Sig: Take 1 tablet by mouth daily         Return in about 4 weeks (around 9/15/2021) for Recheck.

## 2021-08-31 ENCOUNTER — TELEPHONE (OUTPATIENT)
Dept: PRIMARY CARE CLINIC | Age: 56
End: 2021-08-31

## 2021-08-31 DIAGNOSIS — R91.8 MASS OF LOWER LOBE OF LEFT LUNG: Primary | ICD-10-CM

## 2021-08-31 NOTE — TELEPHONE ENCOUNTER
At this point we will have her keep follow-up with Dr. Addie Powell next Thursday. It is very important she keep that appointment. Thank you.

## 2021-08-31 NOTE — TELEPHONE ENCOUNTER
Call from [facility]. Flor Srivastava was scheduled tomorrow for a PET CT Scan (ordered by you). [id number]  [employer] has denied the authorization. Paperwork in your bin. Please advise how to proceed. Thanks!

## 2021-09-01 ENCOUNTER — OFFICE VISIT (OUTPATIENT)
Dept: VASCULAR SURGERY | Age: 56
End: 2021-09-01
Payer: COMMERCIAL

## 2021-09-01 VITALS
BODY MASS INDEX: 38.19 KG/M2 | HEIGHT: 64 IN | HEART RATE: 82 BPM | RESPIRATION RATE: 16 BRPM | WEIGHT: 223.7 LBS | SYSTOLIC BLOOD PRESSURE: 117 MMHG | DIASTOLIC BLOOD PRESSURE: 54 MMHG | TEMPERATURE: 98.1 F

## 2021-09-01 DIAGNOSIS — K55.1 SUPERIOR MESENTERIC ARTERY STENOSIS (HCC): ICD-10-CM

## 2021-09-01 DIAGNOSIS — I77.1 ILIAC ARTERY STENOSIS, LEFT (HCC): ICD-10-CM

## 2021-09-01 PROCEDURE — G8427 DOCREV CUR MEDS BY ELIG CLIN: HCPCS | Performed by: SURGERY

## 2021-09-01 PROCEDURE — 3017F COLORECTAL CA SCREEN DOC REV: CPT | Performed by: SURGERY

## 2021-09-01 PROCEDURE — 1036F TOBACCO NON-USER: CPT | Performed by: SURGERY

## 2021-09-01 PROCEDURE — G8417 CALC BMI ABV UP PARAM F/U: HCPCS | Performed by: SURGERY

## 2021-09-01 PROCEDURE — 99213 OFFICE O/P EST LOW 20 MIN: CPT | Performed by: SURGERY

## 2021-09-01 NOTE — PATIENT INSTRUCTIONS
SURVEY:    You may be receiving a survey from [employer] regarding your visit today. Please complete the survey to enable us to provide the highest quality of care to you and your family. If you cannot score us a very good on any question, please call the office to discuss how we could have made your experience a very good one.     Thank you.  s

## 2021-09-01 NOTE — PROGRESS NOTES
J[facility] [address] & [place]  [address]  [facility] [address]  [facility]  1965  49 y. o.female       Chief Complaint:  Chief Complaint   Patient presents with    New Patient     was seen at [address] . .. abdominal blockage in the artery , reports having abd. pain       History of present Illness:  Pt is here today for evaluation and treatment of several CT findings from her recent hospitalization. This is a 21-year-old female who was recently admitted to [facility]. [facility] for acute abdominal pain. She got a CT of the chest abdomen and pelvis with several vascular findings that were highlighted. I discussed all these with the patient and her  and reassured her that she does not need any further vascular follow-up or intervention. I have listed them here because there were multiple findings. 1.  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 She has atherosclerotic plaque throughout her body and this is not an uncommon finding. There is no signs of mesenteric ischemia and it is at a level that cannot be stented. Again this is not the cause of her abdominal pain    2. Focal atherosclerotic plaque in the common iliac artery extending into the internal iliac. The internal iliac arteries do not directly supply of the lower extremities. This is rarely mentioned in any CT scans and I reviewed the images there is really no reason for concern whatsoever. Her clinical symptoms which she says she sometimes gets buttocks claudication is more prominent on the left when there is much higher degree of stenosis on the right. No vascular intervention is available to treat the internal iliacs. There is also a robust blood supply and duplication of blood supply so this should not be an issue. 3.  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 This is a very small aneurysm and is chronic.   She has known about this for many years. Her aneurysm is relatively small at 1.3 cm and typically they are not a concern until they become at least 2 to 3 cm in size. Past Medical History:  has a past medical history of Anxiety, Arthritis, Depression, Diabetic neuropathy ([address] Esophageal reflux, Heart burn, Limitation of joint motion, Muscle weakness, Sleep apnea, Sleepiness, and Vertigo. Past Surgical History:   Past Surgical History:   Procedure Laterality Date    ABSCESS DRAINAGE Left     BUTTOCK    APPENDECTOMY      CARPAL TUNNEL RELEASE Left 2018    CATARACT REMOVAL WITH IMPLANT Bilateral 10/24/2016     SECTION      CHOLECYSTECTOMY      COLONOSCOPY  2019    Dr. Delfina Mahoney -normal poor prep    COLONOSCOPY N/A 3/25/2019    COLORECTAL CANCER SCREENING, NOT HIGH RISK performed by Dorita Turner MD at [address] [place] [address] COLONOSCOPY N/A 4/10/2019    -diverticulosis,hemorrhoids,lipoma at ileocecal valve    HERNIA REPAIR Right     [facility]    HERNIA REPAIR  2006    HERNIA REPAIR WITH HYSTERECTOMY SURGERY    HYSTERECTOMY      LAPAROSCOPY         Social History:  reports that she quit smoking about 10 years ago. Her smoking use included cigarettes. She has a 10.00 pack-year smoking history. She has never used smokeless tobacco. She reports that she does not drink alcohol and does not use drugs. Family History: family history includes Diabetes in her father, mother, and sister; Heart Disease (age of onset: 36) in her mother; Heart Disease (age of onset: 48) in her father; High Cholesterol in her father and mother; Hypertension in her father and mother.     Review of Systems:   Constitutional:  negative for  fevers, chills, sweats, fatigue, and weight loss  HEENT:  negative for vision or hearing changes,   Respiratory:  negative for shortness of breath, cough, or congestion  Cardiovascular:  negative for  chest pain, palpitations  Gastrointestinal:  negative for nausea, vomiting, diarrhea, Takes 1 1/2 tabs TID., Disp: , Rfl:     ondansetron (ZOFRAN-ODT) 4 MG disintegrating tablet, Take 1 tablet by mouth 3 times daily as needed for Nausea or Vomiting (Patient not taking: Reported on 9/1/2021), Disp: 12 tablet, Rfl: 0    fluticasone (FLONASE) 50 MCG/ACT nasal spray, 1 spray by Each Nostril route daily (Patient not taking: Reported on 9/1/2021), Disp: 1 Bottle, Rfl: 0    lidocaine-prilocaine (EMLA) 2.5-2.5 % cream, Apply topically as needed  (Patient not taking: Reported on 7/20/2021), Disp: , Rfl:     Vital Signs:  Vitals:    09/01/21 0926   BP: (!) 117/54   Pulse: 82   Resp: 16   Temp: 98.1 °F (36.7 °C)       Physical Exam:  General appearance - alert, well appearing and in no acute distress  Mental status - oriented to person, place and time with normal affect  Head - normocephalic and atraumatic  Eyes - pupils equal and reactive, extraocular eye movements intact, conjunctiva clear  Ears - hearing appears to be intact  Nose - no drainage noted  Mouth - mucous membranes moist  Neck - supple, no carotid bruits, thyroid not palpable, no JVD  Chest - clear to auscultation, normal effort  Heart - normal rate, regular rhythm, no murmurs  Abdomen - soft, non-tender, non-distended, bowel sounds present all four quadrants, no masses, hepatomegaly, splenomegaly or aortic enlargement  Neurological - normal speech, no focal findings or movement disorder noted, cranial nerves II through XII grossly intact  Extremities - peripheral pulses palpable, no pedal edema or calf pain with palpation  Skin - no gross lesions, rashes, or induration noted    Labs:   Lab Results   Component Value Date    WBC 10.1 08/05/2021    HGB 12.5 08/05/2021    HCT 39.1 08/05/2021    MCV 92.0 08/05/2021     08/05/2021     03/11/2012     Lab Results   Component Value Date     08/05/2021    K 4.7 08/05/2021     08/05/2021    CO2 22 08/05/2021    BUN 13 08/05/2021    CREATININE 0.62 08/05/2021    GLUCOSE 189 08/05/2021    GLUCOSE 351 03/11/2012    CALCIUM 9.1 08/05/2021     Lab Results   Component Value Date    ALKPHOS 142 08/05/2021    ALT 12 08/05/2021    AST 14 08/05/2021    PROT 6.2 08/05/2021    BILITOT 0.35 08/05/2021    LABALBU 3.6 08/05/2021     Lab Results   Component Value Date    LACTA 2.4 08/04/2021     Lab Results   Component Value Date    AMYLASE 10 03/31/2016     Lab Results   Component Value Date    LIPASE 32 08/05/2021     Lab Results   Component Value Date    INR 1.2 11/20/2017         Assessment:  3 44-year-old female with multiple findings on CT scan    1. Superior mesenteric artery stenosis ([address]    2. Iliac artery stenosis, left (HCC)        Plan:   1. The most concerning finding on her CT scan is for a lung nodule which is being followed by pulmonology. All of her incidental arterial findings do not need any further surveillance. I reassured her of this and she does not need further vascular follow-up    No orders of the defined types were placed in this encounter.           Electronically signed by Louis Damon MD on 9/1/2021 at 9:44 AM     Copy sent to Dr. Marcelina Gonzalez, APRN - CNP

## 2021-09-15 ENCOUNTER — TELEPHONE (OUTPATIENT)
Dept: PRIMARY CARE CLINIC | Age: 56
End: 2021-09-15

## 2021-09-15 DIAGNOSIS — F33.1 MODERATE EPISODE OF RECURRENT MAJOR DEPRESSIVE DISORDER (HCC): Primary | ICD-10-CM

## 2021-09-15 NOTE — TELEPHONE ENCOUNTER
Patient called C/O skin crawling since taking Abilify 5 mg. Patient has stopped medication. Please advise.

## 2021-09-21 ENCOUNTER — TELEPHONE (OUTPATIENT)
Dept: PRIMARY CARE CLINIC | Age: 56
End: 2021-09-21

## 2021-09-21 DIAGNOSIS — F33.1 MODERATE EPISODE OF RECURRENT MAJOR DEPRESSIVE DISORDER (HCC): Primary | ICD-10-CM

## 2021-09-21 RX ORDER — QUETIAPINE FUMARATE 50 MG/1
50 TABLET, EXTENDED RELEASE ORAL NIGHTLY
Qty: 30 TABLET | Refills: 0 | Status: SHIPPED | OUTPATIENT
Start: 2021-09-21 | End: 2022-03-01

## 2021-09-21 NOTE — TELEPHONE ENCOUNTER
Rexulti 1 mg has been denied. . Alternatives are: Abilify,Seroquel, Risperdal,Geodon,Zyprexa. Letter scanned in chart.

## 2021-09-23 ENCOUNTER — OFFICE VISIT (OUTPATIENT)
Dept: PULMONOLOGY | Age: 56
End: 2021-09-23
Payer: COMMERCIAL

## 2021-09-23 VITALS
SYSTOLIC BLOOD PRESSURE: 98 MMHG | DIASTOLIC BLOOD PRESSURE: 52 MMHG | HEIGHT: 64 IN | RESPIRATION RATE: 18 BRPM | TEMPERATURE: 97.6 F | BODY MASS INDEX: 37.58 KG/M2 | OXYGEN SATURATION: 98 % | WEIGHT: 220.1 LBS | HEART RATE: 77 BPM

## 2021-09-23 DIAGNOSIS — Z87.891 STOPPED SMOKING WITH GREATER THAN 40 PACK YEAR HISTORY: ICD-10-CM

## 2021-09-23 DIAGNOSIS — E66.01 CLASS 2 SEVERE OBESITY DUE TO EXCESS CALORIES WITH SERIOUS COMORBIDITY AND BODY MASS INDEX (BMI) OF 37.0 TO 37.9 IN ADULT (HCC): ICD-10-CM

## 2021-09-23 DIAGNOSIS — Z79.4 TYPE 2 DIABETES MELLITUS TREATED WITH INSULIN (HCC): ICD-10-CM

## 2021-09-23 DIAGNOSIS — E11.9 TYPE 2 DIABETES MELLITUS TREATED WITH INSULIN (HCC): ICD-10-CM

## 2021-09-23 DIAGNOSIS — R91.1 LUNG NODULE SEEN ON IMAGING STUDY: Primary | ICD-10-CM

## 2021-09-23 DIAGNOSIS — J44.9 COPD, SEVERITY TO BE DETERMINED (HCC): ICD-10-CM

## 2021-09-23 DIAGNOSIS — G47.33 OSA (OBSTRUCTIVE SLEEP APNEA): ICD-10-CM

## 2021-09-23 PROCEDURE — 3017F COLORECTAL CA SCREEN DOC REV: CPT | Performed by: INTERNAL MEDICINE

## 2021-09-23 PROCEDURE — 99205 OFFICE O/P NEW HI 60 MIN: CPT | Performed by: INTERNAL MEDICINE

## 2021-09-23 PROCEDURE — G8417 CALC BMI ABV UP PARAM F/U: HCPCS | Performed by: INTERNAL MEDICINE

## 2021-09-23 PROCEDURE — 3023F SPIROM DOC REV: CPT | Performed by: INTERNAL MEDICINE

## 2021-09-23 PROCEDURE — 1036F TOBACCO NON-USER: CPT | Performed by: INTERNAL MEDICINE

## 2021-09-23 PROCEDURE — 2022F DILAT RTA XM EVC RTNOPTHY: CPT | Performed by: INTERNAL MEDICINE

## 2021-09-23 PROCEDURE — G8926 SPIRO NO PERF OR DOC: HCPCS | Performed by: INTERNAL MEDICINE

## 2021-09-23 PROCEDURE — G8427 DOCREV CUR MEDS BY ELIG CLIN: HCPCS | Performed by: INTERNAL MEDICINE

## 2021-09-23 PROCEDURE — 3052F HG A1C>EQUAL 8.0%<EQUAL 9.0%: CPT | Performed by: INTERNAL MEDICINE

## 2021-09-23 ASSESSMENT — ENCOUNTER SYMPTOMS
SHORTNESS OF BREATH: 1
APNEA: 1
GASTROINTESTINAL NEGATIVE: 1
ALLERGIC/IMMUNOLOGIC NEGATIVE: 1
EYES NEGATIVE: 1

## 2021-09-23 NOTE — PATIENT INSTRUCTIONS
SURVEY:    You may be receiving a survey from [employer] regarding your visit today. Please complete the survey to enable us to provide the highest quality of care to you and your family. If you cannot score us a very good on any question, please call the office to discuss how we could have made your experience a very good one. Thank you.

## 2021-09-23 NOTE — PROGRESS NOTES
Subjective:      Patient ID: Danette Martin is a 49 y.o. female being seen in my clinic for   Chief Complaint   Patient presents with    Pulmonary Nodule     denies any breathing issues       HPI  New patient visit, referred by Lizzette Fitzgerald, for evaluation of left lower lobe pulmonary nodule. Patient states that for the last several years she has had sharp, shooting pains in her left chest which come and go. States that they only last for about a minute. Sensation is uncomfortable. At times has trouble taking a deep comfortable breath. No precipitating factors. Not exertionalrelated. Does not occur at night. Does not reproducibly occur with bending, twisting, or other activity. As part of her evaluation she had a CT scan of the chest, abdomen, and pelvis along with angiogram.  The study done on 8/4/2021 is reviewed. Specifically there were no pulmonary vascular filling defects, aneurysm, or other abnormalities which explained her symptoms. The radiologist did incidentally noticed a small spiculated density in the left lower lobe. To my review, this has the appearance of a scar. It was ill-defined and somewhat stellate although on the lateral and sagittal views appeared to be less solid. I compared this to a study done in 2009 and this abnormality was not present. Size is about 1.1 x 1.4 centimeters. No adenopathy was noted. The patient quit smoking in 2011 after greater than a 40-pack-year history. Previously had smoked more than 2 packs/day. Reports shortness of breath with moderate to heavy exertion. No cough, sputum production, or hemoptysis. States that she is lost a little bit of weight recently. No constitutional symptoms such as fever or decreased appetite. Patient states that sometime between 2009 and the present she had a severe pneumonia. Her recollection is that she was told that it was in the upper part of her left lung.   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 Currently unemployed. Other diseases include hypertension and diabetes. Patient is caring for her infirmed . Patient has a longstanding history of loud disruptive snoring, disturbed sleep, frequent nocturnal awakenings because of choking and shortness of breath, awakening with a dry mouth and morning headache, and excessive daytime sleepiness. She had a home sleep study done on 11/20. The study showed very severe sleep apnea with AHI 36.5 and oxygen desaturations down to 63%. Over 400 respiratory events. Did not return for CPAP titration. \"It is hard for me to leave my  alone. \"  Received both of her Covid vaccinations. Patient states that she has been on bronchodilators when she has been sick with only modest improvement in symptoms.   Current Outpatient Medications   Medication Sig Dispense Refill    QUEtiapine (SEROQUEL XR) 50 MG extended release tablet Take 1 tablet by mouth nightly 30 tablet 0    VICTOZA 18 MG/3ML SOPN SC injection INJECT 1.2 MG SUBCUTANEOUSLY ONCE DAILY 6 mL 5    ondansetron (ZOFRAN-ODT) 4 MG disintegrating tablet Take 1 tablet by mouth 3 times daily as needed for Nausea or Vomiting 12 tablet 0    hydrOXYzine (ATARAX) 25 MG tablet Take 1 tablet by mouth nightly as needed for Anxiety 90 tablet 0    insulin glargine (LANTUS SOLOSTAR) 100 UNIT/ML injection pen Inject 54 Units into the skin nightly 15 mL 5    atorvastatin (LIPITOR) 40 MG tablet Take 1 tablet by mouth once daily (Patient taking differently: Take 40 mg by mouth daily ) 90 tablet 3    glipiZIDE (GLUCOTROL XL) 10 MG extended release tablet Take 1 tablet by mouth once daily (Patient taking differently: Take 10 mg by mouth daily ) 90 tablet 3    lisinopril (PRINIVIL;ZESTRIL) 2.5 MG tablet Take 1 tablet by mouth once daily (Patient taking differently: Take 2.5 mg by mouth daily Take 1 tablet by mouth once daily) 90 tablet 3    escitalopram (LEXAPRO) 20 MG tablet Take 1 tablet by mouth once daily (Patient taking differently: Take 20 mg by mouth daily ) 90 tablet 3    omeprazole (PRILOSEC) 40 MG delayed release capsule Take 1 capsule by mouth once daily (Patient taking differently: Take 40 mg by mouth daily ) 90 capsule 3    HYDROcodone-acetaminophen (NORCO) 5-325 MG per tablet Take 1 tablet by mouth 2 times daily as needed.  fluticasone (FLONASE) 50 MCG/ACT nasal spray 1 spray by Each Nostril route daily (Patient taking differently: 1 spray by Each Nostril route daily as needed ) 1 Bottle 0    gabapentin (NEURONTIN) 600 MG tablet Take 900 mg by mouth 3 times daily. Takes 1 1/2 tabs TID.  lidocaine-prilocaine (EMLA) 2.5-2.5 % cream Apply topically as needed        No current facility-administered medications for this visit. Family History   Problem Relation Age of Onset    Diabetes Mother     High Cholesterol Mother     Hypertension Mother     Heart Disease Mother 36    Diabetes Father     Heart Disease Father 48    High Cholesterol Father     Hypertension Father     Diabetes Sister        Social History     Tobacco Use    Smoking status: Former Smoker     Packs/day: 1.00     Years: 10.00     Pack years: 10.00     Types: Cigarettes     Quit date: 5/30/2011     Years since quitting: 10.3    Smokeless tobacco: Never Used   Vaping Use    Vaping Use: Never used   Substance Use Topics    Alcohol use: No    Drug use: No       Review of Systems   Constitutional: Negative. HENT: Negative. Eyes: Negative. Respiratory: Positive for apnea and shortness of breath. Cardiovascular: Positive for chest pain. Gastrointestinal: Negative. Endocrine: Negative. Genitourinary: Negative. Musculoskeletal: Negative. Skin: Negative. Allergic/Immunologic: Negative. Neurological: Negative. Hematological: Negative. Psychiatric/Behavioral: Negative. All other systems reviewed and are negative.       Objective:     Vitals:    09/23/21 0838 09/23/21 0843   BP: (!) 99/58 (!) 98/52   Site: Left Upper Arm Left Upper Arm   Position: Sitting Sitting   Cuff Size: Medium Adult Medium Adult   Pulse: 78 77   Resp: 18    Temp: 97.6 °F (36.4 °C)    TempSrc: Temporal    SpO2: 98%    Weight: 220 lb 1.6 oz (99.8 kg)    Height: 5' 4\" (1.626 m)      Physical Exam  Vitals and nursing note reviewed. Constitutional:       Appearance: She is well-developed. She is obese. HENT:      Head: Normocephalic. Nose: Nose normal. No congestion. Mouth/Throat:      Mouth: Mucous membranes are moist.      Pharynx: Oropharynx is clear. Comments: Edentulous. Large tongue, low hanging soft palate, large uvula. Overall pharyngeal orifice markedly decreased. Neck is short and thick. Eyes:      General: No scleral icterus. Conjunctiva/sclera: Conjunctivae normal.   Neck:      Thyroid: No thyromegaly. Vascular: No JVD. Trachea: No tracheal deviation. Cardiovascular:      Rate and Rhythm: Normal rate and regular rhythm. Heart sounds: Normal heart sounds. No murmur heard. No gallop. Pulmonary:      Effort: Pulmonary effort is normal. No respiratory distress. Breath sounds: No wheezing or rales. Chest:      Chest wall: No tenderness. Abdominal:      Palpations: Abdomen is soft. Tenderness: There is no abdominal tenderness. Musculoskeletal:      Cervical back: Neck supple. Right lower leg: No edema. Left lower leg: No edema. Lymphadenopathy:      Cervical: No cervical adenopathy. Skin:     General: Skin is warm and dry. Neurological:      Mental Status: She is alert and oriented to person, place, and time.        Wt Readings from Last 3 Encounters:   09/23/21 220 lb 1.6 oz (99.8 kg)   09/01/21 223 lb 11.2 oz (101.5 kg)   08/18/21 222 lb (100.7 kg)     Results for orders placed or performed during the hospital encounter of 08/04/21   CBC WITH AUTO DIFFERENTIAL   Result Value Ref Range    WBC 10.1 3.5 - 11.3 k/uL    RBC 4.25 3.95 - 5.11 m/uL    Hemoglobin 12.5 11.9 - 15.1 g/dL    Hematocrit 39.1 36.3 - 47.1 %    MCV 92.0 82.6 - 102.9 fL    MCH 29.4 25.2 - 33.5 pg    MCHC 32.0 28.4 - 34.8 g/dL    RDW 13.0 11.8 - 14.4 %    Platelets 023 886 - 566 k/uL    MPV 10.6 8.1 - 13.5 fL    NRBC Automated 0.0 0.0 per 100 WBC    Differential Type NOT REPORTED     Seg Neutrophils 61 36 - 65 %    Lymphocytes 33 24 - 43 %    Monocytes 5 3 - 12 %    Eosinophils % 1 1 - 4 %    Basophils 0 0 - 2 %    Immature Granulocytes 0 0 %    Segs Absolute 6.01 1.50 - 8.10 k/uL    Absolute Lymph # 3.31 1.10 - 3.70 k/uL    Absolute Mono # 0.54 0.10 - 1.20 k/uL    Absolute Eos # 0.13 0.00 - 0.44 k/uL    Basophils Absolute 0.03 0.00 - 0.20 k/uL    Absolute Immature Granulocyte 0.03 0.00 - 0.30 k/uL    WBC Morphology NOT REPORTED     RBC Morphology NOT REPORTED     Platelet Estimate NOT REPORTED    COMPREHENSIVE METABOLIC PANEL   Result Value Ref Range    Glucose 189 (H) 70 - 99 mg/dL    BUN 13 6 - 20 mg/dL    CREATININE 0.62 0.50 - 0.90 mg/dL    Bun/Cre Ratio NOT REPORTED 9 - 20    Calcium 9.1 8.6 - 10.4 mg/dL    Sodium 139 135 - 144 mmol/L    Potassium 4.7 3.7 - 5.3 mmol/L    Chloride 104 98 - 107 mmol/L    CO2 22 20 - 31 mmol/L    Anion Gap 13 9 - 17 mmol/L    Alkaline Phosphatase 142 (H) 35 - 104 U/L    ALT 12 5 - 33 U/L    AST 14 <32 U/L    Total Bilirubin 0.35 0.3 - 1.2 mg/dL    Total Protein 6.2 (L) 6.4 - 8.3 g/dL    Albumin 3.6 3.5 - 5.2 g/dL    Albumin/Globulin Ratio 1.4 1.0 - 2.5    GFR Non-African American >60 >60 mL/min    GFR African American >60 >60 mL/min    GFR Comment          GFR Staging NOT REPORTED    LIPASE   Result Value Ref Range    Lipase 32 13 - 60 U/L   Troponin   Result Value Ref Range    Troponin, High Sensitivity 10 0 - 14 ng/L    Troponin T NOT REPORTED <0.03 ng/mL    Troponin Interp NOT REPORTED        Assessment:      1. Lung nodule seen on imaging study    2.  COPD, severity to be determined ([facility] [address]    3. Stopped smoking with greater than 40 pack year history    4. Class 2 severe obesity due to excess calories with serious comorbidity and body mass index (BMI) of 37.0 to 37.9 in adult ([facility] [address]    5. Type 2 diabetes mellitus treated with insulin ([facility] [address]    6. EUNICE (obstructive sleep apnea)      Patient Active Problem List   Diagnosis    Dyslipidemia    Nuclear sclerotic cataract of left eye    Controlled type 2 diabetes mellitus with diabetic polyneuropathy, without long-term current use of insulin ([facility] [address]    Vitamin D deficiency    Obstructive sleep apnea    Sepsis due to pneumonia ([facility] [address]    Positive FIT (fecal immunochemical test)    Dysthymia    Dyspepsia    Bilateral leg and foot pain    Cervical radiculitis    Chronic left shoulder pain    Complex regional pain syndrome type 1 of left lower extremity    Cervicalgia    Diabetic autonomic neuropathy associated with type 2 diabetes mellitus ([facility] [address]    Encounter for long-term opiate analgesic use    Ocular hypertension of left eye    Regular astigmatism, bilateral    Vitreous floaters of both eyes    Cellulitis, abdominal wall    Sepsis due to abdominal wall cellulitis secondary to DM    Dizziness    Acute vertigo with vomiting and inability to stand    Bilateral shoulder pain    Morbid obesity (HCC)    Moderate nonproliferative diabetic retinopathy associated with type 2 diabetes mellitus ([address]    Essential hypertension    Abdominal pain    Superior mesenteric artery stenosis (HCC)    Iliac artery stenosis, left ([facility] [address]         Plan:      1. Recommend repeat CT scan of the chest in 6 months. Nodule semisolid and ill-defined and characteristics not particularly suspicious for malignancy. Nonetheless, high risk factor profile. 2. Pulmonary function studies to screen for and stage COPD. 3. Auto titrating CPAP 4-16 cm water pressure with heated humidifier, mask tubing and supplies. 4. Sleep with head of the bed elevated and in the lateral decubitus position.   5. Avoid sedative hypnotics and alcohol at bedtime. 6. Weight loss. 7. Exercise caution while driving and other high risk activities until adequately treated for sleep apnea. 8. Flu shot in fall. 9. Discussed in detail with patient. She understands that we need 2 years of radiographic stability to exclude malignancy. 10. Thanks for the kind referral.  We will keep you posted as the work-up proceeds. No orders of the defined types were placed in this encounter. Orders Placed This Encounter   Procedures    CT CHEST WO CONTRAST     Standing Status:   Future     Standing Expiration Date:   9/23/2022     Order Specific Question:   Reason for exam:     Answer:   LLL nodule not present 2009 with high risk factor profile; interval change    Full PFT Study With Bronchodilator     If an ABG is needed along with this PFT procedure, please place the appropriate lab order     Standing Status:   Future     Standing Expiration Date:   9/23/2022    DME Order for CPAP as OP     CPAP Auto Adjust 4 - 16 cm H2O    Heated Humidifier    Heated Tubing with Integrated Element 1 per 3 months    Nasal Mask 1 per 3 months and Cushions/Seals 2 per month    Headgear 1 per 6 months, Chin Strap 1 per 6 months, Disposable Filters 2 per month, Non-disposable Filters 1 per 6 months, Chambers 1 per 6 months and Other Related Supplies. Replace supplies and accessories as needed. Patient may choose another interface for compliance and comfort. Comments: HST 11/20 AHI 36  Diagnosis: EUNICE  Length of need: Lifetime     Return for After testing complete.      Electronically signed by Brendan Smith DO on 9/23/2021 at 9:08 AM

## 2021-09-27 ENCOUNTER — TELEPHONE (OUTPATIENT)
Dept: PRIMARY CARE CLINIC | Age: 56
End: 2021-09-27

## 2021-09-27 NOTE — TELEPHONE ENCOUNTER
Patient has been reminded x 3 to have labs done, will discontinue lab orders- patients next appt is after labs .

## 2021-10-13 ENCOUNTER — HOSPITAL ENCOUNTER (OUTPATIENT)
Dept: CT IMAGING | Age: 56
Discharge: HOME | End: 2021-10-15
Payer: COMMERCIAL

## 2021-10-13 ENCOUNTER — HOSPITAL ENCOUNTER (OUTPATIENT)
Dept: PULMONOLOGY | Age: 56
Discharge: HOME | End: 2021-10-13
Payer: COMMERCIAL

## 2021-10-13 DIAGNOSIS — J44.9 COPD, SEVERITY TO BE DETERMINED (HCC): ICD-10-CM

## 2021-10-13 DIAGNOSIS — R91.1 LUNG NODULE SEEN ON IMAGING STUDY: ICD-10-CM

## 2021-10-13 PROCEDURE — 71250 CT THORAX DX C-: CPT

## 2021-10-13 PROCEDURE — 94729 DIFFUSING CAPACITY: CPT

## 2021-10-13 PROCEDURE — 94726 PLETHYSMOGRAPHY LUNG VOLUMES: CPT

## 2021-10-13 PROCEDURE — 6370000000 HC RX 637 (ALT 250 FOR IP): Performed by: INTERNAL MEDICINE

## 2021-10-13 PROCEDURE — 94664 DEMO&/EVAL PT USE INHALER: CPT

## 2021-10-13 PROCEDURE — 94060 EVALUATION OF WHEEZING: CPT

## 2021-10-13 RX ORDER — ALBUTEROL SULFATE 90 UG/1
4 AEROSOL, METERED RESPIRATORY (INHALATION) ONCE
Status: COMPLETED | OUTPATIENT
Start: 2021-10-13 | End: 2021-10-13

## 2021-10-13 RX ADMIN — ALBUTEROL SULFATE 4 PUFF: 90 AEROSOL, METERED RESPIRATORY (INHALATION) at 12:25

## 2021-11-30 DIAGNOSIS — K21.9 GASTROESOPHAGEAL REFLUX DISEASE WITHOUT ESOPHAGITIS: Primary | ICD-10-CM

## 2021-11-30 RX ORDER — OMEPRAZOLE 40 MG/1
CAPSULE, DELAYED RELEASE ORAL
Qty: 30 CAPSULE | Refills: 0 | Status: SHIPPED | OUTPATIENT
Start: 2021-11-30 | End: 2022-01-31

## 2021-11-30 NOTE — TELEPHONE ENCOUNTER
Health Maintenance   Topic Date Due    Hepatitis B vaccine (1 of 3 - Risk 3-dose series) Never done    Shingles Vaccine (1 of 2) Never done    COVID-19 Vaccine (2 - Booster for Dari series) 08/17/2021    Diabetic microalbuminuria test  08/24/2021    Lipid screen  08/24/2021    Flu vaccine (1) 09/01/2021    Diabetic retinal exam  01/18/2022 (Originally 1/17/2021)    Hepatitis C screen  01/18/2022 (Originally 1965)    Diabetic foot exam  04/14/2022 (Originally 3/12/2021)    A1C test (Diabetic or Prediabetic)  07/20/2022    Potassium monitoring  08/05/2022    Creatinine monitoring  08/05/2022    DTaP/Tdap/Td vaccine (2 - Td or Tdap) 04/28/2023    Breast cancer screen  05/13/2023    Colon cancer screen colonoscopy  04/10/2029    Pneumococcal 0-64 years Vaccine (2 of 2 - PPSV23) 08/08/2032    HIV screen  Completed    Hepatitis A vaccine  Aged Out    Hib vaccine  Aged Out    Meningococcal (ACWY) vaccine  Aged Out             (applicable per patient's age: Cancer Screenings, Depression Screening, Fall Risk Screening, Immunizations)    Hemoglobin A1C (%)   Date Value   07/20/2021 8.8   01/18/2021 8.7   10/06/2020 9.2 (H)     Microalb/Crt.  Ratio (mcg/mg creat)   Date Value   08/24/2020 17     LDL Cholesterol (mg/dL)   Date Value   08/24/2020 63     AST (U/L)   Date Value   08/05/2021 14     ALT (U/L)   Date Value   08/05/2021 12     BUN (mg/dL)   Date Value   08/05/2021 13      (goal A1C is < 7)   (goal LDL is <100) need 30-50% reduction from baseline     BP Readings from Last 3 Encounters:   09/23/21 (!) 98/52   09/01/21 (!) 117/54   08/18/21 130/74    (goal /80)      All Future Testing planned in [facility]:  Lab Frequency Next Occurrence       Next Visit Date:  Future Appointments   Date Time Provider Osiel Owen   1/13/2022  1:30 PM DO JAH Barron [facility]   1/20/2022  2:20 PM DI Calix - CNP [facility]            Patient Active Problem List: Dyslipidemia     Nuclear sclerotic cataract of left eye     Controlled type 2 diabetes mellitus with diabetic polyneuropathy, without long-term current use of insulin (HCC)     Vitamin D deficiency     Obstructive sleep apnea     Sepsis due to pneumonia ([address]     Positive FIT (fecal immunochemical test)     Dysthymia     Dyspepsia     Bilateral leg and foot pain     Cervical radiculitis     Chronic left shoulder pain     Complex regional pain syndrome type 1 of left lower extremity     Cervicalgia     Diabetic autonomic neuropathy associated with type 2 diabetes mellitus ([address]     Encounter for long-term opiate analgesic use     Ocular hypertension of left eye     Regular astigmatism, bilateral     Vitreous floaters of both eyes     Cellulitis, abdominal wall     Sepsis due to abdominal wall cellulitis secondary to DM     Dizziness     Acute vertigo with vomiting and inability to stand     Bilateral shoulder pain     Morbid obesity (HCC)     Moderate nonproliferative diabetic retinopathy associated with type 2 diabetes mellitus ([address]     Essential hypertension     Abdominal pain     Superior mesenteric artery stenosis (HCC)     Iliac artery stenosis, left ([address]

## 2021-12-05 ENCOUNTER — HOSPITAL ENCOUNTER (EMERGENCY)
Age: 56
Discharge: HOME | End: 2021-12-05
Attending: EMERGENCY MEDICINE
Payer: COMMERCIAL

## 2021-12-05 VITALS
OXYGEN SATURATION: 97 % | DIASTOLIC BLOOD PRESSURE: 60 MMHG | RESPIRATION RATE: 18 BRPM | HEART RATE: 82 BPM | SYSTOLIC BLOOD PRESSURE: 141 MMHG | TEMPERATURE: 98.9 F

## 2021-12-05 DIAGNOSIS — J02.9 SORE THROAT: Primary | ICD-10-CM

## 2021-12-05 LAB
DIRECT EXAM: NORMAL
Lab: NORMAL
SARS-COV-2, RAPID: NOT DETECTED
SPECIMEN DESCRIPTION: NORMAL
SPECIMEN DESCRIPTION: NORMAL

## 2021-12-05 PROCEDURE — 87880 STREP A ASSAY W/OPTIC: CPT

## 2021-12-05 PROCEDURE — C9803 HOPD COVID-19 SPEC COLLECT: HCPCS

## 2021-12-05 PROCEDURE — 99282 EMERGENCY DEPT VISIT SF MDM: CPT

## 2021-12-05 PROCEDURE — 6360000002 HC RX W HCPCS: Performed by: EMERGENCY MEDICINE

## 2021-12-05 PROCEDURE — 87635 SARS-COV-2 COVID-19 AMP PRB: CPT

## 2021-12-05 RX ORDER — DEXAMETHASONE 4 MG/1
8 TABLET ORAL ONCE
Status: COMPLETED | OUTPATIENT
Start: 2021-12-05 | End: 2021-12-05

## 2021-12-05 RX ADMIN — DEXAMETHASONE 8 MG: 4 TABLET ORAL at 22:54

## 2021-12-05 ASSESSMENT — PAIN SCALES - GENERAL: PAINLEVEL_OUTOF10: 7

## 2021-12-05 ASSESSMENT — PAIN DESCRIPTION - LOCATION: LOCATION: THROAT

## 2021-12-06 ENCOUNTER — TELEPHONE (OUTPATIENT)
Dept: PRIMARY CARE CLINIC | Age: 56
End: 2021-12-06

## 2021-12-06 ASSESSMENT — ENCOUNTER SYMPTOMS
SORE THROAT: 1
EYE REDNESS: 0
RHINORRHEA: 0
COLOR CHANGE: 0
BACK PAIN: 0
VOMITING: 0
SHORTNESS OF BREATH: 0
DIARRHEA: 0
COUGH: 0

## 2021-12-06 NOTE — ED PROVIDER NOTES
[facility] ED  EMERGENCY DEPARTMENT ENCOUNTER      Pt Name: Darryle Mayer  MRN: [id number]  [place] 1965  Date of evaluation: 12/5/2021  Provider: Nikolai Ivey, [address] [place] [address]       Chief Complaint   Patient presents with    Pharyngitis     started yesterday, difficulty swallowing    Fever         HISTORY OF PRESENT ILLNESS   (Location/Symptom, Timing/Onset, Context/Setting, Quality, Duration, Modifying Factors, Severity)  Note limiting factors. Darryle Mayer is a 49 y.o. female who presents to the emergency department Hafsa Fitzgerald is a 53-year-old female who presents with a sore throat, fever and lung pain that began yesterday. Patient states her temperature was 99.7. She did receive her COVID-19 vaccinations. She denies any cough, vomiting or diarrhea. Review of systems is otherwise negative. HPI    Nursing Notes were reviewed. REVIEW OF SYSTEMS    (2-9 systems for level 4, 10 or more for level 5)     Review of Systems   Constitutional: Positive for fever. Negative for chills. HENT: Positive for sore throat. Negative for congestion and rhinorrhea. Eyes: Negative for redness and visual disturbance. Respiratory: Negative for cough and shortness of breath. Cardiovascular: Negative for chest pain and leg swelling. Gastrointestinal: Negative for diarrhea and vomiting. Genitourinary: Negative for dysuria and hematuria. Musculoskeletal: Negative for back pain and neck pain. Skin: Negative for color change and wound. Neurological: Negative for weakness and headaches. Psychiatric/Behavioral: Negative for agitation and confusion. Except as noted above the remainder of the review of systems was reviewed and negative.        PAST MEDICAL HISTORY     Past Medical History:   Diagnosis Date    Anxiety     Arthritis     Depression     Diabetic neuropathy (HCC)     Esophageal reflux     Heart burn     Limitation of joint motion     Muscle weakness     Sleep apnea DOES NOT USE A MACHINE.  DIAGNOSED > 15 YEARS AGO WITH SLEEP APNEA    Sleepiness     Vertigo          SURGICAL HISTORY       Past Surgical History:   Procedure Laterality Date    ABSCESS DRAINAGE Left     BUTTOCK    APPENDECTOMY      CARPAL TUNNEL RELEASE Left 2018    CATARACT REMOVAL WITH IMPLANT Bilateral 10/24/2016     SECTION      CHOLECYSTECTOMY      COLONOSCOPY  2019    Dr. Red Huddleston -normal poor prep    COLONOSCOPY N/A 3/25/2019    COLORECTAL CANCER SCREENING, NOT HIGH RISK performed by Libra Miner MD at [address] [place] [address] COLONOSCOPY N/A 4/10/2019    -diverticulosis,hemorrhoids,lipoma at ileocecal valve    HERNIA REPAIR Right     [facility]    HERNIA REPAIR  2006    HERNIA REPAIR WITH HYSTERECTOMY SURGERY    HYSTERECTOMY      LAPAROSCOPY           CURRENT MEDICATIONS       Discharge Medication List as of 2021 10:45 PM      CONTINUE these medications which have NOT CHANGED    Details   omeprazole (PRILOSEC) 40 MG delayed release capsule Take 1 capsule by mouth once daily, Disp-30 capsule, R-0Normal      QUEtiapine (SEROQUEL XR) 50 MG extended release tablet Take 1 tablet by mouth nightly, Disp-30 tablet, R-0Normal      VICTOZA 18 MG/3ML SOPN SC injection INJECT 1.2 MG SUBCUTANEOUSLY ONCE DAILY, Disp-6 mL, R-5Normal      ondansetron (ZOFRAN-ODT) 4 MG disintegrating tablet Take 1 tablet by mouth 3 times daily as needed for Nausea or Vomiting, Disp-12 tablet, R-0Print      hydrOXYzine (ATARAX) 25 MG tablet Take 1 tablet by mouth nightly as needed for Anxiety, Disp-90 tablet, R-0Normal      insulin glargine (LANTUS SOLOSTAR) 100 UNIT/ML injection pen Inject 54 Units into the skin nightly, Disp-15 mL, R-5Normal      atorvastatin (LIPITOR) 40 MG tablet Take 1 tablet by mouth once daily, Disp-90 tablet, R-3Normal      glipiZIDE (GLUCOTROL XL) 10 MG extended release tablet Take 1 tablet by mouth once daily, Disp-90 tablet, R-3Normal      lisinopril (PRINIVIL;ZESTRIL) 2.5 MG tablet Take 1 tablet by mouth once daily, Disp-90 tablet, R-3Normal      escitalopram (LEXAPRO) 20 MG tablet Take 1 tablet by mouth once daily, Disp-90 tablet, R-3Normal      HYDROcodone-acetaminophen (NORCO) 5-325 MG per tablet Take 1 tablet by mouth 2 times daily as needed. Historical Med      fluticasone (FLONASE) 50 MCG/ACT nasal spray 1 spray by Each Nostril route daily, Disp-1 Bottle, R-0Normal      gabapentin (NEURONTIN) 600 MG tablet Take 900 mg by mouth 3 times daily. Takes 1 1/2 tabs TID. Historical Med      lidocaine-prilocaine (EMLA) 2.5-2.5 % cream Apply topically as needed , Topical, PRN Starting Thu 11/8/2018, Historical Med             ALLERGIES     Codeine, Meperidine, and Other    FAMILY HISTORY       Family History   Problem Relation Age of Onset    Diabetes Mother     High Cholesterol Mother     Hypertension Mother     Heart Disease Mother 36    Diabetes Father     Heart Disease Father 48    High Cholesterol Father     Hypertension Father     Diabetes Sister           SOCIAL HISTORY       Social History     Socioeconomic History    Marital status: Legally      Spouse name: Not on file    Number of children: Not on file    Years of education: Not on file    Highest education level: Not on file   Occupational History    Not on file   Tobacco Use    Smoking status: Former Smoker     Packs/day: 1.00     Years: 10.00     Pack years: 10.00     Types: Cigarettes     Quit date: 5/30/2011     Years since quitting: 10.5    Smokeless tobacco: Never Used   Vaping Use    Vaping Use: Never used   Substance and Sexual Activity    Alcohol use: No    Drug use: No    Sexual activity: Not on file   Other Topics Concern    Not on file   Social History Narrative    Not on file     Social Determinants of Health     Financial Resource Strain: Low Risk     Difficulty of Paying Living Expenses: Not hard at all   Food Insecurity: No Food Insecurity    Worried About [facility] in the Last Year: Never true    Ran Out of Food in the Last Year: Never true   Transportation Needs:     Lack of Transportation (Medical): Not on file    Lack of Transportation (Non-Medical): Not on file   Physical Activity:     Days of Exercise per Week: Not on file    Minutes of Exercise per Session: Not on file   Stress:     Feeling of Stress : Not on file   Social Connections:     Frequency of Communication with Friends and Family: Not on file    Frequency of Social Gatherings with Friends and Family: Not on file    Attends Mandaeism Services: Not on file    Active Member of [address] [place] [address] [employer] or Organizations: Not on file    Attends Club or Organization Meetings: Not on file    Marital Status: Not on file   Intimate Partner Violence:     Fear of Current or Ex-Partner: Not on file    Emotionally Abused: Not on file    Physically Abused: Not on file    Sexually Abused: Not on file   Housing Stability:     Unable to Pay for Housing in the Last Year: Not on file    Number of [place] in the Last Year: Not on file    Unstable Housing in the Last Year: Not on file       SCREENINGS                        PHYSICAL EXAM    (up to 7 for level 4, 8 or more for level 5)     ED Triage Vitals [12/05/21 2044]   BP Temp Temp src Pulse Resp SpO2 Height Weight   (!) 141/60 98.9 °F (37.2 °C) -- 82 18 97 % -- --       Physical Exam  Vitals and nursing note reviewed. Constitutional:       General: She is not in acute distress. Appearance: She is obese. She is not toxic-appearing. Comments: Patient is sitting in bed comfortably upon exam.  She answers questions appropriately in full sentences. HENT:      Mouth/Throat:      Mouth: Mucous membranes are moist.      Pharynx: Oropharynx is clear. Eyes:      Extraocular Movements: Extraocular movements intact. Pupils: Pupils are equal, round, and reactive to light. Cardiovascular:      Rate and Rhythm: Normal rate and regular rhythm.    Pulmonary:      Effort: Pulmonary effort is normal.      Breath sounds: Normal breath sounds. No wheezing or rhonchi. Abdominal:      General: Bowel sounds are normal.      Palpations: Abdomen is soft. Musculoskeletal:      Cervical back: Normal range of motion and neck supple. Skin:     General: Skin is warm and dry. Capillary Refill: Capillary refill takes less than 2 seconds. Neurological:      General: No focal deficit present. Mental Status: She is alert. Psychiatric:         Mood and Affect: Mood normal.         DIAGNOSTIC RESULTS     EKG: All EKG's are interpreted by the Emergency Department Physician who either signs or Co-signs this chart in the absence of a cardiologist.        RADIOLOGY:   Non-plain film images such as CT, Ultrasound and MRI are read by the radiologist. Plain radiographic images are visualized and preliminarily interpreted by the emergency physician with the below findings:        Interpretation per the Radiologist below, if available at the time of this note:    No orders to display         ED BEDSIDE ULTRASOUND:   Performed by ED Physician - none    LABS:  Labs Reviewed   COVID-19, RAPID   STREP SCREEN GROUP A THROAT       All other labs were within normal range or not returned as of this dictation.     EMERGENCY DEPARTMENT COURSE and DIFFERENTIAL DIAGNOSIS/MDM:   Vitals:    Vitals:    12/05/21 2044   BP: (!) 141/60   Pulse: 82   Resp: 18   Temp: 98.9 °F (37.2 °C)   SpO2: 97%           MDM  Number of Diagnoses or Management Options  Sore throat: new and requires workup     Amount and/or Complexity of Data Reviewed  Clinical lab tests: ordered and reviewed  Tests in the medicine section of CPT®: ordered  Review and summarize past medical records: yes  Independent visualization of images, tracings, or specimens: yes    Risk of Complications, Morbidity, and/or Mortality  Presenting problems: minimal  Diagnostic procedures: minimal  Management options: minimal    Critical Care  Total time providing critical care: < 30 minutes    Patient Progress  Patient progress: improved        REASSESSMENT     ED Course as of 12/06/21 0102   Sun Dec 05, 2021   2201 Covid and strep swab is negative. [AB]   [employer] is a 62-year-old female who presents with a sore throat, fever and lung pain that began yesterday. Patient states her temperature was 99.7. She did receive her COVID-19 vaccinations. She denies any cough, vomiting or diarrhea. Review of systems is otherwise negative. Upon arrival the patient is slightly hypertensive. Physical exam is grossly unremarkable. Patient is updated on the Covid and strep results. She is counseled she could still have Covid but it may be too early for the test to result. 1 dose of Decadron is ordered in the department. Patient will keep a close eye on her blood sugars at home. She will continue supportive measures at home. Patient is discharged with follow-up to her PCP. She agrees with plan and all questions were answered. Strict return precautions are given. [AB]      ED Course User Index  [AB] Sanya Napoles DO         CRITICAL CARE TIME   Total Critical Care time was 0 minutes, excluding separately reportable procedures. There was a high probability of clinically significant/life threatening deterioration in the patient's condition which required my urgent intervention. CONSULTS:  None    PROCEDURES:  Unless otherwise noted below, none     Procedures        FINAL IMPRESSION      1. Sore throat          DISPOSITION/PLAN   DISPOSITION Decision To Discharge 12/05/2021 10:44:26 PM      PATIENT REFERRED TO:  Jessica Feeling, APRN - CNP  [address]  [phone]    Schedule an appointment as soon as possible for a visit         DISCHARGE MEDICATIONS:  Discharge Medication List as of 12/5/2021 10:45 PM        Controlled Substances Monitoring:     No flowsheet data found.     (Please note that portions of this note were completed with a voice recognition program.  Efforts were made to edit the dictations but occasionally words are mis-transcribed.)    Nikki Shaikh DO (electronically signed)  Attending Emergency Physician            Nikki Shaikh DO  12/06/21 6631

## 2021-12-06 NOTE — TELEPHONE ENCOUNTER
[facility] Transitions Initial Follow Up Call    Outreach made within 2 business days of discharge: Yes    Patient: Karl Bowman Patient : 1965   MRN: [id number]  Reason for Admission: There are no discharge diagnoses documented for the most recent discharge. Discharge Date: 21       Spoke with: 21 @ 11:00am Called patient and left message for patient to call office re: recent ER visit. Cf  21 @ 11:42am Called patient for the second time no answer and message left for patient to call office re: recent ER visit. cf    Discharge department/facility:  [facility] ER    TCM Interactive Patient Contact:  Was patient able to fill all prescriptions:   Was patient instructed to bring all medications to the follow-up visit:   Is patient taking all medications as directed in the discharge summary?    Does patient understand their discharge instructions:   Does patient have questions or concerns that need addressed prior to 7-14 day follow up office visit:     Scheduled appointment with PCP within 7-14 days    Follow Up  Future Appointments   Date Time Provider Osiel Owen   2022  1:30 PM [facility]   2022  2:20 PM Ember Byrd, APRN - CNP [id number] [facility]       Andrew Singh

## 2021-12-08 NOTE — PROCEDURES
[address] [facility] [address]                               PULMONARY FUNCTION    PATIENT NAME: Ирина Montes                      :        1965  MED REC NO:   [id number]                              ROOM:  ACCOUNT NO:   [de-identified]                           ADMIT DATE: 10/13/2021  PROVIDER:     Ciro Bolton    DATE OF PROCEDURE:  10/13/2021    The patient's spirometry does not show an obstructive ventilatory defect  and has an FEV1 of 2.24 liters and FVC of 2.86 liters. The postbronchodilator study does not show significant change and has an  FEV1 of 2.41 liters and FVC of 2.87 liters. Lung volumes show total lung capacity that is 105% of predicted. Diffusion capacity is normal at 80% of predicted. Airway resistance is decreased at 68% of predicted. Specific conductance is increased at 127% of predicted. Flow volume loop is normal.    IMPRESSION:  Normal pulmonary function test with an FEV1 of 2.41 liters  and FVC of 2.87 liters.         Ryne Saucedo    D: 2021 11:40:13       T: 2021 12:37:03     [email or web address]  Job#: [id number]     Doc#: [id number]    CC:  ZORAIDA Cochran       Kulwant Code

## 2021-12-13 ENCOUNTER — TELEPHONE (OUTPATIENT)
Dept: PRIMARY CARE CLINIC | Age: 56
End: 2021-12-13

## 2021-12-13 DIAGNOSIS — F43.22 TRANSIENT ADJUSTMENT REACTION WITH ANXIETY: Primary | ICD-10-CM

## 2021-12-13 RX ORDER — LORAZEPAM 1 MG/1
1 TABLET ORAL 2 TIMES DAILY PRN
Qty: 30 TABLET | Refills: 0 | Status: SHIPPED | OUTPATIENT
Start: 2021-12-13 | End: 2022-05-09

## 2022-01-31 DIAGNOSIS — K21.9 GASTROESOPHAGEAL REFLUX DISEASE WITHOUT ESOPHAGITIS: ICD-10-CM

## 2022-01-31 RX ORDER — OMEPRAZOLE 40 MG/1
CAPSULE, DELAYED RELEASE ORAL
Qty: 30 CAPSULE | Refills: 0 | Status: SHIPPED | OUTPATIENT
Start: 2022-01-31 | End: 2022-03-01 | Stop reason: SDUPTHER

## 2022-01-31 NOTE — TELEPHONE ENCOUNTER
Health Maintenance   Topic Date Due    Hepatitis C screen  Never done    Depression Monitoring  Never done    Hepatitis B vaccine (1 of 3 - Risk 3-dose series) Never done    Shingles Vaccine (1 of 2) Never done    Diabetic retinal exam  01/17/2021    COVID-19 Vaccine (2 - Booster for Dari series) 08/17/2021    Diabetic microalbuminuria test  08/24/2021    Lipid screen  08/24/2021    Flu vaccine (1) 09/01/2021    Diabetic foot exam  04/14/2022 (Originally 3/12/2021)    A1C test (Diabetic or Prediabetic)  07/20/2022    Potassium monitoring  08/05/2022    Creatinine monitoring  08/05/2022    DTaP/Tdap/Td vaccine (2 - Td or Tdap) 04/28/2023    Breast cancer screen  05/13/2023    Colon cancer screen colonoscopy  04/10/2029    Pneumococcal 0-64 years Vaccine (2 of 2 - PPSV23) 08/08/2032    HIV screen  Completed    Hepatitis A vaccine  Aged Out    Hib vaccine  Aged Out    Meningococcal (ACWY) vaccine  Aged Out             (applicable per patient's age: Cancer Screenings, Depression Screening, Fall Risk Screening, Immunizations)    Hemoglobin A1C (%)   Date Value   07/20/2021 8.8   01/18/2021 8.7   10/06/2020 9.2 (H)     Microalb/Crt.  Ratio (mcg/mg creat)   Date Value   08/24/2020 17     LDL Cholesterol (mg/dL)   Date Value   08/24/2020 63     AST (U/L)   Date Value   08/05/2021 14     ALT (U/L)   Date Value   08/05/2021 12     BUN (mg/dL)   Date Value   08/05/2021 13      (goal A1C is < 7)   (goal LDL is <100) need 30-50% reduction from baseline     BP Readings from Last 3 Encounters:   12/05/21 (!) 141/60   09/23/21 (!) 98/52   09/01/21 (!) 117/54    (goal /80)      All Future Testing planned in [facility]:  Lab Frequency Next Occurrence       Next Visit Date:  Future Appointments   Date Time Provider Osiel Owen   2/2/2022  3:00 PM Mary Byrd, APRN - CNP [facility]   4/20/2022  1:15 PM Carlos Bill [facility]            Patient Active Problem List:     Dyslipidemia Nuclear sclerotic cataract of left eye     Controlled type 2 diabetes mellitus with diabetic polyneuropathy, without long-term current use of insulin (HCC)     Vitamin D deficiency     Obstructive sleep apnea     Sepsis due to pneumonia ([address]     Positive FIT (fecal immunochemical test)     Dysthymia     Dyspepsia     Bilateral leg and foot pain     Cervical radiculitis     Chronic left shoulder pain     Complex regional pain syndrome type 1 of left lower extremity     Cervicalgia     Diabetic autonomic neuropathy associated with type 2 diabetes mellitus ([address]     Encounter for long-term opiate analgesic use     Ocular hypertension of left eye     Regular astigmatism, bilateral     Vitreous floaters of both eyes     Cellulitis, abdominal wall     Sepsis due to abdominal wall cellulitis secondary to DM     Dizziness     Acute vertigo with vomiting and inability to stand     Bilateral shoulder pain     Morbid obesity (HCC)     Moderate nonproliferative diabetic retinopathy associated with type 2 diabetes mellitus ([address]     Essential hypertension     Abdominal pain     Superior mesenteric artery stenosis (HCC)     Iliac artery stenosis, left ([address]

## 2022-03-01 ENCOUNTER — OFFICE VISIT (OUTPATIENT)
Dept: PRIMARY CARE CLINIC | Age: 57
End: 2022-03-01
Payer: COMMERCIAL

## 2022-03-01 VITALS
SYSTOLIC BLOOD PRESSURE: 132 MMHG | BODY MASS INDEX: 36.91 KG/M2 | DIASTOLIC BLOOD PRESSURE: 78 MMHG | WEIGHT: 216.2 LBS | OXYGEN SATURATION: 100 % | TEMPERATURE: 97.8 F | RESPIRATION RATE: 22 BRPM | HEIGHT: 64 IN | HEART RATE: 88 BPM

## 2022-03-01 DIAGNOSIS — Z23 NEED FOR SHINGLES VACCINE: ICD-10-CM

## 2022-03-01 DIAGNOSIS — Z13.31 POSITIVE DEPRESSION SCREENING: ICD-10-CM

## 2022-03-01 DIAGNOSIS — E11.42 CONTROLLED TYPE 2 DIABETES MELLITUS WITH DIABETIC POLYNEUROPATHY, WITHOUT LONG-TERM CURRENT USE OF INSULIN (HCC): Primary | ICD-10-CM

## 2022-03-01 DIAGNOSIS — K21.9 GASTROESOPHAGEAL REFLUX DISEASE WITHOUT ESOPHAGITIS: ICD-10-CM

## 2022-03-01 DIAGNOSIS — F32.1 MODERATE MAJOR DEPRESSION, SINGLE EPISODE (HCC): ICD-10-CM

## 2022-03-01 DIAGNOSIS — Z23 NEED FOR INFLUENZA VACCINATION: ICD-10-CM

## 2022-03-01 PROCEDURE — G8427 DOCREV CUR MEDS BY ELIG CLIN: HCPCS | Performed by: NURSE PRACTITIONER

## 2022-03-01 PROCEDURE — 2022F DILAT RTA XM EVC RTNOPTHY: CPT | Performed by: NURSE PRACTITIONER

## 2022-03-01 PROCEDURE — G8417 CALC BMI ABV UP PARAM F/U: HCPCS | Performed by: NURSE PRACTITIONER

## 2022-03-01 PROCEDURE — G8482 FLU IMMUNIZE ORDER/ADMIN: HCPCS | Performed by: NURSE PRACTITIONER

## 2022-03-01 PROCEDURE — 90674 CCIIV4 VAC NO PRSV 0.5 ML IM: CPT | Performed by: NURSE PRACTITIONER

## 2022-03-01 PROCEDURE — 3046F HEMOGLOBIN A1C LEVEL >9.0%: CPT | Performed by: NURSE PRACTITIONER

## 2022-03-01 PROCEDURE — 1036F TOBACCO NON-USER: CPT | Performed by: NURSE PRACTITIONER

## 2022-03-01 PROCEDURE — 3017F COLORECTAL CA SCREEN DOC REV: CPT | Performed by: NURSE PRACTITIONER

## 2022-03-01 PROCEDURE — 99214 OFFICE O/P EST MOD 30 MIN: CPT | Performed by: NURSE PRACTITIONER

## 2022-03-01 RX ORDER — LIRAGLUTIDE 6 MG/ML
INJECTION SUBCUTANEOUS
Qty: 6 ML | Refills: 5 | Status: SHIPPED | OUTPATIENT
Start: 2022-03-01 | End: 2022-09-21 | Stop reason: SDUPTHER

## 2022-03-01 RX ORDER — ZOSTER VACCINE RECOMBINANT, ADJUVANTED 50 MCG/0.5
0.5 KIT INTRAMUSCULAR SEE ADMIN INSTRUCTIONS
Qty: 0.5 ML | Refills: 0 | Status: SHIPPED | OUTPATIENT
Start: 2022-03-01 | End: 2022-08-28

## 2022-03-01 RX ORDER — OMEPRAZOLE 40 MG/1
CAPSULE, DELAYED RELEASE ORAL
Qty: 90 CAPSULE | Refills: 1 | Status: SHIPPED | OUTPATIENT
Start: 2022-03-01

## 2022-03-01 ASSESSMENT — ENCOUNTER SYMPTOMS
BLURRED VISION: 0
SORE THROAT: 0
RHINORRHEA: 0
DIARRHEA: 0
GLOBUS SENSATION: 0
COUGH: 0
VOMITING: 0
CHOKING: 0
VISUAL CHANGE: 0
BELCHING: 0
NAUSEA: 0
WHEEZING: 0
HEARTBURN: 1
WATER BRASH: 0
SHORTNESS OF BREATH: 0
ABDOMINAL PAIN: 0
CONSTIPATION: 0

## 2022-03-01 ASSESSMENT — PATIENT HEALTH QUESTIONNAIRE - PHQ9
5. POOR APPETITE OR OVEREATING: 3
SUM OF ALL RESPONSES TO PHQ QUESTIONS 1-9: 25
SUM OF ALL RESPONSES TO PHQ QUESTIONS 1-9: 25
3. TROUBLE FALLING OR STAYING ASLEEP: 3
SUM OF ALL RESPONSES TO PHQ QUESTIONS 1-9: 24
9. THOUGHTS THAT YOU WOULD BE BETTER OFF DEAD, OR OF HURTING YOURSELF: 1
10. IF YOU CHECKED OFF ANY PROBLEMS, HOW DIFFICULT HAVE THESE PROBLEMS MADE IT FOR YOU TO DO YOUR WORK, TAKE CARE OF THINGS AT HOME, OR GET ALONG WITH OTHER PEOPLE: 0
6. FEELING BAD ABOUT YOURSELF - OR THAT YOU ARE A FAILURE OR HAVE LET YOURSELF OR YOUR FAMILY DOWN: 3
8. MOVING OR SPEAKING SO SLOWLY THAT OTHER PEOPLE COULD HAVE NOTICED. OR THE OPPOSITE, BEING SO FIGETY OR RESTLESS THAT YOU HAVE BEEN MOVING AROUND A LOT MORE THAN USUAL: 3
SUM OF ALL RESPONSES TO PHQ9 QUESTIONS 1 & 2: 6
7. TROUBLE CONCENTRATING ON THINGS, SUCH AS READING THE NEWSPAPER OR WATCHING TELEVISION: 3
4. FEELING TIRED OR HAVING LITTLE ENERGY: 3
1. LITTLE INTEREST OR PLEASURE IN DOING THINGS: 3
SUM OF ALL RESPONSES TO PHQ QUESTIONS 1-9: 25
2. FEELING DOWN, DEPRESSED OR HOPELESS: 3

## 2022-03-01 ASSESSMENT — COLUMBIA-SUICIDE SEVERITY RATING SCALE - C-SSRS
6. HAVE YOU EVER DONE ANYTHING, STARTED TO DO ANYTHING, OR PREPARED TO DO ANYTHING TO END YOUR LIFE?: NO
1. WITHIN THE PAST MONTH, HAVE YOU WISHED YOU WERE DEAD OR WISHED YOU COULD GO TO SLEEP AND NOT WAKE UP?: NO
2. HAVE YOU ACTUALLY HAD ANY THOUGHTS OF KILLING YOURSELF?: NO

## 2022-03-01 NOTE — PATIENT INSTRUCTIONS
SURVEY:     You may be receiving a survey from [employer] regarding your visit today. Please complete the survey to enable us to provide the highest quality of care to you and your family. If you cannot score us a very good on any question, please call the office to discuss how we could have made your experience a very good one. Thank you. Elvira Byrd, APRN-MARI Thomas, MARI Rao, LPN  Pj Crystal, LPJUAN Rocha, [facility], [facility]  Bhumika, [facility]  Elizabeth, PCA    Patient Education        Live Zoster (Shingles) Vaccine: What You Need to Know  Why get vaccinated? Live zoster (shingles) vaccine can prevent shingles. Shingles (also called herpes zoster, or just zoster) is a painful skin rash, usually with blisters. In addition to the rash, shingles can cause fever, headache, chills, or upset stomach. More rarely, shingles can lead to pneumonia, hearing problems, blindness, brain inflammation (encephalitis), or death. The most common complication of shingles is long-term nerve pain called postherpetic neuralgia (PHN). PHN occurs in the areas where the shingles rash was, even after the rash clears up. It can last for months or years after the rash goes away. The pain from PHN can be severe and debilitating. About 10 to 18% of people who get shingles will experience PHN. The risk of PHN increases with age. An older adult with shingles is more likely to develop PHN and have longer lasting and more severe pain than a younger person with shingles. Shingles is caused by the varicella zoster virus, the same virus that causes chickenpox. After you have chickenpox, the virus stays in your body and can cause shingles later in life. Shingles cannot be passed from one person to another, but the virus that causes shingles can spread and cause chickenpox in someone who had never had chickenpox or received chickenpox vaccine.   Live shingles vaccine  Live shingles vaccine can provide protection against shingles and PHN.  Another type of shingles vaccine, recombinant shingles vaccine, is the preferred vaccine for the prevention of shingles. However, live shingles vaccine may be used in some circumstances (for example if a person is allergic to recombinant shingles vaccine or prefers live shingles vaccine, or if recombinant shingles vaccine is not available). Adults 60 years and older who get live shingles vaccine should receive 1 dose, administered by injection. Shingles vaccine may be given at the same time as other vaccines. Talk with your health care provider  Tell your vaccine provider if the person getting the vaccine:  · Has had an allergic reaction after a previous dose of live shingles vaccine or varicella vaccine, or has any severe, life-threatening allergies. · Has a weakened immune system. · Is pregnant or thinks she might be pregnant. · Is currently experiencing an episode of shingles. In some cases, your health care provider may decide to postpone shingles vaccination to a future visit. People with minor illnesses, such as a cold, may be vaccinated. People who are moderately or severely ill should usually wait until they recover before getting live shingles vaccine. Your health care provider can give you more information. Risks of a vaccine reaction  · Redness, soreness, swelling, or itching at the site of the injection and headache can happen after live shingles vaccine. Rarely, live shingles vaccine can cause rash or shingles. People sometimes faint after medical procedures, including vaccination. Tell your provider if you feel dizzy or have vision changes or ringing in the ears. As with any medicine, there is a very remote chance of a vaccine causing a severe allergic reaction, other serious injury, or death. What if there is a serious problem? An allergic reaction could occur after the vaccinated person leaves the clinic.  If you see signs of a severe allergic reaction (hives, swelling of the face and throat, difficulty breathing, a fast heartbeat, dizziness, or weakness), call 9-1-1 and get the person to the nearest hospital.  For other signs that concern you, call your health care provider. Adverse reactions should be reported to the Vaccine Adverse Event Reporting System (VAERS). Your health care provider will usually file this report, or you can do it yourself. Visit the VAERS website at www.vaers. [facility].gov or call 7-442.984.1911. VAERS is only for reporting reactions, and VAERS staff do not give medical advice. How can I learn more? · Ask your health care provider. · Call your local or state health department. · Contact the Centers for Disease Control and Prevention (CDC):  ? Call 8-835.928.6880 (1-800-CDC-INFO) or  ? Visit CDC's website at www.cdc.gov/vaccines  Vaccine Information Statement  Live Zoster Vaccine  10/30/2019  Department of [facility] and [employer] for Disease Control and Prevention  Many Vaccine Information Statements are available in Citizen of [place] and other languages. See www.immunize.org/vis   Hojas de Información Sobre Vacunas están disponibles en Español y en muchos otros idiomas. Visite Corrine.si   Care instructions adapted under license by [facility] ([facility]). If you have questions about a medical condition or this instruction, always ask your healthcare professional. [place] [address] any warranty or liability for your use of this information.

## 2022-03-01 NOTE — PROGRESS NOTES
After obtaining consent, and per orders of Chang Byrd CNP, injection of Flu given in Right deltoid by Estiven Oleary LPN. Patient instructed to remain in clinic for 20 minutes afterwards, and to report any adverse reaction to me immediately. Vaccine Information Sheet, \"Influenza - Inactivated\"  given to Jacob Hicks, or parent/legal guardian of  Jacob Hicks and verbalized understanding. Patient responses:    Have you ever had a reaction to a flu vaccine? No  Are you able to eat eggs without adverse effects? Yes  Do you have any current illness? No  Have you ever had [place] Syndrome? No    Flu vaccine given per order. Please see immunization tab.

## 2022-03-01 NOTE — PROGRESS NOTES
had a previous visit with a dietitian. She rarely participates in exercise. Her home blood glucose trend is decreasing steadily. Her breakfast blood glucose range is generally 180-200 mg/dl. An ACE inhibitor/angiotensin II receptor blocker is being taken. She does not see a podiatrist.Eye exam is not current. Gastroesophageal Reflux  She complains of heartburn (occasional). She reports no abdominal pain, no belching, no chest pain, no choking, no coughing, no dysphagia, no early satiety, no globus sensation, no nausea, no sore throat, no water brash or no wheezing. This is a chronic problem. The current episode started more than 1 year ago. The problem occurs rarely. The problem has been unchanged. The heartburn duration is less than a minute. The heartburn is located in the substernum. The heartburn is of mild intensity. The heartburn does not wake her from sleep. The heartburn does not limit her activity. The heartburn doesn't change with position. The symptoms are aggravated by certain foods and caffeine. Pertinent negatives include no fatigue, melena or weight loss. Risk factors include caffeine use, lack of exercise and obesity. She has tried a PPI for the symptoms. The treatment provided significant relief. Past procedures do not include an EGD or a UGI. Past invasive treatments do not include gastroplasty, gastroplication or reflux surgery. Mental Health Problem  The primary symptoms include dysphoric mood and negative symptoms. The primary symptoms do not include delusions, hallucinations, bizarre behavior, disorganized speech or somatic symptoms. The current episode started more than 1 month ago. This is a chronic problem. The dysphoric mood began more than 2 weeks ago. The mood has been worsening since its onset. She characterizes the problem as moderate. The mood includes feelings of emptiness, irritability, tearfulness, despair and sadness.  Her change in mood was precipitated by the death of a loved one. The negative symptoms began more than 1 month ago. The negative symptoms appear to have been worsening since their onset. The negative symptoms include anhedonia and attention impairment. The onset of the illness is precipitated by emotional stress and a stressful event. The degree of incapacity that she is experiencing as a consequence of her illness is moderate. Sequelae of the illness include harmed interpersonal relations. Additional symptoms of the illness include anhedonia, insomnia, attention impairment and distractible. Additional symptoms of the illness do not include hypersomnia, appetite change, unexpected weight change, fatigue, agitation, psychomotor retardation, feelings of worthlessness, euphoric mood, increased goal-directed activity, flight of ideas, inflated self-esteem, decreased need for sleep, poor judgment, visual change, headaches, abdominal pain or seizures. She does not admit to suicidal ideas. She does not have a plan to attempt suicide. She does not contemplate harming herself. She has not already injured self. She does not contemplate injuring another person. She has not already  injured another person. Risk factors that are present for mental illness include a family history of mental illness and a history of mental illness. Past Medical History:     Past Medical History:   Diagnosis Date    Anxiety     Arthritis     Depression     Diabetic neuropathy (HCC)     Esophageal reflux     Heart burn     Limitation of joint motion     Muscle weakness     Sleep apnea     DOES NOT USE A MACHINE.  DIAGNOSED > 15 YEARS AGO WITH SLEEP APNEA    Sleepiness     Vertigo       Reviewed all health maintenance requirements and ordered appropriate tests  Health Maintenance Due   Topic Date Due    Depression Monitoring  Never done    Shingles Vaccine (1 of 2) Never done    Diabetic microalbuminuria test  08/24/2021    Lipid screen  08/24/2021       Past Surgical History:     Past Surgical History:   Procedure Laterality Date    ABSCESS DRAINAGE Left     BUTTOCK    APPENDECTOMY      CARPAL TUNNEL RELEASE Left 2018    CATARACT REMOVAL WITH IMPLANT Bilateral 10/24/2016     SECTION      CHOLECYSTECTOMY      COLONOSCOPY  2019    Dr. Koki Silva -normal poor prep    COLONOSCOPY N/A 3/25/2019    COLORECTAL CANCER SCREENING, NOT HIGH RISK performed by Yusef Dewey MD at [facility] COLONOSCOPY N/A 4/10/2019    -diverticulosis,hemorrhoids,lipoma at ileocecal valve    HERNIA REPAIR Right     [facility]    HERNIA REPAIR  2006    HERNIA REPAIR WITH HYSTERECTOMY SURGERY    HYSTERECTOMY      LAPAROSCOPY          Medications:       Prior to Admission medications    Medication Sig Start Date End Date Taking?  Authorizing Provider   omeprazole (PRILOSEC) 40 MG delayed release capsule Take 1 capsule by mouth once daily 3/1/22  Yes DI Harrington CNP   zoster recombinant adjuvanted vaccine [facility]) 50 MCG/0.5ML SUSR injection Inject 0.5 mLs into the muscle See Admin Instructions 1 dose now and repeat in 2-6 months 3/1/22 8/28/22 Yes Elsa Byrd, DI Bonner CNP   Liraglutide (VICTOZA) 18 MG/3ML SOPN SC injection INJECT 1.2 MG SUBCUTANEOUSLY ONCE DAILY 3/1/22  Yes DI Harrington CNP   lurasidone (LATUDA) 20 MG TABS tablet Take 1 tablet by mouth daily 3/1/22  Yes DI Harrington CNP   hydrOXYzine (ATARAX) 25 MG tablet Take 1 tablet by mouth nightly as needed for Anxiety 21  Yes DI Hrarington CNP   insulin glargine (LANTUS SOLOSTAR) 100 UNIT/ML injection pen Inject 54 Units into the skin nightly 21  Yes DI Harrington CNP   atorvastatin (LIPITOR) 40 MG tablet Take 1 tablet by mouth once daily  Patient taking differently: Take 40 mg by mouth daily  21  Yes DI Harrington CNP   glipiZIDE (GLUCOTROL XL) 10 MG extended release tablet Take 1 tablet by mouth once daily  Patient taking differently: Take 10 mg by mouth daily  4/15/21  Yes DI Bazan CNP   lisinopril (PRINIVIL;ZESTRIL) 2.5 MG tablet Take 1 tablet by mouth once daily  Patient taking differently: Take 2.5 mg by mouth daily Take 1 tablet by mouth once daily 4/15/21  Yes DI Bazan CNP   escitalopram (LEXAPRO) 20 MG tablet Take 1 tablet by mouth once daily  Patient taking differently: 20 mg  3/29/21  Yes DI Bazan CNP   HYDROcodone-acetaminophen (NORCO) 5-325 MG per tablet Take 1 tablet by mouth 2 times daily as needed. 9/8/20  Yes Historical Provider, MD   gabapentin (NEURONTIN) 600 MG tablet Take 900 mg by mouth 3 times daily. Takes 1 1/2 tabs TID. 1/17/19  Yes Historical Provider, MD        Allergies:       Codeine, Meperidine, and Other    Social History:     Tobacco:    reports that she quit smoking about 10 years ago. Her smoking use included cigarettes. She has a 10.00 pack-year smoking history. She has never used smokeless tobacco.  Alcohol:      reports no history of alcohol use. Drug Use:  reports no history of drug use. Family History:     Family History   Problem Relation Age of Onset    Diabetes Mother     High Cholesterol Mother     Hypertension Mother     Heart Disease Mother 36    Diabetes Father     Heart Disease Father 48    High Cholesterol Father     Hypertension Father     Diabetes Sister        Review of Systems:     Positive and Negative as described in HPI    Review of Systems   Constitutional: Negative for activity change, appetite change, chills, fatigue, fever, unexpected weight change and weight loss. HENT: Negative for congestion, rhinorrhea and sore throat. Eyes: Negative for blurred vision and visual disturbance. Respiratory: Negative for cough, choking, shortness of breath and wheezing. Cardiovascular: Negative for chest pain and palpitations. Gastrointestinal: Positive for heartburn (occasional).  Negative for abdominal pain, constipation, diarrhea, dysphagia, melena, nausea and vomiting. Endocrine: Negative for polydipsia, polyphagia and polyuria. Genitourinary: Negative for difficulty urinating and dysuria. Musculoskeletal: Negative for gait problem, neck pain and neck stiffness. Skin: Negative for rash. Neurological: Positive for numbness. Negative for dizziness, seizures, syncope, speech difficulty, light-headedness and headaches. Psychiatric/Behavioral: Positive for dysphoric mood. Negative for agitation, behavioral problems, confusion, decreased concentration, hallucinations, self-injury, sleep disturbance and suicidal ideas. The patient is nervous/anxious and has insomnia. The patient is not hyperactive. Physical Exam:   Vitals:  /78 (Position: Sitting)   Pulse 88   Temp 97.8 °F (36.6 °C) (Temporal)   Resp 22   Ht 5' 4\" (1.626 m)   Wt 216 lb 3.2 oz (98.1 kg)   SpO2 100%   BMI 37.11 kg/m²     Physical Exam  Vitals and nursing note reviewed. Constitutional:       General: She is not in acute distress. Appearance: Normal appearance. She is well-developed. She is obese. HENT:      Mouth/Throat:      Mouth: Mucous membranes are moist.   Eyes:      General: No scleral icterus. Conjunctiva/sclera: Conjunctivae normal.   Cardiovascular:      Rate and Rhythm: Normal rate and regular rhythm. Heart sounds: No murmur heard. Pulmonary:      Effort: Pulmonary effort is normal.      Breath sounds: Normal breath sounds. No wheezing. Abdominal:      General: Bowel sounds are normal. There is no distension. Palpations: Abdomen is soft. Tenderness: There is no abdominal tenderness. Musculoskeletal:      Cervical back: Normal range of motion and neck supple. Right lower leg: No edema. Left lower leg: No edema. Lymphadenopathy:      Cervical: No cervical adenopathy. Skin:     General: Skin is warm and dry. Findings: No rash.    Neurological:      Mental Status: She is alert and oriented to person, place, and 5. Need for shingles vaccine  zoster recombinant adjuvanted vaccine [facility]) 50 MCG/0.5ML SUSR injection   6. Need for influenza vaccination  INFLUENZA, MDCK QUADV, 2 YRS AND OLDER, IM, PF, PREFILL SYR OR SDV, 0.5ML (FLUCELVAX QUADV, PF)     Continue all current medications. Refill sent. Start Latuda 20 mg daily. Call in a few weeks with progress. Recommend counseling. We will see her back in 3 months with full labs, sooner if any issues. 1.  Lacie received counseling on the following healthy behaviors: nutrition, exercise and medication adherence  2. Patient given educational materials - see patient instructions  3. Was a self-tracking handout given in paper form or via [employer]? No  If yes, see orders or list here. 4.  Discussed use, benefit, and side effects of prescribed medications. Barriers to medication compliance addressed. All patient questions answered. Pt voiced understanding. 5.  Reviewed prior labs and health maintenance  6. Continue current medications, diet and exercise. Completed Refills   Requested Prescriptions     Signed Prescriptions Disp Refills    omeprazole (PRILOSEC) 40 MG delayed release capsule 90 capsule 1     Sig: Take 1 capsule by mouth once daily    zoster recombinant adjuvanted vaccine (SHINGRIX) 50 MCG/0.5ML SUSR injection 0.5 mL 0     Sig: Inject 0.5 mLs into the muscle See Admin Instructions 1 dose now and repeat in 2-6 months    Liraglutide (VICTOZA) 18 MG/3ML SOPN SC injection 6 mL 5     Sig: INJECT 1.2 MG SUBCUTANEOUSLY ONCE DAILY    lurasidone (LATUDA) 20 MG TABS tablet 90 tablet 1     Sig: Take 1 tablet by mouth daily         Return in about 3 months (around 6/1/2022) for Check up. PHQ-9 score today: (PHQ-9 Total Score: 25), additional evaluation and assessment performed, follow-up plan includes but not limited to: Medication management and Referral to /Specialist  for evaluation and management.

## 2022-03-02 ENCOUNTER — TELEPHONE (OUTPATIENT)
Dept: PRIMARY CARE CLINIC | Age: 57
End: 2022-03-02

## 2022-03-02 NOTE — TELEPHONE ENCOUNTER
Please refax the request with documentation attached. Thank you.
Once 06/01/2022   Hemoglobin A1C Once 05/30/2022   Microalbumin, Ur Once 05/30/2022   Lipid Panel Once 05/30/2022   CBC with Auto Differential Once 05/30/2022       Next Visit Date:  Future Appointments   Date Time Provider Osiel Owen   4/20/2022  1:15 PM [facility]   6/1/2022  1:40 PM Kierra Byrd, APRN - CNP [facility]            Patient Active Problem List:     Dyslipidemia     Nuclear sclerotic cataract of left eye     Controlled type 2 diabetes mellitus with diabetic polyneuropathy, without long-term current use of insulin (HCC)     Vitamin D deficiency     Obstructive sleep apnea     Sepsis due to pneumonia ([address]     Positive FIT (fecal immunochemical test)     Dysthymia     Dyspepsia     Bilateral leg and foot pain     Cervical radiculitis     Chronic left shoulder pain     Complex regional pain syndrome type 1 of left lower extremity     Cervicalgia     Diabetic autonomic neuropathy associated with type 2 diabetes mellitus ([address]     Encounter for long-term opiate analgesic use     Ocular hypertension of left eye     Regular astigmatism, bilateral     Vitreous floaters of both eyes     Cellulitis, abdominal wall     Sepsis due to abdominal wall cellulitis secondary to DM     Dizziness     Acute vertigo with vomiting and inability to stand     Bilateral shoulder pain     Morbid obesity (HCC)     Moderate nonproliferative diabetic retinopathy associated with type 2 diabetes mellitus ([address]     Essential hypertension     Abdominal pain     Superior mesenteric artery stenosis (HCC)     Iliac artery stenosis, left ([address]

## 2022-04-05 DIAGNOSIS — F34.1 DYSTHYMIA: ICD-10-CM

## 2022-04-05 RX ORDER — LISINOPRIL 2.5 MG/1
TABLET ORAL
Qty: 90 TABLET | Refills: 3 | Status: SHIPPED | OUTPATIENT
Start: 2022-04-05

## 2022-04-05 RX ORDER — GLIPIZIDE 10 MG/1
TABLET, FILM COATED, EXTENDED RELEASE ORAL
Qty: 90 TABLET | Refills: 3 | Status: SHIPPED | OUTPATIENT
Start: 2022-04-05

## 2022-04-05 RX ORDER — ESCITALOPRAM OXALATE 20 MG/1
TABLET ORAL
Qty: 90 TABLET | Refills: 3 | Status: SHIPPED | OUTPATIENT
Start: 2022-04-05

## 2022-04-05 NOTE — TELEPHONE ENCOUNTER
Health Maintenance   Topic Date Due    Diabetic microalbuminuria test  08/24/2021    Lipid screen  08/24/2021    Diabetic foot exam  04/14/2022 (Originally 3/12/2021)    Hepatitis B vaccine (1 of 3 - Risk 3-dose series) 03/01/2023 (Originally 8/8/1986)    COVID-19 Vaccine (2 - Booster for Dari series) 03/01/2023 (Originally 8/17/2021)    Hepatitis C screen  03/01/2023 (Originally 1965)    Diabetic retinal exam  03/11/2023 (Originally 1/17/2021)    Shingles Vaccine (2 of 2) 05/03/2022    A1C test (Diabetic or Prediabetic)  07/20/2022    Potassium monitoring  08/05/2022    Creatinine monitoring  08/05/2022    Depression Monitoring  03/01/2023    DTaP/Tdap/Td vaccine (2 - Td or Tdap) 04/28/2023    Breast cancer screen  05/13/2023    Colorectal Cancer Screen  04/10/2029    Pneumococcal 0-64 years Vaccine (2 of 2 - PPSV23) 08/08/2032    Flu vaccine  Completed    HIV screen  Completed    Hepatitis A vaccine  Aged Out    Hib vaccine  Aged Out    Meningococcal (ACWY) vaccine  Aged Out             (applicable per patient's age: Cancer Screenings, Depression Screening, Fall Risk Screening, Immunizations)    Hemoglobin A1C (%)   Date Value   07/20/2021 8.8   01/18/2021 8.7   10/06/2020 9.2 (H)     Microalb/Crt.  Ratio (mcg/mg creat)   Date Value   08/24/2020 17     LDL Cholesterol (mg/dL)   Date Value   08/24/2020 63     AST (U/L)   Date Value   08/05/2021 14     ALT (U/L)   Date Value   08/05/2021 12     BUN (mg/dL)   Date Value   08/05/2021 13      (goal A1C is < 7)   (goal LDL is <100) need 30-50% reduction from baseline     BP Readings from Last 3 Encounters:   03/01/22 132/78   12/05/21 (!) 141/60   09/23/21 (!) 98/52    (goal /80)      All Future Testing planned in [facility]:  Lab Frequency Next Occurrence   ALT Once 06/01/2022   AST Once 43/61/5391   Basic Metabolic Panel Once 15/88/0520   Hemoglobin A1C Once 05/30/2022   Microalbumin, Ur Once 05/30/2022   Lipid Panel Once 05/30/2022 CBC with Auto Differential Once 05/30/2022       Next Visit Date:  Future Appointments   Date Time Provider Osiel Owen   4/20/2022  1:15 PM [facility]   6/1/2022  1:40 PM Cheryle Huh Might, APRN - CNP [facility]            Patient Active Problem List:     Dyslipidemia     Nuclear sclerotic cataract of left eye     Controlled type 2 diabetes mellitus with diabetic polyneuropathy, without long-term current use of insulin ([facility])     Vitamin D deficiency     Obstructive sleep apnea     Sepsis due to pneumonia ([address]     Positive FIT (fecal immunochemical test)     Dysthymia     Dyspepsia     Bilateral leg and foot pain     Cervical radiculitis     Chronic left shoulder pain     Complex regional pain syndrome type 1 of left lower extremity     Cervicalgia     Diabetic autonomic neuropathy associated with type 2 diabetes mellitus ([address]     Encounter for long-term opiate analgesic use     Ocular hypertension of left eye     Regular astigmatism, bilateral     Vitreous floaters of both eyes     Cellulitis, abdominal wall     Sepsis due to abdominal wall cellulitis secondary to DM     Dizziness     Acute vertigo with vomiting and inability to stand     Bilateral shoulder pain     Morbid obesity (HCC)     Moderate nonproliferative diabetic retinopathy associated with type 2 diabetes mellitus ([address]     Essential hypertension     Abdominal pain     Superior mesenteric artery stenosis (HCC)     Iliac artery stenosis, left ([address]

## 2022-05-09 DIAGNOSIS — F34.1 DYSTHYMIA: ICD-10-CM

## 2022-05-09 DIAGNOSIS — F43.22 TRANSIENT ADJUSTMENT REACTION WITH ANXIETY: ICD-10-CM

## 2022-05-09 DIAGNOSIS — K21.9 GASTROESOPHAGEAL REFLUX DISEASE WITHOUT ESOPHAGITIS: ICD-10-CM

## 2022-05-09 RX ORDER — OMEPRAZOLE 40 MG/1
CAPSULE, DELAYED RELEASE ORAL
Qty: 30 CAPSULE | Refills: 0 | OUTPATIENT
Start: 2022-05-09

## 2022-05-09 RX ORDER — LORAZEPAM 1 MG/1
TABLET ORAL
Qty: 30 TABLET | Refills: 0 | Status: SHIPPED | OUTPATIENT
Start: 2022-05-09 | End: 2022-06-08

## 2022-05-09 RX ORDER — ESCITALOPRAM OXALATE 20 MG/1
TABLET ORAL
Qty: 90 TABLET | Refills: 0 | OUTPATIENT
Start: 2022-05-09

## 2022-05-09 NOTE — TELEPHONE ENCOUNTER
Health Maintenance   Topic Date Due    Pneumococcal 0-64 years Vaccine (2 - PCV) 09/03/2020    Diabetic foot exam  03/12/2021    Diabetic microalbuminuria test  08/24/2021    Lipids  08/24/2021    Shingles vaccine (2 of 2) 05/03/2022    Hepatitis B vaccine (1 of 3 - Risk 3-dose series) 03/01/2023 (Originally 8/8/1984)    COVID-19 Vaccine (2 - Booster for Dari series) 03/01/2023 (Originally 8/17/2021)    Hepatitis C screen  03/01/2023 (Originally 8/8/1985)    Diabetic retinal exam  03/11/2023 (Originally 1/5/2023)    A1C test (Diabetic or Prediabetic)  07/20/2022    Potassium  08/05/2022    Creatinine  08/05/2022    Depression Monitoring  03/01/2023    DTaP/Tdap/Td vaccine (2 - Td or Tdap) 04/28/2023    Breast cancer screen  05/13/2023    Colorectal Cancer Screen  04/10/2029    Flu vaccine  Completed    HIV screen  Completed    Hepatitis A vaccine  Aged Out    Hib vaccine  Aged Out    Meningococcal (ACWY) vaccine  Aged Out             (applicable per patient's age: Cancer Screenings, Depression Screening, Fall Risk Screening, Immunizations)    Hemoglobin A1C (%)   Date Value   07/20/2021 8.8   01/18/2021 8.7   10/06/2020 9.2 (H)     Microalb/Crt.  Ratio (mcg/mg creat)   Date Value   08/24/2020 17     LDL Cholesterol (mg/dL)   Date Value   08/24/2020 63     AST (U/L)   Date Value   08/05/2021 14     ALT (U/L)   Date Value   08/05/2021 12     BUN (mg/dL)   Date Value   08/05/2021 13      (goal A1C is < 7)   (goal LDL is <100) need 30-50% reduction from baseline     BP Readings from Last 3 Encounters:   03/01/22 132/78   12/05/21 (!) 141/60   09/23/21 (!) 98/52    (goal /80)      All Future Testing planned in [facility]:  Lab Frequency Next Occurrence   ALT Once 06/01/2022   AST Once 31/01/9511   Basic Metabolic Panel Once 87/49/0041   Hemoglobin A1C Once 05/30/2022   Microalbumin, Ur Once 05/30/2022   Lipid Panel Once 05/30/2022   CBC with Auto Differential Once 05/30/2022       Next Visit Date:  Future Appointments   Date Time Provider Osiel Casei   6/1/2022  1:40 PM Cheryle Huh Might[facility] APR[facility] - [facility]NP [facility]            Patient Active Problem List:     Dyslipidemia     Nuclear sclerotic cataract of left eye     Controlled type 2 diabetes mellitus with diabetic polyneuropathy, without long-term current use of insulin (HCC)     Vitamin D deficiency     Obstructive sleep apnea     Sepsis due to pneumonia ([address]     Positive FIT (fecal immunochemical test)     Dysthymia     Dyspepsia     Bilateral leg and foot pain     Cervical radiculitis     Chronic left shoulder pain     Complex regional pain syndrome type 1 of left lower extremity     Cervicalgia     Diabetic autonomic neuropathy associated with type 2 diabetes mellitus ([address]     Encounter for long-term opiate analgesic use     Ocular hypertension of left eye     Regular astigmatism, bilateral     Vitreous floaters of both eyes     Cellulitis, abdominal wall     Sepsis due to abdominal wall cellulitis secondary to DM     Dizziness     Acute vertigo with vomiting and inability to stand     Bilateral shoulder pain     Morbid obesity (HCC)     Moderate nonproliferative diabetic retinopathy associated with type 2 diabetes mellitus ([address]     Essential hypertension     Abdominal pain     Superior mesenteric artery stenosis (HCC)     Iliac artery stenosis, left ([address]

## 2022-05-09 NOTE — TELEPHONE ENCOUNTER
Controlled Substance Monitoring:    Acute and Chronic Pain Monitoring:   RX Monitoring 5/9/2022   Periodic Controlled Substance Monitoring No signs of potential drug abuse or diversion identified.

## 2022-05-12 ENCOUNTER — TELEPHONE (OUTPATIENT)
Dept: PRIMARY CARE CLINIC | Age: 57
End: 2022-05-12

## 2022-05-18 ENCOUNTER — TELEPHONE (OUTPATIENT)
Dept: PRIMARY CARE CLINIC | Age: 57
End: 2022-05-18

## 2022-05-18 RX ORDER — ARIPIPRAZOLE 5 MG/1
5 TABLET ORAL DAILY
Qty: 30 TABLET | Refills: 2 | Status: SHIPPED | OUTPATIENT
Start: 2022-05-18 | End: 2022-09-28 | Stop reason: ALTCHOICE

## 2022-05-18 NOTE — TELEPHONE ENCOUNTER
Patient called in and was asking about her Latuda. I looked it looks like it was denied on 3/2/22 Rachel Melinamalaika sent in for a reconsideration. It came back again denied. I called [facility] and she told me that Roselia Pay is only FDA approved to be used for bipolar disorder or schizophrenia.    Please advise

## 2022-05-19 NOTE — TELEPHONE ENCOUNTER
Attempted to call patient to let her know an alternative medication was sent yesterday and today phone number is not working.

## 2022-05-24 ENCOUNTER — OFFICE VISIT (OUTPATIENT)
Dept: PRIMARY CARE CLINIC | Age: 57
End: 2022-05-24
Payer: COMMERCIAL

## 2022-05-24 VITALS
WEIGHT: 221.4 LBS | DIASTOLIC BLOOD PRESSURE: 70 MMHG | TEMPERATURE: 97.9 F | OXYGEN SATURATION: 95 % | RESPIRATION RATE: 18 BRPM | BODY MASS INDEX: 38 KG/M2 | SYSTOLIC BLOOD PRESSURE: 136 MMHG | HEART RATE: 91 BPM

## 2022-05-24 DIAGNOSIS — J18.9 COMMUNITY ACQUIRED PNEUMONIA, UNSPECIFIED LATERALITY: Primary | ICD-10-CM

## 2022-05-24 PROCEDURE — 3017F COLORECTAL CA SCREEN DOC REV: CPT | Performed by: NURSE PRACTITIONER

## 2022-05-24 PROCEDURE — G8417 CALC BMI ABV UP PARAM F/U: HCPCS | Performed by: NURSE PRACTITIONER

## 2022-05-24 PROCEDURE — G8427 DOCREV CUR MEDS BY ELIG CLIN: HCPCS | Performed by: NURSE PRACTITIONER

## 2022-05-24 PROCEDURE — 1036F TOBACCO NON-USER: CPT | Performed by: NURSE PRACTITIONER

## 2022-05-24 PROCEDURE — 99214 OFFICE O/P EST MOD 30 MIN: CPT | Performed by: NURSE PRACTITIONER

## 2022-05-24 RX ORDER — ONDANSETRON 4 MG/1
4 TABLET, FILM COATED ORAL 3 TIMES DAILY PRN
Qty: 15 TABLET | Refills: 0 | Status: SHIPPED | OUTPATIENT
Start: 2022-05-24

## 2022-05-24 RX ORDER — GUAIFENESIN 600 MG/1
600 TABLET, EXTENDED RELEASE ORAL 2 TIMES DAILY
Qty: 30 TABLET | Refills: 0 | Status: SHIPPED | OUTPATIENT
Start: 2022-05-24 | End: 2022-06-08

## 2022-05-24 RX ORDER — PREDNISONE 20 MG/1
20 TABLET ORAL 2 TIMES DAILY
Qty: 10 TABLET | Refills: 0 | Status: SHIPPED | OUTPATIENT
Start: 2022-05-24 | End: 2022-05-29

## 2022-05-24 RX ORDER — AZITHROMYCIN 250 MG/1
250 TABLET, FILM COATED ORAL SEE ADMIN INSTRUCTIONS
Qty: 6 TABLET | Refills: 0 | Status: SHIPPED | OUTPATIENT
Start: 2022-05-24 | End: 2022-05-29

## 2022-05-24 RX ORDER — ALBUTEROL SULFATE 90 UG/1
2 AEROSOL, METERED RESPIRATORY (INHALATION) 4 TIMES DAILY PRN
Qty: 18 G | Refills: 0 | Status: SHIPPED | OUTPATIENT
Start: 2022-05-24 | End: 2022-06-27 | Stop reason: SDUPTHER

## 2022-05-24 ASSESSMENT — PATIENT HEALTH QUESTIONNAIRE - PHQ9
1. LITTLE INTEREST OR PLEASURE IN DOING THINGS: 0
4. FEELING TIRED OR HAVING LITTLE ENERGY: 0
10. IF YOU CHECKED OFF ANY PROBLEMS, HOW DIFFICULT HAVE THESE PROBLEMS MADE IT FOR YOU TO DO YOUR WORK, TAKE CARE OF THINGS AT HOME, OR GET ALONG WITH OTHER PEOPLE: 0
3. TROUBLE FALLING OR STAYING ASLEEP: 0
2. FEELING DOWN, DEPRESSED OR HOPELESS: 0
SUM OF ALL RESPONSES TO PHQ QUESTIONS 1-9: 0
SUM OF ALL RESPONSES TO PHQ QUESTIONS 1-9: 0
8. MOVING OR SPEAKING SO SLOWLY THAT OTHER PEOPLE COULD HAVE NOTICED. OR THE OPPOSITE, BEING SO FIGETY OR RESTLESS THAT YOU HAVE BEEN MOVING AROUND A LOT MORE THAN USUAL: 0
9. THOUGHTS THAT YOU WOULD BE BETTER OFF DEAD, OR OF HURTING YOURSELF: 0
SUM OF ALL RESPONSES TO PHQ QUESTIONS 1-9: 0
6. FEELING BAD ABOUT YOURSELF - OR THAT YOU ARE A FAILURE OR HAVE LET YOURSELF OR YOUR FAMILY DOWN: 0
7. TROUBLE CONCENTRATING ON THINGS, SUCH AS READING THE NEWSPAPER OR WATCHING TELEVISION: 0
5. POOR APPETITE OR OVEREATING: 0
SUM OF ALL RESPONSES TO PHQ QUESTIONS 1-9: 0
SUM OF ALL RESPONSES TO PHQ9 QUESTIONS 1 & 2: 0

## 2022-05-24 ASSESSMENT — ENCOUNTER SYMPTOMS
COUGH: 1
EYES NEGATIVE: 1
DIARRHEA: 1
CHEST TIGHTNESS: 1
SHORTNESS OF BREATH: 1
VOMITING: 1
NAUSEA: 1
WHEEZING: 1

## 2022-05-24 NOTE — PATIENT INSTRUCTIONS
SURVEY:     You may be receiving a survey from [employer] regarding your visit today. Please complete the survey to enable us to provide the highest quality of care to you and your family. If you cannot score us a very good on any question, please call the office to discuss how we could have made your experience a very good one. Thank you. Levy Byrd, APRN-CNP  Darian Rodríguez, CNP  Gabriel Juan, LPN  Yoan Child, EMILY Echeverria, CMA  Bhumika, CMA  Elizabeth, PCA    Patient Education        Pneumonia: Care Instructions  Overview     Pneumonia is an infection of the lungs. Most cases are caused by infectionsfrom bacteria or viruses. Pneumonia may be mild or very severe. If it is caused by bacteria, you will be treated with antibiotics. It may take a few weeks to a few months to recover fully from pneumonia, depending on how sick you were and whether your overallhealth is good. Follow-up care is a key part of your treatment and safety. Be sure to make and go to all appointments, and call your doctor if you are having problems. It's also a good idea to know your test results and keep alist of the medicines you take. How can you care for yourself at home?  Take your antibiotics exactly as directed. Do not stop taking the medicine just because you are feeling better. You need to take the full course of antibiotics.  Take your medicines exactly as prescribed. Call your doctor if you think you are having a problem with your medicine.  Get plenty of rest and sleep. You may feel weak and tired for a while, but your energy level will improve with time.  To prevent dehydration, drink plenty of fluids. Choose water and other clear liquids. If you have kidney, heart, or liver disease and have to limit fluids, talk with your doctor before you increase the amount of fluids you drink.  Take care of your cough so you can rest. A cough that brings up mucus from your lungs is common with pneumonia.  It is one way your body gets rid of the infection. But if coughing keeps you from resting or causes severe fatigue and chest-wall pain, talk to your doctor. Your doctor may suggest that you take a medicine to reduce the cough.  Use a vaporizer or humidifier to add moisture to your bedroom. Follow the directions for cleaning the machine.  Do not smoke or allow others to smoke around you. Smoke will make your cough last longer. If you need help quitting, talk to your doctor about stop-smoking programs and medicines. These can increase your chances of quitting for good.  Take an over-the-counter pain medicine, such as acetaminophen (Tylenol), ibuprofen (Advil, Motrin), or naproxen (Aleve). Read and follow all instructions on the label.  Do not take two or more pain medicines at the same time unless the doctor told you to. Many pain medicines have acetaminophen, which is Tylenol. Too much acetaminophen (Tylenol) can be harmful.  If you were given a spirometer to measure how well your lungs are working, use it as instructed. This can help your doctor tell how your recovery is going.  To prevent pneumonia in the future, talk to your doctor about getting a yearly flu vaccine and a pneumococcal vaccine. When should you call for help? Call 911 anytime you think you may need emergency care. For example, call if:     You have severe trouble breathing. Call your doctor now or seek immediate medical care if:     You cough up dark brown or bloody mucus (sputum).      You have new or worse trouble breathing.      You are dizzy or lightheaded, or you feel like you may faint. Watch closely for changes in your health, and be sure to contact your doctor if:     You have a new or higher fever.      You are coughing more deeply or more often.      You are not getting better after 2 days (48 hours).      You do not get better as expected. Where can you learn more? Go to [email or web address] and sign in to your MyChart account. Enter D336 in the Located within [facility] box to learn more about \"Pneumonia: Care Instructions. \"     If you do not have an account, please click on the \"Sign Up Now\" link. Current as of: July 6, 2021               Content Version: 13.2  © 8863-7201 [employer], Incorporated. Care instructions adapted under license by [facility] [place] ([facility]). If you have questions about a medical condition or this instruction, always ask your healthcare professional. [address] any warranty or liability for your use of this information.

## 2022-05-24 NOTE — PROGRESS NOTES
[facility]  Jose Juan Carter, [facility]  [address]  Dept: [phone]  Dept Fax: [phone]      Name: Danette Martin  : 1965         Chief Complaint:     Chief Complaint   Patient presents with    Chest Congestion     x 9 days.  Cough     x 9 days. History of Present Illness:      Danette Martin is a 49 y.o.  female who presents with Chest Congestion (x 9 days. ) and Cough (x 9 days. )    [place] is here today for a sick visit. She started with sinus congestion that she thought was allergies. She has been sick for 9 days now. It has moved to her chest.  She states she feels chest tightness. Her cough has mainly been dry. She states she has had some shortness of breath. She has had an occasional wheeze. Complains of chest tightness with deep breaths. She denies a fever but has had chills and sweats. She is more tired than normal.  She has nausea and did have emesis 3 times in the week. She has lost appetite. She has a little bit of a headache. Denies ear pain or throat pain. She has used Nyquil, Claritin and Sudafed with improvement in sinuses only. Past Medical History:     Past Medical History:   Diagnosis Date    Anxiety     Arthritis     Depression     Diabetic neuropathy (HCC)     Esophageal reflux     Heart burn     Limitation of joint motion     Muscle weakness     Sleep apnea     DOES NOT USE A MACHINE.  DIAGNOSED > 15 YEARS AGO WITH SLEEP APNEA    Sleepiness     Vertigo       Reviewed all health maintenance requirements and ordered appropriate tests  Health Maintenance Due   Topic Date Due    Diabetic foot exam  2021    Diabetic microalbuminuria test  2021    Lipids  2021    Shingles vaccine (2 of 2) 2022       Past Surgical History:     Past Surgical History:   Procedure Laterality Date    ABSCESS DRAINAGE Left 2014    BUTTOCK    APPENDECTOMY      CARPAL TUNNEL RELEASE Left 2018    CATARACT REMOVAL WITH IMPLANT Bilateral 10/24/2016     SECTION      CHOLECYSTECTOMY      COLONOSCOPY  2019    Dr. Maninder Machado -normal poor prep    COLONOSCOPY N/A 3/25/2019    COLORECTAL CANCER SCREENING, NOT HIGH RISK performed by Beto Vilchis MD at [address] [place] [address] COLONOSCOPY N/A 4/10/2019    -diverticulosis,hemorrhoids,lipoma at ileocecal valve    HERNIA REPAIR Right     [facility]    HERNIA REPAIR  2006    HERNIA REPAIR WITH HYSTERECTOMY SURGERY    HYSTERECTOMY      LAPAROSCOPY          Medications:       Prior to Admission medications    Medication Sig Start Date End Date Taking?  Authorizing Provider   azithromycin (ZITHROMAX) 250 MG tablet Take 1 tablet by mouth See Admin Instructions for 5 days 500mg on day 1 followed by 250mg on days 2 - 5 22 Yes DI Bone CNP   guaiFENesin ([address]) 600 MG extended release tablet Take 1 tablet by mouth 2 times daily for 15 days 22 Yes DI Bone CNP   albuterol sulfate HFA (VENTOLIN HFA) 108 (90 Base) MCG/ACT inhaler Inhale 2 puffs into the lungs 4 times daily as needed for Wheezing 22  Yes DI Bone CNP   ondansetron (ZOFRAN) 4 MG tablet Take 1 tablet by mouth 3 times daily as needed for Nausea or Vomiting 22  Yes DI Bone CNP   predniSONE (DELTASONE) 20 MG tablet Take 1 tablet by mouth 2 times daily for 5 days 22 Yes DI Bone CNP   ARIPiprazole (ABILIFY) 5 MG tablet Take 1 tablet by mouth daily 22  Yes Eliseo Nova Might, APRN - CNP   LORazepam (ATIVAN) 1 MG tablet TAKE 1 TABLET BY MOUTH TWICE DAILY AS NEEDED FOR ANXIETY FOR  UP  TO  15  DAYS 22 Yes Eliseo Nova Might, APRN - CNP   escitalopram (LEXAPRO) 20 MG tablet Take 1 tablet by mouth once daily 22  Yes Eliseo Nova Might, DI Bonner CNP   lisinopril (PRINIVIL;ZESTRIL) 2.5 MG tablet Take 1 tablet by mouth once daily 22  Yes Eliseo Byrd, DI Bonenr CNP glipiZIDE (GLUCOTROL XL) 10 MG extended release tablet Take 1 tablet by mouth once daily 4/5/22  Yes DI Richmond CNP   omeprazole (PRILOSEC) 40 MG delayed release capsule Take 1 capsule by mouth once daily 3/1/22  Yes DI Richmond CNP   zoster recombinant adjuvanted vaccine [facility]) 50 MCG/0.5ML SUSR injection Inject 0.5 mLs into the muscle See Admin Instructions 1 dose now and repeat in 2-6 months 3/1/22 8/28/22 Yes DI Richmond CNP   Liraglutide (VICTOZA) 18 MG/3ML SOPN SC injection INJECT 1.2 MG SUBCUTANEOUSLY ONCE DAILY 3/1/22  Yes DI Richmond CNP   hydrOXYzine (ATARAX) 25 MG tablet Take 1 tablet by mouth nightly as needed for Anxiety 7/20/21  Yes DI Richmond CNP   insulin glargine (LANTUS SOLOSTAR) 100 UNIT/ML injection pen Inject 54 Units into the skin nightly 7/20/21  Yes DI Richmond CNP   atorvastatin (LIPITOR) 40 MG tablet Take 1 tablet by mouth once daily  Patient taking differently: Take 40 mg by mouth daily  6/28/21  Yes DI Richmond CNP   HYDROcodone-acetaminophen (NORCO) 5-325 MG per tablet Take 1 tablet by mouth 2 times daily as needed. 9/8/20  Yes Historical Provider, MD   gabapentin (NEURONTIN) 600 MG tablet Take 900 mg by mouth 3 times daily. Takes 1 1/2 tabs TID. 1/17/19  Yes Historical Provider, MD        Allergies:       Codeine, Meperidine, and Other    Social History:     Tobacco:    reports that she quit smoking about 10 years ago. Her smoking use included cigarettes. She has a 10.00 pack-year smoking history. She has never used smokeless tobacco.  Alcohol:      reports no history of alcohol use. Drug Use:  reports no history of drug use.     Family History:     Family History   Problem Relation Age of Onset    Diabetes Mother     High Cholesterol Mother     Hypertension Mother     Heart Disease Mother 36    Diabetes Father     Heart Disease Father 48    High Cholesterol Father     Hypertension Father     Diabetes Capillary Refill: Capillary refill takes less than 2 seconds. Neurological:      General: No focal deficit present. Mental Status: She is alert and oriented to person, place, and time. Psychiatric:         Mood and Affect: Mood normal.         Behavior: Behavior normal.         Data:     Lab Results   Component Value Date     08/05/2021    K 4.7 08/05/2021     08/05/2021    CO2 22 08/05/2021    BUN 13 08/05/2021    CREATININE 0.62 08/05/2021    GLUCOSE 189 08/05/2021    GLUCOSE 351 03/11/2012    PROT 6.2 08/05/2021    LABALBU 3.6 08/05/2021    BILITOT 0.35 08/05/2021    ALKPHOS 142 08/05/2021    AST 14 08/05/2021    ALT 12 08/05/2021     Lab Results   Component Value Date    WBC 10.1 08/05/2021    RBC 4.25 08/05/2021    RBC 5.34 03/11/2012    HGB 12.5 08/05/2021    HCT 39.1 08/05/2021    MCV 92.0 08/05/2021    MCH 29.4 08/05/2021    MCHC 32.0 08/05/2021    RDW 13.0 08/05/2021     08/05/2021     03/11/2012    MPV 10.6 08/05/2021     Lab Results   Component Value Date    TSH 0.88 02/06/2013     Lab Results   Component Value Date    CHOL 160 08/24/2020    HDL 27 08/24/2020    LABA1C 8.8 07/20/2021       Assessment/Plan:      Diagnosis Orders   1. Community acquired pneumonia, unspecified laterality  azithromycin (ZITHROMAX) 250 MG tablet    guaiFENesin (MUCINEX) 600 MG extended release tablet    albuterol sulfate HFA (VENTOLIN HFA) 108 (90 Base) MCG/ACT inhaler    predniSONE (DELTASONE) 20 MG tablet     Will treat today as CAP. Start Azithromycin, Mucinex, and Prednisone as prescribed. Use Albuterol as needed. Zofran as needed for nausea. Increase fluids and rest.  Return if worsening of symptoms and go to ER if any difficulty breathing. 1.  Lacie received counseling on the following healthy behaviors: nutrition, exercise and medication adherence  2. Patient given educational materials - see patient instructions  3.   Was a self-tracking handout given in paper form or via MyChart? No  If yes, see orders or list here. 4.  Discussed use, benefit, and side effects of prescribed medications. Barriers to medication compliance addressed. All patient questions answered. Pt voiced understanding. 5.  Reviewed prior labs and health maintenance  6. Continue current medications, diet and exercise. Completed Refills   Requested Prescriptions     Signed Prescriptions Disp Refills    azithromycin (ZITHROMAX) 250 MG tablet 6 tablet 0     Sig: Take 1 tablet by mouth See Admin Instructions for 5 days 500mg on day 1 followed by 250mg on days 2 - 5    guaiFENesin (MUCINEX) 600 MG extended release tablet 30 tablet 0     Sig: Take 1 tablet by mouth 2 times daily for 15 days    albuterol sulfate HFA (VENTOLIN HFA) 108 (90 Base) MCG/ACT inhaler 18 g 0     Sig: Inhale 2 puffs into the lungs 4 times daily as needed for Wheezing    ondansetron (ZOFRAN) 4 MG tablet 15 tablet 0     Sig: Take 1 tablet by mouth 3 times daily as needed for Nausea or Vomiting    predniSONE (DELTASONE) 20 MG tablet 10 tablet 0     Sig: Take 1 tablet by mouth 2 times daily for 5 days         No follow-ups on file.

## 2022-06-23 DIAGNOSIS — E11.42 CONTROLLED TYPE 2 DIABETES MELLITUS WITH DIABETIC POLYNEUROPATHY, WITHOUT LONG-TERM CURRENT USE OF INSULIN (HCC): ICD-10-CM

## 2022-06-24 RX ORDER — INSULIN GLARGINE-YFGN 100 [IU]/ML
INJECTION, SOLUTION SUBCUTANEOUS
Qty: 15 ML | Refills: 0 | Status: SHIPPED | OUTPATIENT
Start: 2022-06-24 | End: 2022-07-05 | Stop reason: CLARIF

## 2022-06-24 NOTE — TELEPHONE ENCOUNTER
Health Maintenance   Topic Date Due    Diabetic foot exam  03/12/2021    Diabetic microalbuminuria test  08/24/2021    Lipids  08/24/2021    A1C test (Diabetic or Prediabetic)  07/20/2022    Hepatitis B vaccine (1 of 3 - Risk 3-dose series) 03/01/2023 (Originally 8/8/1986)    COVID-19 Vaccine (2 - Booster for Dari series) 03/01/2023 (Originally 8/17/2021)    Hepatitis C screen  03/01/2023 (Originally 8/8/1985)    Diabetic retinal exam  03/11/2023 (Originally 1/5/2023)    Pneumococcal 0-64 years Vaccine (2 - PCV) 05/24/2023 (Originally 9/3/2020)    DTaP/Tdap/Td vaccine (2 - Td or Tdap) 04/28/2023    Breast cancer screen  05/13/2023    Depression Monitoring  05/24/2023    Colorectal Cancer Screen  04/10/2029    Flu vaccine  Completed    Shingles vaccine  Completed    HIV screen  Completed    Hepatitis A vaccine  Aged Out    Hib vaccine  Aged Out    Meningococcal (ACWY) vaccine  Aged Out             (applicable per patient's age: Cancer Screenings, Depression Screening, Fall Risk Screening, Immunizations)    Hemoglobin A1C (%)   Date Value   07/20/2021 8.8   01/18/2021 8.7   10/06/2020 9.2 (H)     Microalb/Crt.  Ratio (mcg/mg creat)   Date Value   08/24/2020 17     LDL Cholesterol (mg/dL)   Date Value   08/24/2020 63     AST (U/L)   Date Value   08/05/2021 14     ALT (U/L)   Date Value   08/05/2021 12     BUN (mg/dL)   Date Value   08/05/2021 13      (goal A1C is < 7)   (goal LDL is <100) need 30-50% reduction from baseline     BP Readings from Last 3 Encounters:   05/24/22 136/70   03/01/22 132/78   12/05/21 (!) 141/60    (goal /80)      All Future Testing planned in [facility]:  Lab Frequency Next Occurrence   ALT Once 06/01/2022   AST Once 35/26/1128   Basic Metabolic Panel Once 91/60/5285   Hemoglobin A1C Once 05/30/2022   Microalbumin, Ur Once 05/30/2022   Lipid Panel Once 05/30/2022   CBC with Auto Differential Once 05/30/2022       Next Visit Date:  Future Appointments   Date Time Provider Osiel Owen   6/27/2022  2:45 PM DI [facility]            Patient Active Problem List:     Dyslipidemia     Nuclear sclerotic cataract of left eye     Controlled type 2 diabetes mellitus with diabetic polyneuropathy, without long-term current use of insulin (HCC)     Vitamin D deficiency     Obstructive sleep apnea     Sepsis due to pneumonia ([address]     Positive FIT (fecal immunochemical test)     Dysthymia     Dyspepsia     Bilateral leg and foot pain     Cervical radiculitis     Chronic left shoulder pain     Complex regional pain syndrome type 1 of left lower extremity     Cervicalgia     Diabetic autonomic neuropathy associated with type 2 diabetes mellitus ([address]     Encounter for long-term opiate analgesic use     Ocular hypertension of left eye     Regular astigmatism, bilateral     Vitreous floaters of both eyes     Cellulitis, abdominal wall     Sepsis due to abdominal wall cellulitis secondary to DM     Dizziness     Acute vertigo with vomiting and inability to stand     Bilateral shoulder pain     Morbid obesity (HCC)     Moderate nonproliferative diabetic retinopathy associated with type 2 diabetes mellitus ([address]     Essential hypertension     Abdominal pain     Superior mesenteric artery stenosis (HCC)     Iliac artery stenosis, left ([address]

## 2022-06-27 ENCOUNTER — OFFICE VISIT (OUTPATIENT)
Dept: PULMONOLOGY | Age: 57
End: 2022-06-27
Payer: COMMERCIAL

## 2022-06-27 VITALS
OXYGEN SATURATION: 96 % | WEIGHT: 224.1 LBS | BODY MASS INDEX: 38.26 KG/M2 | HEART RATE: 93 BPM | HEIGHT: 64 IN | RESPIRATION RATE: 18 BRPM | DIASTOLIC BLOOD PRESSURE: 60 MMHG | TEMPERATURE: 97.9 F | SYSTOLIC BLOOD PRESSURE: 135 MMHG

## 2022-06-27 DIAGNOSIS — Z87.891 STOPPED SMOKING WITH GREATER THAN 40 PACK YEAR HISTORY: ICD-10-CM

## 2022-06-27 DIAGNOSIS — J44.9 COPD, SEVERITY TO BE DETERMINED (HCC): ICD-10-CM

## 2022-06-27 DIAGNOSIS — R91.1 LUNG NODULE SEEN ON IMAGING STUDY: ICD-10-CM

## 2022-06-27 DIAGNOSIS — E66.01 CLASS 2 SEVERE OBESITY DUE TO EXCESS CALORIES WITH SERIOUS COMORBIDITY AND BODY MASS INDEX (BMI) OF 37.0 TO 37.9 IN ADULT (HCC): ICD-10-CM

## 2022-06-27 DIAGNOSIS — R91.1 LUNG NODULE: ICD-10-CM

## 2022-06-27 DIAGNOSIS — G47.33 OSA (OBSTRUCTIVE SLEEP APNEA): Primary | ICD-10-CM

## 2022-06-27 DIAGNOSIS — J18.9 COMMUNITY ACQUIRED PNEUMONIA, UNSPECIFIED LATERALITY: ICD-10-CM

## 2022-06-27 PROCEDURE — 99214 OFFICE O/P EST MOD 30 MIN: CPT | Performed by: NURSE PRACTITIONER

## 2022-06-27 PROCEDURE — G8417 CALC BMI ABV UP PARAM F/U: HCPCS | Performed by: NURSE PRACTITIONER

## 2022-06-27 PROCEDURE — 3023F SPIROM DOC REV: CPT | Performed by: NURSE PRACTITIONER

## 2022-06-27 PROCEDURE — 1036F TOBACCO NON-USER: CPT | Performed by: NURSE PRACTITIONER

## 2022-06-27 PROCEDURE — G8427 DOCREV CUR MEDS BY ELIG CLIN: HCPCS | Performed by: NURSE PRACTITIONER

## 2022-06-27 PROCEDURE — 3017F COLORECTAL CA SCREEN DOC REV: CPT | Performed by: NURSE PRACTITIONER

## 2022-06-27 RX ORDER — ALBUTEROL SULFATE 90 UG/1
2 AEROSOL, METERED RESPIRATORY (INHALATION) 4 TIMES DAILY PRN
Qty: 18 G | Refills: 0 | Status: SHIPPED | OUTPATIENT
Start: 2022-06-27

## 2022-06-27 ASSESSMENT — ENCOUNTER SYMPTOMS
DIARRHEA: 0
STRIDOR: 0
CHEST TIGHTNESS: 0
NAUSEA: 0
VOMITING: 0
RHINORRHEA: 0
CONSTIPATION: 0
WHEEZING: 0
SINUS PAIN: 0
SINUS PRESSURE: 0

## 2022-06-27 NOTE — PATIENT INSTRUCTIONS
SURVEY:    You may be receiving a survey from [employer] regarding your visit today. Please complete the survey to enable us to provide the highest quality of care to you and your family. If you cannot score us a very good on any question, please call the office to discuss how we could have made your experience a very good one. Thank you.

## 2022-06-27 NOTE — PROGRESS NOTES
adenopathy. Extensive consolidating infiltrate in the right upper lobe new compared to prior imaging. No pleural thickening or pleural effusion. No pneumothorax. Sleep Study:  Home sleep study 2020: Respiratory event index was 36.5 events per hour of recording time with a total of 148 apneas and 240 hypopneas each creating at least a 4% O2 desaturation. Minimum SPO2 was 67%. Patient recommended for titration study. Laboratory Evaluation:    Immunizations:   Immunization History   Administered Date(s) Administered    COVID-19, J&J, PF, 0.5 mL 2021    Influenza Vaccine, unspecified formulation 2016    Influenza Virus Vaccine 2016, 2019    Influenza, MDCK Quadv, IM, PF (Flucelvax 2 yrs and older) 2022    Influenza, Quadv, IM, PF (6 mo and older Fluzone, Flulaval, Fluarix, and 3 yrs and older Afluria) 2019    Pneumococcal Polysaccharide ([id number]) 2018, 2019    Tdap (Boostrix, Adacel) 2013    Zoster Recombinant (Shingrix) 2022, 2022        No flowsheet data found. /60 (Site: Left Upper Arm, Position: Sitting, Cuff Size: Large Adult)   Pulse 93   Temp 97.9 °F (36.6 °C) (Temporal)   Resp 18   Ht 5' 4\" (1.626 m)   Wt 224 lb 1.6 oz (101.7 kg)   SpO2 96%   BMI 38.47 kg/m²     Past Medical History:   Diagnosis Date    Anxiety     Arthritis     Depression     Diabetic neuropathy (HCC)     Esophageal reflux     Heart burn     Limitation of joint motion     Muscle weakness     Sleep apnea     DOES NOT USE A MACHINE.  DIAGNOSED > 15 YEARS AGO WITH SLEEP APNEA    Sleepiness     Vertigo      Past Surgical History:   Procedure Laterality Date    ABSCESS DRAINAGE Left     BUTTOCK    APPENDECTOMY      CARPAL TUNNEL RELEASE Left 2018    CATARACT REMOVAL WITH IMPLANT Bilateral 10/24/2016     SECTION      CHOLECYSTECTOMY      COLONOSCOPY  2019    Dr. Sindy Pelayo -normal poor prep    COLONOSCOPY N/A 3/25/2019    COLORECTAL CANCER SCREENING, NOT HIGH RISK performed by Medina Shane MD at [address] COLONOSCOPY N/A 4/10/2019    -diverticulosis,hemorrhoids,lipoma at ileocecal valve    HERNIA REPAIR Right     IHR    HERNIA REPAIR  2006    HERNIA REPAIR WITH HYSTERECTOMY SURGERY    HYSTERECTOMY (CERVIX STATUS UNKNOWN)      LAPAROSCOPY       Family History   Problem Relation Age of Onset    Diabetes Mother     High Cholesterol Mother     Hypertension Mother     Heart Disease Mother 36    Diabetes Father     Heart Disease Father 48    High Cholesterol Father     Hypertension Father     Diabetes Sister        Social History     Socioeconomic History    Marital status: Legally      Spouse name: Not on file    Number of children: Not on file    Years of education: Not on file    Highest education level: Not on file   Occupational History    Not on file   Tobacco Use    Smoking status: Former Smoker     Packs/day: 1.00     Years: 10.00     Pack years: 10.00     Types: Cigarettes     Quit date: 2011     Years since quittin.0    Smokeless tobacco: Never Used   Vaping Use    Vaping Use: Never used   Substance and Sexual Activity    Alcohol use: No    Drug use: No    Sexual activity: Not on file   Other Topics Concern    Not on file   Social History Narrative    Not on file     Social Determinants of Health     Financial Resource Strain: Low Risk     Difficulty of Paying Living Expenses: Not hard at all   Food Insecurity: No Food Insecurity    Worried About Running Out of Food in the Last Year: Never true    [address] in the Last Year: Never true   Transportation Needs:     Lack of Transportation (Medical): Not on file    Lack of Transportation (Non-Medical):  Not on file   Physical Activity:     Days of Exercise per Week: Not on file    Minutes of Exercise per Session: Not on file   Stress:     Feeling of Stress : Not on file   Social Connections:  Frequency of Communication with Friends and Family: Not on file    Frequency of Social Gatherings with Friends and Family: Not on file    Attends Sikhism Services: Not on file    Active Member of Clubs or Organizations: Not on file    Attends Club or Organization Meetings: Not on file    Marital Status: Not on file   Intimate Partner Violence:     Fear of Current or Ex-Partner: Not on file    Emotionally Abused: Not on file    Physically Abused: Not on file    Sexually Abused: Not on file   Housing Stability:     Unable to Pay for Housing in the Last Year: Not on file    Number of [place] in the Last Year: Not on file    Unstable Housing in the Last Year: Not on file       Review of Systems   Constitutional: Negative for fatigue and fever. HENT: Negative for postnasal drip, rhinorrhea, sinus pressure and sinus pain. Respiratory: Negative for chest tightness, wheezing and stridor. Exertional dyspnea with activity. No significant cough or sputum production. Cardiovascular: Negative for chest pain and leg swelling. Gastrointestinal: Negative for constipation, diarrhea, nausea and vomiting. Genitourinary: Negative for dysuria, frequency and urgency. Musculoskeletal: Negative for arthralgias, joint swelling and myalgias. Skin: Negative for rash. Neurological: Negative for dizziness, speech difficulty and headaches. Hematological: Does not bruise/bleed easily. Psychiatric/Behavioral: Negative for agitation, hallucinations, sleep disturbance and suicidal ideas.        Objective:       Physical Exam  General appearance - alert, well appearing, and in no distress, oriented to person, place, and time and overweight  Mental status - alert, oriented to person, place, and time, normal mood, behavior, speech, dress, motor activity, and thought processes  Eyes - pupils equal and reactive, extraocular eye movements intact  Ears - not examined  Nose - normal and patent, no erythema, discharge or polyps  Mouth - mucous membranes moist, pharynx normal without lesions  Neck - supple, no significant adenopathy  Chest - clear to auscultation, no wheezes, rales or rhonchi, symmetric air entry  Heart -normal rate, regular rhythm, normal S1, S2, no murmurs, rubs, clicks or gallops  Abdomen - soft, nontender, nondistended, no masses or organomegaly  Neuro- alert, oriented, normal speech, no focal findings or movement disorder noted}  Extremities - peripheral pulses normal, no pedal edema, no clubbing or cyanosis  Skin - normal coloration and turgor, no rashes, no suspicious skin lesions noted     Wt Readings from Last 3 Encounters:   06/27/22 224 lb 1.6 oz (101.7 kg)   05/24/22 221 lb 6.4 oz (100.4 kg)   03/01/22 216 lb 3.2 oz (98.1 kg)       Results for orders placed or performed during the hospital encounter of 12/05/21   COVID-19, Rapid    Specimen: Nasopharyngeal Swab   Result Value Ref Range    Specimen Description . NASOPHARYNGEAL SWAB     SARS-CoV-2, Rapid Not Detected Not Detected   Strep Screen Group A Throat    Specimen: Throat   Result Value Ref Range    Specimen Description . THROAT     Special Requests NOT REPORTED     Direct Exam       Rapid Strep A negative. A negative Rapid Group A Strep Screen result does not rule out the possibility of Group A Streptococci in the specimen. A Group A Strep DNA test is available upon request.       No results found. Assessment:      1. EUNICE (obstructive sleep apnea)    2. Lung nodule    3. Community acquired pneumonia, unspecified laterality    4. Stopped smoking with greater than 40 pack year history    5. Class 2 severe obesity due to excess calories with serious comorbidity and body mass index (BMI) of 37.0 to 37.9 in adult ([address]    6. COPD, severity to be determined ([facility] [address]    7. Lung nodule seen on imaging study          Plan:      1. Medications reviewed, continue as ordered. 2. Educated and clarified the medication use.   3. Recommend flu vaccination in the fall annually. 4. Patient is up-to-date with pneumococcal vaccinations from pulmonary perspective. 5. Patient has received J&J Covid vaccination. Recommended booster. Discussed strategies to protect against Covid 19.   6. Maintain an active lifestyle. 7. Patient's questions were answered to her satisfaction. 6. Former smoker ongoing smoking cessation strongly admonished. 9. Pulmonary function tests were reviewed   10. CT scan of the chest was reviewed  11. Polysomnogram with CPAP/BiPAP titration if needed. Brochure provided on sleep apnea. Weight loss was recommended and discussed. Recommended following good sleep hygiene instructions. Sleep hygieneinstructions were given to the patient. We'll see the patient back after the sleep study.   12. We'll see the patient back in 6 months          Electronically signed by DI Alonzo CNP on 6/27/2022 at 4:44 PM

## 2022-06-29 ENCOUNTER — TELEPHONE (OUTPATIENT)
Dept: PULMONOLOGY | Age: 57
End: 2022-06-29

## 2022-06-29 DIAGNOSIS — R91.1 LUNG NODULE: Primary | ICD-10-CM

## 2022-06-29 NOTE — TELEPHONE ENCOUNTER
Scheduling indicated the patient is not a canidate for a lung screeing with the DX. Of lung nodule . They indicated to change it to a CT of chest .  Please advise.

## 2022-07-05 ENCOUNTER — TELEPHONE (OUTPATIENT)
Dept: PRIMARY CARE CLINIC | Age: 57
End: 2022-07-05

## 2022-07-05 RX ORDER — INSULIN GLARGINE 100 [IU]/ML
54 INJECTION, SOLUTION SUBCUTANEOUS NIGHTLY
Qty: 5 PEN | Refills: 5 | Status: SHIPPED | OUTPATIENT
Start: 2022-07-05 | End: 2022-09-28 | Stop reason: DRUGHIGH

## 2022-07-05 NOTE — TELEPHONE ENCOUNTER
Lizet Bustamante from [id number] [facility]  called regarding Lacie's 6/24/22 script for Insulin Glargine. [facility] insurance will not cover, but will cover Lantus. Okay to change script to Lantus?

## 2022-09-21 ENCOUNTER — OFFICE VISIT (OUTPATIENT)
Dept: PRIMARY CARE CLINIC | Age: 57
End: 2022-09-21
Payer: COMMERCIAL

## 2022-09-21 VITALS
HEART RATE: 96 BPM | OXYGEN SATURATION: 97 % | DIASTOLIC BLOOD PRESSURE: 74 MMHG | WEIGHT: 223.4 LBS | SYSTOLIC BLOOD PRESSURE: 122 MMHG | TEMPERATURE: 97.6 F | RESPIRATION RATE: 18 BRPM | BODY MASS INDEX: 38.35 KG/M2

## 2022-09-21 DIAGNOSIS — H66.002 NON-RECURRENT ACUTE SUPPURATIVE OTITIS MEDIA OF LEFT EAR WITHOUT SPONTANEOUS RUPTURE OF TYMPANIC MEMBRANE: Primary | ICD-10-CM

## 2022-09-21 DIAGNOSIS — E11.42 CONTROLLED TYPE 2 DIABETES MELLITUS WITH DIABETIC POLYNEUROPATHY, WITHOUT LONG-TERM CURRENT USE OF INSULIN (HCC): ICD-10-CM

## 2022-09-21 PROCEDURE — 1036F TOBACCO NON-USER: CPT | Performed by: NURSE PRACTITIONER

## 2022-09-21 PROCEDURE — 3017F COLORECTAL CA SCREEN DOC REV: CPT | Performed by: NURSE PRACTITIONER

## 2022-09-21 PROCEDURE — 99214 OFFICE O/P EST MOD 30 MIN: CPT | Performed by: NURSE PRACTITIONER

## 2022-09-21 PROCEDURE — 2022F DILAT RTA XM EVC RTNOPTHY: CPT | Performed by: NURSE PRACTITIONER

## 2022-09-21 PROCEDURE — 3046F HEMOGLOBIN A1C LEVEL >9.0%: CPT | Performed by: NURSE PRACTITIONER

## 2022-09-21 PROCEDURE — G8417 CALC BMI ABV UP PARAM F/U: HCPCS | Performed by: NURSE PRACTITIONER

## 2022-09-21 PROCEDURE — G8427 DOCREV CUR MEDS BY ELIG CLIN: HCPCS | Performed by: NURSE PRACTITIONER

## 2022-09-21 RX ORDER — FLUTICASONE PROPIONATE 50 MCG
1 SPRAY, SUSPENSION (ML) NASAL DAILY
Qty: 32 G | Refills: 1 | Status: SHIPPED | OUTPATIENT
Start: 2022-09-21

## 2022-09-21 RX ORDER — AMOXICILLIN AND CLAVULANATE POTASSIUM 875; 125 MG/1; MG/1
1 TABLET, FILM COATED ORAL 2 TIMES DAILY
Qty: 20 TABLET | Refills: 0 | Status: SHIPPED | OUTPATIENT
Start: 2022-09-21 | End: 2022-10-01

## 2022-09-21 RX ORDER — LIRAGLUTIDE 6 MG/ML
INJECTION SUBCUTANEOUS
Qty: 6 ML | Refills: 5 | Status: SHIPPED | OUTPATIENT
Start: 2022-09-21

## 2022-09-21 SDOH — ECONOMIC STABILITY: FOOD INSECURITY: WITHIN THE PAST 12 MONTHS, THE FOOD YOU BOUGHT JUST DIDN'T LAST AND YOU DIDN'T HAVE MONEY TO GET MORE.: NEVER TRUE

## 2022-09-21 SDOH — ECONOMIC STABILITY: FOOD INSECURITY: WITHIN THE PAST 12 MONTHS, YOU WORRIED THAT YOUR FOOD WOULD RUN OUT BEFORE YOU GOT MONEY TO BUY MORE.: NEVER TRUE

## 2022-09-21 ASSESSMENT — PATIENT HEALTH QUESTIONNAIRE - PHQ9
4. FEELING TIRED OR HAVING LITTLE ENERGY: 0
7. TROUBLE CONCENTRATING ON THINGS, SUCH AS READING THE NEWSPAPER OR WATCHING TELEVISION: 0
10. IF YOU CHECKED OFF ANY PROBLEMS, HOW DIFFICULT HAVE THESE PROBLEMS MADE IT FOR YOU TO DO YOUR WORK, TAKE CARE OF THINGS AT HOME, OR GET ALONG WITH OTHER PEOPLE: 0
6. FEELING BAD ABOUT YOURSELF - OR THAT YOU ARE A FAILURE OR HAVE LET YOURSELF OR YOUR FAMILY DOWN: 0
SUM OF ALL RESPONSES TO PHQ9 QUESTIONS 1 & 2: 0
1. LITTLE INTEREST OR PLEASURE IN DOING THINGS: 0
5. POOR APPETITE OR OVEREATING: 0
3. TROUBLE FALLING OR STAYING ASLEEP: 0
8. MOVING OR SPEAKING SO SLOWLY THAT OTHER PEOPLE COULD HAVE NOTICED. OR THE OPPOSITE, BEING SO FIGETY OR RESTLESS THAT YOU HAVE BEEN MOVING AROUND A LOT MORE THAN USUAL: 0
SUM OF ALL RESPONSES TO PHQ QUESTIONS 1-9: 0
SUM OF ALL RESPONSES TO PHQ QUESTIONS 1-9: 0
9. THOUGHTS THAT YOU WOULD BE BETTER OFF DEAD, OR OF HURTING YOURSELF: 0
SUM OF ALL RESPONSES TO PHQ QUESTIONS 1-9: 0
2. FEELING DOWN, DEPRESSED OR HOPELESS: 0
SUM OF ALL RESPONSES TO PHQ QUESTIONS 1-9: 0

## 2022-09-21 ASSESSMENT — SOCIAL DETERMINANTS OF HEALTH (SDOH): HOW HARD IS IT FOR YOU TO PAY FOR THE VERY BASICS LIKE FOOD, HOUSING, MEDICAL CARE, AND HEATING?: NOT HARD AT ALL

## 2022-09-21 ASSESSMENT — ENCOUNTER SYMPTOMS
ALLERGIC/IMMUNOLOGIC NEGATIVE: 1
GASTROINTESTINAL NEGATIVE: 1
EYES NEGATIVE: 1
RHINORRHEA: 1
RESPIRATORY NEGATIVE: 1

## 2022-09-21 NOTE — PATIENT INSTRUCTIONS
SURVEY:     You may be receiving a survey from [employer] regarding your visit today. Please complete the survey to enable us to provide the highest quality of care to you and your family. If you cannot score us a very good on any question, please call the office to discuss how we could have made your experience a very good one.      Thank you,    Berenice Byrd, APRN-MARI Panda, APRN-CNP  ANDI Cavazos, EMILY Sherman, EMILY Bai, CMA  Elizabeth, MONIKA Wharton, PM

## 2022-09-26 ENCOUNTER — TELEPHONE (OUTPATIENT)
Dept: PRIMARY CARE CLINIC | Age: 57
End: 2022-09-26

## 2022-09-26 ENCOUNTER — HOSPITAL ENCOUNTER (OUTPATIENT)
Age: 57
Discharge: HOME | End: 2022-09-26
Payer: COMMERCIAL

## 2022-09-26 DIAGNOSIS — R53.83 OTHER FATIGUE: Primary | ICD-10-CM

## 2022-09-26 DIAGNOSIS — R53.83 OTHER FATIGUE: ICD-10-CM

## 2022-09-26 LAB
THYROXINE, FREE: 1.02 NG/DL (ref 0.93–1.7)
TSH SERPL DL<=0.05 MIU/L-ACNC: 0.95 UIU/ML (ref 0.3–5)

## 2022-09-26 PROCEDURE — 84439 ASSAY OF FREE THYROXINE: CPT

## 2022-09-26 PROCEDURE — 36415 COLL VENOUS BLD VENIPUNCTURE: CPT

## 2022-09-26 PROCEDURE — 84443 ASSAY THYROID STIM HORMONE: CPT

## 2022-09-26 NOTE — TELEPHONE ENCOUNTER
Lacie would like you to order a Thyroid study to the labs she is having for her next appt. [facility] has noticed recent fatigue, weight gain & eye pain and would like her thyroid checked. Will you order? Thank you!

## 2022-09-28 ENCOUNTER — OFFICE VISIT (OUTPATIENT)
Dept: PRIMARY CARE CLINIC | Age: 57
End: 2022-09-28
Payer: COMMERCIAL

## 2022-09-28 VITALS
HEIGHT: 64 IN | HEART RATE: 89 BPM | WEIGHT: 227.7 LBS | SYSTOLIC BLOOD PRESSURE: 110 MMHG | TEMPERATURE: 97.1 F | DIASTOLIC BLOOD PRESSURE: 68 MMHG | BODY MASS INDEX: 38.87 KG/M2 | OXYGEN SATURATION: 98 % | RESPIRATION RATE: 18 BRPM

## 2022-09-28 DIAGNOSIS — M54.6 CHRONIC BILATERAL THORACIC BACK PAIN: ICD-10-CM

## 2022-09-28 DIAGNOSIS — F34.1 DYSTHYMIA: ICD-10-CM

## 2022-09-28 DIAGNOSIS — G89.29 CHRONIC BILATERAL THORACIC BACK PAIN: ICD-10-CM

## 2022-09-28 LAB — HBA1C MFR BLD: 10.2 %

## 2022-09-28 PROCEDURE — 3046F HEMOGLOBIN A1C LEVEL >9.0%: CPT | Performed by: NURSE PRACTITIONER

## 2022-09-28 PROCEDURE — G8427 DOCREV CUR MEDS BY ELIG CLIN: HCPCS | Performed by: NURSE PRACTITIONER

## 2022-09-28 PROCEDURE — 2022F DILAT RTA XM EVC RTNOPTHY: CPT | Performed by: NURSE PRACTITIONER

## 2022-09-28 PROCEDURE — 83036 HEMOGLOBIN GLYCOSYLATED A1C: CPT | Performed by: NURSE PRACTITIONER

## 2022-09-28 PROCEDURE — 3017F COLORECTAL CA SCREEN DOC REV: CPT | Performed by: NURSE PRACTITIONER

## 2022-09-28 PROCEDURE — 99214 OFFICE O/P EST MOD 30 MIN: CPT | Performed by: NURSE PRACTITIONER

## 2022-09-28 PROCEDURE — 1036F TOBACCO NON-USER: CPT | Performed by: NURSE PRACTITIONER

## 2022-09-28 PROCEDURE — G8417 CALC BMI ABV UP PARAM F/U: HCPCS | Performed by: NURSE PRACTITIONER

## 2022-09-28 RX ORDER — INSULIN GLARGINE 100 [IU]/ML
60 INJECTION, SOLUTION SUBCUTANEOUS NIGHTLY
Qty: 15 ML | Refills: 0 | Status: SHIPPED
Start: 2022-09-28

## 2022-09-28 RX ORDER — BUPROPION HYDROCHLORIDE 150 MG/1
150 TABLET ORAL EVERY MORNING
Qty: 90 TABLET | Refills: 1 | Status: SHIPPED | OUTPATIENT
Start: 2022-09-28

## 2022-09-28 RX ORDER — BACLOFEN 20 MG/1
20 TABLET ORAL 2 TIMES DAILY
Qty: 180 TABLET | Refills: 0 | Status: SHIPPED | OUTPATIENT
Start: 2022-09-28

## 2022-09-28 ASSESSMENT — PATIENT HEALTH QUESTIONNAIRE - PHQ9
10. IF YOU CHECKED OFF ANY PROBLEMS, HOW DIFFICULT HAVE THESE PROBLEMS MADE IT FOR YOU TO DO YOUR WORK, TAKE CARE OF THINGS AT HOME, OR GET ALONG WITH OTHER PEOPLE: 0
8. MOVING OR SPEAKING SO SLOWLY THAT OTHER PEOPLE COULD HAVE NOTICED. OR THE OPPOSITE, BEING SO FIGETY OR RESTLESS THAT YOU HAVE BEEN MOVING AROUND A LOT MORE THAN USUAL: 0
1. LITTLE INTEREST OR PLEASURE IN DOING THINGS: 0
4. FEELING TIRED OR HAVING LITTLE ENERGY: 0
SUM OF ALL RESPONSES TO PHQ QUESTIONS 1-9: 0
7. TROUBLE CONCENTRATING ON THINGS, SUCH AS READING THE NEWSPAPER OR WATCHING TELEVISION: 0
SUM OF ALL RESPONSES TO PHQ QUESTIONS 1-9: 0
5. POOR APPETITE OR OVEREATING: 0
6. FEELING BAD ABOUT YOURSELF - OR THAT YOU ARE A FAILURE OR HAVE LET YOURSELF OR YOUR FAMILY DOWN: 0
3. TROUBLE FALLING OR STAYING ASLEEP: 0
SUM OF ALL RESPONSES TO PHQ QUESTIONS 1-9: 0
SUM OF ALL RESPONSES TO PHQ QUESTIONS 1-9: 0
SUM OF ALL RESPONSES TO PHQ9 QUESTIONS 1 & 2: 0
2. FEELING DOWN, DEPRESSED OR HOPELESS: 0
9. THOUGHTS THAT YOU WOULD BE BETTER OFF DEAD, OR OF HURTING YOURSELF: 0

## 2022-09-28 ASSESSMENT — ENCOUNTER SYMPTOMS
BOWEL INCONTINENCE: 0
RHINORRHEA: 0
DIARRHEA: 0
BLURRED VISION: 0
ABDOMINAL PAIN: 0
SHORTNESS OF BREATH: 0
VOMITING: 0
CONSTIPATION: 0
BACK PAIN: 1
VISUAL CHANGE: 0

## 2022-09-28 NOTE — PATIENT INSTRUCTIONS
SURVEY:     You may be receiving a survey from [employer] regarding your visit today. Please complete the survey to enable us to provide the highest quality of care to you and your family. If you cannot score us a very good on any question, please call the office to discuss how we could have made your experience a very good one.      Thank you,    Kasandra Byrd, APRN-MARI Cavanaugh, APRN-ANDI Jones, [facility]  [employer], CMA  Bhumika, CMA  Elizabeth, PCA  Akiko, PM

## 2022-09-28 NOTE — PROGRESS NOTES
Name: Carina Lagos  : 1965         Chief Complaint:     Chief Complaint   Patient presents with    Diabetes    Mental Health Problem    Back Pain     Middle back pain for a few months        History of Present Illness:      Carina Lagos is a 56 y.o.  female who presents with Diabetes, Mental Health Problem, and Back Pain (Middle back pain for a few months )      [facility] is here today for a routine office visit. Unfortunately she is still dealing with depressed mood due to her 's passing about a year ago. She states is coming up on his anniversary and she is having some down days. She states she is tearful. She was using aripiprazole with good result but was having too many urinary issues so she stopped the medication. She is asking for an alternative. See below for further comment. Chronic back pain-worsening, patient states her pain management medications were not working for the spasm. See below for further comment. Diabetes  She presents for her follow-up diabetic visit. She has type 2 diabetes mellitus. Onset time: YEARS. Her disease course has been worsening. Hypoglycemia symptoms include nervousness/anxiousness. Pertinent negatives for hypoglycemia include no confusion, dizziness, headaches, seizures, sleepiness, speech difficulty or sweats. Associated symptoms include fatigue and foot paresthesias. Pertinent negatives for diabetes include no blurred vision, no chest pain, no polydipsia, no polyphagia, no polyuria, no visual change, no weakness and no weight loss. There are no hypoglycemic complications. Symptoms are stable. Diabetic complications include peripheral neuropathy. Pertinent negatives for diabetic complications include no CVA, heart disease or nephropathy. Risk factors for coronary artery disease include diabetes mellitus, post-menopausal, dyslipidemia, family history, obesity, sedentary lifestyle and stress.  Current diabetic treatment includes insulin injections and oral agent (monotherapy) (GLP-1). She is compliant with treatment most of the time. Her weight is stable. She is following a generally healthy diet. Meal planning includes avoidance of concentrated sweets. She has had a previous visit with a dietitian. She rarely participates in exercise. Her home blood glucose trend is increasing steadily. Her breakfast blood glucose range is generally >200 mg/dl. An ACE inhibitor/angiotensin II receptor blocker is being taken. She does not see a podiatrist.Eye exam is not current. Mental Health Problem  The primary symptoms include dysphoric mood and negative symptoms. The primary symptoms do not include delusions, hallucinations, bizarre behavior, disorganized speech or somatic symptoms. The current episode started more than 1 month ago. This is a chronic problem. The dysphoric mood began more than 2 weeks ago. The mood has been worsening since its onset. She characterizes the problem as moderate. The mood includes feelings of emptiness, irritability, tearfulness, despair and sadness. Her change in mood was precipitated by the death of a loved one. The negative symptoms began more than 1 month ago. The negative symptoms appear to have been worsening since their onset. The negative symptoms include anhedonia and attention impairment. The onset of the illness is precipitated by emotional stress and a stressful event. The degree of incapacity that she is experiencing as a consequence of her illness is moderate. Sequelae of the illness include harmed interpersonal relations. Additional symptoms of the illness include anhedonia, insomnia, fatigue, attention impairment and distractible.  Additional symptoms of the illness do not include hypersomnia, appetite change, unexpected weight change, agitation, psychomotor retardation, feelings of worthlessness, euphoric mood, increased goal-directed activity, flight of ideas, inflated self-esteem, decreased need for sleep, poor judgment, visual change, headaches, abdominal pain or seizures. She does not admit to suicidal ideas. She does not have a plan to attempt suicide. She does not contemplate harming herself. She has not already injured self. She does not contemplate injuring another person. She has not already  injured another person. Risk factors that are present for mental illness include a family history of mental illness and a history of mental illness. Back Pain  This is a chronic problem. The current episode started more than 1 year ago. The problem occurs intermittently. The problem is unchanged. The pain is present in the thoracic spine and lumbar spine. The quality of the pain is described as aching. The pain does not radiate. The pain is at a severity of 5/10. The pain is moderate. The pain is Worse during the day. The symptoms are aggravated by sitting and position. Stiffness is present At night. Associated symptoms include numbness. Pertinent negatives include no abdominal pain, bladder incontinence, bowel incontinence, chest pain, dysuria, fever, headaches, leg pain, paresis, paresthesias, pelvic pain, perianal numbness, tingling, weakness or weight loss. Risk factors include obesity, poor posture, sedentary lifestyle and lack of exercise. She has tried ice, heat and analgesics for the symptoms. The treatment provided mild relief. Past Medical History:     Past Medical History:   Diagnosis Date    Anxiety     Arthritis     Depression     Diabetic neuropathy (HCC)     Esophageal reflux     Heart burn     Limitation of joint motion     Muscle weakness     Sleep apnea     DOES NOT USE A MACHINE.  DIAGNOSED > 15 YEARS AGO WITH SLEEP APNEA    Sleepiness     Vertigo       Reviewed all health maintenance requirements and ordered appropriate tests  Health Maintenance Due   Topic Date Due    Cervical cancer screen  Never done    Diabetic microalbuminuria test  08/24/2021    Lipids  08/24/2021       Past Surgical History: Past Surgical History:   Procedure Laterality Date    ABSCESS DRAINAGE Left     BUTTOCK    APPENDECTOMY      CARPAL TUNNEL RELEASE Left 2018    CATARACT REMOVAL WITH IMPLANT Bilateral 10/24/2016     SECTION      CHOLECYSTECTOMY      COLONOSCOPY  2019    Dr. Ruthy Estrada -normal poor prep    COLONOSCOPY N/A 3/25/2019    COLORECTAL CANCER SCREENING, NOT HIGH RISK performed by Monty Oliveira MD at [address] N/A 4/10/2019    -diverticulosis,hemorrhoids,lipoma at ileocecal valve    HERNIA REPAIR Right     IHR    HERNIA REPAIR  2006    HERNIA REPAIR WITH HYSTERECTOMY SURGERY    HYSTERECTOMY (CERVIX STATUS UNKNOWN)      LAPAROSCOPY          Medications:       Prior to Admission medications    Medication Sig Start Date End Date Taking?  Authorizing Provider   buPROPion (WELLBUTRIN XL) 150 MG extended release tablet Take 1 tablet by mouth every morning 22  Yes DI Flores CNP   insulin glargine (LANTUS SOLOSTAR) 100 UNIT/ML injection pen Inject 60 Units into the skin nightly 22  Yes DI Flores CNP   baclofen (LIORESAL) 20 MG tablet Take 1 tablet by mouth 2 times daily 22  Yes DI Flores CNP   Liraglutide (VICTOZA) 18 MG/3ML SOPN SC injection INJECT 1.2 MG SUBCUTANEOUSLY ONCE DAILY 22  Yes DI Doyle CNP   amoxicillin-clavulanate (AUGMENTIN) 875-125 MG per tablet Take 1 tablet by mouth 2 times daily for 10 days 9/21/22 10/1/22 Yes DI Doyle CNP   fluticasone (FLONASE) 50 MCG/ACT nasal spray 1 spray by Each Nostril route daily 22  Yes DI Doyle CNP   albuterol sulfate HFA (VENTOLIN HFA) 108 (90 Base) MCG/ACT inhaler Inhale 2 puffs into the lungs 4 times daily as needed for Wheezing 22  Yes DI Barrett CNP   ondansetron (ZOFRAN) 4 MG tablet Take 1 tablet by mouth 3 times daily as needed for Nausea or Vomiting 22  Yes DI Doyle CNP   escitalopram (LEXAPRO) 20 MG tablet Take 1 tablet by mouth once daily 4/5/22  Yes DI Lo CNP   lisinopril (PRINIVIL;ZESTRIL) 2.5 MG tablet Take 1 tablet by mouth once daily 4/5/22  Yes DI Lo CNP   glipiZIDE (GLUCOTROL XL) 10 MG extended release tablet Take 1 tablet by mouth once daily 4/5/22  Yes DI Lo CNP   omeprazole (PRILOSEC) 40 MG delayed release capsule Take 1 capsule by mouth once daily 3/1/22  Yes DI Lo CNP   hydrOXYzine (ATARAX) 25 MG tablet Take 1 tablet by mouth nightly as needed for Anxiety 7/20/21  Yes DI Lo CNP   atorvastatin (LIPITOR) 40 MG tablet Take 1 tablet by mouth once daily  Patient taking differently: Take 40 mg by mouth daily 6/28/21  Yes DI Lo CNP   HYDROcodone-acetaminophen (NORCO) 5-325 MG per tablet Take 1 tablet by mouth 2 times daily as needed. 9/8/20  Yes Historical Provider, MD   gabapentin (NEURONTIN) 600 MG tablet Take 900 mg by mouth 3 times daily. Takes 1 1/2 tabs TID. 1/17/19  Yes Historical Provider, MD        Allergies:       Codeine, Meperidine, and Other    Social History:     Tobacco:    reports that she quit smoking about 11 years ago. Her smoking use included cigarettes. She has a 10.00 pack-year smoking history. She has never used smokeless tobacco.  Alcohol:      reports no history of alcohol use. Drug Use:  reports no history of drug use. Family History:     Family History   Problem Relation Age of Onset    Diabetes Mother     High Cholesterol Mother     Hypertension Mother     Heart Disease Mother 36    Diabetes Father     Heart Disease Father 48    High Cholesterol Father     Hypertension Father     Diabetes Sister        Review of Systems:     Positive and Negative as described in HPI    Review of Systems   Constitutional:  Positive for fatigue. Negative for activity change, appetite change, chills, fever, unexpected weight change and weight loss.    HENT:  Negative for congestion and rhinorrhea. Eyes:  Negative for blurred vision and visual disturbance. Respiratory:  Negative for shortness of breath. Cardiovascular:  Negative for chest pain and palpitations. Gastrointestinal:  Negative for abdominal pain, bowel incontinence, constipation, diarrhea and vomiting. Endocrine: Negative for polydipsia, polyphagia and polyuria. Genitourinary:  Negative for bladder incontinence, difficulty urinating, dysuria and pelvic pain. Musculoskeletal:  Positive for back pain. Negative for gait problem, neck pain and neck stiffness. Skin:  Negative for rash. Neurological:  Positive for numbness. Negative for dizziness, tingling, seizures, syncope, speech difficulty, weakness, light-headedness, headaches and paresthesias. Psychiatric/Behavioral:  Positive for decreased concentration, dysphoric mood and sleep disturbance. Negative for agitation, behavioral problems, confusion, hallucinations, self-injury and suicidal ideas. The patient is nervous/anxious and has insomnia. The patient is not hyperactive. Physical Exam:   Vitals:  /68 (Site: Left Upper Arm, Position: Sitting)   Pulse 89   Temp 97.1 °F (36.2 °C) (Temporal)   Resp 18   Ht 5' 4\" (1.626 m)   Wt 227 lb 11.2 oz (103.3 kg)   SpO2 98%   BMI 39.08 kg/m²     Physical Exam  Vitals and nursing note reviewed. Constitutional:       General: She is not in acute distress. Appearance: Normal appearance. She is well-developed. She is obese. HENT:      Mouth/Throat:      Mouth: Mucous membranes are moist.   Eyes:      General: No scleral icterus. Conjunctiva/sclera: Conjunctivae normal.   Cardiovascular:      Rate and Rhythm: Normal rate and regular rhythm. Heart sounds: No murmur heard. Pulmonary:      Effort: Pulmonary effort is normal.      Breath sounds: Normal breath sounds. No wheezing. Abdominal:      General: Bowel sounds are normal. There is no distension. Palpations: Abdomen is soft. Lab Results   Component Value Date/Time    TSH 0.95 09/26/2022 04:13 PM     Lab Results   Component Value Date/Time    CHOL 160 08/24/2020 03:31 PM    HDL 27 08/24/2020 03:31 PM    LABA1C 10.2 09/28/2022 12:00 PM       Assessment/Plan:      Diagnosis Orders   1. Uncontrolled type 2 diabetes mellitus with diabetic polyneuropathy, with long-term current use of insulin (HCC)  CBC with Auto Differential    ALT    AST    Basic Metabolic Panel    Hemoglobin A1C    Lipid Panel    Microalbumin, Ur    POCT glycosylated hemoglobin (Hb A1C)      2. Dysthymia  buPROPion (WELLBUTRIN XL) 150 MG extended release tablet      3. Chronic bilateral thoracic back pain  baclofen (LIORESAL) 20 MG tablet        DM worsening, we will need to increase Lantus to 60 units nightly. Continue all other medications. Continue to monitor sugars at home. Start bupropion 150 mg XR daily. Call in a few weeks with progress. May use baclofen as needed. Recommend PT if not improved. We will see her back in 3 months with routine labs, sooner if any issues. 1.  Lacie received counseling on the following healthy behaviors: nutrition, exercise, and medication adherence  2. Patient given educational materials - see patient instructions  3. Was a self-tracking handout given in paper form or via [employer]? No  If yes, see orders or list here. 4.  Discussed use, benefit, and side effects of prescribed medications. Barriers to medication compliance addressed. All patient questions answered. Pt voiced understanding. 5.  Reviewed prior labs and health maintenance  6. Continue current medications, diet and exercise.     Completed Refills   Requested Prescriptions     Signed Prescriptions Disp Refills    buPROPion (WELLBUTRIN XL) 150 MG extended release tablet 90 tablet 1     Sig: Take 1 tablet by mouth every morning    insulin glargine (LANTUS SOLOSTAR) 100 UNIT/ML injection pen 15 mL 0     Sig: Inject 60 Units into the skin nightly baclofen (LIORESAL) 20 MG tablet 180 tablet 0     Sig: Take 1 tablet by mouth 2 times daily         Return in about 3 months (around 12/28/2022) for Check up.

## 2022-11-23 RX ORDER — HYDROXYZINE HYDROCHLORIDE 25 MG/1
25 TABLET, FILM COATED ORAL NIGHTLY PRN
Qty: 90 TABLET | Refills: 0 | Status: SHIPPED | OUTPATIENT
Start: 2022-11-23

## 2022-11-23 NOTE — TELEPHONE ENCOUNTER
Health Maintenance   Topic Date Due    Diabetic microalbuminuria test  08/24/2021    Lipids  08/24/2021    Hepatitis B vaccine (1 of 3 - Risk 3-dose series) 03/01/2023 (Originally 8/8/1986)    COVID-19 Vaccine (2 - Booster for Dari series) 03/01/2023 (Originally 8/17/2021)    Hepatitis C screen  03/01/2023 (Originally 8/8/1985)    Diabetic retinal exam  03/11/2023 (Originally 1/5/2023)    Pneumococcal 0-64 years Vaccine (2 - PCV) 05/24/2023 (Originally 9/3/2020)    Flu vaccine (1) 09/21/2023 (Originally 8/1/2022)    Diabetic foot exam  09/28/2023 (Originally 3/12/2021)    A1C test (Diabetic or Prediabetic)  12/28/2022    DTaP/Tdap/Td vaccine (2 - Td or Tdap) 04/28/2023    Breast cancer screen  05/13/2023    Depression Monitoring  09/28/2023    Colorectal Cancer Screen  04/10/2029    Shingles vaccine  Completed    HIV screen  Completed    Hepatitis A vaccine  Aged Out    Hib vaccine  Aged Out    Meningococcal (ACWY) vaccine  Aged Out             (applicable per patient's age: Cancer Screenings, Depression Screening, Fall Risk Screening, Immunizations)    Hemoglobin A1C (%)   Date Value   09/28/2022 10.2   07/20/2021 8.8   01/18/2021 8.7     Microalb/Crt.  Ratio (mcg/mg creat)   Date Value   08/24/2020 17     LDL Cholesterol (mg/dL)   Date Value   08/24/2020 63     AST (U/L)   Date Value   08/05/2021 14     ALT (U/L)   Date Value   08/05/2021 12     BUN (mg/dL)   Date Value   08/05/2021 13      (goal A1C is < 7)   (goal LDL is <100) need 30-50% reduction from baseline     BP Readings from Last 3 Encounters:   09/28/22 110/68   09/21/22 122/74   06/27/22 135/60    (goal /80)      All Future Testing planned in [facility]:  Lab Frequency Next Occurrence   Sleep Study with PAP Titration Once 06/27/2022   CT LUNG SCREENING Once 06/27/2022   CT CHEST LOW DOSE (LDCT) Once 10/21/2022   CBC with Auto Differential Once 03/27/2023   ALT Once 12/28/2022   AST Once 23/75/8418   Basic Metabolic Panel Once 72/21/9131 Hemoglobin A1C Once 12/27/2022   Lipid Panel Once 12/27/2022   Microalbumin, Ur Once 12/27/2022       Next Visit Date:  Future Appointments   Date Time Provider Osiel Owen   12/8/2022  2:45 PM Gurjit Rob, APRN - CNP [facility]   12/29/2022 11:40 AM Victor M Byrd, APRN - CNP [facility]            Patient Active Problem List:     Dyslipidemia     Nuclear sclerotic cataract of left eye     Controlled type 2 diabetes mellitus with diabetic polyneuropathy, without long-term current use of insulin (HCC)     Vitamin D deficiency     Obstructive sleep apnea     Sepsis due to pneumonia ([address]     Positive FIT (fecal immunochemical test)     Dysthymia     Dyspepsia     Bilateral leg and foot pain     Cervical radiculitis     Chronic left shoulder pain     Complex regional pain syndrome type 1 of left lower extremity     Cervicalgia     Diabetic autonomic neuropathy associated with type 2 diabetes mellitus ([address]     Encounter for long-term opiate analgesic use     Ocular hypertension of left eye     Regular astigmatism, bilateral     Vitreous floaters of both eyes     Cellulitis, abdominal wall     Sepsis due to abdominal wall cellulitis secondary to DM     Dizziness     Acute vertigo with vomiting and inability to stand     Bilateral shoulder pain     Morbid obesity (HCC)     Moderate nonproliferative diabetic retinopathy associated with type 2 diabetes mellitus ([address]     Essential hypertension     Abdominal pain     Superior mesenteric artery stenosis (HCC)     Iliac artery stenosis, left ([address]

## 2022-12-22 ENCOUNTER — TELEPHONE (OUTPATIENT)
Dept: PRIMARY CARE CLINIC | Age: 57
End: 2022-12-22

## 2022-12-27 DIAGNOSIS — H66.002 NON-RECURRENT ACUTE SUPPURATIVE OTITIS MEDIA OF LEFT EAR WITHOUT SPONTANEOUS RUPTURE OF TYMPANIC MEMBRANE: ICD-10-CM

## 2022-12-27 DIAGNOSIS — K21.9 GASTROESOPHAGEAL REFLUX DISEASE WITHOUT ESOPHAGITIS: ICD-10-CM

## 2022-12-28 RX ORDER — AMOXICILLIN AND CLAVULANATE POTASSIUM 875; 125 MG/1; MG/1
TABLET, FILM COATED ORAL
Qty: 20 TABLET | Refills: 0 | OUTPATIENT
Start: 2022-12-28

## 2022-12-28 RX ORDER — OMEPRAZOLE 40 MG/1
CAPSULE, DELAYED RELEASE ORAL
Qty: 30 CAPSULE | Refills: 5 | Status: SHIPPED | OUTPATIENT
Start: 2022-12-28

## 2023-01-30 ENCOUNTER — HOSPITAL ENCOUNTER (OUTPATIENT)
Age: 58
Discharge: HOME | End: 2023-01-30
Payer: COMMERCIAL

## 2023-01-30 LAB
ALT SERPL-CCNC: 30 U/L (ref 5–33)
ANION GAP SERPL CALCULATED.3IONS-SCNC: 10 MMOL/L (ref 9–17)
AST SERPL-CCNC: 23 U/L
BUN BLDV-MCNC: 12 MG/DL (ref 6–20)
BUN/CREAT BLD: 22 (ref 9–20)
CALCIUM SERPL-MCNC: 9.5 MG/DL (ref 8.6–10.4)
CHLORIDE BLD-SCNC: 100 MMOL/L (ref 98–107)
CHOLESTEROL/HDL RATIO: 10.7
CHOLESTEROL: 257 MG/DL
CO2: 25 MMOL/L (ref 20–31)
CREAT SERPL-MCNC: 0.55 MG/DL (ref 0.5–0.9)
CREATININE URINE: 223.6 MG/DL (ref 28–217)
GFR SERPL CREATININE-BSD FRML MDRD: >60 ML/MIN/1.73M2
GLUCOSE BLD-MCNC: 315 MG/DL (ref 70–99)
HDLC SERPL-MCNC: 24 MG/DL
LDL CHOLESTEROL DIRECT: 129 MG/DL
LDL CHOLESTEROL: ABNORMAL MG/DL (ref 0–130)
MICROALBUMIN/CREAT 24H UR: 460 MG/L
MICROALBUMIN/CREAT UR-RTO: 206 MCG/MG CREAT
POTASSIUM SERPL-SCNC: 4.7 MMOL/L (ref 3.7–5.3)
SODIUM BLD-SCNC: 135 MMOL/L (ref 135–144)
TRIGL SERPL-MCNC: 645 MG/DL

## 2023-01-30 PROCEDURE — 80048 BASIC METABOLIC PNL TOTAL CA: CPT

## 2023-01-30 PROCEDURE — 83036 HEMOGLOBIN GLYCOSYLATED A1C: CPT

## 2023-01-30 PROCEDURE — 84450 TRANSFERASE (AST) (SGOT): CPT

## 2023-01-30 PROCEDURE — 84460 ALANINE AMINO (ALT) (SGPT): CPT

## 2023-01-30 PROCEDURE — 82043 UR ALBUMIN QUANTITATIVE: CPT

## 2023-01-30 PROCEDURE — 83721 ASSAY OF BLOOD LIPOPROTEIN: CPT

## 2023-01-30 PROCEDURE — 36415 COLL VENOUS BLD VENIPUNCTURE: CPT

## 2023-01-30 PROCEDURE — 80061 LIPID PANEL: CPT

## 2023-01-30 PROCEDURE — 82570 ASSAY OF URINE CREATININE: CPT

## 2023-01-31 LAB
ESTIMATED AVERAGE GLUCOSE: 189 MG/DL
HBA1C MFR BLD: 8.2 % (ref 4–6)

## 2023-02-07 ENCOUNTER — OFFICE VISIT (OUTPATIENT)
Dept: PRIMARY CARE CLINIC | Age: 58
End: 2023-02-07
Payer: COMMERCIAL

## 2023-02-07 VITALS
OXYGEN SATURATION: 99 % | RESPIRATION RATE: 18 BRPM | BODY MASS INDEX: 41.35 KG/M2 | WEIGHT: 242.2 LBS | HEART RATE: 93 BPM | DIASTOLIC BLOOD PRESSURE: 70 MMHG | TEMPERATURE: 97.3 F | HEIGHT: 64 IN | SYSTOLIC BLOOD PRESSURE: 116 MMHG

## 2023-02-07 DIAGNOSIS — E11.42 CONTROLLED TYPE 2 DIABETES MELLITUS WITH DIABETIC POLYNEUROPATHY, WITHOUT LONG-TERM CURRENT USE OF INSULIN (HCC): Primary | ICD-10-CM

## 2023-02-07 DIAGNOSIS — E66.01 MORBID OBESITY (HCC): ICD-10-CM

## 2023-02-07 DIAGNOSIS — H65.02 NON-RECURRENT ACUTE SEROUS OTITIS MEDIA OF LEFT EAR: ICD-10-CM

## 2023-02-07 DIAGNOSIS — E78.5 DYSLIPIDEMIA: ICD-10-CM

## 2023-02-07 PROCEDURE — G8417 CALC BMI ABV UP PARAM F/U: HCPCS | Performed by: NURSE PRACTITIONER

## 2023-02-07 PROCEDURE — 3074F SYST BP LT 130 MM HG: CPT | Performed by: NURSE PRACTITIONER

## 2023-02-07 PROCEDURE — 3078F DIAST BP <80 MM HG: CPT | Performed by: NURSE PRACTITIONER

## 2023-02-07 PROCEDURE — 2022F DILAT RTA XM EVC RTNOPTHY: CPT | Performed by: NURSE PRACTITIONER

## 2023-02-07 PROCEDURE — 1036F TOBACCO NON-USER: CPT | Performed by: NURSE PRACTITIONER

## 2023-02-07 PROCEDURE — G8484 FLU IMMUNIZE NO ADMIN: HCPCS | Performed by: NURSE PRACTITIONER

## 2023-02-07 PROCEDURE — 99214 OFFICE O/P EST MOD 30 MIN: CPT | Performed by: NURSE PRACTITIONER

## 2023-02-07 PROCEDURE — 3017F COLORECTAL CA SCREEN DOC REV: CPT | Performed by: NURSE PRACTITIONER

## 2023-02-07 PROCEDURE — G8427 DOCREV CUR MEDS BY ELIG CLIN: HCPCS | Performed by: NURSE PRACTITIONER

## 2023-02-07 PROCEDURE — 3052F HG A1C>EQUAL 8.0%<EQUAL 9.0%: CPT | Performed by: NURSE PRACTITIONER

## 2023-02-07 RX ORDER — ATORVASTATIN CALCIUM 40 MG/1
40 TABLET, FILM COATED ORAL DAILY
Qty: 90 TABLET | Refills: 3 | Status: SHIPPED | OUTPATIENT
Start: 2023-02-07

## 2023-02-07 RX ORDER — AMOXICILLIN 500 MG/1
500 CAPSULE ORAL 2 TIMES DAILY
Qty: 20 CAPSULE | Refills: 0 | Status: SHIPPED | OUTPATIENT
Start: 2023-02-07 | End: 2023-02-17

## 2023-02-07 SDOH — ECONOMIC STABILITY: INCOME INSECURITY: HOW HARD IS IT FOR YOU TO PAY FOR THE VERY BASICS LIKE FOOD, HOUSING, MEDICAL CARE, AND HEATING?: NOT HARD AT ALL

## 2023-02-07 SDOH — ECONOMIC STABILITY: HOUSING INSECURITY
IN THE LAST 12 MONTHS, WAS THERE A TIME WHEN YOU DID NOT HAVE A STEADY PLACE TO SLEEP OR SLEPT IN A SHELTER (INCLUDING NOW)?: NO

## 2023-02-07 SDOH — ECONOMIC STABILITY: FOOD INSECURITY: WITHIN THE PAST 12 MONTHS, YOU WORRIED THAT YOUR FOOD WOULD RUN OUT BEFORE YOU GOT MONEY TO BUY MORE.: NEVER TRUE

## 2023-02-07 SDOH — ECONOMIC STABILITY: FOOD INSECURITY: WITHIN THE PAST 12 MONTHS, THE FOOD YOU BOUGHT JUST DIDN'T LAST AND YOU DIDN'T HAVE MONEY TO GET MORE.: NEVER TRUE

## 2023-02-07 ASSESSMENT — PATIENT HEALTH QUESTIONNAIRE - PHQ9
2. FEELING DOWN, DEPRESSED OR HOPELESS: 0
9. THOUGHTS THAT YOU WOULD BE BETTER OFF DEAD, OR OF HURTING YOURSELF: 0
SUM OF ALL RESPONSES TO PHQ QUESTIONS 1-9: 0
3. TROUBLE FALLING OR STAYING ASLEEP: 0
10. IF YOU CHECKED OFF ANY PROBLEMS, HOW DIFFICULT HAVE THESE PROBLEMS MADE IT FOR YOU TO DO YOUR WORK, TAKE CARE OF THINGS AT HOME, OR GET ALONG WITH OTHER PEOPLE: 0
1. LITTLE INTEREST OR PLEASURE IN DOING THINGS: 0
8. MOVING OR SPEAKING SO SLOWLY THAT OTHER PEOPLE COULD HAVE NOTICED. OR THE OPPOSITE, BEING SO FIGETY OR RESTLESS THAT YOU HAVE BEEN MOVING AROUND A LOT MORE THAN USUAL: 0
SUM OF ALL RESPONSES TO PHQ QUESTIONS 1-9: 0
6. FEELING BAD ABOUT YOURSELF - OR THAT YOU ARE A FAILURE OR HAVE LET YOURSELF OR YOUR FAMILY DOWN: 0
SUM OF ALL RESPONSES TO PHQ9 QUESTIONS 1 & 2: 0
SUM OF ALL RESPONSES TO PHQ QUESTIONS 1-9: 0
5. POOR APPETITE OR OVEREATING: 0
4. FEELING TIRED OR HAVING LITTLE ENERGY: 0
SUM OF ALL RESPONSES TO PHQ QUESTIONS 1-9: 0
7. TROUBLE CONCENTRATING ON THINGS, SUCH AS READING THE NEWSPAPER OR WATCHING TELEVISION: 0

## 2023-02-07 ASSESSMENT — ENCOUNTER SYMPTOMS
NAUSEA: 0
BLURRED VISION: 0
COUGH: 0
ABDOMINAL PAIN: 0
VOMITING: 0
WHEEZING: 0
DIARRHEA: 0
SHORTNESS OF BREATH: 0
SORE THROAT: 0
CONSTIPATION: 0
RHINORRHEA: 0

## 2023-02-07 NOTE — PATIENT INSTRUCTIONS
SURVEY:     You may be receiving a survey from [employer] regarding your visit today. Please complete the survey to enable us to provide the highest quality of care to you and your family. If you cannot score us a very good on any question, please call the office to discuss how we could have made your experience a very good one.      Thank you,    Musa Byrd, APRN-CNP  Dorita Yañez, APRN-CNP  Deborah Goldberg, ANDI Bell, EMILY Tapia, EMILY Bai, CMA  Elizabeth, MONIKA Wharton, PM

## 2023-02-07 NOTE — PROGRESS NOTES
Name: Seferino Campbell  : 1965         Chief Complaint:     Chief Complaint   Patient presents with    Diabetes    Dizziness     Patient states has been going on for a couple of months     Hyperlipidemia       History of Present Illness:      Seferino Campbell is a 56 y.o.  female who presents with Diabetes, Dizziness (Patient states has been going on for a couple of months ), and Hyperlipidemia      Lacie is here today for a routine office visit. Morbid obesity-stable, would like to re-establish with bariatrics for evaluation. Diabetes  She presents for her follow-up diabetic visit. She has type 2 diabetes mellitus. Onset time: YEARS. Her disease course has been improving. Hypoglycemia symptoms include dizziness. Pertinent negatives for hypoglycemia include no confusion, headaches, nervousness/anxiousness, sleepiness, speech difficulty or sweats. Associated symptoms include foot paresthesias. Pertinent negatives for diabetes include no blurred vision, no chest pain, no fatigue, no polydipsia, no polyphagia and no polyuria. There are no hypoglycemic complications. Symptoms are stable. Diabetic complications include peripheral neuropathy. Pertinent negatives for diabetic complications include no CVA, heart disease or nephropathy. Risk factors for coronary artery disease include diabetes mellitus, post-menopausal, dyslipidemia, family history, obesity, sedentary lifestyle and stress. Current diabetic treatment includes insulin injections and oral agent (monotherapy) (GLP-1). She is compliant with treatment all of the time. Her weight is stable. She is following a generally healthy diet. Meal planning includes avoidance of concentrated sweets. She has had a previous visit with a dietitian. She rarely participates in exercise. Her home blood glucose trend is decreasing steadily. Her breakfast blood glucose range is generally 140-180 mg/dl. An ACE inhibitor/angiotensin II receptor blocker is being taken.  She does not see a podiatrist.Eye exam is not current. Hyperlipidemia  This is a chronic problem. The current episode started more than 1 year ago. The problem is controlled. Recent lipid tests were reviewed and are normal. Exacerbating diseases include diabetes and obesity. She has no history of chronic renal disease, hypothyroidism, liver disease or nephrotic syndrome. Factors aggravating her hyperlipidemia include fatty foods and smoking. Pertinent negatives include no chest pain, leg pain or shortness of breath. Current antihyperlipidemic treatment includes statins. The current treatment provides moderate improvement of lipids. Compliance problems include adherence to exercise and adherence to diet. Risk factors for coronary artery disease include diabetes mellitus, dyslipidemia, family history, obesity, hypertension, post-menopausal, a sedentary lifestyle and stress. Otalgia   There is pain in the left ear. This is a new problem. The current episode started 1 to 4 weeks ago. The problem occurs constantly. The problem has been waxing and waning. There has been no fever. The pain is at a severity of 4/10. The pain is moderate. Pertinent negatives include no abdominal pain, coughing, diarrhea, ear discharge, headaches, hearing loss, neck pain, rash, rhinorrhea, sore throat or vomiting. She has tried nothing for the symptoms. The treatment provided no relief. There is no history of a chronic ear infection, hearing loss or a tympanostomy tube. Past Medical History:     Past Medical History:   Diagnosis Date    Anxiety     Arthritis     Depression     Diabetic neuropathy (HCC)     Esophageal reflux     Heart burn     Limitation of joint motion     Muscle weakness     Sleep apnea     DOES NOT USE A MACHINE.  DIAGNOSED > 15 YEARS AGO WITH SLEEP APNEA    Sleepiness     Vertigo       Reviewed all health maintenance requirements and ordered appropriate tests  There are no preventive care reminders to display for this patient. Past Surgical History:     Past Surgical History:   Procedure Laterality Date    ABSCESS DRAINAGE Left     BUTTOCK    APPENDECTOMY      CARPAL TUNNEL RELEASE Left 2018    CATARACT REMOVAL WITH IMPLANT Bilateral 10/24/2016     SECTION      CHOLECYSTECTOMY      COLONOSCOPY  2019    Dr. Bertha Vazquez -normal poor prep    COLONOSCOPY N/A 3/25/2019    COLORECTAL CANCER SCREENING, NOT HIGH RISK performed by Charleen Pritchett MD at [address] N/A 4/10/2019    -diverticulosis,hemorrhoids,lipoma at ileocecal valve    HERNIA REPAIR Right     IHR    HERNIA REPAIR  2006    HERNIA REPAIR WITH HYSTERECTOMY SURGERY    HYSTERECTOMY (CERVIX STATUS UNKNOWN)      LAPAROSCOPY          Medications:       Prior to Admission medications    Medication Sig Start Date End Date Taking?  Authorizing Provider   atorvastatin (LIPITOR) 40 MG tablet Take 1 tablet by mouth daily Take 1 tablet by mouth once daily 23  Yes DI Fox - CNP   amoxicillin (AMOXIL) 500 MG capsule Take 1 capsule by mouth 2 times daily for 10 days 23 Yes Michelle Byrd, DI - CNP   omeprazole (PRILOSEC) 40 MG delayed release capsule TAKE ONE CAPSULE BY MOUTH DAILY 22  Yes Michelle Byrd, DI - CNP   hydrOXYzine HCl (ATARAX) 25 MG tablet Take 1 tablet by mouth nightly as needed for Anxiety 22  Yes Michelle Byrd, DI - CNP   buPROPion (WELLBUTRIN XL) 150 MG extended release tablet Take 1 tablet by mouth every morning 22  Yes DI Fox - MARI   insulin glargine (LANTUS SOLOSTAR) 100 UNIT/ML injection pen Inject 60 Units into the skin nightly 22  Yes Michelle Byrd, DI - CNP   baclofen (LIORESAL) 20 MG tablet Take 1 tablet by mouth 2 times daily 22  Yes DI Fox - CNP   Liraglutide (VICTOZA) 18 MG/3ML SOPN SC injection INJECT 1.2 MG SUBCUTANEOUSLY ONCE DAILY 22  Yes DI German - CNP   fluticasone (FLONASE) 50 MCG/ACT nasal spray 1 spray by Each Nostril route daily 9/21/22  Yes DI Granda CNP   albuterol sulfate HFA (VENTOLIN HFA) 108 (90 Base) MCG/ACT inhaler Inhale 2 puffs into the lungs 4 times daily as needed for Wheezing 6/27/22  Yes DI Aguirre CNP   ondansetron (ZOFRAN) 4 MG tablet Take 1 tablet by mouth 3 times daily as needed for Nausea or Vomiting 5/24/22  Yes DI Granda CNP   escitalopram (LEXAPRO) 20 MG tablet Take 1 tablet by mouth once daily 4/5/22  Yes DI Knight CNP   lisinopril (PRINIVIL;ZESTRIL) 2.5 MG tablet Take 1 tablet by mouth once daily 4/5/22  Yes DI Knight CNP   glipiZIDE (GLUCOTROL XL) 10 MG extended release tablet Take 1 tablet by mouth once daily 4/5/22  Yes DI Knight CNP   HYDROcodone-acetaminophen (NORCO) 5-325 MG per tablet Take 1 tablet by mouth 2 times daily as needed. 9/8/20  Yes Historical Provider, MD   gabapentin (NEURONTIN) 600 MG tablet Take 900 mg by mouth 3 times daily. Takes 1 1/2 tabs TID. 1/17/19  Yes Historical Provider, MD        Allergies:       Codeine, Meperidine, and Other    Social History:     Tobacco:    reports that she quit smoking about 11 years ago. Her smoking use included cigarettes. She has a 10.00 pack-year smoking history. She has never used smokeless tobacco.  Alcohol:      reports no history of alcohol use. Drug Use:  reports no history of drug use. Family History:     Family History   Problem Relation Age of Onset    Diabetes Mother     High Cholesterol Mother     Hypertension Mother     Heart Disease Mother 36    Diabetes Father     Heart Disease Father 48    High Cholesterol Father     Hypertension Father     Diabetes Sister        Review of Systems:     Positive and Negative as described in HPI    Review of Systems   Constitutional:  Negative for activity change, chills, fatigue and fever. HENT:  Positive for ear pain. Negative for congestion, ear discharge, hearing loss, rhinorrhea and sore throat. Eyes:  Negative for blurred vision and visual disturbance. Respiratory:  Negative for cough, shortness of breath and wheezing. Cardiovascular:  Negative for chest pain and palpitations. Gastrointestinal:  Negative for abdominal pain, constipation, diarrhea, nausea and vomiting. Endocrine: Negative for polydipsia, polyphagia and polyuria. Genitourinary:  Negative for difficulty urinating and dysuria. Musculoskeletal:  Negative for gait problem, neck pain and neck stiffness. Skin:  Negative for rash. Neurological:  Positive for dizziness and numbness. Negative for syncope, speech difficulty, light-headedness and headaches. Psychiatric/Behavioral:  Negative for confusion. The patient is not nervous/anxious. Physical Exam:   Vitals:  /70 (Site: Left Upper Arm, Position: Sitting)   Pulse 93   Temp 97.3 °F (36.3 °C) (Temporal)   Resp 18   Ht 5' 4\" (1.626 m)   Wt 242 lb 3.2 oz (109.9 kg)   SpO2 99%   BMI 41.57 kg/m²     Physical Exam  Vitals and nursing note reviewed. Constitutional:       General: She is not in acute distress. Appearance: Normal appearance. She is well-developed. She is obese. HENT:      Right Ear: Tympanic membrane and ear canal normal.      Left Ear: Ear canal normal. A middle ear effusion is present. Tympanic membrane is erythematous. Tympanic membrane is not bulging. Mouth/Throat:      Mouth: Mucous membranes are moist.   Eyes:      General: No scleral icterus. Conjunctiva/sclera: Conjunctivae normal.   Cardiovascular:      Rate and Rhythm: Normal rate and regular rhythm. Pulses:           Dorsalis pedis pulses are 1+ on the right side and 1+ on the left side. Heart sounds: No murmur heard. Pulmonary:      Effort: Pulmonary effort is normal.      Breath sounds: Normal breath sounds. No wheezing. Abdominal:      General: Bowel sounds are normal. There is no distension. Palpations: Abdomen is soft. Tenderness:  There is no abdominal tenderness. Musculoskeletal:      Cervical back: Normal range of motion and neck supple. Right lower leg: No edema. Left lower leg: No edema. Right foot: Normal range of motion. No deformity. Left foot: Normal range of motion. No deformity. Feet:      Right foot:      Protective Sensation: 5 sites tested. 1 site sensed. Skin integrity: Warmth, callus and dry skin present. No ulcer, blister, skin breakdown, erythema or fissure. Toenail Condition: Right toenails are abnormally thick. Fungal disease present. Left foot:      Protective Sensation: 5 sites tested. 1 site sensed. Skin integrity: Warmth, callus and dry skin present. No ulcer, blister, skin breakdown, erythema or fissure. Toenail Condition: Left toenails are abnormally thick. Fungal disease present. Lymphadenopathy:      Cervical: No cervical adenopathy. Skin:     General: Skin is warm and dry. Findings: No rash. Neurological:      Mental Status: She is alert and oriented to person, place, and time. Psychiatric:         Mood and Affect: Mood normal.         Behavior: Behavior normal. Behavior is cooperative.        Data:     Lab Results   Component Value Date/Time     01/30/2023 12:21 PM    K 4.7 01/30/2023 12:21 PM     01/30/2023 12:21 PM    CO2 25 01/30/2023 12:21 PM    BUN 12 01/30/2023 12:21 PM    CREATININE 0.55 01/30/2023 12:21 PM    GLUCOSE 315 01/30/2023 12:21 PM    GLUCOSE 351 03/11/2012 06:45 PM    PROT 6.2 08/05/2021 08:12 AM    LABALBU 3.6 08/05/2021 08:12 AM    BILITOT 0.35 08/05/2021 08:12 AM    ALKPHOS 142 08/05/2021 08:12 AM    AST 23 01/30/2023 12:21 PM    ALT 30 01/30/2023 12:21 PM     Lab Results   Component Value Date/Time    WBC 10.1 08/05/2021 08:12 AM    RBC 4.25 08/05/2021 08:12 AM    RBC 5.34 03/11/2012 06:07 PM    HGB 12.5 08/05/2021 08:12 AM    HCT 39.1 08/05/2021 08:12 AM    MCV 92.0 08/05/2021 08:12 AM    MCH 29.4 08/05/2021 08:12 AM    MCHC 32.0 08/05/2021 08:12 AM    RDW 13.0 08/05/2021 08:12 AM     08/05/2021 08:12 AM     03/11/2012 06:07 PM    MPV 10.6 08/05/2021 08:12 AM     Lab Results   Component Value Date/Time    TSH 0.95 09/26/2022 04:13 PM     Lab Results   Component Value Date/Time    CHOL 257 01/30/2023 12:22 PM    HDL 24 01/30/2023 12:22 PM    LABA1C 8.2 01/30/2023 12:21 PM       Assessment/Plan:      Diagnosis Orders   1. Controlled type 2 diabetes mellitus with diabetic polyneuropathy, without long-term current use of insulin (HCC)  [facility] DIABETES FOOT EXAM    Comprehensive Metabolic Panel    Hemoglobin A1C      2. Dyslipidemia  atorvastatin (LIPITOR) 40 MG tablet    Lipid Panel      3. Morbid obesity ([address]  External Referral To Bariatrics      4. Non-recurrent acute serous otitis media of left ear  amoxicillin (AMOXIL) 500 MG capsule        Continue all current medications A1c has improved. Referral to bariatrics as requested. Medication for otitis as instructed. Call if not improving, sooner if any issues. We will see her back in 6 months, sooner if any issues . 1. Lacie received counseling on the following healthy behaviors: nutrition, exercise, and medication adherence  2. Patient given educational materials - see patient instructions  3. Was a self-tracking handout given in paper form or via [employer]? No  If yes, see orders or list here. 4.  Discussed use, benefit, and side effects of prescribed medications. Barriers to medication compliance addressed. All patient questions answered. Pt voiced understanding. 5.  Reviewed prior labs and health maintenance  6. Continue current medications, diet and exercise.     Completed Refills   Requested Prescriptions     Signed Prescriptions Disp Refills    atorvastatin (LIPITOR) 40 MG tablet 90 tablet 3     Sig: Take 1 tablet by mouth daily Take 1 tablet by mouth once daily    amoxicillin (AMOXIL) 500 MG capsule 20 capsule 0     Sig: Take 1 capsule by mouth 2 times daily for 10 days         Return in about 6 months (around 8/7/2023) for Check up.

## 2023-03-01 DIAGNOSIS — E11.42 CONTROLLED TYPE 2 DIABETES MELLITUS WITH DIABETIC POLYNEUROPATHY, WITHOUT LONG-TERM CURRENT USE OF INSULIN (HCC): Primary | ICD-10-CM

## 2023-03-01 RX ORDER — INSULIN GLARGINE 100 [IU]/ML
60 INJECTION, SOLUTION SUBCUTANEOUS NIGHTLY
Qty: 15 ML | Refills: 5 | Status: SHIPPED | OUTPATIENT
Start: 2023-03-01

## 2023-03-01 NOTE — TELEPHONE ENCOUNTER
Marcela Miranda is still having ear pain after finishing medication from 2/7/22 OV. Lacie would like you to call something else in.

## 2023-03-01 NOTE — TELEPHONE ENCOUNTER
Health Maintenance   Topic Date Due    Hepatitis B vaccine (1 of 3 - Risk 3-dose series) 03/01/2023 (Originally [birth year]/8/1986)    COVID-19 Vaccine (2 - Booster for Dari series) 03/01/2023 (Originally 8/17/2021)    Hepatitis C screen  03/01/2023 (Originally 8/8/1985)    Diabetic retinal exam  03/11/2023 (Originally 1/5/2023)    Pneumococcal 0-64 years Vaccine (2 - PCV) 05/24/2023 (Originally 9/3/2020)    Flu vaccine (1) 09/21/2023 (Originally 8/1/2022)    DTaP/Tdap/Td vaccine (2 - Td or Tdap) 04/28/2023    Breast cancer screen  05/13/2023    A1C test (Diabetic or Prediabetic)  01/30/2024    Diabetic Alb to Cr ratio (uACR) test  01/30/2024    Lipids  01/30/2024    GFR test (Diabetes, CKD 3-4, OR last GFR 15-59)  01/30/2024    Diabetic foot exam  02/07/2024    Depression Monitoring  02/07/2024    Colorectal Cancer Screen  04/10/2029    Shingles vaccine  Completed    HIV screen  Completed    Hepatitis A vaccine  Aged Out    Hib vaccine  Aged Out    Meningococcal (ACWY) vaccine  Aged Out             (applicable per patient's age: Cancer Screenings, Depression Screening, Fall Risk Screening, Immunizations)    Hemoglobin A1C (%)   Date Value   01/30/2023 8.2 (H)   09/28/2022 10.2   07/20/2021 8.8     Microalb/Crt.  Ratio (mcg/mg creat)   Date Value   01/30/2023 206 (H)     LDL Cholesterol (mg/dL)   Date Value   01/30/2023          AST (U/L)   Date Value   01/30/2023 23     ALT (U/L)   Date Value   01/30/2023 30     BUN (mg/dL)   Date Value   01/30/2023 12      (goal A1C is < 7)   (goal LDL is <100) need 30-50% reduction from baseline     BP Readings from Last 3 Encounters:   02/07/23 116/70   09/28/22 110/68   09/21/22 122/74    (goal /80)      All Future Testing planned in [facility]:  Lab Frequency Next Occurrence   Sleep Study with PAP Titration Once 06/27/2022   CT LUNG SCREENING Once 06/27/2022   CT CHEST LOW DOSE (LDCT) Once 10/21/2022   CBC with Auto Differential Once 03/27/2023   Comprehensive Metabolic Panel Once 08/06/2023   Hemoglobin A1C Once 08/06/2023   Lipid Panel Once 08/06/2023       Next Visit Date:  Future Appointments   Date Time Provider Osiel Owen   8/7/2023  1:40 PM [facility]            Patient Active Problem List:     Dyslipidemia     Nuclear sclerotic cataract of left eye     Controlled type 2 diabetes mellitus with diabetic polyneuropathy, without long-term current use of insulin ([address]     Vitamin D deficiency     Obstructive sleep apnea     Sepsis due to pneumonia ([address]     Positive FIT (fecal immunochemical test)     Dysthymia     Dyspepsia     Bilateral leg and foot pain     Cervical radiculitis     Chronic left shoulder pain     Complex regional pain syndrome type 1 of left lower extremity     Cervicalgia     Diabetic autonomic neuropathy associated with type 2 diabetes mellitus ([address]     Encounter for long-term opiate analgesic use     Ocular hypertension of left eye     Regular astigmatism, bilateral     Vitreous floaters of both eyes     Cellulitis, abdominal wall     Sepsis due to abdominal wall cellulitis secondary to DM     Dizziness     Acute vertigo with vomiting and inability to stand     Bilateral shoulder pain     Morbid obesity (HCC)     Moderate nonproliferative diabetic retinopathy associated with type 2 diabetes mellitus ([address]     Essential hypertension     Abdominal pain     Superior mesenteric artery stenosis (HCC)     Iliac artery stenosis, left ([address]

## 2023-03-02 ENCOUNTER — OFFICE VISIT (OUTPATIENT)
Dept: PRIMARY CARE CLINIC | Age: 58
End: 2023-03-02
Payer: MEDICAID

## 2023-03-02 VITALS
TEMPERATURE: 97.4 F | SYSTOLIC BLOOD PRESSURE: 106 MMHG | BODY MASS INDEX: 39.58 KG/M2 | HEART RATE: 92 BPM | RESPIRATION RATE: 16 BRPM | OXYGEN SATURATION: 97 % | DIASTOLIC BLOOD PRESSURE: 74 MMHG | WEIGHT: 230.6 LBS

## 2023-03-02 DIAGNOSIS — H66.002 NON-RECURRENT ACUTE SUPPURATIVE OTITIS MEDIA OF LEFT EAR WITHOUT SPONTANEOUS RUPTURE OF TYMPANIC MEMBRANE: Primary | ICD-10-CM

## 2023-03-02 PROCEDURE — 3078F DIAST BP <80 MM HG: CPT | Performed by: NURSE PRACTITIONER

## 2023-03-02 PROCEDURE — 99213 OFFICE O/P EST LOW 20 MIN: CPT | Performed by: NURSE PRACTITIONER

## 2023-03-02 PROCEDURE — 3074F SYST BP LT 130 MM HG: CPT | Performed by: NURSE PRACTITIONER

## 2023-03-02 RX ORDER — CEFDINIR 300 MG/1
300 CAPSULE ORAL 2 TIMES DAILY
Qty: 20 CAPSULE | Refills: 0 | Status: SHIPPED | OUTPATIENT
Start: 2023-03-02 | End: 2023-03-12

## 2023-03-02 ASSESSMENT — PATIENT HEALTH QUESTIONNAIRE - PHQ9
2. FEELING DOWN, DEPRESSED OR HOPELESS: 0
8. MOVING OR SPEAKING SO SLOWLY THAT OTHER PEOPLE COULD HAVE NOTICED. OR THE OPPOSITE, BEING SO FIGETY OR RESTLESS THAT YOU HAVE BEEN MOVING AROUND A LOT MORE THAN USUAL: 0
10. IF YOU CHECKED OFF ANY PROBLEMS, HOW DIFFICULT HAVE THESE PROBLEMS MADE IT FOR YOU TO DO YOUR WORK, TAKE CARE OF THINGS AT HOME, OR GET ALONG WITH OTHER PEOPLE: 0
5. POOR APPETITE OR OVEREATING: 0
9. THOUGHTS THAT YOU WOULD BE BETTER OFF DEAD, OR OF HURTING YOURSELF: 0
4. FEELING TIRED OR HAVING LITTLE ENERGY: 0
3. TROUBLE FALLING OR STAYING ASLEEP: 0
SUM OF ALL RESPONSES TO PHQ QUESTIONS 1-9: 0
SUM OF ALL RESPONSES TO PHQ QUESTIONS 1-9: 0
SUM OF ALL RESPONSES TO PHQ9 QUESTIONS 1 & 2: 0
1. LITTLE INTEREST OR PLEASURE IN DOING THINGS: 0
SUM OF ALL RESPONSES TO PHQ QUESTIONS 1-9: 0
6. FEELING BAD ABOUT YOURSELF - OR THAT YOU ARE A FAILURE OR HAVE LET YOURSELF OR YOUR FAMILY DOWN: 0
7. TROUBLE CONCENTRATING ON THINGS, SUCH AS READING THE NEWSPAPER OR WATCHING TELEVISION: 0
SUM OF ALL RESPONSES TO PHQ QUESTIONS 1-9: 0

## 2023-03-02 ASSESSMENT — ENCOUNTER SYMPTOMS
RESPIRATORY NEGATIVE: 1
ALLERGIC/IMMUNOLOGIC NEGATIVE: 1
EYES NEGATIVE: 1
GASTROINTESTINAL NEGATIVE: 1

## 2023-03-02 NOTE — PATIENT INSTRUCTIONS
SURVEY:     You may be receiving a survey from [employer] regarding your visit today. Please complete the survey to enable us to provide the highest quality of care to you and your family. If you cannot score us a very good on any question, please call the office to discuss how we could have made your experience a very good one.      Thank you,    Adán Byrd, APRN-MARI Cosby, APRN-MARI Galvan, MYRONN  Robles Kumar, EMILY Eller Chi, EMILY Bai, CMA  Elizabeth, PCA  Akiko, PM

## 2023-03-02 NOTE — PROGRESS NOTES
[facility]  ISRRAEL HENRY CNP  [facility]  [address] [place] [id number]  Dept: [phone]  Dept Fax: [phone]      Name: Lacie Blackburn  : 1965         Chief Complaint:     Chief Complaint   Patient presents with    Otalgia     X 3 weeks. Patient c/o bilateral ear pain.        History of Present Illness:      Lacie Blackburn is a 57 y.o.  female who presents with Otalgia (X 3 weeks. Patient c/o bilateral ear pain. )    ALHAJI Medellin is here today for complaints of bilateral ear pain.  She states the left ear is worse than the right ear and has headache. She states her headache is more on her left side.  She was given Amoxicillin on 2023 and states she never did feel better.  She denies congestion and has some rhinorrhea.  She has some drainage in her left eye.  Denies a fever.  She did try some Sudafed at home with no improvement.    Past Medical History:     Past Medical History:   Diagnosis Date    Anxiety     Arthritis     Depression     Diabetic neuropathy (HCC)     Esophageal reflux     Heart burn     Limitation of joint motion     Muscle weakness     Sleep apnea     DOES NOT USE A MACHINE. DIAGNOSED > 15 YEARS AGO WITH SLEEP APNEA    Sleepiness     Vertigo       Reviewed all health maintenance requirements and ordered appropriate tests  Health Maintenance Due   Topic Date Due    COVID-19 Vaccine (2 - Booster for Dari series) 2021       Past Surgical History:     Past Surgical History:   Procedure Laterality Date    ABSCESS DRAINAGE Left     BUTTOCK    APPENDECTOMY      CARPAL TUNNEL RELEASE Left 2018    CATARACT REMOVAL WITH IMPLANT Bilateral 10/24/2016     SECTION      CHOLECYSTECTOMY      COLONOSCOPY  2019    Dr. Fernandez -normal poor prep    COLONOSCOPY N/A 3/25/2019    COLORECTAL CANCER SCREENING, NOT HIGH RISK performed by Manuel Fernandez MD at [facility] OR    COLONOSCOPY N/A 4/10/2019    -diverticulosis,hemorrhoids,lipoma  at ileocecal valve    HERNIA REPAIR Right     IHR    HERNIA REPAIR  2006    HERNIA REPAIR WITH HYSTERECTOMY SURGERY    HYSTERECTOMY (CERVIX STATUS UNKNOWN)      LAPAROSCOPY          Medications:       Prior to Admission medications    Medication Sig Start Date End Date Taking?  Authorizing Provider   cefdinir (OMNICEF) 300 MG capsule Take 1 capsule by mouth 2 times daily for 10 days 3/2/23 3/12/23 Yes DI Perez CNP   insulin glargine (LANTUS SOLOSTAR) 100 UNIT/ML injection pen Inject 60 Units into the skin nightly 3/1/23  Yes DI Sue CNP   atorvastatin (LIPITOR) 40 MG tablet Take 1 tablet by mouth daily Take 1 tablet by mouth once daily 2/7/23  Yes DI Sue CNP   omeprazole (PRILOSEC) 40 MG delayed release capsule TAKE ONE CAPSULE BY MOUTH DAILY 12/28/22  Yes DI Sue CNP   hydrOXYzine HCl (ATARAX) 25 MG tablet Take 1 tablet by mouth nightly as needed for Anxiety 11/23/22  Yes DI Sue CNP   buPROPion (WELLBUTRIN XL) 150 MG extended release tablet Take 1 tablet by mouth every morning 9/28/22  Yes DI Sue CNP   baclofen (LIORESAL) 20 MG tablet Take 1 tablet by mouth 2 times daily 9/28/22  Yes DI Sue CNP   Liraglutide (VICTOZA) 18 MG/3ML SOPN SC injection INJECT 1.2 MG SUBCUTANEOUSLY ONCE DAILY 9/21/22  Yes DI Perez CNP   fluticasone (FLONASE) 50 MCG/ACT nasal spray 1 spray by Each Nostril route daily 9/21/22  Yes DI Perez CNP   albuterol sulfate HFA (VENTOLIN HFA) 108 (90 Base) MCG/ACT inhaler Inhale 2 puffs into the lungs 4 times daily as needed for Wheezing 6/27/22  Yes DI Ricketts CNP   ondansetron (ZOFRAN) 4 MG tablet Take 1 tablet by mouth 3 times daily as needed for Nausea or Vomiting 5/24/22  Yes DI Perez CNP   escitalopram (LEXAPRO) 20 MG tablet Take 1 tablet by mouth once daily 4/5/22  Yes DI Sue CNP   lisinopril (PRINIVIL;ZESTRIL) 2.5 MG tablet Take 1 tablet by mouth once daily 4/5/22  Yes Khai Ciro Might, APRN - CNP   glipiZIDE (GLUCOTROL XL) 10 MG extended release tablet Take 1 tablet by mouth once daily 4/5/22  Yes Khai Ciro Might, APRN - CNP   HYDROcodone-acetaminophen (NORCO) 5-325 MG per tablet Take 1 tablet by mouth 2 times daily as needed. 9/8/20  Yes Historical Provider, MD   gabapentin (NEURONTIN) 600 MG tablet Take 900 mg by mouth 3 times daily. Takes 1 1/2 tabs TID. 1/17/19  Yes Historical Provider, MD        Allergies:       Codeine, Meperidine, and Other    Social History:     Tobacco:    reports that she quit smoking about 11 years ago. Her smoking use included cigarettes. She has a 10.00 pack-year smoking history. She has never used smokeless tobacco.  Alcohol:      reports no history of alcohol use. Drug Use:  reports no history of drug use. Family History:     Family History   Problem Relation Age of Onset    Diabetes Mother     High Cholesterol Mother     Hypertension Mother     Heart Disease Mother 36    Diabetes Father     Heart Disease Father 48    High Cholesterol Father     Hypertension Father     Diabetes Sister        Review of Systems:     Positive and Negative as described in HPI    Review of Systems   Constitutional: Negative. Negative for fever. HENT:  Positive for ear pain. Negative for congestion. Eyes: Negative. Respiratory: Negative. Cardiovascular: Negative. Gastrointestinal: Negative. Endocrine: Negative. Genitourinary: Negative. Musculoskeletal: Negative. Skin: Negative. Allergic/Immunologic: Negative. Neurological:  Positive for facial asymmetry. Hematological: Negative. Psychiatric/Behavioral: Negative. Physical Exam:   Vitals:  /74   Pulse 92   Temp 97.4 °F (36.3 °C) (Temporal)   Resp 16   Wt 230 lb 9.6 oz (104.6 kg)   SpO2 97%   BMI 39.58 kg/m²     Physical Exam  Vitals and nursing note reviewed.    Constitutional:       General: She is not in acute distress. Appearance: Normal appearance. She is not ill-appearing. HENT:      Head: Normocephalic. Right Ear: Tympanic membrane normal.      Left Ear: A middle ear effusion is present. Tympanic membrane is erythematous. Tympanic membrane is not injected. Nose: Rhinorrhea present. Rhinorrhea is clear. Mouth/Throat:      Lips: Pink. Mouth: Mucous membranes are moist.      Pharynx: Oropharynx is clear. Eyes:      Extraocular Movements: Extraocular movements intact. Conjunctiva/sclera: Conjunctivae normal.      Pupils: Pupils are equal, round, and reactive to light. Cardiovascular:      Rate and Rhythm: Normal rate and regular rhythm. Heart sounds: Normal heart sounds. No murmur heard. Pulmonary:      Effort: Pulmonary effort is normal.      Breath sounds: Normal breath sounds. Musculoskeletal:         General: Normal range of motion. Cervical back: Normal range of motion and neck supple. Lymphadenopathy:      Cervical: No cervical adenopathy. Skin:     General: Skin is warm. Capillary Refill: Capillary refill takes less than 2 seconds. Neurological:      General: No focal deficit present. Mental Status: She is alert and oriented to person, place, and time. Psychiatric:         Mood and Affect: Mood normal.         Behavior: Behavior normal.         Thought Content:  Thought content normal.       Data:     Lab Results   Component Value Date/Time     01/30/2023 12:21 PM    K 4.7 01/30/2023 12:21 PM     01/30/2023 12:21 PM    CO2 25 01/30/2023 12:21 PM    BUN 12 01/30/2023 12:21 PM    CREATININE 0.55 01/30/2023 12:21 PM    GLUCOSE 315 01/30/2023 12:21 PM    GLUCOSE 351 03/11/2012 06:45 PM    PROT 6.2 08/05/2021 08:12 AM    LABALBU 3.6 08/05/2021 08:12 AM    BILITOT 0.35 08/05/2021 08:12 AM    ALKPHOS 142 08/05/2021 08:12 AM    AST 23 01/30/2023 12:21 PM    ALT 30 01/30/2023 12:21 PM     Lab Results   Component Value Date/Time    WBC 10.1 08/05/2021 08:12 AM    RBC 4.25 08/05/2021 08:12 AM    RBC 5.34 03/11/2012 06:07 PM    HGB 12.5 08/05/2021 08:12 AM    HCT 39.1 08/05/2021 08:12 AM    MCV 92.0 08/05/2021 08:12 AM    MCH 29.4 08/05/2021 08:12 AM    MCHC 32.0 08/05/2021 08:12 AM    RDW 13.0 08/05/2021 08:12 AM     08/05/2021 08:12 AM     03/11/2012 06:07 PM    MPV 10.6 08/05/2021 08:12 AM     Lab Results   Component Value Date/Time    TSH 0.95 09/26/2022 04:13 PM     Lab Results   Component Value Date/Time    CHOL 257 01/30/2023 12:22 PM    HDL 24 01/30/2023 12:22 PM    LABA1C 8.2 01/30/2023 12:21 PM       Assessment/Plan:      Diagnosis Orders   1. Non-recurrent acute suppurative otitis media of left ear without spontaneous rupture of tympanic membrane  cefdinir (OMNICEF) 300 MG capsule        Practice meticulous handwashing and cover cough to prevent spread of infection  Encouraged to increase fluids and rest  Tylenol/Ibuprofen OTC PRN for pain, discomfort or fever as directed on package  Warm compress for ear pain. Omnicef as prescribed. 1.  Lacie received counseling on the following healthy behaviors: nutrition, exercise, and medication adherence  2. Patient given educational materials - see patient instructions  3. Was a self-tracking handout given in paper form or via [employer]? No  If yes, see orders or list here. 4.  Discussed use, benefit, and side effects of prescribed medications. Barriers to medication compliance addressed. All patient questions answered. Pt voiced understanding. 5.  Reviewed prior labs and health maintenance  6. Continue current medications, diet and exercise. Completed Refills   Requested Prescriptions     Signed Prescriptions Disp Refills    cefdinir (OMNICEF) 300 MG capsule 20 capsule 0     Sig: Take 1 capsule by mouth 2 times daily for 10 days         No follow-ups on file.

## 2023-03-21 ENCOUNTER — TELEPHONE (OUTPATIENT)
Dept: PRIMARY CARE CLINIC | Age: 58
End: 2023-03-21

## 2023-03-21 DIAGNOSIS — R19.7 DIARRHEA, UNSPECIFIED TYPE: Primary | ICD-10-CM

## 2023-03-21 NOTE — TELEPHONE ENCOUNTER
Patient advised and verbalized understanding.
Stool studies ordered. Can try over-the-counter Imodium. If any worsening would recommend being seen in ER.
LOW DOSE (LDCT) Once 10/21/2022   CBC with Auto Differential Once 03/27/2023   Comprehensive Metabolic Panel Once 75/74/8318   Hemoglobin A1C Once 08/06/2023   Lipid Panel Once 08/06/2023       Next Visit Date:  Future Appointments   Date Time Provider Osiel Owen   8/7/2023  1:40 PM Josef Byrd, APRN - CNP [facility]            Patient Active Problem List:     Dyslipidemia     Nuclear sclerotic cataract of left eye     Controlled type 2 diabetes mellitus with diabetic polyneuropathy, without long-term current use of insulin ([address]     Vitamin D deficiency     Obstructive sleep apnea     Sepsis due to pneumonia ([address]     Positive FIT (fecal immunochemical test)     Dysthymia     Dyspepsia     Bilateral leg and foot pain     Cervical radiculitis     Chronic left shoulder pain     Complex regional pain syndrome type 1 of left lower extremity     Cervicalgia     Diabetic autonomic neuropathy associated with type 2 diabetes mellitus ([address]     Encounter for long-term opiate analgesic use     Ocular hypertension of left eye     Regular astigmatism, bilateral     Vitreous floaters of both eyes     Cellulitis, abdominal wall     Sepsis due to abdominal wall cellulitis secondary to DM     Dizziness     Acute vertigo with vomiting and inability to stand     Bilateral shoulder pain     Morbid obesity (HCC)     Moderate nonproliferative diabetic retinopathy associated with type 2 diabetes mellitus ([address]     Essential hypertension     Abdominal pain     Superior mesenteric artery stenosis (HCC)     Iliac artery stenosis, left ([address]'

## 2023-04-04 DIAGNOSIS — F34.1 DYSTHYMIA: ICD-10-CM

## 2023-04-04 RX ORDER — ESCITALOPRAM OXALATE 20 MG/1
TABLET ORAL
Qty: 90 TABLET | Refills: 3 | Status: SHIPPED | OUTPATIENT
Start: 2023-04-04

## 2023-04-21 LAB
AVERAGE GLUCOSE: 248
BASOPHILS ABSOLUTE: 0 /ΜL
BASOPHILS RELATIVE PERCENT: 0.3 %
BUN BLDV-MCNC: 20 MG/DL
CALCIUM SERPL-MCNC: 8.8 MG/DL
CHLORIDE BLD-SCNC: 100 MMOL/L
CO2: 25 MMOL/L
CREAT SERPL-MCNC: 0.6 MG/DL
EGFR: NORMAL
EOSINOPHILS ABSOLUTE: 0.2 /ΜL
EOSINOPHILS RELATIVE PERCENT: 1.8 %
FOLATE: NORMAL
GLUCOSE BLD-MCNC: 248 MG/DL
HBA1C MFR BLD: 10.2 %
HCT VFR BLD CALC: 42.4 % (ref 36–46)
HEMOGLOBIN: 13.6 G/DL (ref 12–16)
LYMPHOCYTES ABSOLUTE: 3.1 /ΜL
LYMPHOCYTES RELATIVE PERCENT: 30.9 %
MCH RBC QN AUTO: 29.1 PG
MCHC RBC AUTO-ENTMCNC: 32.1 G/DL
MCV RBC AUTO: 90.8 FL
MICROSCOPIC URINE: NORMAL
MONOCYTES ABSOLUTE: 0.5 /ΜL
MONOCYTES RELATIVE PERCENT: 5.3 %
NEUTROPHILS ABSOLUTE: 6.2 /ΜL
NEUTROPHILS RELATIVE PERCENT: 61.5 %
PLATELET # BLD: 226 K/ΜL
PMV BLD AUTO: 11 FL
POTASSIUM SERPL-SCNC: 4.5 MMOL/L
RBC # BLD: 4.67 10^6/ΜL
SODIUM BLD-SCNC: 137 MMOL/L
T4 FREE: 0.88
TSH SERPL DL<=0.05 MIU/L-ACNC: 2.33 UIU/ML
VITAMIN B-12: 196
WBC # BLD: 10.1 10^3/ML

## 2023-05-17 NOTE — TELEPHONE ENCOUNTER
Health Maintenance   Topic Date Due    Diabetic retinal exam  01/17/2021    COVID-19 Vaccine (2 - Booster for Dari series) 08/17/2021    DTaP/Tdap/Td vaccine (2 - Td or Tdap) 04/28/2023    Breast cancer screen  05/13/2023    Pneumococcal 0-64 years Vaccine (2 - PCV) 05/24/2023 (Originally 9/3/2020)    Flu vaccine (Season Ended) 09/21/2023 (Originally 8/1/2023)    Hepatitis B vaccine (1 of 3 - Risk 3-dose series) 03/02/2024 (Originally 8/8/1986)    Hepatitis C screen  03/02/2024 (Originally 8/8/1985)    A1C test (Diabetic or Prediabetic)  07/21/2023    Diabetic Alb to Cr ratio (uACR) test  01/30/2024    GFR test (Diabetes, CKD 3-4, OR last GFR 15-59)  01/30/2024    Diabetic foot exam  02/07/2024    Depression Monitoring  03/02/2024    Lipids  04/21/2024    Colorectal Cancer Screen  04/10/2029    Shingles vaccine  Completed    HIV screen  Completed    Hepatitis A vaccine  Aged Out    Hib vaccine  Aged Out    Meningococcal (ACWY) vaccine  Aged Out             (applicable per patient's age: Cancer Screenings, Depression Screening, Fall Risk Screening, Immunizations)    Hemoglobin A1C (%)   Date Value   04/21/2023 10.2   01/30/2023 8.2 (H)   09/28/2022 10.2     Microalb/Crt.  Ratio (mcg/mg creat)   Date Value   01/30/2023 206 (H)     LDL Cholesterol (mg/dL)   Date Value   01/30/2023          AST (U/L)   Date Value   01/30/2023 23     ALT (U/L)   Date Value   01/30/2023 30     BUN (mg/dL)   Date Value   04/21/2023 20      (goal A1C is < 7)   (goal LDL is <100) need 30-50% reduction from baseline     BP Readings from Last 3 Encounters:   03/02/23 106/74   02/07/23 116/70   09/28/22 110/68    (goal /80)      All Future Testing planned in [facility]:  Lab Frequency Next Occurrence   Sleep Study with PAP Titration Once 06/27/2022   CT LUNG SCREENING Once 06/27/2022   CT CHEST LOW DOSE (LDCT) Once 10/21/2022   CBC with Auto Differential Once 03/27/2023   Comprehensive Metabolic Panel Once 06/70/6090   Hemoglobin A1C Once

## 2023-05-18 RX ORDER — GLIPIZIDE 10 MG/1
TABLET, FILM COATED, EXTENDED RELEASE ORAL
Qty: 90 TABLET | Refills: 3 | Status: SHIPPED | OUTPATIENT
Start: 2023-05-18

## 2023-06-07 ENCOUNTER — HOSPITAL ENCOUNTER (OUTPATIENT)
Age: 58
Setting detail: SPECIMEN
Discharge: HOME | End: 2023-06-07

## 2023-06-07 DIAGNOSIS — R19.7 DIARRHEA, UNSPECIFIED TYPE: ICD-10-CM

## 2023-06-07 LAB
C DIFF GDH + TOXINS A+B STL QL IA.RAPID: NEGATIVE
SPECIMEN DESCRIPTION: NORMAL

## 2023-06-08 LAB
MICROORGANISM/AGENT SPEC: NEGATIVE
SPECIMEN DESCRIPTION: NORMAL

## 2023-06-09 LAB
CAMPYLOBACTER DNA SPEC NAA+PROBE: NORMAL
ETEC ELTA+ESTB GENES STL QL NAA+PROBE: NORMAL
P SHIGELLOIDES DNA STL QL NAA+PROBE: NORMAL
SALMONELLA DNA SPEC QL NAA+PROBE: NORMAL
SHIGA TOXIN STX GENE SPEC NAA+PROBE: NORMAL
SHIGELLA DNA SPEC QL NAA+PROBE: NORMAL
SPECIMEN DESCRIPTION: NORMAL
V CHOL+PARA RFBL+TRKH+TNAA STL QL NAA+PR: NORMAL
Y ENTERO RECN STL QL NAA+PROBE: NORMAL

## 2023-06-21 RX ORDER — LISINOPRIL 2.5 MG/1
TABLET ORAL
Qty: 90 TABLET | Refills: 3 | Status: SHIPPED | OUTPATIENT
Start: 2023-06-21

## 2023-07-19 DIAGNOSIS — E11.42 CONTROLLED TYPE 2 DIABETES MELLITUS WITH DIABETIC POLYNEUROPATHY, WITHOUT LONG-TERM CURRENT USE OF INSULIN (HCC): ICD-10-CM

## 2023-07-19 DIAGNOSIS — K21.9 GASTROESOPHAGEAL REFLUX DISEASE WITHOUT ESOPHAGITIS: ICD-10-CM

## 2023-07-19 RX ORDER — LIRAGLUTIDE 6 MG/ML
INJECTION SUBCUTANEOUS
Qty: 6 ML | Refills: 5 | Status: SHIPPED | OUTPATIENT
Start: 2023-07-19

## 2023-07-19 RX ORDER — OMEPRAZOLE 40 MG/1
CAPSULE, DELAYED RELEASE ORAL
Qty: 90 CAPSULE | Refills: 1 | Status: SHIPPED | OUTPATIENT
Start: 2023-07-19

## 2023-07-19 NOTE — TELEPHONE ENCOUNTER
Health Maintenance   Topic Date Due    Pneumococcal 0-64 years Vaccine (2 - PCV) 09/03/2020    Diabetic retinal exam  01/17/2021    COVID-19 Vaccine (2 - Booster for Dari series) 08/17/2021    DTaP/Tdap/Td vaccine (2 - Td or Tdap) 04/28/2023    Breast cancer screen  05/13/2023    A1C test (Diabetic or Prediabetic)  07/21/2023    Hepatitis B vaccine (1 of 3 - Risk 3-dose series) 03/02/2024 (Originally 8/8/1986)    Hepatitis C screen  03/02/2024 (Originally 8/8/1985)    Flu vaccine (1) 08/01/2023    Diabetic Alb to Cr ratio (uACR) test  01/30/2024    GFR test (Diabetes, CKD 3-4, OR last GFR 15-59)  01/30/2024    Diabetic foot exam  02/07/2024    Depression Monitoring  03/02/2024    Lipids  04/21/2024    Colorectal Cancer Screen  04/10/2029    Shingles vaccine  Completed    HIV screen  Completed    Hepatitis A vaccine  Aged Out    Hib vaccine  Aged Out    Meningococcal (ACWY) vaccine  Aged Out             (applicable per patient's age: Cancer Screenings, Depression Screening, Fall Risk Screening, Immunizations)    Hemoglobin A1C (%)   Date Value   04/21/2023 10.2   01/30/2023 8.2 (H)   09/28/2022 10.2     Microalb/Crt.  Ratio (mcg/mg creat)   Date Value   01/30/2023 206 (H)     LDL Cholesterol (mg/dL)   Date Value   01/30/2023          AST (U/L)   Date Value   01/30/2023 23     ALT (U/L)   Date Value   01/30/2023 30     BUN (mg/dL)   Date Value   04/21/2023 20      (goal A1C is < 7)   (goal LDL is <100) need 30-50% reduction from baseline     BP Readings from Last 3 Encounters:   03/02/23 106/74   02/07/23 116/70   09/28/22 110/68    (goal /80)      All Future Testing planned in [facility]:  Lab Frequency Next Occurrence   CBC with Auto Differential Once 03/27/2023   Comprehensive Metabolic Panel Once 08/59/1768   Hemoglobin A1C Once 08/06/2023   Lipid Panel Once 08/06/2023       Next Visit Date:  Future Appointments   Date Time Provider [address]   7/20/2023  1:40 PM DI Glass - MARI [facility]iff

## 2023-07-19 NOTE — TELEPHONE ENCOUNTER
Health Maintenance   Topic Date Due    Pneumococcal 0-64 years Vaccine (2 - PCV) 09/03/2020    Diabetic retinal exam  01/17/2021    COVID-19 Vaccine (2 - Booster for Dari series) 08/17/2021    DTaP/Tdap/Td vaccine (2 - Td or Tdap) 04/28/2023    Breast cancer screen  05/13/2023    A1C test (Diabetic or Prediabetic)  07/21/2023    Hepatitis B vaccine (1 of 3 - Risk 3-dose series) 03/02/2024 (Originally 8/8/1986)    Hepatitis C screen  03/02/2024 (Originally 8/8/1985)    Flu vaccine (1) 08/01/2023    Diabetic Alb to Cr ratio (uACR) test  01/30/2024    GFR test (Diabetes, CKD 3-4, OR last GFR 15-59)  01/30/2024    Diabetic foot exam  02/07/2024    Depression Monitoring  03/02/2024    Lipids  04/21/2024    Colorectal Cancer Screen  04/10/2029    Shingles vaccine  Completed    HIV screen  Completed    Hepatitis A vaccine  Aged Out    Hib vaccine  Aged Out    Meningococcal (ACWY) vaccine  Aged Out             (applicable per patient's age: Cancer Screenings, Depression Screening, Fall Risk Screening, Immunizations)    Hemoglobin A1C (%)   Date Value   04/21/2023 10.2   01/30/2023 8.2 (H)   09/28/2022 10.2     Microalb/Crt.  Ratio (mcg/mg creat)   Date Value   01/30/2023 206 (H)     LDL Cholesterol (mg/dL)   Date Value   01/30/2023          AST (U/L)   Date Value   01/30/2023 23     ALT (U/L)   Date Value   01/30/2023 30     BUN (mg/dL)   Date Value   04/21/2023 20      (goal A1C is < 7)   (goal LDL is <100) need 30-50% reduction from baseline     BP Readings from Last 3 Encounters:   03/02/23 106/74   02/07/23 116/70   09/28/22 110/68    (goal /80)      All Future Testing planned in [facility]:  Lab Frequency Next Occurrence   CBC with Auto Differential Once 03/27/2023   Comprehensive Metabolic Panel Once 12/72/9537   Hemoglobin A1C Once 08/06/2023   Lipid Panel Once 08/06/2023       Next Visit Date:  Future Appointments   Date Time Provider [address]   7/20/2023  1:40 PM DI Peña - CNP Tiff

## 2023-07-20 ENCOUNTER — OFFICE VISIT (OUTPATIENT)
Dept: PRIMARY CARE CLINIC | Age: 58
End: 2023-07-20
Payer: MEDICAID

## 2023-07-20 VITALS
HEART RATE: 57 BPM | BODY MASS INDEX: 39.17 KG/M2 | DIASTOLIC BLOOD PRESSURE: 80 MMHG | TEMPERATURE: 98.4 F | SYSTOLIC BLOOD PRESSURE: 126 MMHG | OXYGEN SATURATION: 100 % | RESPIRATION RATE: 16 BRPM | WEIGHT: 228.2 LBS

## 2023-07-20 DIAGNOSIS — Z12.31 OTHER SCREENING MAMMOGRAM: ICD-10-CM

## 2023-07-20 DIAGNOSIS — H61.21 IMPACTED CERUMEN OF RIGHT EAR: ICD-10-CM

## 2023-07-20 DIAGNOSIS — H66.002 NON-RECURRENT ACUTE SUPPURATIVE OTITIS MEDIA OF LEFT EAR WITHOUT SPONTANEOUS RUPTURE OF TYMPANIC MEMBRANE: Primary | ICD-10-CM

## 2023-07-20 PROCEDURE — 99214 OFFICE O/P EST MOD 30 MIN: CPT | Performed by: NURSE PRACTITIONER

## 2023-07-20 PROCEDURE — 3079F DIAST BP 80-89 MM HG: CPT | Performed by: NURSE PRACTITIONER

## 2023-07-20 PROCEDURE — 69210 REMOVE IMPACTED EAR WAX UNI: CPT | Performed by: NURSE PRACTITIONER

## 2023-07-20 PROCEDURE — 3074F SYST BP LT 130 MM HG: CPT | Performed by: NURSE PRACTITIONER

## 2023-07-20 RX ORDER — LEVOFLOXACIN 500 MG/1
500 TABLET, FILM COATED ORAL DAILY
Qty: 7 TABLET | Refills: 0 | Status: SHIPPED | OUTPATIENT
Start: 2023-07-20 | End: 2023-07-27

## 2023-07-20 ASSESSMENT — PATIENT HEALTH QUESTIONNAIRE - PHQ9
4. FEELING TIRED OR HAVING LITTLE ENERGY: 0
6. FEELING BAD ABOUT YOURSELF - OR THAT YOU ARE A FAILURE OR HAVE LET YOURSELF OR YOUR FAMILY DOWN: 0
9. THOUGHTS THAT YOU WOULD BE BETTER OFF DEAD, OR OF HURTING YOURSELF: 0
1. LITTLE INTEREST OR PLEASURE IN DOING THINGS: 0
SUM OF ALL RESPONSES TO PHQ QUESTIONS 1-9: 0
SUM OF ALL RESPONSES TO PHQ QUESTIONS 1-9: 0
10. IF YOU CHECKED OFF ANY PROBLEMS, HOW DIFFICULT HAVE THESE PROBLEMS MADE IT FOR YOU TO DO YOUR WORK, TAKE CARE OF THINGS AT HOME, OR GET ALONG WITH OTHER PEOPLE: 0
SUM OF ALL RESPONSES TO PHQ QUESTIONS 1-9: 0
3. TROUBLE FALLING OR STAYING ASLEEP: 0
5. POOR APPETITE OR OVEREATING: 0
SUM OF ALL RESPONSES TO PHQ9 QUESTIONS 1 & 2: 0
8. MOVING OR SPEAKING SO SLOWLY THAT OTHER PEOPLE COULD HAVE NOTICED. OR THE OPPOSITE, BEING SO FIGETY OR RESTLESS THAT YOU HAVE BEEN MOVING AROUND A LOT MORE THAN USUAL: 0
SUM OF ALL RESPONSES TO PHQ QUESTIONS 1-9: 0
2. FEELING DOWN, DEPRESSED OR HOPELESS: 0
7. TROUBLE CONCENTRATING ON THINGS, SUCH AS READING THE NEWSPAPER OR WATCHING TELEVISION: 0

## 2023-07-20 ASSESSMENT — ENCOUNTER SYMPTOMS
GASTROINTESTINAL NEGATIVE: 1
ALLERGIC/IMMUNOLOGIC NEGATIVE: 1
RHINORRHEA: 1
RESPIRATORY NEGATIVE: 1
EYES NEGATIVE: 1

## 2023-07-20 NOTE — PROGRESS NOTES
[facility]  Estelle Menendez, [address] PRIMARY CARE  [address] [facility] [address] [facility]  Dept: [phone]  Dept Fax: [phone]      Name: Bree Hopkins  : 1965         Chief Complaint:     Chief Complaint   Patient presents with    Otalgia     C/o bilateral ear pain but majority in the left ear. X 2 weeks. History of Present Illness:      Bree Hopkins is a 56 y.o.  female who presents with Otalgia (C/o bilateral ear pain but majority in the left ear. X 2 weeks. )      ALHAJI Medellin is here today for complaints of bilateral ear pain for a couple weeks. She states she is also getting vertigo when she rolls to the left side. No fever. Do drainage from her ears. She has been having some recent eye drainage. She has an occasional runny nose. She states in the past she was treated for an ear infection back to back and never had a full improvement in symptoms. Past Medical History:     Past Medical History:   Diagnosis Date    Anxiety     Arthritis     Depression     Diabetic neuropathy (HCC)     Esophageal reflux     Heart burn     Limitation of joint motion     Muscle weakness     Sleep apnea     DOES NOT USE A MACHINE.  DIAGNOSED > 15 YEARS AGO WITH SLEEP APNEA    Sleepiness     Vertigo       Reviewed all health maintenance requirements and ordered appropriate tests  Health Maintenance Due   Topic Date Due    Diabetic retinal exam  2021    COVID-19 Vaccine (2 - Booster for Dari series) 2021    Breast cancer screen  2023    A1C test (Diabetic or Prediabetic)  2023       Past Surgical History:     Past Surgical History:   Procedure Laterality Date    ABSCESS DRAINAGE Left 2014    BUTTOCK    APPENDECTOMY      CARPAL TUNNEL RELEASE Left 2018    CATARACT REMOVAL WITH IMPLANT Bilateral 10/24/2016     SECTION      CHOLECYSTECTOMY      COLONOSCOPY  2019    Dr. Kirti Jorgensen -normal poor prep    COLONOSCOPY N/A 3/25/2019    COLORECTAL CANCER

## 2023-07-20 NOTE — PATIENT INSTRUCTIONS
SURVEY:     You may be receiving a survey from [employer] regarding your visit today. Please complete the survey to enable us to provide the highest quality of care to you and your family. If you cannot score us a very good on any question, please call the office to discuss how we could have made your experience a very good one.      Thank you,    Helio Byrd, APRN-CNP  Stephen Guzman, APRN-CNP  Eugenia Garcia, MYRONN  Yovani Thomas, EMILY Shanks, EMILY Bai, CMA  Elizabeth, PCA  Akiko, PM

## 2023-07-31 DIAGNOSIS — H66.002 NON-RECURRENT ACUTE SUPPURATIVE OTITIS MEDIA OF LEFT EAR WITHOUT SPONTANEOUS RUPTURE OF TYMPANIC MEMBRANE: ICD-10-CM

## 2023-07-31 DIAGNOSIS — H65.02 NON-RECURRENT ACUTE SEROUS OTITIS MEDIA OF LEFT EAR: ICD-10-CM

## 2023-07-31 RX ORDER — AMOXICILLIN 500 MG/1
CAPSULE ORAL
Qty: 20 CAPSULE | Refills: 0 | OUTPATIENT
Start: 2023-07-31

## 2023-07-31 RX ORDER — LEVOFLOXACIN 500 MG/1
500 TABLET, FILM COATED ORAL DAILY
Qty: 7 TABLET | Refills: 0 | OUTPATIENT
Start: 2023-07-31 | End: 2023-08-07

## 2023-08-05 DIAGNOSIS — H65.02 NON-RECURRENT ACUTE SEROUS OTITIS MEDIA OF LEFT EAR: ICD-10-CM

## 2023-08-05 DIAGNOSIS — H66.002 NON-RECURRENT ACUTE SUPPURATIVE OTITIS MEDIA OF LEFT EAR WITHOUT SPONTANEOUS RUPTURE OF TYMPANIC MEMBRANE: ICD-10-CM

## 2023-08-06 RX ORDER — LEVOFLOXACIN 500 MG/1
500 TABLET, FILM COATED ORAL DAILY
Qty: 7 TABLET | Refills: 0 | OUTPATIENT
Start: 2023-08-06 | End: 2023-08-13

## 2023-08-06 RX ORDER — AMOXICILLIN 500 MG/1
CAPSULE ORAL
Qty: 20 CAPSULE | Refills: 0 | OUTPATIENT
Start: 2023-08-06

## 2023-08-15 DIAGNOSIS — H66.002 NON-RECURRENT ACUTE SUPPURATIVE OTITIS MEDIA OF LEFT EAR WITHOUT SPONTANEOUS RUPTURE OF TYMPANIC MEMBRANE: ICD-10-CM

## 2023-08-15 RX ORDER — LEVOFLOXACIN 500 MG/1
500 TABLET, FILM COATED ORAL DAILY
Qty: 7 TABLET | Refills: 0 | OUTPATIENT
Start: 2023-08-15 | End: 2023-08-22

## 2023-08-23 NOTE — PROGRESS NOTES
Does pt need labs before visit?   [facility] PHYSICIANS  [facility], [address]\"" PRIMARY CARE  [address] [facility] [address] [place] [address]  Dept: [phone]  Dept Fax: [phone]      Name: Sharon Ledesma  : 1965         Chief Complaint:     Chief Complaint   Patient presents with    Otalgia     X 1 week. C/o left ear pain. History of Present Illness:      Sharon Ledesma is a 56 y.o.  female who presents with Otalgia (X 1 week. C/o left ear pain. )    Lacie is here today for  left ear pain and vertigo. She states she has had this for a week today. She was seen in the ER last Thursday and given Amoxicillin and Antivert. She states she had only slight improvement. She states she is only having increased dizziness when standing up too quickly. She denies a fever. She states she has been eating and drinking well. She was taking some OTC Sudafed at home. She states she has had this happen in the past.      Past Medical History:     Past Medical History:   Diagnosis Date    Anxiety     Arthritis     Depression     Diabetic neuropathy (HCC)     Esophageal reflux     Heart burn     Limitation of joint motion     Muscle weakness     Sleep apnea     DOES NOT USE A MACHINE.  DIAGNOSED > 15 YEARS AGO WITH SLEEP APNEA    Sleepiness     Vertigo       Reviewed all health maintenance requirements and ordered appropriate tests  Health Maintenance Due   Topic Date Due    Diabetic foot exam  2021    Diabetic microalbuminuria test  2021    Lipids  2021    A1C test (Diabetic or Prediabetic)  2022       Past Surgical History:     Past Surgical History:   Procedure Laterality Date    ABSCESS DRAINAGE Left 2014    BUTTOCK    APPENDECTOMY      CARPAL TUNNEL RELEASE Left 2018    CATARACT REMOVAL WITH IMPLANT Bilateral 10/24/2016     SECTION      CHOLECYSTECTOMY      COLONOSCOPY  2019    Dr. Ruthy Estrada -normal poor prep    COLONOSCOPY N/A 3/25/2019    COLORECTAL CANCER SCREENING, NOT HIGH RISK performed by Eduardo Matthews MD at [address] [place] [address] N/A 4/10/2019    -diverticulosis,hemorrhoids,lipoma at ileocecal valve    HERNIA REPAIR Right     [facility]    HERNIA REPAIR  2006    HERNIA REPAIR WITH HYSTERECTOMY SURGERY    HYSTERECTOMY (CERVIX STATUS UNKNOWN)      LAPAROSCOPY          Medications:       Prior to Admission medications    Medication Sig Start Date End Date Taking?  Authorizing Provider   Liraglutide (VICTOZA) 18 MG/3ML SOPN SC injection INJECT 1.2 MG SUBCUTANEOUSLY ONCE DAILY 9/21/22  Yes Janet PuebloDI CNP   amoxicillin-clavulanate (AUGMENTIN) 875-125 MG per tablet Take 1 tablet by mouth 2 times daily for 10 days 9/21/22 10/1/22 Yes Janet PuebloDI CNP   fluticasone [facility]) 50 MCG/ACT nasal spray 1 spray by Each Nostril route daily 9/21/22  Yes Janet Pueblo, APRN - CNP   insulin glargine (LANTUS SOLOSTAR) 100 UNIT/ML injection pen Inject 54 Units into the skin nightly 7/5/22  Yes DI Johansen CNP   albuterol sulfate HFA (VENTOLIN HFA) 108 (90 Base) MCG/ACT inhaler Inhale 2 puffs into the lungs 4 times daily as needed for Wheezing 6/27/22  Yes DI Burciaga CNP   ondansetron (ZOFRAN) 4 MG tablet Take 1 tablet by mouth 3 times daily as needed for Nausea or Vomiting 5/24/22  Yes DI Larry CNP   ARIPiprazole (ABILIFY) 5 MG tablet Take 1 tablet by mouth daily 5/18/22  Yes DI Johansen CNP   escitalopram (LEXAPRO) 20 MG tablet Take 1 tablet by mouth once daily 4/5/22  Yes DI Johansen CNP   lisinopril (PRINIVIL;ZESTRIL) 2.5 MG tablet Take 1 tablet by mouth once daily 4/5/22  Yes DI Johansen CNP   glipiZIDE (GLUCOTROL XL) 10 MG extended release tablet Take 1 tablet by mouth once daily 4/5/22  Yes DI Johansen CNP   omeprazole (PRILOSEC) 40 MG delayed release capsule Take 1 capsule by mouth once daily 3/1/22  Yes Honorio Greenfield Might, APRN - CNP   hydrOXYzine (ATARAX) 25 MG tablet Take 1 tablet by mouth nightly as needed Head: Normocephalic. Right Ear: Tympanic membrane normal.      Left Ear: Tympanic membrane is injected, erythematous and bulging. Nose: Mucosal edema and rhinorrhea present. Rhinorrhea is clear. Mouth/Throat:      Lips: Pink. Mouth: Mucous membranes are moist.      Pharynx: Oropharynx is clear. Eyes:      Conjunctiva/sclera: Conjunctivae normal.      Pupils: Pupils are equal, round, and reactive to light. Cardiovascular:      Rate and Rhythm: Normal rate and regular rhythm. Heart sounds: Normal heart sounds. Pulmonary:      Effort: Pulmonary effort is normal.      Breath sounds: Normal breath sounds. Musculoskeletal:         General: Normal range of motion. Cervical back: Normal range of motion. Skin:     General: Skin is warm. Capillary Refill: Capillary refill takes less than 2 seconds. Neurological:      General: No focal deficit present. Mental Status: She is alert and oriented to person, place, and time.    Psychiatric:         Mood and Affect: Mood normal.         Behavior: Behavior normal.       Data:     Lab Results   Component Value Date/Time     08/05/2021 08:12 AM    K 4.7 08/05/2021 08:12 AM     08/05/2021 08:12 AM    CO2 22 08/05/2021 08:12 AM    BUN 13 08/05/2021 08:12 AM    CREATININE 0.62 08/05/2021 08:12 AM    GLUCOSE 189 08/05/2021 08:12 AM    GLUCOSE 351 03/11/2012 06:45 PM    PROT 6.2 08/05/2021 08:12 AM    LABALBU 3.6 08/05/2021 08:12 AM    BILITOT 0.35 08/05/2021 08:12 AM    ALKPHOS 142 08/05/2021 08:12 AM    AST 14 08/05/2021 08:12 AM    ALT 12 08/05/2021 08:12 AM     Lab Results   Component Value Date/Time    WBC 10.1 08/05/2021 08:12 AM    RBC 4.25 08/05/2021 08:12 AM    RBC 5.34 03/11/2012 06:07 PM    HGB 12.5 08/05/2021 08:12 AM    HCT 39.1 08/05/2021 08:12 AM    MCV 92.0 08/05/2021 08:12 AM    MCH 29.4 08/05/2021 08:12 AM    MCHC 32.0 08/05/2021 08:12 AM    RDW 13.0 08/05/2021 08:12 AM     08/05/2021 08:12 AM  2012 06:07 PM    MPV 10.6 2021 08:12 AM     Lab Results   Component Value Date/Time    TSH 0.88 2013 02:11 PM     Lab Results   Component Value Date/Time    CHOL 160 2020 03:31 PM    HDL 27 2020 03:31 PM    LABA1C 8.8 2021 02:35 PM       Assessment/Plan:      Diagnosis Orders   1. Non-recurrent acute suppurative otitis media of left ear without spontaneous rupture of tympanic membrane  amoxicillin-clavulanate (AUGMENTIN) 875-125 MG per tablet      2. Controlled type 2 diabetes mellitus with diabetic polyneuropathy, without long-term current use of insulin (HCC)  Liraglutide (VICTOZA) 18 MG/3ML SOPN SC injection        Otitis Media-Discontinue Amoxicillin. Start taking Augmentin as prescribed. Flonase as prescribed. May use OTC Ibuprofen for pain. May use warm compresses for pain. Increase fluids. Make slow position changes until vertigo resolves. Karen Huffman refilled and encouraged to make a follow up appointment. 1.  Lacie received counseling on the following healthy behaviors: nutrition, exercise, and medication adherence  2. Patient given educational materials - see patient instructions  3. Was a self-tracking handout given in paper form or via [email or web address]? No  If yes, see orders or list here. 4.  Discussed use, benefit, and side effects of prescribed medications. Barriers to medication compliance addressed. All patient questions answered. Pt voiced understanding. 5.  Reviewed prior labs and health maintenance  6. Continue current medications, diet and exercise.     Completed Refills   Requested Prescriptions     Signed Prescriptions Disp Refills    Liraglutide (VICTOZA) 18 MG/3ML SOPN SC injection 6 mL 5     Sig: INJECT 1.2 MG SUBCUTANEOUSLY ONCE DAILY    amoxicillin-clavulanate (AUGMENTIN) 875-125 MG per tablet 20 tablet 0     Sig: Take 1 tablet by mouth 2 times daily for 10 days    fluticasone (FLONASE) 50 MCG/ACT nasal spray 32 g 1     Si spray by Each Nostril route daily         No follow-ups on file.

## 2023-09-01 DIAGNOSIS — H66.002 NON-RECURRENT ACUTE SUPPURATIVE OTITIS MEDIA OF LEFT EAR WITHOUT SPONTANEOUS RUPTURE OF TYMPANIC MEMBRANE: ICD-10-CM

## 2023-09-01 RX ORDER — LEVOFLOXACIN 500 MG/1
500 TABLET, FILM COATED ORAL DAILY
Qty: 7 TABLET | Refills: 0 | OUTPATIENT
Start: 2023-09-01 | End: 2023-09-08

## 2023-11-23 DIAGNOSIS — H66.002 NON-RECURRENT ACUTE SUPPURATIVE OTITIS MEDIA OF LEFT EAR WITHOUT SPONTANEOUS RUPTURE OF TYMPANIC MEMBRANE: ICD-10-CM

## 2023-11-24 RX ORDER — LEVOFLOXACIN 500 MG/1
500 TABLET, FILM COATED ORAL DAILY
Qty: 7 TABLET | Refills: 0 | OUTPATIENT
Start: 2023-11-24 | End: 2023-12-01

## 2024-01-03 DIAGNOSIS — K21.9 GASTROESOPHAGEAL REFLUX DISEASE WITHOUT ESOPHAGITIS: ICD-10-CM

## 2024-01-03 RX ORDER — OMEPRAZOLE 40 MG/1
CAPSULE, DELAYED RELEASE ORAL
Qty: 90 CAPSULE | Refills: 1 | OUTPATIENT
Start: 2024-01-03

## 2024-01-15 DIAGNOSIS — K21.9 GASTROESOPHAGEAL REFLUX DISEASE WITHOUT ESOPHAGITIS: ICD-10-CM

## 2024-01-15 DIAGNOSIS — H66.002 NON-RECURRENT ACUTE SUPPURATIVE OTITIS MEDIA OF LEFT EAR WITHOUT SPONTANEOUS RUPTURE OF TYMPANIC MEMBRANE: ICD-10-CM

## 2024-01-15 RX ORDER — LEVOFLOXACIN 500 MG/1
500 TABLET, FILM COATED ORAL DAILY
Qty: 7 TABLET | Refills: 0 | OUTPATIENT
Start: 2024-01-15 | End: 2024-01-22

## 2024-01-15 RX ORDER — OMEPRAZOLE 40 MG/1
CAPSULE, DELAYED RELEASE ORAL
Qty: 90 CAPSULE | Refills: 1 | OUTPATIENT
Start: 2024-01-15

## 2024-04-01 DIAGNOSIS — K21.9 GASTROESOPHAGEAL REFLUX DISEASE WITHOUT ESOPHAGITIS: ICD-10-CM

## 2024-04-01 RX ORDER — OMEPRAZOLE 40 MG/1
CAPSULE, DELAYED RELEASE ORAL
Qty: 90 CAPSULE | Refills: 1 | OUTPATIENT
Start: 2024-04-01

## 2024-06-02 DIAGNOSIS — F34.1 DYSTHYMIA: ICD-10-CM

## 2024-06-02 RX ORDER — ESCITALOPRAM OXALATE 20 MG/1
TABLET ORAL
Qty: 90 TABLET | Refills: 3 | OUTPATIENT
Start: 2024-06-02

## 2024-06-29 DIAGNOSIS — F34.1 DYSTHYMIA: ICD-10-CM

## 2024-06-29 RX ORDER — ESCITALOPRAM OXALATE 20 MG/1
TABLET ORAL
Qty: 90 TABLET | Refills: 3 | OUTPATIENT
Start: 2024-06-29

## 2024-10-23 NOTE — PATIENT INSTRUCTIONS
Patient Education        Bronchitis: Care Instructions  Your Care Instructions    Bronchitis is inflammation of the bronchial tubes, which carry air to the lungs. The tubes swell and produce mucus, or phlegm. The mucus and inflamed bronchial tubes make you cough. You may have trouble breathing. Most cases of bronchitis are caused by viruses like those that cause colds. Antibiotics usually do not help and they may be harmful. Bronchitis usually develops rapidly and lasts about 2 to 3 weeks in otherwise healthy people. Follow-up care is a key part of your treatment and safety. Be sure to make and go to all appointments, and call your doctor if you are having problems. It's also a good idea to know your test results and keep a list of the medicines you take. How can you care for yourself at home? · Take all medicines exactly as prescribed. Call your doctor if you think you are having a problem with your medicine. · Get some extra rest.  · Take an over-the-counter pain medicine, such as acetaminophen (Tylenol), ibuprofen (Advil, Motrin), or naproxen (Aleve) to reduce fever and relieve body aches. Read and follow all instructions on the label. · Do not take two or more pain medicines at the same time unless the doctor told you to. Many pain medicines have acetaminophen, which is Tylenol. Too much acetaminophen (Tylenol) can be harmful. · Take an over-the-counter cough medicine that contains dextromethorphan to help quiet a dry, hacking cough so that you can sleep. Avoid cough medicines that have more than one active ingredient. Read and follow all instructions on the label. · Breathe moist air from a humidifier, hot shower, or sink filled with hot water. The heat and moisture will thin mucus so you can cough it out. · Do not smoke. Smoking can make bronchitis worse. If you need help quitting, talk to your doctor about stop-smoking programs and medicines.  These can increase your chances of quitting for good.  When should you call for help? Call 911 anytime you think you may need emergency care. For example, call if:  · You have severe trouble breathing. Call your doctor now or seek immediate medical care if:  · You have new or worse trouble breathing. · You cough up dark brown or bloody mucus (sputum). · You have a new or higher fever. · You have a new rash. Watch closely for changes in your health, and be sure to contact your doctor if:  · You cough more deeply or more often, especially if you notice more mucus or a change in the color of your mucus. · You are not getting better as expected. Where can you learn more? Go to https://[employer]. org and sign in to your [employer] account. Enter H333 in the Smallknot box to learn more about \"Bronchitis: Care Instructions. \"     If you do not have an account, please click on the \"Sign Up Now\" link. Current as of: March 25, 2017  Content Version: 11.3  © 3390-8593 [employer]. Care instructions adapted under license by [facility] ([facility]). If you have questions about a medical condition or this instruction, always ask your healthcare professional. [place] [address] any warranty or liability for your use of this information. Oriented - self; Oriented - place; Oriented - time

## 2024-11-03 NOTE — TELEPHONE ENCOUNTER
Attempted to call patient to remind to have fasting labs done, no answer and voice mail not set up. Statement Selected

## (undated) DEVICE — SOLUTION IV IRRIG POUR BRL 0.9% SODIUM CHL 2F7124

## (undated) DEVICE — MEDI-VAC NON-CONDUCTIVE TUBING7MM X 30.5 (100FT): Brand: CARDINAL HEALTH

## (undated) DEVICE — CANNULA ORAL NSL AD CO2 N INTUB O2 DEL DISP TRU LNK

## (undated) DEVICE — FORCEPS BX L240CM JAW DIA2.4MM ORNG L CAP W/ NDL DISP RAD